# Patient Record
Sex: FEMALE | Race: WHITE | NOT HISPANIC OR LATINO | URBAN - METROPOLITAN AREA
[De-identification: names, ages, dates, MRNs, and addresses within clinical notes are randomized per-mention and may not be internally consistent; named-entity substitution may affect disease eponyms.]

---

## 2019-04-23 ENCOUNTER — EMERGENCY (EMERGENCY)
Facility: HOSPITAL | Age: 49
LOS: 1 days | Discharge: HOME | End: 2019-04-23
Attending: EMERGENCY MEDICINE
Payer: COMMERCIAL

## 2019-04-23 VITALS
RESPIRATION RATE: 20 BRPM | OXYGEN SATURATION: 100 % | DIASTOLIC BLOOD PRESSURE: 84 MMHG | WEIGHT: 169.98 LBS | HEART RATE: 122 BPM | TEMPERATURE: 99 F | HEIGHT: 61 IN | SYSTOLIC BLOOD PRESSURE: 138 MMHG

## 2019-04-23 VITALS
SYSTOLIC BLOOD PRESSURE: 137 MMHG | DIASTOLIC BLOOD PRESSURE: 76 MMHG | TEMPERATURE: 98 F | RESPIRATION RATE: 16 BRPM | OXYGEN SATURATION: 100 % | HEART RATE: 112 BPM

## 2019-04-23 DIAGNOSIS — R19.00 INTRA-ABDOMINAL AND PELVIC SWELLING, MASS AND LUMP, UNSPECIFIED SITE: ICD-10-CM

## 2019-04-23 LAB
ALBUMIN SERPL ELPH-MCNC: 3.6 G/DL — SIGNIFICANT CHANGE UP (ref 3.3–5.2)
ALP SERPL-CCNC: 61 U/L — SIGNIFICANT CHANGE UP (ref 40–120)
ALT FLD-CCNC: 10 U/L — SIGNIFICANT CHANGE UP
ANION GAP SERPL CALC-SCNC: 15 MMOL/L — SIGNIFICANT CHANGE UP (ref 5–17)
APAP SERPL-MCNC: <7.5 UG/ML — LOW (ref 10–26)
APPEARANCE UR: CLEAR — SIGNIFICANT CHANGE UP
APTT BLD: 30.5 SEC — SIGNIFICANT CHANGE UP (ref 27.5–36.3)
AST SERPL-CCNC: 16 U/L — SIGNIFICANT CHANGE UP
BACTERIA # UR AUTO: ABNORMAL
BASOPHILS # BLD AUTO: 0 K/UL — SIGNIFICANT CHANGE UP (ref 0–0.2)
BASOPHILS NFR BLD AUTO: 0.1 % — SIGNIFICANT CHANGE UP (ref 0–2)
BILIRUB DIRECT SERPL-MCNC: 0.1 MG/DL — SIGNIFICANT CHANGE UP (ref 0–0.3)
BILIRUB INDIRECT FLD-MCNC: 0.3 MG/DL — SIGNIFICANT CHANGE UP (ref 0.2–1)
BILIRUB SERPL-MCNC: 0.4 MG/DL — SIGNIFICANT CHANGE UP (ref 0.4–2)
BILIRUB SERPL-MCNC: 0.4 MG/DL — SIGNIFICANT CHANGE UP (ref 0.4–2)
BILIRUB UR-MCNC: NEGATIVE — SIGNIFICANT CHANGE UP
BUN SERPL-MCNC: 9 MG/DL — SIGNIFICANT CHANGE UP (ref 8–20)
CALCIUM SERPL-MCNC: 9 MG/DL — SIGNIFICANT CHANGE UP (ref 8.6–10.2)
CHLORIDE SERPL-SCNC: 101 MMOL/L — SIGNIFICANT CHANGE UP (ref 98–107)
CK SERPL-CCNC: 68 U/L — SIGNIFICANT CHANGE UP (ref 25–170)
CO2 SERPL-SCNC: 22 MMOL/L — SIGNIFICANT CHANGE UP (ref 22–29)
COLOR SPEC: YELLOW — SIGNIFICANT CHANGE UP
CREAT SERPL-MCNC: 0.66 MG/DL — SIGNIFICANT CHANGE UP (ref 0.5–1.3)
DIFF PNL FLD: ABNORMAL
EOSINOPHIL # BLD AUTO: 0 K/UL — SIGNIFICANT CHANGE UP (ref 0–0.5)
EOSINOPHIL NFR BLD AUTO: 0.4 % — SIGNIFICANT CHANGE UP (ref 0–6)
EPI CELLS # UR: SIGNIFICANT CHANGE UP
ETHANOL SERPL-MCNC: <10 MG/DL — SIGNIFICANT CHANGE UP
GLUCOSE SERPL-MCNC: 113 MG/DL — SIGNIFICANT CHANGE UP (ref 70–115)
GLUCOSE UR QL: NEGATIVE MG/DL — SIGNIFICANT CHANGE UP
HCT VFR BLD CALC: 30.9 % — LOW (ref 37–47)
HGB BLD-MCNC: 9.3 G/DL — LOW (ref 12–16)
INR BLD: 1.06 RATIO — SIGNIFICANT CHANGE UP (ref 0.88–1.16)
KETONES UR-MCNC: NEGATIVE — SIGNIFICANT CHANGE UP
LDH SERPL L TO P-CCNC: 273 U/L — HIGH (ref 98–192)
LEUKOCYTE ESTERASE UR-ACNC: NEGATIVE — SIGNIFICANT CHANGE UP
LIDOCAIN IGE QN: 24 U/L — SIGNIFICANT CHANGE UP (ref 22–51)
LYMPHOCYTES # BLD AUTO: 1.4 K/UL — SIGNIFICANT CHANGE UP (ref 1–4.8)
LYMPHOCYTES # BLD AUTO: 15.8 % — LOW (ref 20–55)
MAGNESIUM SERPL-MCNC: 1.9 MG/DL — SIGNIFICANT CHANGE UP (ref 1.6–2.6)
MCHC RBC-ENTMCNC: 24.3 PG — LOW (ref 27–31)
MCHC RBC-ENTMCNC: 30.1 G/DL — LOW (ref 32–36)
MCV RBC AUTO: 80.9 FL — LOW (ref 81–99)
MONOCYTES # BLD AUTO: 0.7 K/UL — SIGNIFICANT CHANGE UP (ref 0–0.8)
MONOCYTES NFR BLD AUTO: 7.8 % — SIGNIFICANT CHANGE UP (ref 3–10)
NEUTROPHILS # BLD AUTO: 6.9 K/UL — SIGNIFICANT CHANGE UP (ref 1.8–8)
NEUTROPHILS NFR BLD AUTO: 75.7 % — HIGH (ref 37–73)
NITRITE UR-MCNC: NEGATIVE — SIGNIFICANT CHANGE UP
PH UR: 7 — SIGNIFICANT CHANGE UP (ref 5–8)
PLATELET # BLD AUTO: 479 K/UL — HIGH (ref 150–400)
POTASSIUM SERPL-MCNC: 3.5 MMOL/L — SIGNIFICANT CHANGE UP (ref 3.5–5.3)
POTASSIUM SERPL-SCNC: 3.5 MMOL/L — SIGNIFICANT CHANGE UP (ref 3.5–5.3)
PROT SERPL-MCNC: 8.3 G/DL — SIGNIFICANT CHANGE UP (ref 6.6–8.7)
PROT UR-MCNC: 30 MG/DL
PROTHROM AB SERPL-ACNC: 12.2 SEC — SIGNIFICANT CHANGE UP (ref 10–12.9)
RBC # BLD: 3.82 M/UL — LOW (ref 4.4–5.2)
RBC # FLD: 16.5 % — HIGH (ref 11–15.6)
RBC CASTS # UR COMP ASSIST: SIGNIFICANT CHANGE UP /HPF (ref 0–4)
SODIUM SERPL-SCNC: 138 MMOL/L — SIGNIFICANT CHANGE UP (ref 135–145)
SP GR SPEC: 1 — LOW (ref 1.01–1.02)
TSH SERPL-MCNC: 2.51 UIU/ML — SIGNIFICANT CHANGE UP (ref 0.27–4.2)
UROBILINOGEN FLD QL: NEGATIVE MG/DL — SIGNIFICANT CHANGE UP
WBC # BLD: 9.1 K/UL — SIGNIFICANT CHANGE UP (ref 4.8–10.8)
WBC # FLD AUTO: 9.1 K/UL — SIGNIFICANT CHANGE UP (ref 4.8–10.8)
WBC UR QL: SIGNIFICANT CHANGE UP

## 2019-04-23 PROCEDURE — 87086 URINE CULTURE/COLONY COUNT: CPT

## 2019-04-23 PROCEDURE — 82248 BILIRUBIN DIRECT: CPT

## 2019-04-23 PROCEDURE — 83690 ASSAY OF LIPASE: CPT

## 2019-04-23 PROCEDURE — 71275 CT ANGIOGRAPHY CHEST: CPT | Mod: 26

## 2019-04-23 PROCEDURE — 82550 ASSAY OF CK (CPK): CPT

## 2019-04-23 PROCEDURE — 71275 CT ANGIOGRAPHY CHEST: CPT

## 2019-04-23 PROCEDURE — 80307 DRUG TEST PRSMV CHEM ANLYZR: CPT

## 2019-04-23 PROCEDURE — 80053 COMPREHEN METABOLIC PANEL: CPT

## 2019-04-23 PROCEDURE — 90471 IMMUNIZATION ADMIN: CPT

## 2019-04-23 PROCEDURE — 86301 IMMUNOASSAY TUMOR CA 19-9: CPT

## 2019-04-23 PROCEDURE — 99284 EMERGENCY DEPT VISIT MOD MDM: CPT

## 2019-04-23 PROCEDURE — 90715 TDAP VACCINE 7 YRS/> IM: CPT

## 2019-04-23 PROCEDURE — 85027 COMPLETE CBC AUTOMATED: CPT

## 2019-04-23 PROCEDURE — 82378 CARCINOEMBRYONIC ANTIGEN: CPT

## 2019-04-23 PROCEDURE — 12013 RPR F/E/E/N/L/M 2.6-5.0 CM: CPT

## 2019-04-23 PROCEDURE — 83615 LACTATE (LD) (LDH) ENZYME: CPT

## 2019-04-23 PROCEDURE — 74177 CT ABD & PELVIS W/CONTRAST: CPT

## 2019-04-23 PROCEDURE — 81001 URINALYSIS AUTO W/SCOPE: CPT

## 2019-04-23 PROCEDURE — 85610 PROTHROMBIN TIME: CPT

## 2019-04-23 PROCEDURE — 85730 THROMBOPLASTIN TIME PARTIAL: CPT

## 2019-04-23 PROCEDURE — 99284 EMERGENCY DEPT VISIT MOD MDM: CPT | Mod: 25

## 2019-04-23 PROCEDURE — 84443 ASSAY THYROID STIM HORMONE: CPT

## 2019-04-23 PROCEDURE — 86304 IMMUNOASSAY TUMOR CA 125: CPT

## 2019-04-23 PROCEDURE — 83735 ASSAY OF MAGNESIUM: CPT

## 2019-04-23 PROCEDURE — 36415 COLL VENOUS BLD VENIPUNCTURE: CPT

## 2019-04-23 PROCEDURE — 84702 CHORIONIC GONADOTROPIN TEST: CPT

## 2019-04-23 PROCEDURE — 74177 CT ABD & PELVIS W/CONTRAST: CPT | Mod: 26

## 2019-04-23 RX ORDER — CEPHALEXIN 500 MG
1 CAPSULE ORAL
Qty: 20 | Refills: 0 | OUTPATIENT
Start: 2019-04-23 | End: 2019-04-27

## 2019-04-23 RX ORDER — CEPHALEXIN 500 MG
500 CAPSULE ORAL ONCE
Qty: 0 | Refills: 0 | Status: COMPLETED | OUTPATIENT
Start: 2019-04-23 | End: 2019-04-23

## 2019-04-23 RX ORDER — TETANUS TOXOID, REDUCED DIPHTHERIA TOXOID AND ACELLULAR PERTUSSIS VACCINE, ADSORBED 5; 2.5; 8; 8; 2.5 [IU]/.5ML; [IU]/.5ML; UG/.5ML; UG/.5ML; UG/.5ML
0.5 SUSPENSION INTRAMUSCULAR ONCE
Qty: 0 | Refills: 0 | Status: COMPLETED | OUTPATIENT
Start: 2019-04-23 | End: 2019-04-23

## 2019-04-23 RX ADMIN — Medication 500 MILLIGRAM(S): at 21:49

## 2019-04-23 RX ADMIN — TETANUS TOXOID, REDUCED DIPHTHERIA TOXOID AND ACELLULAR PERTUSSIS VACCINE, ADSORBED 0.5 MILLILITER(S): 5; 2.5; 8; 8; 2.5 SUSPENSION INTRAMUSCULAR at 16:11

## 2019-04-23 NOTE — ED STATDOCS - NS ED ROS FT
Const: Denies fever, chills  HEENT: Denies blurry vision, sore throat  Neck: Denies neck pain/stiffness  Resp: Denies coughing, SOB  Cardiovascular: Denies CP, palpitations, LE edema  GI: (+) abdominal distension, weight loss (-) Denies nausea, vomiting, abdominal pain, diarrhea, constipation, blood in stool  : Denies urinary frequency/urgency/dysuria, hematuria  MSK: (+) nasal bridge pain (-) Denies back pain  Neuro: Denies HA, dizziness, numbness, weakness  Skin: (+) laceration (-) Denies rashes.

## 2019-04-23 NOTE — ED STATDOCS - ATTENDING CONTRIBUTION TO CARE
I, Rayshawn Peters, performed the initial face to face bedside interview with this patient regarding history of present illness, review of symptoms and relevant past medical, social and family history.  I completed an independent physical examination.  I was the initial provider who evaluated this patient. I have signed out the follow up of any pending tests (i.e. labs, radiological studies) to the ACP.  I have communicated the patient’s plan of care and disposition with the ACP.

## 2019-04-23 NOTE — ED ADULT NURSE NOTE - INTERVENTIONS DEFINITIONS
Instruct patient to call for assistance/Non-slip footwear when patient is off stretcher/Physically safe environment: no spills, clutter or unnecessary equipment/Review medications for side effects contributing to fall risk/Call bell, personal items and telephone within reach/Stretcher in lowest position, wheels locked, appropriate side rails in place/Monitor gait and stability/Reinforce activity limits and safety measures with patient and family/Monitor for mental status changes and reorient to person, place, and time

## 2019-04-23 NOTE — CONSULT NOTE ADULT - PROBLEM SELECTOR RECOMMENDATION 9
Patient okay for discharge from GYN perspective  Stat labs including CEA, CA-125,  and LDH to be drawn prior to discharge if discharge deemed acceptable by ED  Discussed with patient outpatient follow-up tomorrow in [place], patient provided with contact information and agrees to comply with follow-up.    Case discussed with Dr. Gonzalez

## 2019-04-23 NOTE — ED ADULT NURSE NOTE - OBJECTIVE STATEMENT
Patient AOx4, reliable historian, reports stripping over the sidewalk and falling. Patient denies all forms of pain at this time. Bruising noted to fall, mostly around nose and scant bleeding. Patient denies n/v, loss of consciousness, other heady injury. Patient has large abdomen, but patient denies

## 2019-04-23 NOTE — CONSULT NOTE ADULT - SUBJECTIVE AND OBJECTIVE BOX
GYNECOLOGIC ONCOLOGY CONSULT NOTE    47yo nulligravida LMP January 2019 with no pert. hx. presented to ED s/p mechanical fall with c/o moderate, aching pain to the bridge of her nose with laceration. Pt reports she had a fall on pavement outside of her work office. Patient underwent CT A/P with incidental finding of  complex cystic left adnexal mass measuring 17 cm. Massive cystic lesion occupying the lower abdomen measuring 33 cm. It is uncertain whether this represents a large peritoneal metastasis or a component of the above described cystic left adnexal mass, or possibly a right adnexal mass. There is suspicion for rupture of this mass with a large amount of abdominal ascites. Patient reports some occasional constipation over the past few years that comes and goes, she states that it has gotten icnreasingly worse over the past month along with some abdominal distention. She admits to weight loss although she states it was intentional with change in her diet. Patient otherwise has no complaints at this time. She reports never having any GYN Care. She also reports not being followed by a PCP. She reports FHx of Breast Cancer  in her Maternal Aunt. She denies any fevers, SOB, CP, N/V/D, abdominal pain, VB, changes in bladder function and/or calf pain.     OB/GYN HISTORY: G0  LPAP, Lcolonoscopy, Lmammo, LBone Density Scan: Never    Surgical History:    No significant past surgical history    Past Medical History:   No pertinent past medical history    No Known Drug Allergies    REVIEW OF SYSTEMS:    CONSTITUTIONAL: intentional weight loss No fever or fatigue  EYES: No eye pain, visual disturbances, or discharge  ENMT:  No difficulty hearing, tinnitus, vertigo; No sinus or throat pain  NECK: No pain or stiffness  BREASTS: No pain, masses, or nipple discharge  RESPIRATORY: No cough, wheezing, chills or hemoptysis; No shortness of breath  CARDIOVASCULAR: No chest pain, palpitations, dizziness, or leg swelling  GASTROINTESTINAL: Abdominal distention, constipation, No abdominal or epigastric pain. No nausea, vomiting, or hematemesis; No diarrhea. No melena or hematochezia.  GENITOURINARY: No dysuria, frequency, hematuria, or incontinence  NEUROLOGICAL: No headaches, memory loss, loss of strength, numbness, or tremors  SKIN: No itching, burning, rashes, or lesions   LYMPH NODES: No enlarged glands  ENDOCRINE: No heat or cold intolerance; No hair loss  MUSCULOSKELETAL: No joint pain or swelling; No muscle, back, or extremity pain  PSYCHIATRIC: No depression, anxiety, mood swings, or difficulty sleeping  HEME/LYMPH: No easy bruising, or bleeding gums  ALLERY AND IMMUNOLOGIC: No hives or eczema    MEDICATIONS  (STANDING):    OBJECTIVE FINDINGS:    Vital Signs Last 24 Hrs  T(F): 97.9 (23 Apr 2019 18:20), Max: 98.7 (23 Apr 2019 13:01)  HR: 112 (23 Apr 2019 18:20) (112 - 122)  BP: 137/76 (23 Apr 2019 18:20) (137/76 - 138/84)  RR: 16 (23 Apr 2019 18:20) (16 - 20)  SpO2: 100% (23 Apr 2019 18:20) (100% - 100%)    PHYSICAL EXAM:    GENERAL: NAD, well-developed  HEAD:  Swollen R. orbital with bruising, nasal area with laceration covered by dressing.   EYES: Conjunctiva and sclera clear  NECK: Normal thyroid  NERVOUS SYSTEM:  Alert & Oriented X3, Good concentration; Motor Strength 5/5 B/L upper and lower extremities; DTRs 2+ intact and symmetric  CHEST/LUNG: Clear to percussion bilaterally; No rales, rhonchi, wheezing, or rubs  HEART: Regular rate and rhythm; No murmurs, rubs, or gallops  ABDOMEN: Softly distended, nontender, Bowel sounds present, No rebound, No guarding  EXTREMITIES:  2+ Peripheral Pulses, No clubbing, cyanosis, or edema.  LYMPH: No lymphadenopathy noted  SKIN: No rashes or lesions      LABS:                        9.3    9.1   )-----------( 479      ( 23 Apr 2019 14:33 )             30.9

## 2019-04-23 NOTE — ED STATDOCS - PROGRESS NOTE DETAILS
None
PA note: Results of CT scan reviewed " Massive cystic lesion occupying the lower abdomen measuring 33 cm. It is uncertain whether this represents a large peritoneal metastasis or a component of the above described cystic left adnexal mass, or possibly a right adnexal mass." Case discussed with LAURIE Diana ( MD [facility] ). Pt was evaluated by Doctor Carlos who recommended pt follow up in [place] tomorrow. Pt was given follow up information. Pt instructed to return to ED in 5-7 days for suture removal. Pt given xeroform / gauze and instructed on wound care for facial laceration. PT educated to return to ED immediately if she develops abdominal pain.

## 2019-04-23 NOTE — CONSULT NOTE ADULT - ASSESSMENT
49yo s/p fall with incidental finding on CT revealing complex left adnexal mass as well as massive cystic lesion representing peritoneal mets vs component of adnexal cyst, c/o abdominal distention and constipation.

## 2019-04-23 NOTE — ED STATDOCS - PHYSICAL EXAMINATION
Const: Awake, alert and oriented. In no acute distress. Well appearing.  HEENT: NC/AT. Moist mucous membranes.  Eyes: No scleral icterus. EOMI.  Neck:. Soft and supple. Full ROM without pain.  Cardiac: Regular rate and rhythm. +S1/S2. No murmurs. Peripheral pulses 2+ and symmetric. No LE edema.  Resp: Speaking in full sentences. No evidence of respiratory distress. No wheezes, rales or rhonchi.  Abd: Abdomen soft nontender, distended with large volume ascites, with protruding umbilicus and spiders angiomata. Normal bowel sounds in all 4 quadrants. No guarding or rebound.  Back: Spine midline and non-tender. No CVAT.  Skin: 3 cm vertical laceration to bridge of nose, with avulsion over proximal nasal bridge; no bony tenderness, or crepitus.

## 2019-04-23 NOTE — ED STATDOCS - OBJECTIVE STATEMENT
49 y/o F pt with no pert. hx. presents to ED with co-workers/friends s/p mechanical trip and fall 1 hour ago c/o moderate, aching pain to bridge of nose with laceration. Pt reports she had a fall on pavement outside of her work office. In addition, both pt and friends from work, describe pt having 3 months of progressive abdominal distension with assoc. weight loss. Pt admits she has declined to see a doctor up to this point. Pt has not had any routine physicals or health maintenance in many years, including pap smear, and colonoscopy. Pt reports family hx of pancreatic and breast cancer. Pt is not currently on meds. Pt is a non-smoker. Pt denies use of Tylenol. Denies vaginal bleeding, nausea, vomiting and diarrhea. No further complaints at this time.

## 2019-04-23 NOTE — ED STATDOCS - CARE PLAN
Principal Discharge DX:	Pelvic mass Principal Discharge DX:	Pelvic mass  Secondary Diagnosis:	Ascites, malignant  Secondary Diagnosis:	Nasal laceration, initial encounter

## 2019-04-24 ENCOUNTER — APPOINTMENT (OUTPATIENT)
Dept: GYNECOLOGIC ONCOLOGY | Facility: CLINIC | Age: 49
End: 2019-04-24
Payer: COMMERCIAL

## 2019-04-24 VITALS — BODY MASS INDEX: 32.47 KG/M2 | HEIGHT: 61 IN | WEIGHT: 172 LBS

## 2019-04-24 DIAGNOSIS — Z80.3 FAMILY HISTORY OF MALIGNANT NEOPLASM OF BREAST: ICD-10-CM

## 2019-04-24 LAB
CANCER AG125 SERPL-ACNC: 93 U/ML — HIGH
CANCER AG19-9 SERPL-ACNC: 99 U/ML — HIGH
CEA SERPL-MCNC: <0.6 NG/ML — SIGNIFICANT CHANGE UP (ref 0–3.8)
CULTURE RESULTS: SIGNIFICANT CHANGE UP
SPECIMEN SOURCE: SIGNIFICANT CHANGE UP

## 2019-04-24 PROCEDURE — 99204 OFFICE O/P NEW MOD 45 MIN: CPT

## 2019-04-26 ENCOUNTER — TRANSCRIPTION ENCOUNTER (OUTPATIENT)
Age: 49
End: 2019-04-26

## 2019-04-26 NOTE — CHIEF COMPLAINT
[FreeTextEntry1] : [facility]\par \par [facility] [phone] at [address] [place], [place] [id number]

## 2019-04-26 NOTE — HISTORY OF PRESENT ILLNESS
[FreeTextEntry1] : This 49 y/o nulligravida female, LMP 1/2019 with no pertinent history presented to ED at [facility] on 4/23 s/p mechanical fall with c/o moderate, aching pain to the bridge of her nose with laceration. Pt. at that time reported she had a fall on the pavement outside of her work office. Patient underwent CT A/P with incidental finding of complex cystic left adnexal mass measuring 17cm. Massive cyst occupying the lower abdomen measuring 33cm. It is uncertain whether this represents a large peritoneal metastasis or a component of the above described cystic left adnexal mass, or possibly a right adnexal mass. There is suspicion for rupture of this mass with a large amount of abdominal ascites. Patient reports some occasional constipation that comes and goes as well as abdominal distension since December. She does admit to weight loss, amount which she is unable to quantify but does admit it can be intentional to a change in her diet. Family history significant for a maternal aunt with breast ca later in life and sister who was diagnosed in her 40s, who she reports had genetic testing which was negative.\par She reports last menstrual period was in 8/2018 which she reports was irregular x 1 year, and then she had an episode of bleeding in 1/2019. She denies any GYN care in the past.\par Denies ever having a PAP smear, mammogram, colonoscopy or bone scan. \par

## 2019-04-26 NOTE — ASSESSMENT
[FreeTextEntry1] : This 49 y/o nulligravida, virginal female being referred for large pelvic mass found incidentally during mechanical fall work up. Physical examination today revealed an intact hymen which one finger was able to pass into the vaginal canal, pelvic mass filling up the pelvis, unable to tell if it is arising from the uterus. \par \par Discussed with the patient that we are unable to see where this mass is originating from but I am recommending surgical management. I would like her to see a PCP prior to the surgery for medical clearance as she does not routinely follow up with a doctor. \par \par I discussed at length with the patient the nature, purpose, risks, benefits, and alternatives of total abdominal hysterectomy and bilateral salpingo-oophorectomy via a vertical, midline incision, possible bilateral pelvic and para-aortic lymphadenectomy and surgical staging, FS.  The patient understands the risks to include (but not be limited to) bowel injury, bleeding (with the possible need for transfusion), bladder or ureteral injury, infections, protracted wound closure, deep venous thrombosis, and jazmin-operative death.  She is also aware of  the possibility of  a unrecognized surgical complication with need for subsequent re-exploration.  She also understands the rationale for surgical procedures such as omentectomy, or pelvic and para-aortic lymphadenectomy for the proper staging of a gynecological cancer.  She agrees to proceed.  She asked numerous questions which were answered to her satisfaction.  She understands the need for a pre-operative bowel preparation and agrees to comply with our instructions.

## 2019-04-26 NOTE — PHYSICAL EXAM
[Normal] : Bimanual Exam: Normal [de-identified] : tense distension [de-identified] : intact hymen, one finger able to pass through, ovrerall normal cervix [de-identified] : unable to decifer if mass related to uterus, mass filling the pelvis  [de-identified] : Patient was interviewed and examined with chaperone present. Name of Chaperone: Sil Valenzuela PA-C

## 2019-04-29 ENCOUNTER — EMERGENCY (EMERGENCY)
Facility: HOSPITAL | Age: 49
LOS: 1 days | Discharge: HOME | End: 2019-04-29
Attending: EMERGENCY MEDICINE

## 2019-04-29 VITALS
RESPIRATION RATE: 20 BRPM | SYSTOLIC BLOOD PRESSURE: 116 MMHG | HEIGHT: 61 IN | OXYGEN SATURATION: 98 % | WEIGHT: 201.94 LBS | DIASTOLIC BLOOD PRESSURE: 79 MMHG | TEMPERATURE: 98 F | HEART RATE: 112 BPM

## 2019-04-29 NOTE — ED PROVIDER NOTE - ATTENDING CONTRIBUTION TO CARE
Nicole: I performed a face to face bedside interview with patient regarding history of present illness, review of symptoms and past medical history. I completed an independent physical exam.  I have discussed patient's plan of care with advanced care provider.   I agree with note as stated above including HISTORY OF PRESENT ILLNESS, HIV, PAST MEDICAL/SURGICAL/FAMILY/SOCIAL HISTORY, ALLERGIES AND HOME MEDICATIONS, REVIEW OF SYSTEMS, PHYSICAL EXAM, MEDICAL DECISION MAKING and any PROGRESS NOTES during the time I functioned as the attending physician for this patient  unless otherwise noted. My brief assessment is as follows: 5 sutures to bridge of nose, no infection, healing well, removed by LAURIE Nixon

## 2019-05-01 ENCOUNTER — OUTPATIENT (OUTPATIENT)
Dept: OUTPATIENT SERVICES | Facility: HOSPITAL | Age: 49
LOS: 1 days | End: 2019-05-01
Payer: COMMERCIAL

## 2019-05-01 VITALS
DIASTOLIC BLOOD PRESSURE: 79 MMHG | RESPIRATION RATE: 18 BRPM | HEIGHT: 61 IN | SYSTOLIC BLOOD PRESSURE: 118 MMHG | TEMPERATURE: 98 F | WEIGHT: 163.14 LBS | HEART RATE: 111 BPM

## 2019-05-01 DIAGNOSIS — N94.9 UNSPECIFIED CONDITION ASSOCIATED WITH FEMALE GENITAL ORGANS AND MENSTRUAL CYCLE: ICD-10-CM

## 2019-05-01 DIAGNOSIS — Z98.890 OTHER SPECIFIED POSTPROCEDURAL STATES: Chronic | ICD-10-CM

## 2019-05-01 DIAGNOSIS — Z29.9 ENCOUNTER FOR PROPHYLACTIC MEASURES, UNSPECIFIED: ICD-10-CM

## 2019-05-01 DIAGNOSIS — Z13.89 ENCOUNTER FOR SCREENING FOR OTHER DISORDER: ICD-10-CM

## 2019-05-01 DIAGNOSIS — Z01.818 ENCOUNTER FOR OTHER PREPROCEDURAL EXAMINATION: ICD-10-CM

## 2019-05-01 LAB
BLD GP AB SCN SERPL QL: SIGNIFICANT CHANGE UP
HBA1C BLD-MCNC: 5.2 % — SIGNIFICANT CHANGE UP (ref 4–5.6)
TYPE + AB SCN PNL BLD: SIGNIFICANT CHANGE UP

## 2019-05-01 PROCEDURE — 93010 ELECTROCARDIOGRAM REPORT: CPT

## 2019-05-01 RX ORDER — SODIUM CHLORIDE 9 MG/ML
3 INJECTION INTRAMUSCULAR; INTRAVENOUS; SUBCUTANEOUS EVERY 8 HOURS
Qty: 0 | Refills: 0 | Status: DISCONTINUED | OUTPATIENT
Start: 2019-05-08 | End: 2019-05-11

## 2019-05-01 NOTE — H&P PST ADULT - ENMT COMMENTS
Pt has positive ecchymosis noted to right eye, facial area (around her mouth) and bandage to nose in place

## 2019-05-01 NOTE — H&P PST ADULT - NSICDXPROBLEM_GEN_ALL_CORE_FT
PROBLEM DIAGNOSES  Problem: Unsp cond assoc w female genital organs and menstrual cycle  Assessment and Plan: Exploratory Laparotomy, Total Abdominal Hysterectomy, Bilateral Salpingo-Oophorectomy, Possible Staging, Frozen Section    Problem: Need for prophylactic measure  Assessment and Plan:     Problem: Screening for substance abuse  Assessment and Plan: PROBLEM DIAGNOSES  Problem: Unsp cond assoc w female genital organs and menstrual cycle  Assessment and Plan: Exploratory Laparotomy, Total Abdominal Hysterectomy, Bilateral Salpingo-Oophorectomy, Possible Staging, Frozen Section  Medical Clearance (Per surgeon)    Problem: Need for prophylactic measure  Assessment and Plan: High risk.  Surgical Team to evaluate need for Pharmacologic VTE Prophylaxis     Problem: Screening for substance abuse  Assessment and Plan: Opioid screening tool score =0.  Low risk for potential future abuse

## 2019-05-01 NOTE — H&P PST ADULT - ASSESSMENT

## 2019-05-01 NOTE — H&P PST ADULT - HISTORY OF PRESENT ILLNESS
This is a 48 y.o female who presents to PST today. This is a 48 y.o female who presents to PST today.  The pt reports she tripped and fell approximately six days ago and sustained an nasal injury.  She was evaluated in [facility] ED at that time and upon physical examination was noted to have abdominal distention.  She underwent a CT scan and was noted to have a mass.  She was evaluated by Dr. Gonzalez while in the ED and the next day in his office.  She is now in anticipation of a Laparotomy and hysterectomy.

## 2019-05-01 NOTE — H&P PST ADULT - RS GEN PE MLT RESP DETAILS PC
diminished breath sounds, R/diminished breath sounds, L/normal/airway patent/respirations non-labored

## 2019-05-01 NOTE — H&P PST ADULT - NSICDXFAMILYHX_GEN_ALL_CORE_FT
FAMILY HISTORY:  Sibling  Still living? Yes, Estimated age: 41-50  FH: breast cancer, Age at diagnosis: 41-50

## 2019-05-01 NOTE — H&P PST ADULT - NSANTHOSAYNRD_GEN_A_CORE
No. BROOKS screening performed.  STOP BANG Legend: 0-2 = LOW Risk; 3-4 = INTERMEDIATE Risk; 5-8 = HIGH Risk

## 2019-05-02 PROBLEM — Z78.9 OTHER SPECIFIED HEALTH STATUS: Chronic | Status: INACTIVE | Noted: 2019-04-23 | Resolved: 2019-05-01

## 2019-05-07 ENCOUNTER — RESULT REVIEW (OUTPATIENT)
Age: 49
End: 2019-05-07

## 2019-05-07 ENCOUNTER — INPATIENT (INPATIENT)
Facility: HOSPITAL | Age: 49
LOS: 3 days | Discharge: HOME | DRG: 742 | End: 2019-05-11
Attending: OBSTETRICS & GYNECOLOGY | Admitting: OBSTETRICS & GYNECOLOGY
Payer: COMMERCIAL

## 2019-05-07 VITALS
RESPIRATION RATE: 16 BRPM | DIASTOLIC BLOOD PRESSURE: 73 MMHG | OXYGEN SATURATION: 97 % | HEART RATE: 113 BPM | WEIGHT: 163.14 LBS | TEMPERATURE: 100 F | HEIGHT: 61 IN | SYSTOLIC BLOOD PRESSURE: 108 MMHG

## 2019-05-07 DIAGNOSIS — Z98.890 OTHER SPECIFIED POSTPROCEDURAL STATES: Chronic | ICD-10-CM

## 2019-05-07 DIAGNOSIS — N94.9 UNSPECIFIED CONDITION ASSOCIATED WITH FEMALE GENITAL ORGANS AND MENSTRUAL CYCLE: ICD-10-CM

## 2019-05-07 LAB
ABO RH CONFIRMATION: SIGNIFICANT CHANGE UP
GLUCOSE BLDC GLUCOMTR-MCNC: 135 MG/DL — HIGH (ref 70–99)
GLUCOSE BLDC GLUCOMTR-MCNC: 91 MG/DL — SIGNIFICANT CHANGE UP (ref 70–99)
GLUCOSE BLDC GLUCOMTR-MCNC: 95 MG/DL — SIGNIFICANT CHANGE UP (ref 70–99)

## 2019-05-07 PROCEDURE — 36415 COLL VENOUS BLD VENIPUNCTURE: CPT

## 2019-05-07 PROCEDURE — 44604 SUTURE LARGE INTESTINE: CPT | Mod: 80,59

## 2019-05-07 PROCEDURE — 88333 PATH CONSLTJ SURG CYTO XM 1: CPT | Mod: 26

## 2019-05-07 PROCEDURE — 58954 TAH RAD DEBULK/LYMPH REMOVE: CPT | Mod: 80

## 2019-05-07 PROCEDURE — 86850 RBC ANTIBODY SCREEN: CPT

## 2019-05-07 PROCEDURE — 88305 TISSUE EXAM BY PATHOLOGIST: CPT | Mod: 26

## 2019-05-07 PROCEDURE — 58954 TAH RAD DEBULK/LYMPH REMOVE: CPT | Mod: 22

## 2019-05-07 PROCEDURE — G0463: CPT

## 2019-05-07 PROCEDURE — 93005 ELECTROCARDIOGRAM TRACING: CPT

## 2019-05-07 PROCEDURE — 88307 TISSUE EXAM BY PATHOLOGIST: CPT | Mod: 26

## 2019-05-07 PROCEDURE — 86901 BLOOD TYPING SEROLOGIC RH(D): CPT

## 2019-05-07 PROCEDURE — 83036 HEMOGLOBIN GLYCOSYLATED A1C: CPT

## 2019-05-07 PROCEDURE — 86923 COMPATIBILITY TEST ELECTRIC: CPT

## 2019-05-07 PROCEDURE — 44604 SUTURE LARGE INTESTINE: CPT | Mod: 59

## 2019-05-07 PROCEDURE — 88304 TISSUE EXAM BY PATHOLOGIST: CPT | Mod: 26

## 2019-05-07 PROCEDURE — 86900 BLOOD TYPING SEROLOGIC ABO: CPT

## 2019-05-07 RX ORDER — NALOXONE HYDROCHLORIDE 4 MG/.1ML
0.1 SPRAY NASAL
Qty: 0 | Refills: 0 | Status: DISCONTINUED | OUTPATIENT
Start: 2019-05-07 | End: 2019-05-11

## 2019-05-07 RX ORDER — DIPHENHYDRAMINE HCL 50 MG
12.5 CAPSULE ORAL EVERY 4 HOURS
Qty: 0 | Refills: 0 | Status: DISCONTINUED | OUTPATIENT
Start: 2019-05-07 | End: 2019-05-11

## 2019-05-07 RX ORDER — FENTANYL CITRATE 50 UG/ML
30 INJECTION INTRAVENOUS
Qty: 0 | Refills: 0 | Status: DISCONTINUED | OUTPATIENT
Start: 2019-05-07 | End: 2019-05-09

## 2019-05-07 RX ORDER — DEXTROSE MONOHYDRATE, SODIUM CHLORIDE, AND POTASSIUM CHLORIDE 50; .745; 4.5 G/1000ML; G/1000ML; G/1000ML
1000 INJECTION, SOLUTION INTRAVENOUS
Qty: 0 | Refills: 0 | Status: DISCONTINUED | OUTPATIENT
Start: 2019-05-08 | End: 2019-05-08

## 2019-05-07 RX ORDER — FENTANYL CITRATE 50 UG/ML
25 INJECTION INTRAVENOUS
Qty: 0 | Refills: 0 | Status: DISCONTINUED | OUTPATIENT
Start: 2019-05-07 | End: 2019-05-08

## 2019-05-07 RX ORDER — CEFOTETAN DISODIUM 1 G
1 VIAL (EA) INJECTION EVERY 12 HOURS
Qty: 0 | Refills: 0 | Status: COMPLETED | OUTPATIENT
Start: 2019-05-07 | End: 2019-05-08

## 2019-05-07 RX ORDER — SODIUM CHLORIDE 9 MG/ML
1000 INJECTION, SOLUTION INTRAVENOUS
Qty: 0 | Refills: 0 | Status: DISCONTINUED | OUTPATIENT
Start: 2019-05-07 | End: 2019-05-08

## 2019-05-07 RX ORDER — ONDANSETRON 8 MG/1
4 TABLET, FILM COATED ORAL ONCE
Qty: 0 | Refills: 0 | Status: DISCONTINUED | OUTPATIENT
Start: 2019-05-07 | End: 2019-05-08

## 2019-05-07 RX ORDER — ONDANSETRON 8 MG/1
4 TABLET, FILM COATED ORAL EVERY 6 HOURS
Qty: 0 | Refills: 0 | Status: DISCONTINUED | OUTPATIENT
Start: 2019-05-08 | End: 2019-05-11

## 2019-05-07 RX ORDER — ENOXAPARIN SODIUM 100 MG/ML
40 INJECTION SUBCUTANEOUS DAILY
Qty: 0 | Refills: 0 | Status: DISCONTINUED | OUTPATIENT
Start: 2019-05-08 | End: 2019-05-11

## 2019-05-07 RX ORDER — ONDANSETRON 8 MG/1
4 TABLET, FILM COATED ORAL EVERY 6 HOURS
Qty: 0 | Refills: 0 | Status: DISCONTINUED | OUTPATIENT
Start: 2019-05-07 | End: 2019-05-11

## 2019-05-07 RX ORDER — KETOROLAC TROMETHAMINE 30 MG/ML
30 SYRINGE (ML) INJECTION EVERY 6 HOURS
Qty: 0 | Refills: 0 | Status: DISCONTINUED | OUTPATIENT
Start: 2019-05-08 | End: 2019-05-09

## 2019-05-07 RX ADMIN — Medication 100 GRAM(S): at 15:12

## 2019-05-07 RX ADMIN — FENTANYL CITRATE 30 MILLILITER(S): 50 INJECTION INTRAVENOUS at 21:02

## 2019-05-08 ENCOUNTER — RESULT REVIEW (OUTPATIENT)
Age: 49
End: 2019-05-08

## 2019-05-08 DIAGNOSIS — Z90.710 ACQUIRED ABSENCE OF BOTH CERVIX AND UTERUS: ICD-10-CM

## 2019-05-08 LAB
ALBUMIN SERPL ELPH-MCNC: 2.1 G/DL — LOW (ref 3.3–5.2)
ANION GAP SERPL CALC-SCNC: 8 MMOL/L — SIGNIFICANT CHANGE UP (ref 5–17)
ANISOCYTOSIS BLD QL: SLIGHT — SIGNIFICANT CHANGE UP
ANISOCYTOSIS BLD QL: SLIGHT — SIGNIFICANT CHANGE UP
BUN SERPL-MCNC: 14 MG/DL — SIGNIFICANT CHANGE UP (ref 8–20)
BURR CELLS BLD QL SMEAR: PRESENT — SIGNIFICANT CHANGE UP
CALCIUM SERPL-MCNC: 7.4 MG/DL — LOW (ref 8.6–10.2)
CHLORIDE SERPL-SCNC: 102 MMOL/L — SIGNIFICANT CHANGE UP (ref 98–107)
CO2 SERPL-SCNC: 22 MMOL/L — SIGNIFICANT CHANGE UP (ref 22–29)
CREAT SERPL-MCNC: 0.67 MG/DL — SIGNIFICANT CHANGE UP (ref 0.5–1.3)
ELLIPTOCYTES BLD QL SMEAR: SLIGHT — SIGNIFICANT CHANGE UP
GLUCOSE SERPL-MCNC: 234 MG/DL — HIGH (ref 70–115)
HCT VFR BLD CALC: 17.6 % — CRITICAL LOW (ref 37–47)
HCT VFR BLD CALC: 28.7 % — LOW (ref 37–47)
HGB BLD-MCNC: 5.7 G/DL — CRITICAL LOW (ref 12–16)
HGB BLD-MCNC: 9.2 G/DL — LOW (ref 12–16)
HYPOCHROMIA BLD QL: SLIGHT — SIGNIFICANT CHANGE UP
LYMPHOCYTES # BLD AUTO: 4 % — LOW (ref 20–55)
LYMPHOCYTES # BLD AUTO: 5 % — LOW (ref 20–55)
MACROCYTES BLD QL: SLIGHT — SIGNIFICANT CHANGE UP
MAGNESIUM SERPL-MCNC: 1.8 MG/DL — SIGNIFICANT CHANGE UP (ref 1.6–2.6)
MCHC RBC-ENTMCNC: 25.7 PG — LOW (ref 27–31)
MCHC RBC-ENTMCNC: 26.4 PG — LOW (ref 27–31)
MCHC RBC-ENTMCNC: 32.1 G/DL — SIGNIFICANT CHANGE UP (ref 32–36)
MCHC RBC-ENTMCNC: 32.4 G/DL — SIGNIFICANT CHANGE UP (ref 32–36)
MCV RBC AUTO: 80.2 FL — LOW (ref 81–99)
MCV RBC AUTO: 81.5 FL — SIGNIFICANT CHANGE UP (ref 81–99)
MICROCYTES BLD QL: SLIGHT — SIGNIFICANT CHANGE UP
MICROCYTES BLD QL: SLIGHT — SIGNIFICANT CHANGE UP
MONOCYTES NFR BLD AUTO: 7 % — SIGNIFICANT CHANGE UP (ref 3–10)
MONOCYTES NFR BLD AUTO: 9 % — SIGNIFICANT CHANGE UP (ref 3–10)
NEUTROPHILS NFR BLD AUTO: 87 % — HIGH (ref 37–73)
NEUTROPHILS NFR BLD AUTO: 88 % — HIGH (ref 37–73)
OVALOCYTES BLD QL SMEAR: SLIGHT — SIGNIFICANT CHANGE UP
OVALOCYTES BLD QL SMEAR: SLIGHT — SIGNIFICANT CHANGE UP
PLAT MORPH BLD: NORMAL — SIGNIFICANT CHANGE UP
PLAT MORPH BLD: NORMAL — SIGNIFICANT CHANGE UP
PLATELET # BLD AUTO: 291 K/UL — SIGNIFICANT CHANGE UP (ref 150–400)
PLATELET # BLD AUTO: 527 K/UL — HIGH (ref 150–400)
POIKILOCYTOSIS BLD QL AUTO: SLIGHT — SIGNIFICANT CHANGE UP
POIKILOCYTOSIS BLD QL AUTO: SLIGHT — SIGNIFICANT CHANGE UP
POLYCHROMASIA BLD QL SMEAR: SLIGHT — SIGNIFICANT CHANGE UP
POLYCHROMASIA BLD QL SMEAR: SLIGHT — SIGNIFICANT CHANGE UP
POTASSIUM SERPL-MCNC: 4.8 MMOL/L — SIGNIFICANT CHANGE UP (ref 3.5–5.3)
POTASSIUM SERPL-SCNC: 4.8 MMOL/L — SIGNIFICANT CHANGE UP (ref 3.5–5.3)
RBC # BLD: 2.16 M/UL — LOW (ref 4.4–5.2)
RBC # BLD: 3.58 M/UL — LOW (ref 4.4–5.2)
RBC # FLD: 16.1 % — HIGH (ref 11–15.6)
RBC # FLD: 16.3 % — HIGH (ref 11–15.6)
RBC BLD AUTO: ABNORMAL
RBC BLD AUTO: ABNORMAL
SODIUM SERPL-SCNC: 132 MMOL/L — LOW (ref 135–145)
WBC # BLD: 13.3 K/UL — HIGH (ref 4.8–10.8)
WBC # BLD: 7.7 K/UL — SIGNIFICANT CHANGE UP (ref 4.8–10.8)
WBC # FLD AUTO: 13.3 K/UL — HIGH (ref 4.8–10.8)
WBC # FLD AUTO: 7.7 K/UL — SIGNIFICANT CHANGE UP (ref 4.8–10.8)

## 2019-05-08 PROCEDURE — 71045 X-RAY EXAM CHEST 1 VIEW: CPT | Mod: 26

## 2019-05-08 PROCEDURE — 88305 TISSUE EXAM BY PATHOLOGIST: CPT | Mod: 26

## 2019-05-08 PROCEDURE — 88112 CYTOPATH CELL ENHANCE TECH: CPT | Mod: 26

## 2019-05-08 RX ORDER — DEXTROSE MONOHYDRATE, SODIUM CHLORIDE, AND POTASSIUM CHLORIDE 50; .745; 4.5 G/1000ML; G/1000ML; G/1000ML
1000 INJECTION, SOLUTION INTRAVENOUS
Qty: 0 | Refills: 0 | Status: DISCONTINUED | OUTPATIENT
Start: 2019-05-08 | End: 2019-05-09

## 2019-05-08 RX ORDER — SODIUM CHLORIDE 9 MG/ML
1000 INJECTION INTRAMUSCULAR; INTRAVENOUS; SUBCUTANEOUS
Qty: 0 | Refills: 0 | Status: DISCONTINUED | OUTPATIENT
Start: 2019-05-08 | End: 2019-05-08

## 2019-05-08 RX ORDER — CEFOTETAN DISODIUM 1 G
2 VIAL (EA) INJECTION ONCE
Qty: 0 | Refills: 0 | Status: COMPLETED | OUTPATIENT
Start: 2019-05-08 | End: 2019-05-07

## 2019-05-08 RX ADMIN — Medication 30 MILLIGRAM(S): at 12:18

## 2019-05-08 RX ADMIN — SODIUM CHLORIDE 3 MILLILITER(S): 9 INJECTION INTRAMUSCULAR; INTRAVENOUS; SUBCUTANEOUS at 06:14

## 2019-05-08 RX ADMIN — Medication 30 MILLIGRAM(S): at 12:30

## 2019-05-08 RX ADMIN — Medication 30 MILLIGRAM(S): at 00:55

## 2019-05-08 RX ADMIN — Medication 30 MILLIGRAM(S): at 06:20

## 2019-05-08 RX ADMIN — SODIUM CHLORIDE 3 MILLILITER(S): 9 INJECTION INTRAMUSCULAR; INTRAVENOUS; SUBCUTANEOUS at 20:52

## 2019-05-08 RX ADMIN — Medication 30 MILLIGRAM(S): at 07:00

## 2019-05-08 RX ADMIN — DEXTROSE MONOHYDRATE, SODIUM CHLORIDE, AND POTASSIUM CHLORIDE 125 MILLILITER(S): 50; .745; 4.5 INJECTION, SOLUTION INTRAVENOUS at 16:15

## 2019-05-08 RX ADMIN — Medication 30 MILLIGRAM(S): at 17:20

## 2019-05-08 RX ADMIN — Medication 100 GRAM(S): at 16:15

## 2019-05-08 RX ADMIN — FENTANYL CITRATE 30 MILLILITER(S): 50 INJECTION INTRAVENOUS at 06:13

## 2019-05-08 RX ADMIN — SODIUM CHLORIDE 3 MILLILITER(S): 9 INJECTION INTRAMUSCULAR; INTRAVENOUS; SUBCUTANEOUS at 12:18

## 2019-05-08 RX ADMIN — ENOXAPARIN SODIUM 40 MILLIGRAM(S): 100 INJECTION SUBCUTANEOUS at 12:17

## 2019-05-08 RX ADMIN — FENTANYL CITRATE 30 MILLILITER(S): 50 INJECTION INTRAVENOUS at 19:18

## 2019-05-08 RX ADMIN — DEXTROSE MONOHYDRATE, SODIUM CHLORIDE, AND POTASSIUM CHLORIDE 125 MILLILITER(S): 50; .745; 4.5 INJECTION, SOLUTION INTRAVENOUS at 00:52

## 2019-05-08 RX ADMIN — Medication 30 MILLIGRAM(S): at 17:07

## 2019-05-08 RX ADMIN — FENTANYL CITRATE 30 MILLILITER(S): 50 INJECTION INTRAVENOUS at 07:33

## 2019-05-08 RX ADMIN — Medication 100 GRAM(S): at 02:55

## 2019-05-08 RX ADMIN — Medication 30 MILLIGRAM(S): at 01:22

## 2019-05-08 NOTE — PROGRESS NOTE ADULT - SUBJECTIVE AND OBJECTIVE BOX
Patient seen for afternoon rounds with Dr. Gonzalez.  Patient is OOB to chair, in good spirits and without complaints at this time.  Pain is well controlled with PCA.  Grey with 700 UO in 16 hrs, will continue to monitor output.  Encouraged incentive spirometer use, patient demonstrated inhale to 700 at bedside and was educated on proper use.  Fluids remain at 125 cc/hr.   NPO with NGT in place.  CXR demonstrate NGT with proper placement, with no output. Will monitor output for possible removal of NGT tomorrow AM.   Abdominal binder ordered  Will continue to monitor AM labs.     Plan discussed with Dr. Gonzalez

## 2019-05-08 NOTE — PROGRESS NOTE ADULT - SUBJECTIVE AND OBJECTIVE BOX
patient vitals are stable, urine output 375 ml in the last 6 hours, pain is controlled, A- line still in, BP still stable , repeat HGB is 5.7 , will transfuse 2 unit of packed red blood cell , 1 unit of fresh frozen , will d/w dr leal .

## 2019-05-08 NOTE — PROGRESS NOTE ADULT - PROBLEM SELECTOR PLAN 1
Patient doing well on AM rounds. Urine output 75cc/hour. Will continue to monitor closely. CBC in PACU drawn from A-line. Repeat wnl. 2 units and 1 unit FFP held for now. NG tube with 0 cc output. Will discuss plans to advance today. Pain controlled with PCA.

## 2019-05-08 NOTE — PROGRESS NOTE ADULT - SUBJECTIVE AND OBJECTIVE BOX
GYNECOLOGIC ONCOLOGY PROGRESS NOTE    POD#1 s/p ex lap, pelvic mass removal, total abdominal hysterectomy, bilateral salpingectomy    PROBLEMS:  Unsp cond assoc w female genital organs and menstrual cycle  Need for prophylactic measure  Screening for substance abuse      Pt seen and examined at bedside.     SUBJECTIVE:    Patient is without complaints.  Pain well-controlled.  Flatus: absent   Denies Nausea, Vomiting or Diarrhea.  Denies shortness of breath, chest pain or dyspnea on exertion.  NPO, NG tube to suction     OBJECTIVE:     VITALS:  T(F): 99 (05-08-19 @ 06:19), Max: 99.5 (05-07-19 @ 11:19)  HR: 83 (05-08-19 @ 06:19) (83 - 113)  BP: 95/56 (05-08-19 @ 06:19) (88/49 - 108/73)  RR: 18 (05-08-19 @ 06:19) (14 - 18)  SpO2: 97% (05-08-19 @ 06:19) (96% - 100%)    I&O's Summary    07 May 2019 07:01  -  08 May 2019 06:57  --------------------------------------------------------  IN: 0 mL / OUT: 375 mL / NET: -375 mL        Physical Exam:  Constitutional: NAD  Pulmonary: clear to auscultation bilaterally   Cardiovascular: Regular rate and rhythm   Abdomen: soft, non-tender, non-distended, distant bowel sounds  Extremities: no lower extremity edema or calve tenderness  Incision: Clean, dry, intact.  Without signs of infection or hernia.      LABS:                        9.2    13.3  )-----------( 527      ( 08 May 2019 04:40 )             28.7     05-08    132<L>  |  102  |  14.0  ----------------------------<  234<H>  4.8   |  22.0  |  0.67    Ca    7.4<L>      08 May 2019 04:40  Mg     1.8     05-08            RADIOLOGY & ADDITIONAL TESTS:

## 2019-05-09 DIAGNOSIS — D64.9 ANEMIA, UNSPECIFIED: ICD-10-CM

## 2019-05-09 LAB
ALBUMIN SERPL ELPH-MCNC: 2 G/DL — LOW (ref 3.3–5.2)
ANION GAP SERPL CALC-SCNC: 7 MMOL/L — SIGNIFICANT CHANGE UP (ref 5–17)
BASOPHILS # BLD AUTO: 0 K/UL — SIGNIFICANT CHANGE UP (ref 0–0.2)
BASOPHILS NFR BLD AUTO: 0.1 % — SIGNIFICANT CHANGE UP (ref 0–2)
BLD GP AB SCN SERPL QL: SIGNIFICANT CHANGE UP
BUN SERPL-MCNC: 9 MG/DL — SIGNIFICANT CHANGE UP (ref 8–20)
CALCIUM SERPL-MCNC: 7.5 MG/DL — LOW (ref 8.6–10.2)
CHLORIDE SERPL-SCNC: 108 MMOL/L — HIGH (ref 98–107)
CO2 SERPL-SCNC: 23 MMOL/L — SIGNIFICANT CHANGE UP (ref 22–29)
CREAT SERPL-MCNC: 0.56 MG/DL — SIGNIFICANT CHANGE UP (ref 0.5–1.3)
EOSINOPHIL # BLD AUTO: 0 K/UL — SIGNIFICANT CHANGE UP (ref 0–0.5)
EOSINOPHIL NFR BLD AUTO: 0.4 % — SIGNIFICANT CHANGE UP (ref 0–6)
GLUCOSE SERPL-MCNC: 112 MG/DL — SIGNIFICANT CHANGE UP (ref 70–115)
HCT VFR BLD CALC: 22.4 % — LOW (ref 37–47)
HGB BLD-MCNC: 7.2 G/DL — LOW (ref 12–16)
LYMPHOCYTES # BLD AUTO: 1.2 K/UL — SIGNIFICANT CHANGE UP (ref 1–4.8)
LYMPHOCYTES # BLD AUTO: 12 % — LOW (ref 20–55)
MAGNESIUM SERPL-MCNC: 1.8 MG/DL — SIGNIFICANT CHANGE UP (ref 1.6–2.6)
MCHC RBC-ENTMCNC: 26.5 PG — LOW (ref 27–31)
MCHC RBC-ENTMCNC: 32.1 G/DL — SIGNIFICANT CHANGE UP (ref 32–36)
MCV RBC AUTO: 82.4 FL — SIGNIFICANT CHANGE UP (ref 81–99)
MONOCYTES # BLD AUTO: 1 K/UL — HIGH (ref 0–0.8)
MONOCYTES NFR BLD AUTO: 9.7 % — SIGNIFICANT CHANGE UP (ref 3–10)
NEUTROPHILS # BLD AUTO: 7.7 K/UL — SIGNIFICANT CHANGE UP (ref 1.8–8)
NEUTROPHILS NFR BLD AUTO: 77.4 % — HIGH (ref 37–73)
PHOSPHATE SERPL-MCNC: 1.9 MG/DL — LOW (ref 2.4–4.7)
PLATELET # BLD AUTO: 444 K/UL — HIGH (ref 150–400)
POTASSIUM SERPL-MCNC: 4.3 MMOL/L — SIGNIFICANT CHANGE UP (ref 3.5–5.3)
POTASSIUM SERPL-SCNC: 4.3 MMOL/L — SIGNIFICANT CHANGE UP (ref 3.5–5.3)
RBC # BLD: 2.72 M/UL — LOW (ref 4.4–5.2)
RBC # FLD: 16.8 % — HIGH (ref 11–15.6)
SODIUM SERPL-SCNC: 138 MMOL/L — SIGNIFICANT CHANGE UP (ref 135–145)
WBC # BLD: 9.9 K/UL — SIGNIFICANT CHANGE UP (ref 4.8–10.8)
WBC # FLD AUTO: 9.9 K/UL — SIGNIFICANT CHANGE UP (ref 4.8–10.8)

## 2019-05-09 RX ORDER — DEXTROSE MONOHYDRATE, SODIUM CHLORIDE, AND POTASSIUM CHLORIDE 50; .745; 4.5 G/1000ML; G/1000ML; G/1000ML
1000 INJECTION, SOLUTION INTRAVENOUS
Refills: 0 | Status: DISCONTINUED | OUTPATIENT
Start: 2019-05-09 | End: 2019-05-10

## 2019-05-09 RX ORDER — OXYCODONE AND ACETAMINOPHEN 5; 325 MG/1; MG/1
2 TABLET ORAL EVERY 4 HOURS
Refills: 0 | Status: DISCONTINUED | OUTPATIENT
Start: 2019-05-09 | End: 2019-05-11

## 2019-05-09 RX ORDER — OXYCODONE AND ACETAMINOPHEN 5; 325 MG/1; MG/1
1 TABLET ORAL EVERY 4 HOURS
Refills: 0 | Status: DISCONTINUED | OUTPATIENT
Start: 2019-05-09 | End: 2019-05-11

## 2019-05-09 RX ADMIN — ENOXAPARIN SODIUM 40 MILLIGRAM(S): 100 INJECTION SUBCUTANEOUS at 11:43

## 2019-05-09 RX ADMIN — Medication 500 MILLIGRAM(S): at 17:05

## 2019-05-09 RX ADMIN — SODIUM CHLORIDE 3 MILLILITER(S): 9 INJECTION INTRAMUSCULAR; INTRAVENOUS; SUBCUTANEOUS at 22:26

## 2019-05-09 RX ADMIN — Medication 30 MILLIGRAM(S): at 05:34

## 2019-05-09 RX ADMIN — Medication 500 MILLIGRAM(S): at 17:02

## 2019-05-09 RX ADMIN — OXYCODONE AND ACETAMINOPHEN 1 TABLET(S): 5; 325 TABLET ORAL at 14:53

## 2019-05-09 RX ADMIN — SODIUM CHLORIDE 3 MILLILITER(S): 9 INJECTION INTRAMUSCULAR; INTRAVENOUS; SUBCUTANEOUS at 05:29

## 2019-05-09 RX ADMIN — SODIUM CHLORIDE 3 MILLILITER(S): 9 INJECTION INTRAMUSCULAR; INTRAVENOUS; SUBCUTANEOUS at 13:01

## 2019-05-09 NOTE — PROGRESS NOTE ADULT - PROBLEM SELECTOR PLAN 2
1. Patient with Hgb of 7.2 on AM labs. Asymptomatic on rounds. Will discuss with Dr. Lau need for transfusion versus expectant management.

## 2019-05-09 NOTE — PROGRESS NOTE ADULT - PROBLEM SELECTOR PLAN 1
1. NG tube with minimal output in 24 hours, pulled on AM rounds. Will advance to clear liquid diet and switch PCA pump to PO medications. Will decrease IV fluids (currently NS 125cc/hour + KCL) to 60cc/hour.   2. Urine output approximately 50cc/hour overnight. Will discontinue dee catheter with trial of void.   3. Ambulation encouraged. To continue abdominal binder. Counselled on incentive spirometry 10 times per hour.

## 2019-05-09 NOTE — PROGRESS NOTE ADULT - SUBJECTIVE AND OBJECTIVE BOX
Patient seen and examined for afternoon rounds, resting comfortably in bedside chair. She is tolerating a CLD, pain well controlled, ambulating and voiding since removal of Grey. She has not yet passed gas. Plan for 2U PRBC for Hgb of 7.2 this AM. Will follow up AM CBC, patient remains asymptomatic.

## 2019-05-09 NOTE — PROGRESS NOTE ADULT - ASSESSMENT
POD#2 s/p exploratory laparotomy, pelvic mass removal, total abdominal hysterectomy, bilateral salpingectomy

## 2019-05-09 NOTE — PROGRESS NOTE ADULT - SUBJECTIVE AND OBJECTIVE BOX
GYNECOLOGIC ONCOLOGY PROGRESS NOTE    POD#2 s/p exploratory laparotomy, pelvic mass removal, total abdominal hysterectomy, bilateral salpingectomy     PROBLEMS:  S/P hysterectomy  Endometriosis     Pt seen and examined at bedside.     SUBJECTIVE:    Patient is without complaints. Feeling tired, only slept 2-3 hours overnight. No nausea/vomiting.   Pain well-controlled with PCA.   Flatus: None   Denies Nausea, Vomiting or Diarrhea.  Denies shortness of breath, chest pain or dyspnea on exertion.  NPO, NG tube to intermittent suction  OBJECTIVE:     VITALS:  T(F): 99.4 (05-09-19 @ 00:56), Max: 99.4 (05-09-19 @ 00:56)  HR: 104 (05-09-19 @ 00:56) (85 - 113)  BP: 91/54 (05-09-19 @ 00:56) (81/54 - 95/56)  RR: 18 (05-09-19 @ 00:56) (18 - 18)  SpO2: 93% (05-09-19 @ 00:56) (93% - 100%)    I&O's Summary    08 May 2019 07:01  -  09 May 2019 07:00  --------------------------------------------------------  IN: 1550 mL / OUT: 925 mL / NET: 625 mL    NG TUBE: 50cc output in 24+ hours, intermittent suction       MEDICATIONS  (STANDING):  enoxaparin Injectable 40 milliGRAM(s) SubCutaneous daily  naproxen 500 milliGRAM(s) Oral two times a day  sodium chloride 0.9% lock flush 3 milliLiter(s) IV Push every 8 hours  sodium chloride 0.9% with potassium chloride 20 mEq/L 1000 milliLiter(s) (65 mL/Hr) IV Continuous <Continuous>    MEDICATIONS  (PRN):  diphenhydrAMINE   Injectable 12.5 milliGRAM(s) IV Push every 4 hours PRN Pruritus  naloxone Injectable 0.1 milliGRAM(s) IV Push every 3 minutes PRN For ANY of the following changes in patient status:  A. RR LESS THAN 10 breaths per minute, B. Oxygen saturation LESS THAN 90%, C. Sedation score of 6  ondansetron Injectable 4 milliGRAM(s) IV Push every 6 hours PRN Nausea  ondansetron Injectable 4 milliGRAM(s) IV Push every 6 hours PRN Postoperative Nausea and/or Vomiting  oxyCODONE    5 mG/acetaminophen 325 mG 1 Tablet(s) Oral every 4 hours PRN Moderate Pain (4 - 6)  oxyCODONE    5 mG/acetaminophen 325 mG 2 Tablet(s) Oral every 4 hours PRN Severe Pain (7 - 10)      Physical Exam:  Constitutional: NAD  Pulmonary: clear to auscultation bilaterally. Incentive spirometry to 700.   Cardiovascular: Regular rate and rhythm   Abdomen: soft, non-tender, non-distended, normal bowel signs  Extremities: no lower extremity edema or calve tenderness  Incision: Provena, abdominal binder ordered.       LABS:                        7.2    9.9   )-----------( 444      ( 09 May 2019 06:47 )             22.4     05-09    138  |  108<H>  |  9.0  ----------------------------<  112  4.3   |  23.0  |  0.56    Ca    7.5<L>      09 May 2019 06:47  Phos  1.9     05-09  Mg     1.8     05-09    TPro  x   /  Alb  2.1<L>  /  TBili  x   /  DBili  x   /  AST  x   /  ALT  x   /  AlkPhos  x   05-08

## 2019-05-10 ENCOUNTER — TRANSCRIPTION ENCOUNTER (OUTPATIENT)
Age: 49
End: 2019-05-10

## 2019-05-10 DIAGNOSIS — R19.00 INTRA-ABDOMINAL AND PELVIC SWELLING, MASS AND LUMP, UNSPECIFIED SITE: ICD-10-CM

## 2019-05-10 LAB
ANION GAP SERPL CALC-SCNC: 8 MMOL/L — SIGNIFICANT CHANGE UP (ref 5–17)
BASOPHILS # BLD AUTO: 0 K/UL — SIGNIFICANT CHANGE UP (ref 0–0.2)
BASOPHILS NFR BLD AUTO: 0.2 % — SIGNIFICANT CHANGE UP (ref 0–2)
BUN SERPL-MCNC: 4 MG/DL — LOW (ref 8–20)
CALCIUM SERPL-MCNC: 7.8 MG/DL — LOW (ref 8.6–10.2)
CHLORIDE SERPL-SCNC: 104 MMOL/L — SIGNIFICANT CHANGE UP (ref 98–107)
CO2 SERPL-SCNC: 26 MMOL/L — SIGNIFICANT CHANGE UP (ref 22–29)
CREAT SERPL-MCNC: 0.48 MG/DL — LOW (ref 0.5–1.3)
EOSINOPHIL # BLD AUTO: 0.2 K/UL — SIGNIFICANT CHANGE UP (ref 0–0.5)
EOSINOPHIL NFR BLD AUTO: 1.8 % — SIGNIFICANT CHANGE UP (ref 0–6)
GLUCOSE SERPL-MCNC: 122 MG/DL — HIGH (ref 70–115)
HCT VFR BLD CALC: 28.4 % — LOW (ref 37–47)
HGB BLD-MCNC: 9 G/DL — LOW (ref 12–16)
LYMPHOCYTES # BLD AUTO: 1 K/UL — SIGNIFICANT CHANGE UP (ref 1–4.8)
LYMPHOCYTES # BLD AUTO: 11.3 % — LOW (ref 20–55)
MCHC RBC-ENTMCNC: 26.3 PG — LOW (ref 27–31)
MCHC RBC-ENTMCNC: 31.7 G/DL — LOW (ref 32–36)
MCV RBC AUTO: 83 FL — SIGNIFICANT CHANGE UP (ref 81–99)
MONOCYTES # BLD AUTO: 0.7 K/UL — SIGNIFICANT CHANGE UP (ref 0–0.8)
MONOCYTES NFR BLD AUTO: 8.1 % — SIGNIFICANT CHANGE UP (ref 3–10)
NEUTROPHILS # BLD AUTO: 7.1 K/UL — SIGNIFICANT CHANGE UP (ref 1.8–8)
NEUTROPHILS NFR BLD AUTO: 78.3 % — HIGH (ref 37–73)
NON-GYNECOLOGICAL CYTOLOGY STUDY: SIGNIFICANT CHANGE UP
PLATELET # BLD AUTO: 457 K/UL — HIGH (ref 150–400)
POTASSIUM SERPL-MCNC: 3.8 MMOL/L — SIGNIFICANT CHANGE UP (ref 3.5–5.3)
POTASSIUM SERPL-SCNC: 3.8 MMOL/L — SIGNIFICANT CHANGE UP (ref 3.5–5.3)
RBC # BLD: 3.42 M/UL — LOW (ref 4.4–5.2)
RBC # FLD: 16.8 % — HIGH (ref 11–15.6)
SODIUM SERPL-SCNC: 138 MMOL/L — SIGNIFICANT CHANGE UP (ref 135–145)
WBC # BLD: 9.1 K/UL — SIGNIFICANT CHANGE UP (ref 4.8–10.8)
WBC # FLD AUTO: 9.1 K/UL — SIGNIFICANT CHANGE UP (ref 4.8–10.8)

## 2019-05-10 RX ADMIN — SODIUM CHLORIDE 3 MILLILITER(S): 9 INJECTION INTRAMUSCULAR; INTRAVENOUS; SUBCUTANEOUS at 05:25

## 2019-05-10 RX ADMIN — Medication 500 MILLIGRAM(S): at 18:10

## 2019-05-10 RX ADMIN — Medication 500 MILLIGRAM(S): at 05:48

## 2019-05-10 RX ADMIN — ENOXAPARIN SODIUM 40 MILLIGRAM(S): 100 INJECTION SUBCUTANEOUS at 11:25

## 2019-05-10 RX ADMIN — Medication 500 MILLIGRAM(S): at 05:49

## 2019-05-10 RX ADMIN — OXYCODONE AND ACETAMINOPHEN 2 TABLET(S): 5; 325 TABLET ORAL at 12:25

## 2019-05-10 RX ADMIN — SODIUM CHLORIDE 3 MILLILITER(S): 9 INJECTION INTRAMUSCULAR; INTRAVENOUS; SUBCUTANEOUS at 11:59

## 2019-05-10 RX ADMIN — SODIUM CHLORIDE 3 MILLILITER(S): 9 INJECTION INTRAMUSCULAR; INTRAVENOUS; SUBCUTANEOUS at 21:03

## 2019-05-10 RX ADMIN — OXYCODONE AND ACETAMINOPHEN 2 TABLET(S): 5; 325 TABLET ORAL at 11:25

## 2019-05-10 NOTE — PROGRESS NOTE ADULT - SUBJECTIVE AND OBJECTIVE BOX
GYNECOLOGIC ONCOLOGY PROGRESS NOTE    POD#3    PROBLEMS:  Postoperative anemia  S/P hysterectomy  Unsp cond assoc w female genital organs and menstrual cycle  Need for prophylactic measure  Screening for substance abuse      Pt seen and examined at bedside.     SUBJECTIVE:    Patient is without complaints.  Pain well-controlled.  Flatus: negative   Denies Nausea, Vomiting or Diarrhea.  Denies shortness of breath, chest pain or dyspnea on exertion.  Tolerating clears .    OBJECTIVE:     VITALS:  T(F): 97.5 (05-10-19 @ 08:07), Max: 99.3 (05-09-19 @ 19:00)  HR: 84 (05-10-19 @ 08:07) (84 - 100)  BP: 107/72 (05-10-19 @ 08:07) (91/53 - 107/72)  RR: 16 (05-10-19 @ 08:07) (16 - 18)  SpO2: 99% (05-10-19 @ 08:07) (91% - 99%)  Wt(kg): --    I&O's Summary    09 May 2019 07:01  -  10 May 2019 07:00  --------------------------------------------------------  IN: 1000 mL / OUT: 300 mL / NET: 700 mL        MEDICATIONS  (STANDING):  enoxaparin Injectable 40 milliGRAM(s) SubCutaneous daily  naproxen 500 milliGRAM(s) Oral two times a day  sodium chloride 0.9% lock flush 3 milliLiter(s) IV Push every 8 hours    MEDICATIONS  (PRN):  diphenhydrAMINE   Injectable 12.5 milliGRAM(s) IV Push every 4 hours PRN Pruritus  naloxone Injectable 0.1 milliGRAM(s) IV Push every 3 minutes PRN For ANY of the following changes in patient status:  A. RR LESS THAN 10 breaths per minute, B. Oxygen saturation LESS THAN 90%, C. Sedation score of 6  ondansetron Injectable 4 milliGRAM(s) IV Push every 6 hours PRN Nausea  ondansetron Injectable 4 milliGRAM(s) IV Push every 6 hours PRN Postoperative Nausea and/or Vomiting  oxyCODONE    5 mG/acetaminophen 325 mG 1 Tablet(s) Oral every 4 hours PRN Moderate Pain (4 - 6)  oxyCODONE    5 mG/acetaminophen 325 mG 2 Tablet(s) Oral every 4 hours PRN Severe Pain (7 - 10)      Physical Exam:  Constitutional: NAD  Pulmonary: clear to auscultation bilaterally   Cardiovascular: Regular rate and rhythm   Abdomen: soft, non-tender, non-distended, diminished bowel sounds   Extremities: no lower extremity edema or calve tenderness, Martha's sign negative.  Incision: Covered by prevena       LABS:                        7.2    9.9   )-----------( 444      ( 09 May 2019 06:47 )             22.4     05-09    138  |  108<H>  |  9.0  ----------------------------<  112  4.3   |  23.0  |  0.56    Ca    7.5<L>      09 May 2019 06:47  Phos  1.9     05-09  Mg     1.8     05-09    TPro  x   /  Alb  2.0<L>  /  TBili  x   /  DBili  x   /  AST  x   /  ALT  x   /  AlkPhos  x   05-09          RADIOLOGY & ADDITIONAL TESTS:

## 2019-05-10 NOTE — PROGRESS NOTE ADULT - ATTENDING COMMENTS
Dr Gonzalez was updated and agreed with the plan .
patient is seen and examined, x-ray ordered to confirm location of NG tube, otherwise stable vitals and labs, pain is well controlled , Dr Gonzalez was updated .
patient was seen and examined, doing well , hgb is 7.2 patient had soft bp and slightly tachycardic will transfuse 2 units of prbcs , will d/c pca, NG  tube pulled out, start on clears , encourage IS use .
patient was seen and examined, will follow up with post transfusion CBC , will d/c fluids, advance diet to full liquid diet, encourage ambulation and IS use,  will update Dr Gonzalez

## 2019-05-10 NOTE — DISCHARGE NOTE PROVIDER - HOSPITAL COURSE
Patient post-operatively had an uncomplicated hospital course. Her pain was well controlled. She is tolerating a regular diet. She is ambulating independently. She was able to void after removal of dee. Patient with flatus. Labs and Vitals WNL upon discharge.

## 2019-05-10 NOTE — DISCHARGE NOTE PROVIDER - CARE PROVIDER_API CALL
Manpreet Gonzalez)  [facility]  [address] [place] [address]  Phone: [phone]  Fax: [phone]  Follow Up Time:

## 2019-05-10 NOTE — PROGRESS NOTE ADULT - PROBLEM SELECTOR PLAN 1
pod#3 s/p EX-lap , modified radical hyst, bso , JG, multiple bowel serosal repair , pelvic mass excision and omentectomy

## 2019-05-10 NOTE — DISCHARGE NOTE PROVIDER - NSDCFUADDAPPT_GEN_ALL_CORE_FT
Follow-up with Dr. Gonzalez in two weeks to review pathology report.   Please contact your provider for any pain uncontrolled by medication, excessive bleeding or Fever>100.4  Please take Naprosyn 1 tablet every 12 hours x 3 days, may take percocet as prescribed for breakthrough pain.

## 2019-05-10 NOTE — PROGRESS NOTE ADULT - SUBJECTIVE AND OBJECTIVE BOX
Patient seen and examined for afternoon rounds with Dr. Lau, resting comfortably in bedside chair. She reports she has not yet passed flatus but does feel it coming. She is ambulating, voiding without difficulties and tolerating a FLD. AM Hgb appropriate post 2 U PRBC. Will continue to monitor, likely advance to regular diet tonight for dinner. DCP this weekend if patient continues to clinically improve.

## 2019-05-10 NOTE — DISCHARGE NOTE PROVIDER - REASON FOR ADMISSION
surgery - Ex-lap modified radical hysterectomy, bilateral salpingo-oophorectomy, lysis of adhesions, multiple bowel serosal repair, pelvic mass excision, omentectomy

## 2019-05-10 NOTE — PROGRESS NOTE ADULT - ASSESSMENT
49 y/o female pod#3 s/p EX-lap , modified radical hyst, bso , JG, multiple bowel serosal repair , pelvic mass excision and omentectomy, , anemia .

## 2019-05-11 ENCOUNTER — TRANSCRIPTION ENCOUNTER (OUTPATIENT)
Age: 49
End: 2019-05-11

## 2019-05-11 VITALS
HEART RATE: 88 BPM | TEMPERATURE: 98 F | OXYGEN SATURATION: 97 % | RESPIRATION RATE: 18 BRPM | SYSTOLIC BLOOD PRESSURE: 102 MMHG | DIASTOLIC BLOOD PRESSURE: 66 MMHG

## 2019-05-11 LAB
ANION GAP SERPL CALC-SCNC: 9 MMOL/L — SIGNIFICANT CHANGE UP (ref 5–17)
BASOPHILS # BLD AUTO: 0 K/UL — SIGNIFICANT CHANGE UP (ref 0–0.2)
BASOPHILS NFR BLD AUTO: 0.1 % — SIGNIFICANT CHANGE UP (ref 0–2)
BUN SERPL-MCNC: 6 MG/DL — LOW (ref 8–20)
CALCIUM SERPL-MCNC: 7.8 MG/DL — LOW (ref 8.6–10.2)
CHLORIDE SERPL-SCNC: 105 MMOL/L — SIGNIFICANT CHANGE UP (ref 98–107)
CO2 SERPL-SCNC: 26 MMOL/L — SIGNIFICANT CHANGE UP (ref 22–29)
CREAT SERPL-MCNC: 0.43 MG/DL — LOW (ref 0.5–1.3)
EOSINOPHIL # BLD AUTO: 0.3 K/UL — SIGNIFICANT CHANGE UP (ref 0–0.5)
EOSINOPHIL NFR BLD AUTO: 4 % — SIGNIFICANT CHANGE UP (ref 0–6)
GLUCOSE SERPL-MCNC: 120 MG/DL — HIGH (ref 70–115)
HCT VFR BLD CALC: 28.4 % — LOW (ref 37–47)
HGB BLD-MCNC: 9.1 G/DL — LOW (ref 12–16)
LYMPHOCYTES # BLD AUTO: 1.2 K/UL — SIGNIFICANT CHANGE UP (ref 1–4.8)
LYMPHOCYTES # BLD AUTO: 14.8 % — LOW (ref 20–55)
MCHC RBC-ENTMCNC: 26.7 PG — LOW (ref 27–31)
MCHC RBC-ENTMCNC: 32 G/DL — SIGNIFICANT CHANGE UP (ref 32–36)
MCV RBC AUTO: 83.3 FL — SIGNIFICANT CHANGE UP (ref 81–99)
MONOCYTES # BLD AUTO: 0.5 K/UL — SIGNIFICANT CHANGE UP (ref 0–0.8)
MONOCYTES NFR BLD AUTO: 6.8 % — SIGNIFICANT CHANGE UP (ref 3–10)
NEUTROPHILS # BLD AUTO: 5.8 K/UL — SIGNIFICANT CHANGE UP (ref 1.8–8)
NEUTROPHILS NFR BLD AUTO: 74 % — HIGH (ref 37–73)
PLATELET # BLD AUTO: 551 K/UL — HIGH (ref 150–400)
POTASSIUM SERPL-MCNC: 3.7 MMOL/L — SIGNIFICANT CHANGE UP (ref 3.5–5.3)
POTASSIUM SERPL-SCNC: 3.7 MMOL/L — SIGNIFICANT CHANGE UP (ref 3.5–5.3)
RBC # BLD: 3.41 M/UL — LOW (ref 4.4–5.2)
RBC # FLD: 16.9 % — HIGH (ref 11–15.6)
SODIUM SERPL-SCNC: 140 MMOL/L — SIGNIFICANT CHANGE UP (ref 135–145)
WBC # BLD: 7.8 K/UL — SIGNIFICANT CHANGE UP (ref 4.8–10.8)
WBC # FLD AUTO: 7.8 K/UL — SIGNIFICANT CHANGE UP (ref 4.8–10.8)

## 2019-05-11 PROCEDURE — 36415 COLL VENOUS BLD VENIPUNCTURE: CPT

## 2019-05-11 PROCEDURE — 88112 CYTOPATH CELL ENHANCE TECH: CPT

## 2019-05-11 PROCEDURE — P9016: CPT

## 2019-05-11 PROCEDURE — 86900 BLOOD TYPING SEROLOGIC ABO: CPT

## 2019-05-11 PROCEDURE — 83735 ASSAY OF MAGNESIUM: CPT

## 2019-05-11 PROCEDURE — 36430 TRANSFUSION BLD/BLD COMPNT: CPT

## 2019-05-11 PROCEDURE — 71045 X-RAY EXAM CHEST 1 VIEW: CPT

## 2019-05-11 PROCEDURE — 86850 RBC ANTIBODY SCREEN: CPT

## 2019-05-11 PROCEDURE — 85027 COMPLETE CBC AUTOMATED: CPT

## 2019-05-11 PROCEDURE — 82040 ASSAY OF SERUM ALBUMIN: CPT

## 2019-05-11 PROCEDURE — 88305 TISSUE EXAM BY PATHOLOGIST: CPT

## 2019-05-11 PROCEDURE — 88304 TISSUE EXAM BY PATHOLOGIST: CPT

## 2019-05-11 PROCEDURE — 82962 GLUCOSE BLOOD TEST: CPT

## 2019-05-11 PROCEDURE — 80048 BASIC METABOLIC PNL TOTAL CA: CPT

## 2019-05-11 PROCEDURE — 88333 PATH CONSLTJ SURG CYTO XM 1: CPT

## 2019-05-11 PROCEDURE — 84100 ASSAY OF PHOSPHORUS: CPT

## 2019-05-11 PROCEDURE — 86901 BLOOD TYPING SEROLOGIC RH(D): CPT

## 2019-05-11 PROCEDURE — 88307 TISSUE EXAM BY PATHOLOGIST: CPT

## 2019-05-11 RX ADMIN — SODIUM CHLORIDE 3 MILLILITER(S): 9 INJECTION INTRAMUSCULAR; INTRAVENOUS; SUBCUTANEOUS at 06:13

## 2019-05-11 RX ADMIN — Medication 500 MILLIGRAM(S): at 07:10

## 2019-05-11 RX ADMIN — OXYCODONE AND ACETAMINOPHEN 1 TABLET(S): 5; 325 TABLET ORAL at 11:42

## 2019-05-11 RX ADMIN — SODIUM CHLORIDE 3 MILLILITER(S): 9 INJECTION INTRAMUSCULAR; INTRAVENOUS; SUBCUTANEOUS at 13:40

## 2019-05-11 RX ADMIN — Medication 500 MILLIGRAM(S): at 06:12

## 2019-05-11 RX ADMIN — OXYCODONE AND ACETAMINOPHEN 1 TABLET(S): 5; 325 TABLET ORAL at 12:40

## 2019-05-11 NOTE — PROGRESS NOTE ADULT - PROBLEM SELECTOR PLAN 1
POD#4 s/p procedure above  Continue routine post op care  Encourage ambulation and IS  Continue regular diet  Lovenox for DVT ppx  Continue PO pain meds  Consider d/c home today

## 2019-05-11 NOTE — PROGRESS NOTE ADULT - ASSESSMENT
POD#4 s/p exploratory laparotomy, pelvic mass removal, total abdominal hysterectomy, bilateral salpingectomy.  Recovering well.

## 2019-05-11 NOTE — DISCHARGE NOTE NURSING/CASE MANAGEMENT/SOCIAL WORK - NSDCDPATPORTLINK_GEN_ALL_CORE
You can access the [facility], offered by [facility], by registering with the following website: [email or web address]

## 2019-05-11 NOTE — PROGRESS NOTE ADULT - SUBJECTIVE AND OBJECTIVE BOX
GYNECOLOGIC ONCOLOGY PROGRESS NOTE    POD#4    PROBLEMS:  Pelvic mass in female  Postoperative anemia  S/P hysterectomy  Unsp cond assoc w female genital organs and menstrual cycle    Pt seen and examined at bedside.     SUBJECTIVE:    Patient is without complaints.  Pain well-controlled.  Flatus: yes.  Denies Nausea, Vomiting or Diarrhea.  Denies shortness of breath, chest pain or dyspnea on exertion.  Tolerating diet.    OBJECTIVE:     VITALS:  T(F): 97.5 (05-11-19 @ 08:00), Max: 98.3 (05-10-19 @ 23:45)  HR: 88 (05-11-19 @ 08:00) (73 - 88)  BP: 102/64 (05-11-19 @ 08:00) (101/65 - 115/74)  RR: 19 (05-11-19 @ 08:00) (18 - 19)  SpO2: 95% (05-11-19 @ 08:00) (95% - 100%)    I&O's Summary    10 May 2019 07:01  -  11 May 2019 07:00  --------------------------------------------------------  IN: 350 mL / OUT: 0 mL / NET: 350 mL    MEDICATIONS  (STANDING):  enoxaparin Injectable 40 milliGRAM(s) SubCutaneous daily  naproxen 500 milliGRAM(s) Oral two times a day  sodium chloride 0.9% lock flush 3 milliLiter(s) IV Push every 8 hours    MEDICATIONS  (PRN):  diphenhydrAMINE   Injectable 12.5 milliGRAM(s) IV Push every 4 hours PRN Pruritus  naloxone Injectable 0.1 milliGRAM(s) IV Push every 3 minutes PRN For ANY of the following changes in patient status:  A. RR LESS THAN 10 breaths per minute, B. Oxygen saturation LESS THAN 90%, C. Sedation score of 6  ondansetron Injectable 4 milliGRAM(s) IV Push every 6 hours PRN Nausea  ondansetron Injectable 4 milliGRAM(s) IV Push every 6 hours PRN Postoperative Nausea and/or Vomiting  oxyCODONE    5 mG/acetaminophen 325 mG 1 Tablet(s) Oral every 4 hours PRN Moderate Pain (4 - 6)  oxyCODONE    5 mG/acetaminophen 325 mG 2 Tablet(s) Oral every 4 hours PRN Severe Pain (7 - 10)    Physical Exam:  Constitutional: NAD  Pulmonary: clear to auscultation bilaterally   Cardiovascular: Regular rate and rhythm   Abdomen: soft, non-tender, non-distended, normal bowel sounds  Extremities: no lower extremity edema or calve tenderness, Martha's sign negative.  Incision: Clean, dry, intact with prevena. Without signs of infection or hernia.    LABS:                        9.1    7.8   )-----------( 551      ( 11 May 2019 07:41 )             28.4     05-11    140  |  105  |  6.0<L>  ----------------------------<  120<H>  3.7   |  26.0  |  0.43<L>    Ca    7.8<L>      11 May 2019 07:41

## 2019-05-13 LAB — SURGICAL PATHOLOGY STUDY: SIGNIFICANT CHANGE UP

## 2019-05-14 PROBLEM — D64.9 ANEMIA, UNSPECIFIED: Chronic | Status: ACTIVE | Noted: 2019-05-01

## 2019-05-15 ENCOUNTER — RESULT REVIEW (OUTPATIENT)
Age: 49
End: 2019-05-15

## 2019-05-15 ENCOUNTER — INPATIENT (INPATIENT)
Facility: HOSPITAL | Age: 49
LOS: 6 days | Discharge: HOME | DRG: 853 | End: 2019-05-22
Attending: STUDENT IN AN ORGANIZED HEALTH CARE EDUCATION/TRAINING PROGRAM | Admitting: OBSTETRICS & GYNECOLOGY
Payer: COMMERCIAL

## 2019-05-15 VITALS — WEIGHT: 148.81 LBS | HEIGHT: 61 IN

## 2019-05-15 DIAGNOSIS — Z98.890 OTHER SPECIFIED POSTPROCEDURAL STATES: Chronic | ICD-10-CM

## 2019-05-15 DIAGNOSIS — A41.9 SEPSIS, UNSPECIFIED ORGANISM: ICD-10-CM

## 2019-05-15 DIAGNOSIS — R19.00 INTRA-ABDOMINAL AND PELVIC SWELLING, MASS AND LUMP, UNSPECIFIED SITE: ICD-10-CM

## 2019-05-15 LAB
ALBUMIN SERPL ELPH-MCNC: 2 G/DL — LOW (ref 3.3–5.2)
ALP SERPL-CCNC: 73 U/L — SIGNIFICANT CHANGE UP (ref 40–120)
ALT FLD-CCNC: 16 U/L — SIGNIFICANT CHANGE UP
ANION GAP SERPL CALC-SCNC: 16 MMOL/L — SIGNIFICANT CHANGE UP (ref 5–17)
ANISOCYTOSIS BLD QL: SLIGHT — SIGNIFICANT CHANGE UP
APPEARANCE UR: CLEAR — SIGNIFICANT CHANGE UP
APTT BLD: 24.3 SEC — LOW (ref 27.5–36.3)
AST SERPL-CCNC: 14 U/L — SIGNIFICANT CHANGE UP
BACTERIA # UR AUTO: ABNORMAL
BASE EXCESS BLDV CALC-SCNC: -4.9 MMOL/L — LOW (ref -2–2)
BILIRUB SERPL-MCNC: 0.7 MG/DL — SIGNIFICANT CHANGE UP (ref 0.4–2)
BILIRUB UR-MCNC: ABNORMAL
BLD GP AB SCN SERPL QL: SIGNIFICANT CHANGE UP
BUN SERPL-MCNC: 21 MG/DL — HIGH (ref 8–20)
BURR CELLS BLD QL SMEAR: PRESENT — SIGNIFICANT CHANGE UP
CA-I SERPL-SCNC: 0.96 MMOL/L — LOW (ref 1.15–1.33)
CALCIUM SERPL-MCNC: 7.3 MG/DL — LOW (ref 8.6–10.2)
CHLORIDE BLDV-SCNC: 102 MMOL/L — SIGNIFICANT CHANGE UP (ref 98–107)
CHLORIDE SERPL-SCNC: 96 MMOL/L — LOW (ref 98–107)
CO2 SERPL-SCNC: 18 MMOL/L — LOW (ref 22–29)
COLOR SPEC: YELLOW — SIGNIFICANT CHANGE UP
CREAT SERPL-MCNC: 1.53 MG/DL — HIGH (ref 0.5–1.3)
DIFF PNL FLD: ABNORMAL
ELLIPTOCYTES BLD QL SMEAR: SLIGHT — SIGNIFICANT CHANGE UP
EPI CELLS # UR: SIGNIFICANT CHANGE UP
GAS PNL BLDV: 132 MMOL/L — LOW (ref 135–145)
GAS PNL BLDV: SIGNIFICANT CHANGE UP
GAS PNL BLDV: SIGNIFICANT CHANGE UP
GLUCOSE BLDV-MCNC: 123 MG/DL — HIGH (ref 70–99)
GLUCOSE SERPL-MCNC: 134 MG/DL — HIGH (ref 70–115)
GLUCOSE UR QL: NEGATIVE MG/DL — SIGNIFICANT CHANGE UP
HCO3 BLDV-SCNC: 20 MMOL/L — LOW (ref 21–29)
HCT VFR BLD CALC: 26.1 % — LOW (ref 37–47)
HGB BLD-MCNC: 8.2 G/DL — LOW (ref 12–16)
HYPOCHROMIA BLD QL: SLIGHT — SIGNIFICANT CHANGE UP
INR BLD: 1.49 RATIO — HIGH (ref 0.88–1.16)
KETONES UR-MCNC: ABNORMAL
LACTATE BLDV-MCNC: 4.8 MMOL/L — CRITICAL HIGH (ref 0.5–2)
LACTATE BLDV-MCNC: 6.7 MMOL/L — CRITICAL HIGH (ref 0.5–2)
LEUKOCYTE ESTERASE UR-ACNC: ABNORMAL
LYMPHOCYTES # BLD AUTO: 5 % — LOW (ref 20–55)
MACROCYTES BLD QL: SLIGHT — SIGNIFICANT CHANGE UP
MANUAL DIF COMMENT BLD-IMP: SIGNIFICANT CHANGE UP
MCHC RBC-ENTMCNC: 26.5 PG — LOW (ref 27–31)
MCHC RBC-ENTMCNC: 31.4 G/DL — LOW (ref 32–36)
MCV RBC AUTO: 84.2 FL — SIGNIFICANT CHANGE UP (ref 81–99)
METAMYELOCYTES # FLD: 3 % — HIGH (ref 0–0)
MICROCYTES BLD QL: SLIGHT — SIGNIFICANT CHANGE UP
MONOCYTES NFR BLD AUTO: 3 % — SIGNIFICANT CHANGE UP (ref 3–10)
MYELOCYTES NFR BLD: 1 % — HIGH (ref 0–0)
NEUTROPHILS NFR BLD AUTO: 74 % — HIGH (ref 37–73)
NEUTS BAND # BLD: 12 % — HIGH (ref 0–8)
NITRITE UR-MCNC: NEGATIVE — SIGNIFICANT CHANGE UP
OVALOCYTES BLD QL SMEAR: SLIGHT — SIGNIFICANT CHANGE UP
PCO2 BLDV: 34 MMHG — LOW (ref 35–50)
PH BLDV: 7.37 — SIGNIFICANT CHANGE UP (ref 7.32–7.43)
PH UR: 6 — SIGNIFICANT CHANGE UP (ref 5–8)
PLAT MORPH BLD: SIGNIFICANT CHANGE UP
PLATELET # BLD AUTO: 522 K/UL — HIGH (ref 150–400)
PO2 BLDV: 162 MMHG — HIGH (ref 25–45)
POIKILOCYTOSIS BLD QL AUTO: SLIGHT — SIGNIFICANT CHANGE UP
POLYCHROMASIA BLD QL SMEAR: SLIGHT — SIGNIFICANT CHANGE UP
POTASSIUM BLDV-SCNC: 4.1 MMOL/L — SIGNIFICANT CHANGE UP (ref 3.4–4.5)
POTASSIUM SERPL-MCNC: 4 MMOL/L — SIGNIFICANT CHANGE UP (ref 3.5–5.3)
POTASSIUM SERPL-SCNC: 4 MMOL/L — SIGNIFICANT CHANGE UP (ref 3.5–5.3)
PROT SERPL-MCNC: 5.1 G/DL — LOW (ref 6.6–8.7)
PROT UR-MCNC: 30 MG/DL
PROTHROM AB SERPL-ACNC: 17.3 SEC — HIGH (ref 10–12.9)
RBC # BLD: 3.1 M/UL — LOW (ref 4.4–5.2)
RBC # FLD: 18.3 % — HIGH (ref 11–15.6)
RBC BLD AUTO: ABNORMAL
RBC CASTS # UR COMP ASSIST: SIGNIFICANT CHANGE UP /HPF (ref 0–4)
SAO2 % BLDV: 100 % — SIGNIFICANT CHANGE UP
SODIUM SERPL-SCNC: 130 MMOL/L — LOW (ref 135–145)
SP GR SPEC: 1.01 — SIGNIFICANT CHANGE UP (ref 1.01–1.02)
UROBILINOGEN FLD QL: 1 MG/DL
VARIANT LYMPHS # BLD: 2 % — SIGNIFICANT CHANGE UP (ref 0–6)
WBC # BLD: 18.3 K/UL — HIGH (ref 4.8–10.8)
WBC # FLD AUTO: 18.3 K/UL — HIGH (ref 4.8–10.8)
WBC UR QL: SIGNIFICANT CHANGE UP

## 2019-05-15 PROCEDURE — 93010 ELECTROCARDIOGRAM REPORT: CPT

## 2019-05-15 PROCEDURE — 99222 1ST HOSP IP/OBS MODERATE 55: CPT

## 2019-05-15 PROCEDURE — 99221 1ST HOSP IP/OBS SF/LOW 40: CPT

## 2019-05-15 PROCEDURE — 71045 X-RAY EXAM CHEST 1 VIEW: CPT | Mod: 26

## 2019-05-15 PROCEDURE — 99223 1ST HOSP IP/OBS HIGH 75: CPT | Mod: 24

## 2019-05-15 PROCEDURE — 88307 TISSUE EXAM BY PATHOLOGIST: CPT | Mod: 26

## 2019-05-15 PROCEDURE — 71045 X-RAY EXAM CHEST 1 VIEW: CPT | Mod: 26,77,76

## 2019-05-15 PROCEDURE — 99291 CRITICAL CARE FIRST HOUR: CPT

## 2019-05-15 PROCEDURE — 88300 SURGICAL PATH GROSS: CPT | Mod: 26,59

## 2019-05-15 PROCEDURE — 74177 CT ABD & PELVIS W/CONTRAST: CPT | Mod: 26

## 2019-05-15 PROCEDURE — 99292 CRITICAL CARE ADDL 30 MIN: CPT

## 2019-05-15 RX ORDER — ONDANSETRON 8 MG/1
4 TABLET, FILM COATED ORAL ONCE
Refills: 0 | Status: COMPLETED | OUTPATIENT
Start: 2019-05-15 | End: 2019-05-15

## 2019-05-15 RX ORDER — PIPERACILLIN AND TAZOBACTAM 4; .5 G/20ML; G/20ML
3.38 INJECTION, POWDER, LYOPHILIZED, FOR SOLUTION INTRAVENOUS EVERY 8 HOURS
Refills: 0 | Status: DISCONTINUED | OUTPATIENT
Start: 2019-05-15 | End: 2019-05-15

## 2019-05-15 RX ORDER — ENOXAPARIN SODIUM 100 MG/ML
40 INJECTION SUBCUTANEOUS DAILY
Refills: 0 | Status: DISCONTINUED | OUTPATIENT
Start: 2019-05-15 | End: 2019-05-22

## 2019-05-15 RX ORDER — NOREPINEPHRINE BITARTRATE/D5W 8 MG/250ML
0.08 PLASTIC BAG, INJECTION (ML) INTRAVENOUS
Qty: 8 | Refills: 0 | Status: DISCONTINUED | OUTPATIENT
Start: 2019-05-15 | End: 2019-05-17

## 2019-05-15 RX ORDER — SODIUM CHLORIDE 9 MG/ML
1000 INJECTION, SOLUTION INTRAVENOUS ONCE
Refills: 0 | Status: COMPLETED | OUTPATIENT
Start: 2019-05-15 | End: 2019-05-15

## 2019-05-15 RX ORDER — PIPERACILLIN AND TAZOBACTAM 4; .5 G/20ML; G/20ML
3.38 INJECTION, POWDER, LYOPHILIZED, FOR SOLUTION INTRAVENOUS ONCE
Refills: 0 | Status: COMPLETED | OUTPATIENT
Start: 2019-05-15 | End: 2019-05-15

## 2019-05-15 RX ORDER — SODIUM CHLORIDE 9 MG/ML
1000 INJECTION INTRAMUSCULAR; INTRAVENOUS; SUBCUTANEOUS ONCE
Refills: 0 | Status: COMPLETED | OUTPATIENT
Start: 2019-05-15 | End: 2019-05-15

## 2019-05-15 RX ORDER — VANCOMYCIN HCL 1 G
1000 VIAL (EA) INTRAVENOUS ONCE
Refills: 0 | Status: COMPLETED | OUTPATIENT
Start: 2019-05-15 | End: 2019-05-15

## 2019-05-15 RX ORDER — ASPIRIN/CALCIUM CARB/MAGNESIUM 324 MG
0 TABLET ORAL
Qty: 0 | Refills: 0 | DISCHARGE

## 2019-05-15 RX ORDER — PIPERACILLIN AND TAZOBACTAM 4; .5 G/20ML; G/20ML
3.38 INJECTION, POWDER, LYOPHILIZED, FOR SOLUTION INTRAVENOUS ONCE
Refills: 0 | Status: DISCONTINUED | OUTPATIENT
Start: 2019-05-15 | End: 2019-05-15

## 2019-05-15 RX ORDER — HYDROMORPHONE HYDROCHLORIDE 2 MG/ML
0.5 INJECTION INTRAMUSCULAR; INTRAVENOUS; SUBCUTANEOUS
Refills: 0 | Status: DISCONTINUED | OUTPATIENT
Start: 2019-05-15 | End: 2019-05-21

## 2019-05-15 RX ORDER — PIPERACILLIN AND TAZOBACTAM 4; .5 G/20ML; G/20ML
2.25 INJECTION, POWDER, LYOPHILIZED, FOR SOLUTION INTRAVENOUS EVERY 6 HOURS
Refills: 0 | Status: DISCONTINUED | OUTPATIENT
Start: 2019-05-16 | End: 2019-05-16

## 2019-05-15 RX ORDER — SODIUM CHLORIDE 9 MG/ML
2100 INJECTION INTRAMUSCULAR; INTRAVENOUS; SUBCUTANEOUS ONCE
Refills: 0 | Status: COMPLETED | OUTPATIENT
Start: 2019-05-15 | End: 2019-05-15

## 2019-05-15 RX ORDER — NOREPINEPHRINE BITARTRATE/D5W 8 MG/250ML
0.05 PLASTIC BAG, INJECTION (ML) INTRAVENOUS
Qty: 8 | Refills: 0 | Status: DISCONTINUED | OUTPATIENT
Start: 2019-05-15 | End: 2019-05-15

## 2019-05-15 RX ADMIN — SODIUM CHLORIDE 1000 MILLILITER(S): 9 INJECTION INTRAMUSCULAR; INTRAVENOUS; SUBCUTANEOUS at 14:05

## 2019-05-15 RX ADMIN — ONDANSETRON 4 MILLIGRAM(S): 8 TABLET, FILM COATED ORAL at 15:00

## 2019-05-15 RX ADMIN — SODIUM CHLORIDE 2000 MILLILITER(S): 9 INJECTION, SOLUTION INTRAVENOUS at 17:48

## 2019-05-15 RX ADMIN — PIPERACILLIN AND TAZOBACTAM 200 GRAM(S): 4; .5 INJECTION, POWDER, LYOPHILIZED, FOR SOLUTION INTRAVENOUS at 13:55

## 2019-05-15 RX ADMIN — PIPERACILLIN AND TAZOBACTAM 3.38 GRAM(S): 4; .5 INJECTION, POWDER, LYOPHILIZED, FOR SOLUTION INTRAVENOUS at 14:20

## 2019-05-15 RX ADMIN — SODIUM CHLORIDE 2100 MILLILITER(S): 9 INJECTION INTRAMUSCULAR; INTRAVENOUS; SUBCUTANEOUS at 13:55

## 2019-05-15 RX ADMIN — Medication 6.33 MICROGRAM(S)/KG/MIN: at 14:27

## 2019-05-15 RX ADMIN — Medication 250 MILLIGRAM(S): at 15:37

## 2019-05-15 RX ADMIN — SODIUM CHLORIDE 2100 MILLILITER(S): 9 INJECTION INTRAMUSCULAR; INTRAVENOUS; SUBCUTANEOUS at 14:00

## 2019-05-15 NOTE — ED ADULT TRIAGE NOTE - CHIEF COMPLAINT QUOTE
Pt BIBA A&Ox3 c/o dizziness and hypotension, pt had hysterectomy last week by Dr. Gonzalez. Abdomen distended, skin color very pale. Reports associated SOB. Critical care RN and MD Kraft at bedside upon arrival.

## 2019-05-15 NOTE — H&P ADULT - HISTORY OF PRESENT ILLNESS
GYNECOLOGIC ONCOLOGY CONSULT NOTE    48y G  P  Last Menstrual Period    presents with HPI: s/p exlap modified radical abdominal hysterectomy, bilateral salpingo-oopherectoy omentectomy, pelvic lymph node sampling, peritonectomy of abdominal cavity, frozen section, enterolysis, serosal repair of sigmoid colon on 5/7/19 for abdominal mass, presenting to the ED with lightheadness, and hypotension, patient has fallen multiple times at home since yesterday. At presentation patient was a code sepsis with hypotension, tachycardia and WBC of 18. She admits to having a fever of 104 yesterday, with 1 episode of vomiting, as well as loose stools this morning. Patient denies chest pain, shortness of breath, or any pain at this time. GYNECOLOGIC ONCOLOGY CONSULT NOTE    48y     presents with HPI: s/p exlap modified radical abdominal hysterectomy, bilateral salpingo-oopherectoy omentectomy, pelvic lymph node sampling, peritonectomy of abdominal cavity, frozen section, enterolysis, serosal repair of sigmoid colon on 5/7/19 for abdominal mass, presenting to the ED with lightheadness, and hypotension, patient has fallen multiple times at home since yesterday. At presentation patient was a code sepsis with hypotension, tachycardia and WBC of 18. She admits to having a fever of 104 yesterday, with 1 episode of vomiting, as well as loose stools this morning. Patient denies chest pain, shortness of breath, vainal bleeding, abdominal pain, or calf pain.

## 2019-05-15 NOTE — H&P ADULT - ASSESSMENT
47 yo s/p exlap modified radical abdominal hysterectomy, bilateral salpingo-oopherectoy omentectomy, pelvic lymph node sampling, peritonectomy of abdominal cavity, frozen section, enterolysis, serosal repair of sigmoid colon presenting as code sepsis.

## 2019-05-15 NOTE — ED ADULT NURSE NOTE - NSIMPLEMENTINTERV_GEN_ALL_ED
Implemented All Fall with Harm Risk Interventions:  [place] to call system. Call bell, personal items and telephone within reach. Instruct patient to call for assistance. Room bathroom lighting operational. Non-slip footwear when patient is off stretcher. Physically safe environment: no spills, clutter or unnecessary equipment. Stretcher in lowest position, wheels locked, appropriate side rails in place. Provide visual cue, wrist band, yellow gown, etc. Monitor gait and stability. Monitor for mental status changes and reorient to person, place, and time. Review medications for side effects contributing to fall risk. Reinforce activity limits and safety measures with patient and family. Provide visual clues: red socks.

## 2019-05-15 NOTE — ED PROVIDER NOTE - CLINICAL SUMMARY MEDICAL DECISION MAKING FREE TEXT BOX
49 yo F s/p hysterectomy for ovarian mass now with hypotension, tachycardia. Patient was peresitently hypotensive 70s/30s after NS3L. She was started on peripheral levophed. WBC 18. Lacate 4.8 worsened to 6.7 after IVF. She was given vanc and zosyn.

## 2019-05-15 NOTE — ED PROVIDER NOTE - OBJECTIVE STATEMENT
47 yo F hx of ovarian mass s/p surgery with total hysterectomy last tuesday with Dr. Flowers. She report feeling weak since yesterday, she fell a few times, no loc. no head injury. Patient report fever of 104F yesterday. She took oxycodone and naproxen yesterday. She continue to feel weak. She was found hypotensive 60/30s  and tachycardic. No chest pain, no sob, no abdominal pain, no dysuria. She had 1 episode of vomting. had BM today.

## 2019-05-15 NOTE — ED PROVIDER NOTE - PROGRESS NOTE DETAILS
GYN-ONC PA with  at bedside. Patient persistently hypotensive after NS 3 L, started on levophed peripherally. Lacate worsenened from 4.8 to 6.7. vancomycine ordered. CT abdomen showed large fluid collection. GYN-on notified. SICU at bedside for evaluation.

## 2019-05-15 NOTE — CONSULT NOTE ADULT - ASSESSMENT
48 year old female POD#8 s/p ex lap, JG, modified radical hysterectomy, BSO, pelvic mass excision and pelvic lymph node sampling in addition to multiple bowel serosal repairs intraoperatively (?sigmoid colon?) now presenting in hypovolemic shock requiring pressors (0.4 levo) found to have a large 25cm extraperitoneal fluid collection to the omentum requiring operative intervention with Gyn Onc team in conjunction with CRS service      Neuro:     CV: Currently on 0.4mcg/kg/min of levophed with consideration of addition of vaso if unable to maintain MAP>65    Pulm: 98% O2 on RA - no active issues     GI/Nutrition: Keep NPO     /Renal: Initiate IVFs     ID: Received one dose of vanc and zosyn     Endo:     Skin: Repositioning for DTI prevention while in bed    DVT Prophylaxis: SCDs    Lines/Tubes:     Dispo:     CODE STATUS: 48 year old female POD#8 s/p ex lap, JG, modified radical hysterectomy, BSO, pelvic mass excision and pelvic lymph node sampling in addition to multiple bowel serosal repairs intraoperatively (?sigmoid colon?) now presenting in hypovolemic shock requiring pressors (0.4 levo) found to have a large 25cm extraperitoneal fluid collection to the omentum requiring operative intervention with Gyn Onc team in conjunction with CRS service      Neuro: AOX3 GCS 15 with no pre-op active issues     CV: Currently on 0.4mcg/kg/min of levophed with consideration of addition of vaso if unable to maintain MAP>65    Pulm: 98% O2 on RA - no active issues     GI/Nutrition: Keep NPO for now in anticipation of the OR     /Renal: Initiate IVFs as patient is NPO; JAMIE noted Cr 1.53     ID: WBC 18.3 on presentation; received one dose of vanc and zosyn in the ED     Endo: No Hx of DM or hypothyroidism in PMHx     Skin: Repositioning for DTI prevention while in bed    DVT Prophylaxis: SCDs    Lines/Tubes: Intent to place central line and A line     Dispo: Admission to ICU prior to OR      Misc: 2 units on hold to OR     CODE STATUS: Full 48 year old female POD#8 s/p ex lap, JG, modified radical hysterectomy, BSO, pelvic mass excision and pelvic lymph node sampling in addition to multiple bowel serosal repairs intraoperatively (?sigmoid colon?) now presenting in shock requiring pressors (0.4 levo) found to have free air ilsa large 25cm extraperitoneal fluid collection to the omentum on initial imaging requiring immediate operative intervention with Gyn Onc team in conjunction with CRS service      Neuro: AOX3 GCS 15 with no pre-op active issues     CV: Currently on 0.4mcg/kg/min of levophed with consideration of addition of vaso if unable to maintain MAP>65    Pulm: 98% O2 on RA - no active issues     GI/Nutrition: Keep NPO for now in anticipation of the OR     /Renal: Initiate IVFs as patient is NPO; JAMIE noted Cr 1.53     ID: WBC 18.3 on presentation; received one dose of vanc and zosyn in the ED     Endo: No Hx of DM or hypothyroidism in PMHx     Skin: Repositioning for DTI prevention while in bed    DVT Prophylaxis: SCDs    Lines/Tubes: Intent to place central line and A line     Dispo: Admission to SICU prior to OR      Misc: 2 units on hold to OR     CODE STATUS: Full

## 2019-05-15 NOTE — BRIEF OPERATIVE NOTE - OPERATION/FINDINGS
3L Feculent liquid suctioned upon opening.  Dense adhesions of small and large bowel.  Identified perforation of sigmoid colon with feculent drainage.  Resection performed of this portion of colon.  >10L irrigation.  End colostomy creation.  Fascia closed, wound vac placed.

## 2019-05-15 NOTE — H&P ADULT - PROBLEM SELECTOR PLAN 2
Resuscitated with zosyn, IVF and lefophed.  STAT Labs: WBC 18, lactate 4.8   Urine and blood cultures sent-will follow up  CT AP ordered-will follow up results  CXR ordered-will follow up results   NPO Resuscitated with zosyn, IVF and lefophed.  STAT Labs: WBC 18, lactate 4.8   Urine and blood cultures sent-will follow up  CT AP ordered-will follow up results  CXR ordered-will follow up results   NPO  Will consult ICU  Will consult surgery Resuscitated with IVF, currently requiring levophed to maintain MAP > 60.  Received zosyn x 1.  STAT Labs: WBC 18, lactate uptrending, will continue to monitor.   Urine and blood cultures sent-will follow up  CT AP ordered-will follow up results  CXR ordered-will follow up results   Cardiac monitor  NPO  STAT ICU consult  STAT surgery consult  SCD's for DVT ppx    Discussed case with Dr. Gonzalez.

## 2019-05-15 NOTE — CONSULT NOTE ADULT - SUBJECTIVE AND OBJECTIVE BOX
HISTORY  48y Female    24 HOUR EVENTS:    SUBJECTIVE/ROS:  [ ] A ten-point review of systems was otherwise negative except as noted.  [ ] Due to altered mental status/intubation, subjective information were not able to be obtained from the patient. History was obtained, to the extent possible, from review of the chart and collateral sources of information.      NEURO  RASS:     GCS:     CAM ICU:  Exam:   Meds:   [x] Adequacy of sedation and pain control has been assessed and adjusted      RESPIRATORY  RR: 18 (05-15-19 @ 16:18) (16 - 22)  SpO2: 100% (05-15-19 @ 16:18) (97% - 100%)  Wt(kg): --  Exam: unlabored, clear to auscultation bilaterally  Mechanical Ventilation:     [ ] Extubation Readiness Assessed  Meds:       CARDIOVASCULAR  HR: 98 (05-15-19 @ 16:18) (75 - 118)  BP: 127/71 (05-15-19 @ 16:18) (68/34 - 127/71)  BP(mean): --  ABP: --  ABP(mean): --  Wt(kg): --  CVP(cm H2O): --  VBG - ( 15 May 2019 15:22 )  pH: x     /  pCO2: x     /  pO2: x     / HCO3: x     / Base Excess: x     /  SaO2: x      Lactate: 6.7                Exam:  Cardiac Rhythm:  Perfusion     [ ]Adequate   [ ]Inadequate  Mentation   [ ]Normal       [ ]Reduced  Extremities  [ ]Warm         [ ]Cool  Volume Status [ ]Hypervolemic [ ]Euvolemic [ ]Hypovolemic  Meds: norepinephrine Infusion 0.05 MICROgram(s)/kG/Min IV Continuous <Continuous>        GI/NUTRITION  Exam:  Diet:  Meds:     GENITOURINARY  I&O's Detail    Weight (kg): 67.5 (05-15 @ 13:11)  05-15    130<L>  |  96<L>  |  21.0<H>  ----------------------------<  134<H>  4.0   |  18.0<L>  |  1.53<H>    Ca    7.3<L>      15 May 2019 13:46    TPro  5.1<L>  /  Alb  2.0<L>  /  TBili  0.7  /  DBili  x   /  AST  14  /  ALT  16  /  AlkPhos  73  05-15    [ ] Grey catheter, indication: N/A  Meds:       HEMATOLOGIC  Meds:   [x] VTE Prophylaxis                        8.2    18.3  )-----------( 522      ( 15 May 2019 13:46 )             26.1     PT/INR - ( 15 May 2019 13:46 )   PT: 17.3 sec;   INR: 1.49 ratio         PTT - ( 15 May 2019 13:46 )  PTT:24.3 sec  Transfusion     [ ] PRBC   [ ] Platelets   [ ] FFP   [ ] Cryoprecipitate      INFECTIOUS DISEASES  T(C): 37.8 (05-15-19 @ 13:50), Max: 37.8 (05-15-19 @ 13:50)  Wt(kg): --  WBC Count: 18.3 K/uL (05-15 @ 13:46)    Recent Cultures:    Meds:       ENDOCRINE  Capillary Blood Glucose    Meds:       ACCESS DEVICES:  [ ] Peripheral IV  [ ] Central Venous Line	[ ] R	[ ] L	[ ] IJ	[ ] Fem	[ ] SC	Placed:   [ ] Arterial Line		[ ] R	[ ] L	[ ] Fem	[ ] Rad	[ ] Ax	Placed:   [ ] PICC:					[ ] Mediport  [ ] Urinary Catheter, Date Placed:   [ ] Necessity of urinary, arterial, and venous catheters discussed    OTHER MEDICATIONS: 24 HOUR EVENTS:    SUBJECTIVE/ROS:  [ ] A ten-point review of systems was otherwise negative except as noted.  [ ] Due to altered mental status/intubation, subjective information were not able to be obtained from the patient. History was obtained, to the extent possible, from review of the chart and collateral sources of information.      NEURO  RASS:     GCS:     CAM ICU:  Exam:   Meds:   [x] Adequacy of sedation and pain control has been assessed and adjusted      RESPIRATORY  RR: 18 (05-15-19 @ 16:18) (16 - 22)  SpO2: 100% (05-15-19 @ 16:18) (97% - 100%)  Wt(kg): --  Exam: unlabored, clear to auscultation bilaterally  Mechanical Ventilation:     [ ] Extubation Readiness Assessed  Meds:       CARDIOVASCULAR  HR: 98 (05-15-19 @ 16:18) (75 - 118)  BP: 127/71 (05-15-19 @ 16:18) (68/34 - 127/71)  BP(mean): --  ABP: --  ABP(mean): --  Wt(kg): --  CVP(cm H2O): --  VBG - ( 15 May 2019 15:22 )  pH: x     /  pCO2: x     /  pO2: x     / HCO3: x     / Base Excess: x     /  SaO2: x      Lactate: 6.7                Exam:  Cardiac Rhythm:  Perfusion     [ ]Adequate   [ ]Inadequate  Mentation   [ ]Normal       [ ]Reduced  Extremities  [ ]Warm         [ ]Cool  Volume Status [ ]Hypervolemic [ ]Euvolemic [ ]Hypovolemic  Meds: norepinephrine Infusion 0.05 MICROgram(s)/kG/Min IV Continuous <Continuous>        GI/NUTRITION  Exam:  Diet:  Meds:     GENITOURINARY  I&O's Detail    Weight (kg): 67.5 (05-15 @ 13:11)  05-15    130<L>  |  96<L>  |  21.0<H>  ----------------------------<  134<H>  4.0   |  18.0<L>  |  1.53<H>    Ca    7.3<L>      15 May 2019 13:46    TPro  5.1<L>  /  Alb  2.0<L>  /  TBili  0.7  /  DBili  x   /  AST  14  /  ALT  16  /  AlkPhos  73  05-15    [ ] Grey catheter, indication: N/A  Meds:       HEMATOLOGIC  Meds:   [x] VTE Prophylaxis                        8.2    18.3  )-----------( 522      ( 15 May 2019 13:46 )             26.1     PT/INR - ( 15 May 2019 13:46 )   PT: 17.3 sec;   INR: 1.49 ratio         PTT - ( 15 May 2019 13:46 )  PTT:24.3 sec  Transfusion     [ ] PRBC   [ ] Platelets   [ ] FFP   [ ] Cryoprecipitate      INFECTIOUS DISEASES  T(C): 37.8 (05-15-19 @ 13:50), Max: 37.8 (05-15-19 @ 13:50)  Wt(kg): --  WBC Count: 18.3 K/uL (05-15 @ 13:46)    Recent Cultures:    Meds:       ENDOCRINE  Capillary Blood Glucose    Meds:       ACCESS DEVICES:  [ ] Peripheral IV  [ ] Central Venous Line	[ ] R	[ ] L	[ ] IJ	[ ] Fem	[ ] SC	Placed:   [ ] Arterial Line		[ ] R	[ ] L	[ ] Fem	[ ] Rad	[ ] Ax	Placed:   [ ] PICC:					[ ] Mediport  [ ] Urinary Catheter, Date Placed:   [ ] Necessity of urinary, arterial, and venous catheters discussed    OTHER MEDICATIONS: 48 year old female presenting with chief complaint of dizziness, weakness and SOB. Found to be hypotensive, tachycardic and tachypneic (triage vitals BP 68/34  RR 22) requiring initiation of levophed     SUBJECTIVE/ROS:  [x] A ten-point review of systems was otherwise negative except as noted.  [ ] Due to altered mental status/intubation, subjective information were not able to be obtained from the patient. History was obtained, to the extent possible, from review of the chart and collateral sources of information.              NEURO  RASS:     GCS:     CAM ICU:  Exam:   Meds:   [x] Adequacy of sedation and pain control has been assessed and adjusted      RESPIRATORY  RR: 18 (05-15-19 @ 16:18) (16 - 22)  SpO2: 100% (05-15-19 @ 16:18) (97% - 100%)  Wt(kg): --  Exam: unlabored, clear to auscultation bilaterally  Mechanical Ventilation:     [ ] Extubation Readiness Assessed  Meds:       CARDIOVASCULAR  HR: 98 (05-15-19 @ 16:18) (75 - 118)  BP: 127/71 (05-15-19 @ 16:18) (68/34 - 127/71)  BP(mean): --  ABP: --  ABP(mean): --  Wt(kg): --  CVP(cm H2O): --  VBG - ( 15 May 2019 15:22 )  pH: x     /  pCO2: x     /  pO2: x     / HCO3: x     / Base Excess: x     /  SaO2: x      Lactate: 6.7                Exam:  Cardiac Rhythm:  Perfusion     [ ]Adequate   [ ]Inadequate  Mentation   [ ]Normal       [ ]Reduced  Extremities  [ ]Warm         [ ]Cool  Volume Status [ ]Hypervolemic [ ]Euvolemic [ ]Hypovolemic  Meds: norepinephrine Infusion 0.05 MICROgram(s)/kG/Min IV Continuous <Continuous>        GI/NUTRITION  Exam:  Diet:  Meds:     GENITOURINARY  I&O's Detail    Weight (kg): 67.5 (05-15 @ 13:11)  05-15    130<L>  |  96<L>  |  21.0<H>  ----------------------------<  134<H>  4.0   |  18.0<L>  |  1.53<H>    Ca    7.3<L>      15 May 2019 13:46    TPro  5.1<L>  /  Alb  2.0<L>  /  TBili  0.7  /  DBili  x   /  AST  14  /  ALT  16  /  AlkPhos  73  05-15    [ ] Grey catheter, indication: N/A  Meds:       HEMATOLOGIC  Meds:   [x] VTE Prophylaxis                        8.2    18.3  )-----------( 522      ( 15 May 2019 13:46 )             26.1     PT/INR - ( 15 May 2019 13:46 )   PT: 17.3 sec;   INR: 1.49 ratio         PTT - ( 15 May 2019 13:46 )  PTT:24.3 sec  Transfusion     [ ] PRBC   [ ] Platelets   [ ] FFP   [ ] Cryoprecipitate      INFECTIOUS DISEASES  T(C): 37.8 (05-15-19 @ 13:50), Max: 37.8 (05-15-19 @ 13:50)  Wt(kg): --  WBC Count: 18.3 K/uL (05-15 @ 13:46)    Recent Cultures:    Meds:       ENDOCRINE  Capillary Blood Glucose    Meds:       ACCESS DEVICES:  [ ] Peripheral IV  [ ] Central Venous Line	[ ] R	[ ] L	[ ] IJ	[ ] Fem	[ ] SC	Placed:   [ ] Arterial Line		[ ] R	[ ] L	[ ] Fem	[ ] Rad	[ ] Ax	Placed:   [ ] PICC:					[ ] Mediport  [ ] Urinary Catheter, Date Placed:   [ ] Necessity of urinary, arterial, and venous catheters discussed    OTHER MEDICATIONS: 48 year old female presenting with chief complaint of dizziness, weakness and SOB. Found to be hypotensive, tachycardic and tachypneic (triage vitals BP 68/34  RR 22) requiring initiation of levophed. Currently POD#8 s/p ex lap, JG, modified radical hysterectomy, BSO, pelvic mass excision and pelvic lymph node sampling in addition to multiple bowel serosal repairs intraoperatively (?sigmoid colon?) for pelvic mass by Gyn service (initially presented to [facility] s/p fall in 2019 and during evaluation noted to have marked abdominal distention on physical exam where imaging revealing an incidental fidning of a complex left adnexal mass) Evaluated in ED by SICU team and at that time, had received 3L fluid boluses, a dose of vanc and zosyn in addition to initiation of levophed. Vitals during evaluation noted as 124/73 HR 97 RR 24 and 99% on RA. Patient immediately transferred to ICU for resuscitation prior to the OR.     PMHx: Pelvic mass  PSHx: Ex lap, JG, modified radical hysterectomy, BSO, pelvic mass excision and pelvic lymph node sampling, multiple bowel serosal repairs intraoperatively (?sigmoid colon?)   Allergies: None   Meds: Post op pain medications (oxy, NSAIDs) no meds pre-op   FamHx: Maternal aunt with breast CA and different maternal aunt with pancreatic CA (both )    SocHx: Denies toxic habits x3       SUBJECTIVE/ROS:  [x] A ten-point review of systems was otherwise negative except as noted.  [ ] Due to altered mental status/intubation, subjective information were not able to be obtained from the patient. History was obtained, to the extent possible, from review of the chart and collateral sources of information.    Constitutional: Patient resting comfortably in bed in no acute distress   HEENT: EOMI/PERRL b/l  Neck: No JVD, full ROM w/o pain?   Respiratory: CTAB with unlabored respirations, no accessory muscle use or conversational dyspnea   Cardiovascular: Regular rate and rhythm with no arrythmias or murmurs  Gastrointestinal: Midline surgical incision with staples c/d/i, abdomen soft, non-tender, moderately distended and tympanic to percussion with no rebound tenderness or guarding   Rectal: Not indicated  Neurological: GCS 15 ([id number]) with no gross sensory, motor or coordination deficits   Psychiatric: Normal mood and affect   Musculoskeletal: No joint pain, swelling or deformity with no limitation of movement     NEURO  RASS:     GCS:  15   CAM ICU:  Exam:   Meds:   [x] Adequacy of sedation and pain control has been assessed and adjusted    RESPIRATORY  RR: 18 (05-15-19 @ 16:18) (16 - 22)  SpO2: 100% (05-15-19 @ 16:18) (97% - 100%)  Wt(kg): --  Exam: unlabored, clear to auscultation bilaterally  Mechanical Ventilation:     CARDIOVASCULAR  HR: 98 (05-15-19 @ 16:18) (75 - 118)  BP: 127/71 (05-15-19 @ 16:18) (68/34 - 127/71)  BP(mean): --  ABP: --  ABP(mean): --  Wt(kg): --  CVP(cm H2O): --  VBG - ( 15 May 2019 15:22 )  pH: x     /  pCO2: x     /  pO2: x     / HCO3: x     / Base Excess: x     /  SaO2: x      Lactate: 6.7      Exam:  Cardiac Rhythm: Normal S1 and S2   Perfusion    [x]Adequate   [ ]Inadequate  Mentation   [x]Normal       [ ]Reduced  Extremities  [x]Warm         [ ]Cool  Volume Status [ ]Hypervolemic [ ]Euvolemic [ ]Hypovolemic  Meds: norepinephrine Infusion 0.05 MICROgram(s)/kG/Min IV Continuous <Continuous>    GI/NUTRITION  Exam:  Diet: NPO   Meds:     GENITOURINARY  I&O's Detail    Weight (kg): 67.5 (05-15 @ 13:11)  05-15    130<L>  |  96<L>  |  21.0<H>  ----------------------------<  134<H>  4.0   |  18.0<L>  |  1.53<H>    Ca    7.3<L>      15 May 2019 13:46    TPro  5.1<L>  /  Alb  2.0<L>  /  TBili  0.7  /  DBili  x   /  AST  14  /  ALT  16  /  AlkPhos  73  05-15    [ ] Grey catheter, indication: N/A  Meds:     HEMATOLOGIC  Meds:   [x] VTE Prophylaxis                        8.2    18.3  )-----------( 522      ( 15 May 2019 13:46 )             26.1     PT/INR - ( 15 May 2019 13:46 )   PT: 17.3 sec;   INR: 1.49 ratio         PTT - ( 15 May 2019 13:46 )  PTT:24.3 sec  Transfusion     [ ] PRBC   [ ] Platelets   [ ] FFP   [ ] Cryoprecipitate      INFECTIOUS DISEASES  T(C): 37.8 (05-15-19 @ 13:50), Max: 37.8 (05-15-19 @ 13:50)  Wt(kg): --  WBC Count: 18.3 K/uL (05-15 @ 13:46)    Recent Cultures:    Meds:     ENDOCRINE  Capillary Blood Glucose    Meds:       ACCESS DEVICES:  [x] Peripheral IV  [ ] Central Venous Line	[ ] R	[ ] L	[ ] IJ	[ ] Fem	[ ] SC	Placed:   [ ] Arterial Line		[ ] R	[ ] L	[ ] Fem	[ ] Rad	[ ] Ax	Placed:   [ ] PICC:					[ ] Mediport  [ ] Urinary Catheter, Date Placed:   [ ] Necessity of urinary, arterial, and venous catheters discussed    OTHER MEDICATIONS:

## 2019-05-15 NOTE — CONSULT NOTE ADULT - SUBJECTIVE AND OBJECTIVE BOX
49yo female POD 8 following ex lap, pelvic mass removal, total abdominal hysterectomy, bilateral salpingectomy for 'abnormal menstruation and large complex cystic adnexal mass.    Per patient "surgery was complex and bowel was matted to uterus." Reports uncomplicated postoperative course, discharge to home.  Eating and stooling normally until yesterday.  Recalls abdomen becoming very distended, two episodes of bilious emesis and decreased stool output.  Reports passing flatus.  Multiple episodes of lightheadedness, dizziness and near syncopal event today which led her to come to ED.    Denies abdominal pain at this time.  Denies abdominal pain throughout last three days    Denies chest pain, dyspnea.  Denies changes in urinary patterns, dysuria.    PMH: Denies  PSH: Denies  Meds: Denies  Allergies: Denies   FHx: Mother - borderline HTN, MGM - HTN, M Aunts - Breast CA  SHx: Lives with family, works for [employer]     on 0.4mcg Levophed.  Febrile to 104  NAD, A&Ox3  EOMI, PERRL  Breathing comfortably on 2LNC, CTAB  Tachycardia, regular rhythm  Abdomen markedly distended, nontender to palpation/percussion, resonant to percussion.  Midline incision with staples, no evidence of erythema or purulent/feculant drainage.  Extremities WDWP, nonedematous    CT imaging reviewed with large burden of intraabdominal fluid consistent with feces vs pus, including air and small pneumoperitoneum

## 2019-05-15 NOTE — ED PROVIDER NOTE - NS ED ROS FT
Review of Systems  •	CONSTITUTIONAL - (+)   fever, no diaphoresis, no weight change  •	SKIN - no rash  (+)  pale skin   •	HEMATOLOGIC - no bleeding, no bruising  •	EYES - no eye pain, no blurred vision  •	ENT - no change in hearing, no pain  •	RESPIRATORY - no shortness of breath, no cough  •	CARDIAC - no chest pain, no palpitations  •	GI - no abd pain, no nausea, no vomiting, no diarrhea, no constipation, no bleeding  •	GENITO-URINARY - no discharge, no dysuria; no hematuria,   •	ENDO - no polydypsia, no polyurea, no heat/no cold intolerance  •	MUSCULOSKELETAL - no joint pain, no swelling, no redness  •	NEUROLOGIC -  (+)   generalized  weakness, no headache, no anesthesia, no paresthesias  •	PSYCH - no anxiety, non suicidal, non homicidal, no hallucination, no depression

## 2019-05-15 NOTE — ED ADULT NURSE NOTE - OBJECTIVE STATEMENT
patient with recent hysterectomy last Tuesday, c/o nausea and fall last night getting out of the bathroom, today feeling weak and nauseous. patient has abdominal binder with staples to the abdomen.

## 2019-05-15 NOTE — ED ADULT NURSE REASSESSMENT NOTE - NS ED NURSE REASSESS COMMENT FT1
patients pressure continued to drop and after bolus MD Kraft made aware and ordered to start Levophed.

## 2019-05-15 NOTE — ED PROVIDER NOTE - PHYSICAL EXAMINATION
VITAL SIGNS: I have reviewed nursing notes and confirm.  CONSTITUTIONAL: Well-developed; well-nourished;  (+)  weak appearing   SKIN: Skin exam is warm and dry, no acute rash.  (+)  pale skin  HEAD: Normocephalic; atraumatic.  EYES: PERRL, EOM intact; conjunctiva and sclera clear.  ENT: No nasal discharge; airway clear. Throat clear.  NECK: Supple; non tender.    CARD: S1, S2 normal; no murmurs, gallops, or rubs. Regular rate and rhythm.  RESP: No wheezes,  no rales or rhonchi.   ABD:  soft;  (+)  mild distension  (+)  vertical abdominal incision with stable. no purlent driange.   EXT: Normal ROM. No clubbing, cyanosis or edema.  NEURO: Alert, oriented. Grossly unremarkable. No focal deficits. no facial droop, moves all extremities,   PSYCH: Cooperative, appropriate.

## 2019-05-15 NOTE — H&P ADULT - NSHPPHYSICALEXAM_GEN_ALL_CORE
OBJECTIVE FINDINGS:    Vital Signs Last 24 Hrs  T(C): 37.8 (15 May 2019 13:50), Max: 37.8 (15 May 2019 13:50)  T(F): 100.1 (15 May 2019 13:50), Max: 100.1 (15 May 2019 13:50)  HR: 86 (15 May 2019 14:45) (75 - 118)  BP: 114/62 (15 May 2019 14:45) (68/34 - 115/68)  RR: 18 (15 May 2019 14:45) (16 - 22)  SpO2: 100% (15 May 2019 14:45) (97% - 100%)    PHYSICAL EXAM:    GENERAL: NAD, well-developed  HEAD:  Atraumatic, Normocephalic  EYES: EOMI, PERRLA, conjunctiva and sclera clear  NECK: Supple, No JVD  NERVOUS SYSTEM:  Alert & Oriented X3, Good concentration  CHEST/LUNG: Clear to percussion bilaterally; No rales, rhonchi, wheezing, or rubs  HEART: Regular rate and rhythm; No murmurs, rubs, or gallops  ABDOMEN: Distended, firm, hypoactive bowel sounds. Nontender, No rebound, No guarding  EXTREMITIES:  2+ Peripheral Pulses, No clubbing, cyanosis, or edema, Martha's sign negative  LYMPH: No lymphadenopathy noted  SKIN: No rashes or lesions OBJECTIVE FINDINGS:    Vital Signs Last 24 Hrs  T(C): 37.8 (15 May 2019 13:50), Max: 37.8 (15 May 2019 13:50)  T(F): 100.1 (15 May 2019 13:50), Max: 100.1 (15 May 2019 13:50)  HR: 86 (15 May 2019 14:45) (75 - 118)  BP: 114/62 (15 May 2019 14:45) (68/34 - 115/68)  RR: 18 (15 May 2019 14:45) (16 - 22)  SpO2: 100% (15 May 2019 14:45) (97% - 100%)    PHYSICAL EXAM:    GENERAL: pale, NAD, well-developed  HEAD:  Atraumatic, Normocephalic  EYES: EOMI, PERRLA, conjunctiva and sclera clear  NECK: Supple, No JVD  NERVOUS SYSTEM:  Alert & Oriented X3, Good concentration  CHEST/LUNG: Clear to percussion bilaterally; No rales, rhonchi, wheezing, or rubs  HEART: Regular rate and rhythm; No murmurs, rubs, or gallops  ABDOMEN: Distended, firm, hypoactive bowel sounds. Nontender, No rebound, No guarding  EXTREMITIES:  2+ Peripheral Pulses, No clubbing, cyanosis, or edema, Martha's sign negative  LYMPH: No lymphadenopathy noted  SKIN: No rashes or lesions

## 2019-05-15 NOTE — BRIEF OPERATIVE NOTE - NSICDXBRIEFPROCEDURE_GEN_ALL_CORE_FT
PROCEDURES:  Abdominal washout 15-May-2019 22:53:34  Stacey Carrillo  Creation, end colostomy 15-May-2019 22:53:27  Stacey Carrillo  Open sigmoidectomy 15-May-2019 22:53:18  Stacey Carrillo  Exploratory laparotomy 15-May-2019 22:53:09  Stacey Carrillo

## 2019-05-15 NOTE — PHARMACOTHERAPY INTERVENTION NOTE - COMMENTS
Spoke to patient at bedside. Is currently on no maintenance medications. Is only on post-op medications s/p hysterectomy earlier this month
Hypotension, sepsis

## 2019-05-15 NOTE — ED PROVIDER NOTE - CRITICAL CARE PROVIDED
documentation/direct patient care (not related to procedure)/consultation with other physicians/interpretation of diagnostic studies/additional history taking

## 2019-05-15 NOTE — H&P ADULT - PROBLEM SELECTOR PLAN 1
s/p exlap modified radical abdominal hysterectomy, bilateral salpingo-oopherectoy omentectomy, pelvic lymph node sampling, peritonectomy of abdominal cavity, frozen section, enterolysis, serosal repair of sigmoid colon.  pathology pending from surgery, will follow up s/p exlap modified radical abdominal hysterectomy, bilateral salpingo-oopherectomy omentectomy, pelvic lymph node sampling, peritonectomy of abdominal cavity, frozen section, enterolysis, serosal repair of sigmoid colon.  pathology pending from surgery, will follow up

## 2019-05-15 NOTE — CONSULT NOTE ADULT - ASSESSMENT
47yo female POD 8 from ex lap, pelvic mass removal, total abdominal hysterectomy, bilateral salpingectomy  now with intraabdominal fluid collection and Septic Shock consistent with bowel perforation.    Agree with SICU admission  to OR for Exploratory laparotomy, Washout, Possible bowel resection, possible ostomy    Further plans to follow operative intervention    Discussed and images reviewed with attending physician whom agrees

## 2019-05-16 ENCOUNTER — TRANSCRIPTION ENCOUNTER (OUTPATIENT)
Age: 49
End: 2019-05-16

## 2019-05-16 DIAGNOSIS — N80.9 ENDOMETRIOSIS, UNSPECIFIED: ICD-10-CM

## 2019-05-16 DIAGNOSIS — K66.8 OTHER SPECIFIED DISORDERS OF PERITONEUM: ICD-10-CM

## 2019-05-16 LAB
ANION GAP SERPL CALC-SCNC: 10 MMOL/L — SIGNIFICANT CHANGE UP (ref 5–17)
BUN SERPL-MCNC: 20 MG/DL — SIGNIFICANT CHANGE UP (ref 8–20)
CALCIUM SERPL-MCNC: 6.7 MG/DL — LOW (ref 8.6–10.2)
CHLORIDE SERPL-SCNC: 108 MMOL/L — HIGH (ref 98–107)
CO2 SERPL-SCNC: 18 MMOL/L — LOW (ref 22–29)
CREAT SERPL-MCNC: 0.86 MG/DL — SIGNIFICANT CHANGE UP (ref 0.5–1.3)
CULTURE RESULTS: NO GROWTH — SIGNIFICANT CHANGE UP
GLUCOSE SERPL-MCNC: 112 MG/DL — SIGNIFICANT CHANGE UP (ref 70–115)
HCT VFR BLD CALC: 33.8 % — LOW (ref 37–47)
HGB BLD-MCNC: 11.5 G/DL — LOW (ref 12–16)
INR BLD: 1.6 RATIO — HIGH (ref 0.88–1.16)
LACTATE BLDV-MCNC: 2.3 MMOL/L — HIGH (ref 0.5–2)
MAGNESIUM SERPL-MCNC: 1.6 MG/DL — SIGNIFICANT CHANGE UP (ref 1.6–2.6)
MCHC RBC-ENTMCNC: 28.2 PG — SIGNIFICANT CHANGE UP (ref 27–31)
MCHC RBC-ENTMCNC: 34 G/DL — SIGNIFICANT CHANGE UP (ref 32–36)
MCV RBC AUTO: 82.8 FL — SIGNIFICANT CHANGE UP (ref 81–99)
PHOSPHATE SERPL-MCNC: 3.6 MG/DL — SIGNIFICANT CHANGE UP (ref 2.4–4.7)
PLATELET # BLD AUTO: 584 K/UL — HIGH (ref 150–400)
POTASSIUM SERPL-MCNC: 4.1 MMOL/L — SIGNIFICANT CHANGE UP (ref 3.5–5.3)
POTASSIUM SERPL-SCNC: 4.1 MMOL/L — SIGNIFICANT CHANGE UP (ref 3.5–5.3)
PROTHROM AB SERPL-ACNC: 18.7 SEC — HIGH (ref 10–12.9)
RBC # BLD: 4.08 M/UL — LOW (ref 4.4–5.2)
RBC # FLD: 17.4 % — HIGH (ref 11–15.6)
SODIUM SERPL-SCNC: 136 MMOL/L — SIGNIFICANT CHANGE UP (ref 135–145)
SPECIMEN SOURCE: SIGNIFICANT CHANGE UP
WBC # BLD: 22.8 K/UL — HIGH (ref 4.8–10.8)
WBC # FLD AUTO: 22.8 K/UL — HIGH (ref 4.8–10.8)

## 2019-05-16 PROCEDURE — 36556 INSERT NON-TUNNEL CV CATH: CPT

## 2019-05-16 PROCEDURE — 99233 SBSQ HOSP IP/OBS HIGH 50: CPT

## 2019-05-16 PROCEDURE — 99291 CRITICAL CARE FIRST HOUR: CPT

## 2019-05-16 RX ORDER — ONDANSETRON 8 MG/1
4 TABLET, FILM COATED ORAL ONCE
Refills: 0 | Status: COMPLETED | OUTPATIENT
Start: 2019-05-16 | End: 2019-05-16

## 2019-05-16 RX ORDER — SODIUM CHLORIDE 9 MG/ML
1000 INJECTION, SOLUTION INTRAVENOUS
Refills: 0 | Status: DISCONTINUED | OUTPATIENT
Start: 2019-05-16 | End: 2019-05-16

## 2019-05-16 RX ORDER — CHLORHEXIDINE GLUCONATE 213 G/1000ML
1 SOLUTION TOPICAL DAILY
Refills: 0 | Status: DISCONTINUED | OUTPATIENT
Start: 2019-05-16 | End: 2019-05-22

## 2019-05-16 RX ORDER — PIPERACILLIN AND TAZOBACTAM 4; .5 G/20ML; G/20ML
3.38 INJECTION, POWDER, LYOPHILIZED, FOR SOLUTION INTRAVENOUS EVERY 8 HOURS
Refills: 0 | Status: DISCONTINUED | OUTPATIENT
Start: 2019-05-16 | End: 2019-05-22

## 2019-05-16 RX ORDER — MAGNESIUM SULFATE 500 MG/ML
2 VIAL (ML) INJECTION ONCE
Refills: 0 | Status: COMPLETED | OUTPATIENT
Start: 2019-05-16 | End: 2019-05-16

## 2019-05-16 RX ORDER — SODIUM CHLORIDE 9 MG/ML
500 INJECTION, SOLUTION INTRAVENOUS ONCE
Refills: 0 | Status: COMPLETED | OUTPATIENT
Start: 2019-05-16 | End: 2019-05-16

## 2019-05-16 RX ORDER — WATER FOR INHALATION
1000 VIAL, NEBULIZER (ML) INHALATION
Refills: 0 | Status: DISCONTINUED | OUTPATIENT
Start: 2019-05-16 | End: 2019-05-17

## 2019-05-16 RX ADMIN — HYDROMORPHONE HYDROCHLORIDE 0.5 MILLIGRAM(S): 2 INJECTION INTRAMUSCULAR; INTRAVENOUS; SUBCUTANEOUS at 01:35

## 2019-05-16 RX ADMIN — ENOXAPARIN SODIUM 40 MILLIGRAM(S): 100 INJECTION SUBCUTANEOUS at 13:45

## 2019-05-16 RX ADMIN — HYDROMORPHONE HYDROCHLORIDE 0.5 MILLIGRAM(S): 2 INJECTION INTRAMUSCULAR; INTRAVENOUS; SUBCUTANEOUS at 09:47

## 2019-05-16 RX ADMIN — HYDROMORPHONE HYDROCHLORIDE 0.5 MILLIGRAM(S): 2 INJECTION INTRAMUSCULAR; INTRAVENOUS; SUBCUTANEOUS at 00:19

## 2019-05-16 RX ADMIN — PIPERACILLIN AND TAZOBACTAM 25 GRAM(S): 4; .5 INJECTION, POWDER, LYOPHILIZED, FOR SOLUTION INTRAVENOUS at 05:45

## 2019-05-16 RX ADMIN — PIPERACILLIN AND TAZOBACTAM 25 GRAM(S): 4; .5 INJECTION, POWDER, LYOPHILIZED, FOR SOLUTION INTRAVENOUS at 13:45

## 2019-05-16 RX ADMIN — HYDROMORPHONE HYDROCHLORIDE 0.5 MILLIGRAM(S): 2 INJECTION INTRAMUSCULAR; INTRAVENOUS; SUBCUTANEOUS at 10:00

## 2019-05-16 RX ADMIN — Medication 100 MILLILITER(S): at 13:45

## 2019-05-16 RX ADMIN — Medication 50 GRAM(S): at 09:47

## 2019-05-16 RX ADMIN — ONDANSETRON 4 MILLIGRAM(S): 8 TABLET, FILM COATED ORAL at 01:51

## 2019-05-16 RX ADMIN — CHLORHEXIDINE GLUCONATE 1 APPLICATION(S): 213 SOLUTION TOPICAL at 13:00

## 2019-05-16 RX ADMIN — HYDROMORPHONE HYDROCHLORIDE 0.5 MILLIGRAM(S): 2 INJECTION INTRAMUSCULAR; INTRAVENOUS; SUBCUTANEOUS at 18:46

## 2019-05-16 RX ADMIN — Medication 50 GRAM(S): at 04:01

## 2019-05-16 RX ADMIN — SODIUM CHLORIDE 1000 MILLILITER(S): 9 INJECTION, SOLUTION INTRAVENOUS at 15:30

## 2019-05-16 RX ADMIN — PIPERACILLIN AND TAZOBACTAM 25 GRAM(S): 4; .5 INJECTION, POWDER, LYOPHILIZED, FOR SOLUTION INTRAVENOUS at 21:06

## 2019-05-16 RX ADMIN — HYDROMORPHONE HYDROCHLORIDE 0.5 MILLIGRAM(S): 2 INJECTION INTRAMUSCULAR; INTRAVENOUS; SUBCUTANEOUS at 14:35

## 2019-05-16 RX ADMIN — HYDROMORPHONE HYDROCHLORIDE 0.5 MILLIGRAM(S): 2 INJECTION INTRAMUSCULAR; INTRAVENOUS; SUBCUTANEOUS at 19:00

## 2019-05-16 RX ADMIN — HYDROMORPHONE HYDROCHLORIDE 0.5 MILLIGRAM(S): 2 INJECTION INTRAMUSCULAR; INTRAVENOUS; SUBCUTANEOUS at 14:05

## 2019-05-16 NOTE — PROGRESS NOTE ADULT - SUBJECTIVE AND OBJECTIVE BOX
Subjective: Patient was seen and examined bedside. She was resting comfortably in bed w/ no complaints. NGT in place with bilious drainage. NGT flushed bedside and flowing well. Patient remains on .03 mcg/kg/min of Levophed. Grey in place with clear yellow urine. JULIAN drains with sero-sanguinous drainage. Wound vac in place on midline incision.       MEDICATIONS  (STANDING):  chlorhexidine 2% Cloths 1 Application(s) Topical daily  enoxaparin Injectable 40 milliGRAM(s) SubCutaneous daily  multiple electrolytes Injection Type 1 1000 milliLiter(s) (125 mL/Hr) IV Continuous <Continuous>  norepinephrine Infusion 0.08 MICROgram(s)/kG/Min (10.005 mL/Hr) IV Continuous <Continuous>  piperacillin/tazobactam IVPB. 3.375 Gram(s) IV Intermittent every 8 hours    MEDICATIONS  (PRN):  HYDROmorphone  Injectable 0.5 milliGRAM(s) IV Push every 3 hours PRN Moderate Pain (4 - 6)      Vital Signs Last 24 Hrs  T(C): 36.4 (16 May 2019 08:00), Max: 37.8 (15 May 2019 13:50)  T(F): 97.5 (16 May 2019 08:00), Max: 100.1 (15 May 2019 13:50)  HR: 92 (16 May 2019 10:00) (75 - 118)  BP: 86/55 (16 May 2019 10:00) (68/34 - 127/71)  BP(mean): 65 (16 May 2019 10:00) (60 - 84)  RR: 20 (16 May 2019 10:00) (16 - 33)  SpO2: 95% (16 May 2019 10:00) (95% - 100%)      05-15  -  05-16  --------------------------------------------------------  IN:    multiple electrolytes Injection Type 1: 750 mL    norepinephrine Infusion: 19 mL    norepinephrine Infusion: 94.2 mL    Plasma: 588 mL    Sodium Chloride 0.9% IV Bolus: 1000 mL    Solution: 50 mL  Total IN: 2501.2 mL    OUT:    Bulb: 165 mL    Bulb: 250 mL    Indwelling Catheter - Urethral: 760 mL  Total OUT: 1175 mL    Total NET: 1326.2 mL        --------------------------------------------------------  IN:    multiple electrolytes Injection Type 1: 375 mL    norepinephrine Infusion: 9.9 mL    Solution: 100 mL    Solution: 50 mL  Total IN: 534.9 mL    OUT:    Bulb: 15 mL    Bulb: 30 mL    Indwelling Catheter - Urethral: 125 mL  Total OUT: 170 mL    Total NET: 364.9 mL      Physical Exam:    Constitutional: NAD  HEENT: PERRL, EOMI  Neck: No JVD, FROM without pain  Respiratory: Respirations non-labored, no accessory muscle use  Cardiovascular: Regular rate & rhythm, S1, S2  Gastrointestinal: NGT in place with bilious drainage, Ostomy pink and viable, JULIAN x2 in place with serosanguinous drainage, wound vac on midline incision, Soft, non-tender, non-distended  Extremities: No peripheral edema, No cyanosis  Neurological: A&O x 3; without gross deficit  Musculoskeletal: No joint pain, swelling, deformity, or point tenderness; no limitation of movement      LABS:                        11.5   22.8  )-----------( 584      ( 16 May 2019 00:49 )             33.8     05-    136  |  108<H>  |  20.0  ----------------------------<  112  4.1   |  18.0<L>  |  0.86    Ca    6.7<L>      16 May 2019 00:49  Phos  3.6     -  Mg     1.6     -16    TPro  5.1<L>  /  Alb  2.0<L>  /  TBili  0.7  /  DBili  x   /  AST  14  /  ALT  16  /  AlkPhos  73  05-15    PT/INR - ( 16 May 2019 00:49 )   PT: 18.7 sec;   INR: 1.60 ratio         PTT - ( 15 May 2019 13:46 )  PTT:24.3 sec  Urinalysis Basic - ( 15 May 2019 18:09 )    Color: Yellow / Appearance: Clear / S.015 / pH: x  Gluc: x / Ketone: Trace  / Bili: Small / Urobili: 1 mg/dL   Blood: x / Protein: 30 mg/dL / Nitrite: Negative   Leuk Esterase: Trace / RBC: 0-2 /HPF / WBC 0-2   Sq Epi: x / Non Sq Epi: Occasional / Bacteria: Occasional

## 2019-05-16 NOTE — DIETITIAN INITIAL EVALUATION ADULT. - PERTINENT LABORATORY DATA
05-16 Na136 mmol/L Glu 112 mg/dL K+ 4.1 mmol/L Cr  0.86 mg/dL BUN 20.0 mg/dL Phos 3.6 mg/dL Alb n/a   PAB n/a

## 2019-05-16 NOTE — PROGRESS NOTE ADULT - ASSESSMENT
Assessment: Patient is a 47yo now POD#1 s/p exploratory laparotomy, abdominal washout of 3L feculent matter, open sigmoidectomy and end colostomy for bowel perforation 9 days post-op from a modified radical hysterectomy BSO, omentectomy, debulking, peritectomy of abdominal cavity for severe endometriosis.    Plan:  Continue IV ABX  Monitor wound vac  monitor JULIAN output  Grey catheter, monitor I&O's  Pain control  OOB as tolerated  encourage IS  DVT ppx  Continued care per surgical ICU

## 2019-05-16 NOTE — PROGRESS NOTE ADULT - SUBJECTIVE AND OBJECTIVE BOX
Post-op Check    Subjective:  Pt offers no acute complaints at this time, is mentating well. Pain well controlled on current regiment. Patient remains intubated on levophed , with 2 units FFP running 2/2 INR increasing to 1.6. Hemoglobin stable, lactate decreasing. Midline incision with wound well seated, JULIAN drains x2 draining sero-sangenuous fluid. Grey in place with meth[place] blue urine.    STATUS POST:  Abdominal washout ,Creation, end colostomy ,Open sigmoidectomy ,Exploratory laparotomy     POST OPERATIVE DAY #: 0    MEDICATIONS  (STANDING):  enoxaparin Injectable 40 milliGRAM(s) SubCutaneous daily  magnesium sulfate  IVPB 2 Gram(s) IV Intermittent once  multiple electrolytes Injection Type 1 1000 milliLiter(s) (125 mL/Hr) IV Continuous <Continuous>  norepinephrine Infusion 0.08 MICROgram(s)/kG/Min (10.005 mL/Hr) IV Continuous <Continuous>  piperacillin/tazobactam IVPB. 3.375 Gram(s) IV Intermittent every 8 hours    MEDICATIONS  (PRN):  HYDROmorphone  Injectable 0.5 milliGRAM(s) IV Push every 3 hours PRN Moderate Pain (4 - 6)      Vital Signs Last 24 Hrs  T(C): 36.9 (15 May 2019 17:10), Max: 37.8 (15 May 2019 13:50)  T(F): 98.4 (15 May 2019 17:10), Max: 100.1 (15 May 2019 13:50)  HR: 95 (16 May 2019 02:15) (75 - 118)  BP: 80/50 (16 May 2019 02:00) (68/34 - 127/71)  BP(mean): 60 (16 May 2019 02:00) (60 - 84)  RR: 16 (16 May 2019 02:15) (16 - 33)  SpO2: 96% (16 May 2019 02:15) (95% - 100%)    Physical Exam:    Constitutional: NAD  HEENT: PERRL, EOMI  Neck: No JVD, FROM without pain  Respiratory: no accessory muscle use, respirations non-labored, no issues on ventilator   GI: soft, appropriate ttp , midline wound vac well seated, JULIAN drains x2   Neurological: A&O x 3; without gross deficit    A:     P:  Continue current care  Pain control  OOB as tolerated  Encourage IS  DVT ppx

## 2019-05-16 NOTE — PROGRESS NOTE ADULT - SUBJECTIVE AND OBJECTIVE BOX
Patient seen for PM rounds with Dr. Gonzalez. She is doing well, sitting in chair. Pain controlled. Afebrile. Pressors discontinued however MAP going to <70 after she moved to chair. Given 500cc bolus, management per SICU. JULIAN drains output 115cc (left), 95cc (right) during shift. NPO, NG output 200cc for shift. Will continue to follow.

## 2019-05-16 NOTE — DIETITIAN INITIAL EVALUATION ADULT. - ETIOLOGY
related to increased physiological needs in the setting of sepsis related to inadequate protein-energy intake with altered GI function in setting of large adnexal mass (s/p BASILIA/BSO excision, now readmitted with pneumoperitoneum)

## 2019-05-16 NOTE — DIETITIAN INITIAL EVALUATION ADULT. - PROBLEM SELECTOR PLAN 2
Resuscitated with IVF, currently requiring levophed to maintain MAP > 60.  Received zosyn x 1.  STAT Labs: WBC 18, lactate uptrending, will continue to monitor.   Urine and blood cultures sent-will follow up  CT AP ordered-will follow up results  CXR ordered-will follow up results   Cardiac monitor  NPO  STAT ICU consult  STAT surgery consult  SCD's for DVT ppx    Discussed case with Dr. Gonzalez.

## 2019-05-16 NOTE — PROGRESS NOTE ADULT - ASSESSMENT
Patient is a 49yo now POD#1 s/p exploratory laparotomy, abdominal washout of 3L feculent matter, open sigmoidectomy and end colostomy for bowel perforation 9 days post-op from a modified radical hysterectomy BSO, omentectomy, debulking, peritectomy of abdominal cavity for severe endometriosis     Cardiac: On pressors overnight, weaned to 0.03 Levophed with MAP >80.     Pulmonary: no active disease, incentive spirometer at bedside. O2 saturation 97% on 2 L, recently d/c.     Neuro: pain well controlled with current regimen.     Endo: no active disease      GI: NPO with NG tube to continuous suction, management per surgical team. Minimal output overnight <100cc.     : JAMIE on admission, creatinine now improving. UOP approximately 100cc/hour over the last 6 hours. Will continue to monitor with strict I's and O's.     ID: Lactate downtrending, most recently 2.3 down from 6.7. Continue zosyn, renally dosed.     Heme: SCDs when not ambulating, Lovenox for VTE prophylaxis. CBC wnl will continue with AM labs.     Skin: wound vac in place     FEN: IVF at 100cc/hr, will discontinue once PO intake is adequate. Electrolytes wnl. Will continue AM labs.     Lines/tubes: Left subclavian inserted with port reserved for TPN in case necessary. JULIAN drains bilaterally. Output total <300cc overnight.     Dispo: SICU, will continue to follow.

## 2019-05-16 NOTE — PROGRESS NOTE ADULT - SUBJECTIVE AND OBJECTIVE BOX
GYNECOLOGIC ONCOLOGY PROGRESS NOTE    POD#1 s/p exploratory laparotomy, abdominal washout of 3L feculent matter, open sigmoidectomy and end colostomy for bowel perforation 9 days post-op from a modified radical hysterectomy BSO, omentectomy, debulking, peritectomy of abdominal cavity for severe endometriosis     PROBLEMS:  Pneumoperitoneum  Sepsis  Colostomy   Pelvic mass  JAMIE     Pt seen and examined at bedside.     SUBJECTIVE:  Feeling thirsty, no other complaints at this time.  Pain well-controlled.  Flatus: no   Denies Nausea, Vomiting or Diarrhea.  Denies shortness of breath, chest pain or dyspnea on exertion.  NPO with NG tube to continuous suction     OBJECTIVE:     VITALS:  T(F): 97.5 (19 @ 08:00), Max: 100.1 (05-15-19 @ 13:50)  HR: 93 (19 @ 08:17) (75 - 118)  BP: 94/60 (19 @ 08:00) (68/34 - 127/71)  RR: 22 (19 @ 08:17) (16 - 33)  SpO2: 97% (19 @ 08:17) (95% - 100%)    I&O's Summary    15 May 2019 07:  -  16 May 2019 07:00  --------------------------------------------------------  IN: 2501.2 mL / OUT: 1175 mL / NET: 1326.2 mL    16 May 2019 07:  -  16 May 2019 08:49  --------------------------------------------------------  IN: 153.7 mL / OUT: 50 mL / NET: 103.7 mL        MEDICATIONS  (STANDING):  chlorhexidine 2% Cloths 1 Application(s) Topical daily  enoxaparin Injectable 40 milliGRAM(s) SubCutaneous daily  magnesium sulfate  IVPB 2 Gram(s) IV Intermittent once  multiple electrolytes Injection Type 1 1000 milliLiter(s) (125 mL/Hr) IV Continuous <Continuous>  norepinephrine Infusion 0.08 MICROgram(s)/kG/Min (10.005 mL/Hr) IV Continuous <Continuous>  piperacillin/tazobactam IVPB. 3.375 Gram(s) IV Intermittent every 8 hours    MEDICATIONS  (PRN):  HYDROmorphone  Injectable 0.5 milliGRAM(s) IV Push every 3 hours PRN Moderate Pain (4 - 6)      Physical Exam:  Constitutional: NAD  Pulmonary: clear to auscultation bilaterally   Cardiovascular: Regular rate and rhythm   Abdomen: soft, non-tender, non-distended, normal bowel signs. Colostomy with stool and gas.   Extremities: no lower extremity edema or calve tenderness  Incision: Wound vac present-Clean, dry, intact.       LABS:                        11.5   22.8  )-----------( 584      ( 16 May 2019 00:49 )             33.8     05-16    136  |  108<H>  |  20.0  ----------------------------<  112  4.1   |  18.0<L>  |  0.86    Ca    6.7<L>      16 May 2019 00:49  Phos  3.6     05-16  Mg     1.6     05-16    TPro  5.1<L>  /  Alb  2.0<L>  /  TBili  0.7  /  DBili  x   /  AST  14  /  ALT  16  /  AlkPhos  73  05-15    PT/INR - ( 16 May 2019 00:49 )   PT: 18.7 sec;   INR: 1.60 ratio         PTT - ( 15 May 2019 13:46 )  PTT:24.3 sec  Urinalysis Basic - ( 15 May 2019 18:09 )    Color: Yellow / Appearance: Clear / S.015 / pH: x  Gluc: x / Ketone: Trace  / Bili: Small / Urobili: 1 mg/dL   Blood: x / Protein: 30 mg/dL / Nitrite: Negative   Leuk Esterase: Trace / RBC: 0-2 /HPF / WBC 0-2   Sq Epi: x / Non Sq Epi: Occasional / Bacteria: Occasional

## 2019-05-16 NOTE — PROGRESS NOTE ADULT - SUBJECTIVE AND OBJECTIVE BOX
24h Events:  Taken to OR for ex-lap, abdominal washout of 3L of stool, sigmoidectomy, creation of colostomy. Pt required pressors throughout the case, successfully extubated upon completion.     Subjective:  Pt c/o abdominal pain worst at the surgical site. Denies chest pain, shortness of breath, nausea, vomiting, diarrhea, headache.     ICU Vital Signs Last 24 Hrs  T(C): 36.9 (15 May 2019 17:10), Max: 37.8 (15 May 2019 13:50)  T(F): 98.4 (15 May 2019 17:10), Max: 100.1 (15 May 2019 13:50)  HR: 113 (15 May 2019 18:00) (75 - 118)  BP: 103/68 (15 May 2019 18:00) (68/34 - 127/71)  BP(mean): 81 (15 May 2019 18:00) (74 - 84)  ABP: --  ABP(mean): --  RR: 33 (15 May 2019 18:00) (16 - 33)  SpO2: 97% (15 May 2019 18:00) (97% - 100%)      I&O's Detail    15 May 2019 07:01  -  16 May 2019 00:18  --------------------------------------------------------  IN:    norepinephrine Infusion: 19 mL    Sodium Chloride 0.9% IV Bolus: 1000 mL  Total IN: 1019 mL    OUT:    Indwelling Catheter - Urethral: 105 mL  Total OUT: 105 mL    Total NET: 914 mL              MEDICATIONS  (STANDING):  enoxaparin Injectable 40 milliGRAM(s) SubCutaneous daily  norepinephrine Infusion 0.08 MICROgram(s)/kG/Min (10.005 mL/Hr) IV Continuous <Continuous>  piperacillin/tazobactam IVPB. 2.25 Gram(s) IV Intermittent every 6 hours    MEDICATIONS  (PRN):  HYDROmorphone  Injectable 0.5 milliGRAM(s) IV Push every 3 hours PRN Moderate Pain (4 - 6)          Physical Exam:    Gen: Resting comfortably in bed, flat affect    HEENT: PERRL, EOMI    Neurological: Alert and oriented without focal deficit    Neck: Trachea midline, no evidence of JVD, FROM without pain, neck symmetric    Pulmonary: CTAB with decreased breath sounds at the bases    Cardiovascular: S1S2    Gastrointestinal: Soft, +jazmin-incisional tenderness, b/l JULIAN in place collecting serosanguinous fluid    : Grey in place draining green urine    Extremities: Without c/c/e    Skin: midline wound VAC in place    Musculoskeletal: FROM without pain, no deformity or areas of tenderness    LABS:  Pending          ASSESSMENT/PLAN:  48y Female s/p abdominal washout, sigmoidectomy, creation of colostomy. Now pressor dependant. Labs pending. Pre-op labs reveal JAMIE    Neuro: Pain control with intermittent dilaudid, delirium precautions, sleep hygiene     CV: Titrate pressors to maintain MAP 65-70, IVC shows moderate variability, phil trac and CVP pending. Will determine fluid status once all data has been collected    Pulm: Encourage incentive spirometry, OOB as tolerated     GI/Nutrition: NGT to LIWS. NGT and JULIAN management as per surgical team. NPO/IVF for now    /Renal: Grey for strict I&O. Will monitor creatinine. Surgical team was unable to visualize L ureter intra-op but no methylene blue was seen extravasating. If Cr is trending up will consider JAMIE vs ureteral injury    ID: Zosyn, renally dosed    Endo: No issue    Skin: Repositioning for DTI prevention while in bed, continue wound VAC    Heme/DVT Prophylaxis: SCDs, Lovenox    Lines/Tubes: Prior RIJ TLC was malpositioned. It has since been removed and a L SC TLC has been inserted.     Dispo: Continue in SICU

## 2019-05-16 NOTE — DIETITIAN INITIAL EVALUATION ADULT. - OTHER INFO
48y presents with HPI: s/p exlap modified radical abdominal hysterectomy, bilateral salpingo-oopherectoy omentectomy, pelvic lymph node sampling, peritonectomy of abdominal cavity, frozen section, enterolysis, serosal repair of sigmoid colon on 5/7/19 for abdominal mass, presenting to the ED with lightheadedness, and hypotension. At presentation patient was a code sepsis. 5/16 Taken to OR for ex-lap, abdominal washout of 3L of stool, sigmoidectomy, creation of colostomy. Pt continues on NPO with IVF. Pt reports good po intake (100%) prior to admission but on 5/14 experienced lack of appetite that has persisted. Otherwise, no c/o N/V/D/C. During time of interview, pt was able to answer questions but she had long pauses as if searching for her words.

## 2019-05-16 NOTE — DIETITIAN INITIAL EVALUATION ADULT. - PROBLEM SELECTOR PLAN 1
s/p exlap modified radical abdominal hysterectomy, bilateral salpingo-oopherectomy omentectomy, pelvic lymph node sampling, peritonectomy of abdominal cavity, frozen section, enterolysis, serosal repair of sigmoid colon.  pathology pending from surgery, will follow up

## 2019-05-17 LAB
-  CANDIDA ALBICANS: SIGNIFICANT CHANGE UP
-  CANDIDA GLABRATA: SIGNIFICANT CHANGE UP
-  CANDIDA KRUSEI: SIGNIFICANT CHANGE UP
-  CANDIDA PARAPSILOSIS: SIGNIFICANT CHANGE UP
-  CANDIDA TROPICALIS: SIGNIFICANT CHANGE UP
-  COAGULASE NEGATIVE STAPHYLOCOCCUS: SIGNIFICANT CHANGE UP
-  K. PNEUMONIAE GROUP: SIGNIFICANT CHANGE UP
-  KPC RESISTANCE GENE: SIGNIFICANT CHANGE UP
-  STREPTOCOCCUS SP. (NOT GRP A, B OR S PNEUMONIAE): SIGNIFICANT CHANGE UP
A BAUMANNII DNA SPEC QL NAA+PROBE: SIGNIFICANT CHANGE UP
ALBUMIN SERPL ELPH-MCNC: 1.8 G/DL — LOW (ref 3.3–5.2)
ALP SERPL-CCNC: 68 U/L — SIGNIFICANT CHANGE UP (ref 40–120)
ALT FLD-CCNC: 11 U/L — SIGNIFICANT CHANGE UP
ANION GAP SERPL CALC-SCNC: 8 MMOL/L — SIGNIFICANT CHANGE UP (ref 5–17)
ANISOCYTOSIS BLD QL: SLIGHT — SIGNIFICANT CHANGE UP
APTT BLD: 30.2 SEC — SIGNIFICANT CHANGE UP (ref 27.5–36.3)
AST SERPL-CCNC: 13 U/L — SIGNIFICANT CHANGE UP
BASOPHILS # BLD AUTO: 0 K/UL — SIGNIFICANT CHANGE UP (ref 0–0.2)
BASOPHILS NFR BLD AUTO: 0 % — SIGNIFICANT CHANGE UP (ref 0–2)
BILIRUB SERPL-MCNC: 0.4 MG/DL — SIGNIFICANT CHANGE UP (ref 0.4–2)
BUN SERPL-MCNC: 20 MG/DL — SIGNIFICANT CHANGE UP (ref 8–20)
BURR CELLS BLD QL SMEAR: PRESENT — SIGNIFICANT CHANGE UP
CALCIUM SERPL-MCNC: 7.5 MG/DL — LOW (ref 8.6–10.2)
CHLORIDE SERPL-SCNC: 104 MMOL/L — SIGNIFICANT CHANGE UP (ref 98–107)
CO2 SERPL-SCNC: 27 MMOL/L — SIGNIFICANT CHANGE UP (ref 22–29)
CREAT SERPL-MCNC: 0.66 MG/DL — SIGNIFICANT CHANGE UP (ref 0.5–1.3)
E CLOAC COMP DNA BLD POS QL NAA+PROBE: SIGNIFICANT CHANGE UP
E COLI DNA BLD POS QL NAA+NON-PROBE: SIGNIFICANT CHANGE UP
ELLIPTOCYTES BLD QL SMEAR: SLIGHT — SIGNIFICANT CHANGE UP
ENTEROCOC DNA BLD POS QL NAA+NON-PROBE: SIGNIFICANT CHANGE UP
ENTEROCOC DNA BLD POS QL NAA+NON-PROBE: SIGNIFICANT CHANGE UP
EOSINOPHIL # BLD AUTO: 0 K/UL — SIGNIFICANT CHANGE UP (ref 0–0.5)
EOSINOPHIL NFR BLD AUTO: 1 % — SIGNIFICANT CHANGE UP (ref 0–5)
GLUCOSE SERPL-MCNC: 114 MG/DL — SIGNIFICANT CHANGE UP (ref 70–115)
GP B STREP DNA BLD POS QL NAA+NON-PROBE: SIGNIFICANT CHANGE UP
HAEM INFLU DNA BLD POS QL NAA+NON-PROBE: SIGNIFICANT CHANGE UP
HCT VFR BLD CALC: 26.5 % — LOW (ref 37–47)
HGB BLD-MCNC: 8.6 G/DL — LOW (ref 12–16)
HYPOCHROMIA BLD QL: SLIGHT — SIGNIFICANT CHANGE UP
INR BLD: 1.2 RATIO — HIGH (ref 0.88–1.16)
K OXYTOCA DNA BLD POS QL NAA+NON-PROBE: SIGNIFICANT CHANGE UP
L MONOCYTOG DNA BLD POS QL NAA+NON-PROBE: SIGNIFICANT CHANGE UP
LYMPHOCYTES # BLD AUTO: 0.8 K/UL — LOW (ref 1–4.8)
LYMPHOCYTES # BLD AUTO: 2 % — LOW (ref 20–55)
MACROCYTES BLD QL: SLIGHT — SIGNIFICANT CHANGE UP
MAGNESIUM SERPL-MCNC: 3.1 MG/DL — HIGH (ref 1.6–2.6)
MANUAL DIF COMMENT BLD-IMP: SIGNIFICANT CHANGE UP
MCHC RBC-ENTMCNC: 26.9 PG — LOW (ref 27–31)
MCHC RBC-ENTMCNC: 32.5 G/DL — SIGNIFICANT CHANGE UP (ref 32–36)
MCV RBC AUTO: 82.8 FL — SIGNIFICANT CHANGE UP (ref 81–99)
METHOD TYPE: SIGNIFICANT CHANGE UP
MICROCYTES BLD QL: SLIGHT — SIGNIFICANT CHANGE UP
MONOCYTES # BLD AUTO: 0.7 K/UL — SIGNIFICANT CHANGE UP (ref 0–0.8)
MONOCYTES NFR BLD AUTO: 3 % — SIGNIFICANT CHANGE UP (ref 3–10)
MRSA SPEC QL CULT: SIGNIFICANT CHANGE UP
MSSA DNA SPEC QL NAA+PROBE: SIGNIFICANT CHANGE UP
N MEN ISLT CULT: SIGNIFICANT CHANGE UP
NEUTROPHILS # BLD AUTO: 23.1 K/UL — HIGH (ref 1.8–8)
NEUTROPHILS NFR BLD AUTO: 89 % — HIGH (ref 37–73)
NEUTS BAND # BLD: 5 % — SIGNIFICANT CHANGE UP (ref 0–8)
ORGANISM # SPEC MICROSCOPIC CNT: SIGNIFICANT CHANGE UP
OVALOCYTES BLD QL SMEAR: SLIGHT — SIGNIFICANT CHANGE UP
P AERUGINOSA DNA BLD POS NAA+NON-PROBE: SIGNIFICANT CHANGE UP
PHOSPHATE SERPL-MCNC: 2.3 MG/DL — LOW (ref 2.4–4.7)
PLAT MORPH BLD: SIGNIFICANT CHANGE UP
PLATELET # BLD AUTO: 380 K/UL — SIGNIFICANT CHANGE UP (ref 150–400)
POIKILOCYTOSIS BLD QL AUTO: SLIGHT — SIGNIFICANT CHANGE UP
POLYCHROMASIA BLD QL SMEAR: SLIGHT — SIGNIFICANT CHANGE UP
POTASSIUM SERPL-MCNC: 4.3 MMOL/L — SIGNIFICANT CHANGE UP (ref 3.5–5.3)
POTASSIUM SERPL-SCNC: 4.3 MMOL/L — SIGNIFICANT CHANGE UP (ref 3.5–5.3)
PROT SERPL-MCNC: 5.1 G/DL — LOW (ref 6.6–8.7)
PROTHROM AB SERPL-ACNC: 13.9 SEC — HIGH (ref 10–12.9)
RBC # BLD: 3.2 M/UL — LOW (ref 4.4–5.2)
RBC # FLD: 17.6 % — HIGH (ref 11–15.6)
RBC BLD AUTO: ABNORMAL
S MARCESCENS DNA BLD POS NAA+NON-PROBE: SIGNIFICANT CHANGE UP
S PNEUM DNA BLD POS QL NAA+NON-PROBE: SIGNIFICANT CHANGE UP
S PYO DNA BLD POS QL NAA+NON-PROBE: SIGNIFICANT CHANGE UP
SODIUM SERPL-SCNC: 139 MMOL/L — SIGNIFICANT CHANGE UP (ref 135–145)
SPECIMEN SOURCE: SIGNIFICANT CHANGE UP
WBC # BLD: 22.7 K/UL — HIGH (ref 4.8–10.8)
WBC # FLD AUTO: 22.7 K/UL — HIGH (ref 4.8–10.8)

## 2019-05-17 PROCEDURE — 99233 SBSQ HOSP IP/OBS HIGH 50: CPT

## 2019-05-17 PROCEDURE — 99233 SBSQ HOSP IP/OBS HIGH 50: CPT | Mod: 24

## 2019-05-17 RX ORDER — SODIUM CHLORIDE 9 MG/ML
1000 INJECTION, SOLUTION INTRAVENOUS
Refills: 0 | Status: DISCONTINUED | OUTPATIENT
Start: 2019-05-17 | End: 2019-05-20

## 2019-05-17 RX ADMIN — HYDROMORPHONE HYDROCHLORIDE 0.5 MILLIGRAM(S): 2 INJECTION INTRAMUSCULAR; INTRAVENOUS; SUBCUTANEOUS at 13:53

## 2019-05-17 RX ADMIN — CHLORHEXIDINE GLUCONATE 1 APPLICATION(S): 213 SOLUTION TOPICAL at 11:08

## 2019-05-17 RX ADMIN — PIPERACILLIN AND TAZOBACTAM 25 GRAM(S): 4; .5 INJECTION, POWDER, LYOPHILIZED, FOR SOLUTION INTRAVENOUS at 04:52

## 2019-05-17 RX ADMIN — PIPERACILLIN AND TAZOBACTAM 25 GRAM(S): 4; .5 INJECTION, POWDER, LYOPHILIZED, FOR SOLUTION INTRAVENOUS at 21:15

## 2019-05-17 RX ADMIN — PIPERACILLIN AND TAZOBACTAM 25 GRAM(S): 4; .5 INJECTION, POWDER, LYOPHILIZED, FOR SOLUTION INTRAVENOUS at 13:53

## 2019-05-17 RX ADMIN — HYDROMORPHONE HYDROCHLORIDE 0.5 MILLIGRAM(S): 2 INJECTION INTRAMUSCULAR; INTRAVENOUS; SUBCUTANEOUS at 04:15

## 2019-05-17 RX ADMIN — HYDROMORPHONE HYDROCHLORIDE 0.5 MILLIGRAM(S): 2 INJECTION INTRAMUSCULAR; INTRAVENOUS; SUBCUTANEOUS at 00:01

## 2019-05-17 RX ADMIN — SODIUM CHLORIDE 100 MILLILITER(S): 9 INJECTION, SOLUTION INTRAVENOUS at 08:15

## 2019-05-17 RX ADMIN — HYDROMORPHONE HYDROCHLORIDE 0.5 MILLIGRAM(S): 2 INJECTION INTRAMUSCULAR; INTRAVENOUS; SUBCUTANEOUS at 00:30

## 2019-05-17 RX ADMIN — HYDROMORPHONE HYDROCHLORIDE 0.5 MILLIGRAM(S): 2 INJECTION INTRAMUSCULAR; INTRAVENOUS; SUBCUTANEOUS at 03:52

## 2019-05-17 RX ADMIN — HYDROMORPHONE HYDROCHLORIDE 0.5 MILLIGRAM(S): 2 INJECTION INTRAMUSCULAR; INTRAVENOUS; SUBCUTANEOUS at 14:08

## 2019-05-17 RX ADMIN — Medication 62.5 MILLIMOLE(S): at 09:26

## 2019-05-17 RX ADMIN — ENOXAPARIN SODIUM 40 MILLIGRAM(S): 100 INJECTION SUBCUTANEOUS at 11:08

## 2019-05-17 RX ADMIN — SODIUM CHLORIDE 100 MILLILITER(S): 9 INJECTION, SOLUTION INTRAVENOUS at 21:39

## 2019-05-17 NOTE — CHART NOTE - NSCHARTNOTEFT_GEN_A_CORE
Was called by RN. Patient had Grey catheter removed and was due for TOV at 5 PM. Patient had no spontaneously voided yet and bladder scan was performed revealing 200 CC. At 8PM patient still had not voided and had 360 CC on bladder scan. Patient remained asymptomatic with no distention or suprapubic/lower abdominal tenderness. Another bladder scan was performed at 9:30 PM which had 400 CC. Straight Cath was ordered to decompress bladder and bladder scans were ordered q6. Patient will continue to be monitored for spontaneous voiding and additional straight cath if needed prior to replacing indwelling Grey Catheter.

## 2019-05-17 NOTE — PROVIDER CONTACT NOTE (OTHER) - SITUATION
pt has not voided since dee removed, bladder scanned pt and has 366cc in bladder, pt not in any pain, no distention noted

## 2019-05-17 NOTE — PROGRESS NOTE ADULT - SUBJECTIVE AND OBJECTIVE BOX
Patient seen at bedside. No acute events overnight. No longer on levophed. Grey in place with clear yellow output. Patient reports improved abdominal pain. Patient OOB to chair. NPO/NGT to suction - 150 cc in last 24hrs. Flushed at bedside. JULIAN drains x 2 with SS output L JULIAN 20cc R JULIAN 30cc - striped at bedside. Ostomy pink/patent with mucous and residual output. Continues to work on IS. Midline wound vac in place with seal. No concerns at this time.      MEDICATIONS  (STANDING):  chlorhexidine 2% Cloths 1 Application(s) Topical daily  enoxaparin Injectable 40 milliGRAM(s) SubCutaneous daily  multiple electrolytes Injection Type 1 1000 milliLiter(s) (100 mL/Hr) IV Continuous <Continuous>  norepinephrine Infusion 0.08 MICROgram(s)/kG/Min (10.005 mL/Hr) IV Continuous <Continuous>  piperacillin/tazobactam IVPB. 3.375 Gram(s) IV Intermittent every 8 hours  sodium phosphate IVPB 15 milliMole(s) IV Intermittent once  sterile water 1000 milliLiter(s) (100 mL/Hr) IV Continuous <Continuous>    MEDICATIONS  (PRN):  HYDROmorphone  Injectable 0.5 milliGRAM(s) IV Push every 3 hours PRN Moderate Pain (4 - 6)      Vital Signs Last 24 Hrs  T(C): 36.3 (17 May 2019 04:00), Max: 36.5 (16 May 2019 12:00)  T(F): 97.4 (17 May 2019 04:00), Max: 97.7 (16 May 2019 12:00)  HR: 69 (17 May 2019 06:00) (69 - 94)  BP: 98/63 (17 May 2019 06:00) (80/53 - 101/67)  BP(mean): 76 (17 May 2019 06:00) (61 - 80)  RR: 14 (17 May 2019 06:00) (11 - 26)  SpO2: 97% (17 May 2019 06:00) (95% - 99%)      Physical Exam:  Constitutional: NAD  HEENT: PERRL, EOMI  Neck: No JVD, FROM without pain  Respiratory: Respirations non-labored, no accessory muscle use  Cardiovascular: Regular rate & rhythm, S1, S2  Gastrointestinal: NGT in place with bilious drainage, Ostomy pink and viable, JULIAN x2 in place with serosanguinous drainage, wound vac on midline incision, Soft, non-tender, non-distended  Extremities: No peripheral edema, No cyanosis  Neurological: A&O x 3; without gross deficit  Musculoskeletal: No joint pain, swelling, deformity, or point tenderness; no limitation of movement    I&O's Detail    16 May 2019 07:01  -  17 May 2019 07:00  --------------------------------------------------------  IN:    multiple electrolytes Injection Type 1multiple electrolytes Injection Type 1: 500 mL    norepinephrine Infusion: 12.4 mL    Solution: 100 mL    Solution: 325 mL    sterile water: 1900 mL  Total IN: 2837.4 mL    OUT:    Bulb: 185 mL    Bulb: 145 mL    Indwelling Catheter - Urethral: 1155 mL    Nasoenteral Tube: 250 mL  Total OUT: 1735 mL    Total NET: 1102.4 mL      17 May 2019 07:01  -  17 May 2019 08:29  --------------------------------------------------------  IN:    multiple electrolytes Injection Type 1: 200 mL    Solution: 25 mL  Total IN: 225 mL    OUT:    Bulb: 30 mL    Bulb: 20 mL    Nasoenteral Tube: 150 mL  Total OUT: 200 mL    Total NET: 25 mL          LABS:                        8.6    22.7  )-----------( 380      ( 17 May 2019 04:54 )             26.5         139  |  104  |  20.0  ----------------------------<  114  4.3   |  27.0  |  0.66    Ca    7.5<L>      17 May 2019 04:54  Phos  2.3       Mg     3.1         TPro  5.1<L>  /  Alb  1.8<L>  /  TBili  0.4  /  DBili  x   /  AST  13  /  ALT  11  /  AlkPhos  68      PT/INR - ( 17 May 2019 04:54 )   PT: 13.9 sec;   INR: 1.20 ratio         PTT - ( 17 May 2019 04:54 )  PTT:30.2 sec  Urinalysis Basic - ( 15 May 2019 18:09 )    Color: Yellow / Appearance: Clear / S.015 / pH: x  Gluc: x / Ketone: Trace  / Bili: Small / Urobili: 1 mg/dL   Blood: x / Protein: 30 mg/dL / Nitrite: Negative   Leuk Esterase: Trace / RBC: 0-2 /HPF / WBC 0-2   Sq Epi: x / Non Sq Epi: Occasional / Bacteria: Occasional        RADIOLOGY & ADDITIONAL STUDIES: Patient seen at bedside. No acute events overnight. No longer on levophed. Grey in place with clear yellow output. Patient reports improved abdominal pain. Patient OOB to chair. NPO/NGT to suction - 250 cc bilious in last 24hrs. Flushed at bedside. JULIAN drains x 2 with SS output L JULIAN 20cc R JULIAN 30cc - striped at bedside. Ostomy pink/patent with mucous and residual output, no gas. Continues to work on IS. Midline wound vac in place with seal. No concerns at this time.      MEDICATIONS  (STANDING):  chlorhexidine 2% Cloths 1 Application(s) Topical daily  enoxaparin Injectable 40 milliGRAM(s) SubCutaneous daily  multiple electrolytes Injection Type 1 1000 milliLiter(s) (100 mL/Hr) IV Continuous <Continuous>  norepinephrine Infusion 0.08 MICROgram(s)/kG/Min (10.005 mL/Hr) IV Continuous <Continuous>  piperacillin/tazobactam IVPB. 3.375 Gram(s) IV Intermittent every 8 hours  sodium phosphate IVPB 15 milliMole(s) IV Intermittent once  sterile water 1000 milliLiter(s) (100 mL/Hr) IV Continuous <Continuous>    MEDICATIONS  (PRN):  HYDROmorphone  Injectable 0.5 milliGRAM(s) IV Push every 3 hours PRN Moderate Pain (4 - 6)      Vital Signs Last 24 Hrs  T(C): 36.3 (17 May 2019 04:00), Max: 36.5 (16 May 2019 12:00)  T(F): 97.4 (17 May 2019 04:00), Max: 97.7 (16 May 2019 12:00)  HR: 69 (17 May 2019 06:00) (69 - 94)  BP: 98/63 (17 May 2019 06:00) (80/53 - 101/67)  BP(mean): 76 (17 May 2019 06:00) (61 - 80)  RR: 14 (17 May 2019 06:00) (11 - 26)  SpO2: 97% (17 May 2019 06:00) (95% - 99%)      Physical Exam:  Constitutional: NAD  HEENT: PERRL, EOMI  Neck: No JVD, FROM without pain  Respiratory: Respirations non-labored, no accessory muscle use  Cardiovascular: Regular rate & rhythm, S1, S2  Gastrointestinal: NGT in place with bilious drainage, Ostomy pink and viable, JULIAN x2 in place with serosanguinous drainage, wound vac on midline incision, Soft, non-tender, non-distended  Extremities: No peripheral edema, No cyanosis  Neurological: A&O x 3; without gross deficit  Musculoskeletal: No joint pain, swelling, deformity, or point tenderness; no limitation of movement    I&O's Detail    16 May 2019 07:01  -  17 May 2019 07:00  --------------------------------------------------------  IN:    multiple electrolytes Injection Type 1multiple electrolytes Injection Type 1: 500 mL    norepinephrine Infusion: 12.4 mL    Solution: 100 mL    Solution: 325 mL    sterile water: 1900 mL  Total IN: 2837.4 mL    OUT:    Bulb: 185 mL    Bulb: 145 mL    Indwelling Catheter - Urethral: 1155 mL    Nasoenteral Tube: 250 mL  Total OUT: 1735 mL    Total NET: 1102.4 mL      17 May 2019 07:01  -  17 May 2019 08:29  --------------------------------------------------------  IN:    multiple electrolytes Injection Type 1: 200 mL    Solution: 25 mL  Total IN: 225 mL    OUT:    Bulb: 30 mL    Bulb: 20 mL    Nasoenteral Tube: 150 mL  Total OUT: 200 mL    Total NET: 25 mL          LABS:                        8.6    22.7  )-----------( 380      ( 17 May 2019 04:54 )             26.5         139  |  104  |  20.0  ----------------------------<  114  4.3   |  27.0  |  0.66    Ca    7.5<L>      17 May 2019 04:54  Phos  2.3       Mg     3.1         TPro  5.1<L>  /  Alb  1.8<L>  /  TBili  0.4  /  DBili  x   /  AST  13  /  ALT  11  /  AlkPhos  68  05-17    PT/INR - ( 17 May 2019 04:54 )   PT: 13.9 sec;   INR: 1.20 ratio         PTT - ( 17 May 2019 04:54 )  PTT:30.2 sec  Urinalysis Basic - ( 15 May 2019 18:09 )    Color: Yellow / Appearance: Clear / S.015 / pH: x  Gluc: x / Ketone: Trace  / Bili: Small / Urobili: 1 mg/dL   Blood: x / Protein: 30 mg/dL / Nitrite: Negative   Leuk Esterase: Trace / RBC: 0-2 /HPF / WBC 0-2   Sq Epi: x / Non Sq Epi: Occasional / Bacteria: Occasional        RADIOLOGY & ADDITIONAL STUDIES:

## 2019-05-17 NOTE — PROGRESS NOTE ADULT - ASSESSMENT
48 year old female POD#10 s/p ex lap, JG, modified radical hysterectomy, BSO, pelvic mass excision and pelvic lymph node sampling in addition to multiple bowel serosal repairs intraoperatively (?sigmoid colon?) and POD#2 s/p ex-lap, abdominal washout of 3L of stool, sigmoidectomy, creation of colostomy in improving condition.     Neuro: AOX3 GCS 15 with adequate analgesia on current regimen      CV: Weaned off levophed     Pulm: 98% O2 on RA - no active issues     GI/Nutrition: Keep NPO for now in anticipation of the OR     /Renal: Initiate IVFs as patient is NPO; JAMIE noted Cr 1.53     ID: WBC 18.3 on presentation; received one dose of vanc and zosyn in the ED     Endo: No Hx of DM or hypothyroidism in PMHx     Skin: Repositioning for DTI prevention while in bed    DVT Prophylaxis: SCDs    Lines/Tubes: Intent to place central line and A line     Dispo: Admission to SICU prior to OR      Misc: 2 units on hold to OR     CODE STATUS: Full

## 2019-05-17 NOTE — PROGRESS NOTE ADULT - SUBJECTIVE AND OBJECTIVE BOX
Off levo since 11am yesterday. Was OOB to chair most of the day. No acute events overnight.     SUBJECTIVE/ROS:  [x] A ten-point review of systems was otherwise negative except as noted.  [ ] Due to altered mental status/intubation, subjective information were not able to be obtained from the patient. History was obtained, to the extent possible, from review of the chart and collateral sources of information.    Constitutional: Patient resting comfortably in bed in no acute distress   HEENT: NG tube in place   Neck: No JVD,  Respiratory: CTAB with unlabored respirations, no accessory muscle use or conversational dyspnea   Cardiovascular: Regular rate and rhythm with no arrythmias or murmurs  Gastrointestinal: Wound vac present to midline incision with good seal and no leak, abdomen soft, appropriately tender around surgical incision, minimally distended with no rebound tenderness or guarding   Rectal: Not indicated   Neurological: GCS 15 ([id number]) with no gross sensory, motor or coordination deficits   Psychiatric: Normal mood and affect   Musculoskeletal: No joint pain, swelling or deformity with no limitation of movement     NEURO  RASS:     GCS: 15    CAM ICU:  Exam:   Meds: HYDROmorphone  Injectable 0.5 milliGRAM(s) IV Push every 3 hours PRN Moderate Pain (4 - 6)    [x] Adequacy of sedation and pain control has been assessed and adjusted      RESPIRATORY  RR: 14 (05-17-19 @ 06:00) (11 - 26)  SpO2: 97% (05-17-19 @ 06:00) (95% - 99%)  Wt(kg): --  Exam: unlabored, clear to auscultation bilaterally  Mechanical Ventilation:     [ ] Extubation Readiness Assessed  Meds:       CARDIOVASCULAR  HR: 69 (05-17-19 @ 06:00) (69 - 94)  BP: 98/63 (05-17-19 @ 06:00) (80/53 - 101/67)  BP(mean): 76 (05-17-19 @ 06:00) (61 - 80)  ABP: 116/66 (05-17-19 @ 06:00) (0/0 - 116/66)  ABP(mean): 85 (05-17-19 @ 06:00) (0 - 85)  Wt(kg): --  CVP(cm H2O): --  VBG - ( 16 May 2019 00:50 )  pH: x     /  pCO2: x     /  pO2: x     / HCO3: x     / Base Excess: x     /  SaO2: x      Lactate: 2.3                Exam:  Cardiac Rhythm:  Perfusion     [ ]Adequate   [ ]Inadequate  Mentation   [ ]Normal       [ ]Reduced  Extremities  [ ]Warm         [ ]Cool  Volume Status [ ]Hypervolemic [ ]Euvolemic [ ]Hypovolemic  Meds: norepinephrine Infusion 0.08 MICROgram(s)/kG/Min IV Continuous <Continuous>      GI/NUTRITION  Exam:  Diet:  Meds:     GENITOURINARY  I&O's Detail    05-15 @ 07:01 - 05-16 @ 07:00  --------------------------------------------------------  IN:    multiple electrolytes Injection Type 1multiple electrolytes Injection Type 1: 750 mL    norepinephrine Infusion: 19 mL    norepinephrine Infusion: 94.2 mL    Plasma: 588 mL    Sodium Chloride 0.9% IV Bolus: 1000 mL    Solution: 50 mL  Total IN: 2501.2 mL    OUT:    Bulb: 165 mL    Bulb: 250 mL    Indwelling Catheter - Urethral: 760 mL  Total OUT: 1175 mL    Total NET: 1326.2 mL      05-16 @ 07:01 - 05-17 @ 06:18  --------------------------------------------------------  IN:    multiple electrolytes Injection Type 1multiple electrolytes Injection Type 1: 500 mL    norepinephrine Infusion: 12.4 mL    Solution: 100 mL    Solution: 325 mL    sterile water: 1900 mL  Total IN: 2837.4 mL    OUT:    Bulb: 145 mL    Bulb: 185 mL    Indwelling Catheter - Urethral: 1155 mL    Nasoenteral Tube: 250 mL  Total OUT: 1735 mL    Total NET: 1102.4 mL          05-17    139  |  104  |  20.0  ----------------------------<  114  4.3   |  27.0  |  0.66    Ca    7.5<L>      17 May 2019 04:54  Phos  2.3     05-17  Mg     3.1     05-17    TPro  5.1<L>  /  Alb  1.8<L>  /  TBili  0.4  /  DBili  x   /  AST  13  /  ALT  11  /  AlkPhos  68  05-17    [ ] Grey catheter, indication: N/A  Meds:       HEMATOLOGIC  Meds: enoxaparin Injectable 40 milliGRAM(s) SubCutaneous daily    [x] VTE Prophylaxis                        8.6    22.7  )-----------( 380      ( 17 May 2019 04:54 )             26.5     PT/INR - ( 17 May 2019 04:54 )   PT: 13.9 sec;   INR: 1.20 ratio         PTT - ( 17 May 2019 04:54 )  PTT:30.2 sec  Transfusion     [ ] PRBC   [ ] Platelets   [ ] FFP   [ ] Cryoprecipitate      INFECTIOUS DISEASES  T(C): 36.3 (05-17-19 @ 04:00), Max: 36.5 (05-16-19 @ 12:00)  Wt(kg): --  WBC Count: 22.7 K/uL (05-17 @ 04:54)    Recent Cultures:  Specimen Source: .Urine, 05-15 @ 18:09; Results   No growth; Gram Stain: --; Organism: --    Meds: piperacillin/tazobactam IVPB. 3.375 Gram(s) IV Intermittent every 8 hours        ENDOCRINE  Capillary Blood Glucose    Meds:       ACCESS DEVICES:  [ ] Peripheral IV  [ ] Central Venous Line	[ ] R	[ ] L	[ ] IJ	[ ] Fem	[ ] SC	Placed:   [ ] Arterial Line		[ ] R	[ ] L	[ ] Fem	[ ] Rad	[ ] Ax	Placed:   [ ] PICC:					[ ] Mediport  [ ] Urinary Catheter, Date Placed:   [ ] Necessity of urinary, arterial, and venous catheters discussed    OTHER MEDICATIONS:  chlorhexidine 2% Cloths 1 Application(s) Topical daily  sterile water 1000 milliLiter(s) IV Continuous <Continuous>

## 2019-05-17 NOTE — PROGRESS NOTE ADULT - SUBJECTIVE AND OBJECTIVE BOX
47yo F examined at bedside and found in no distress. No overnight events. No active complaints except for adequate pain in abdomen. recently downgraded for the SICU with no NGT nor dee. Patient has not yet passed her TOV. Denies fever, chills, nausea, vomiting, diarrhea, constipation, chest pain, and sob.     Vital Signs Last 24 Hrs  T(C): 36.3 (17 May 2019 12:00), Max: 36.4 (16 May 2019 20:00)  T(F): 97.3 (17 May 2019 12:00), Max: 97.5 (16 May 2019 20:00)  HR: 65 (17 May 2019 12:00) (65 - 84)  BP: 106/68 (17 May 2019 12:00) (87/58 - 111/74)  BP(mean): 83 (17 May 2019 12:00) (67 - 88)  RR: 18 (17 May 2019 12:00) (11 - 24)  SpO2: 99% (17 May 2019 12:00) (97% - 99%)    I&O's Detail    16 May 2019 07:01  -  17 May 2019 07:00  --------------------------------------------------------  IN:    multiple electrolytes Injection Type 1multiple electrolytes Injection Type 1: 500 mL    norepinephrine Infusion: 12.4 mL    Solution: 100 mL    Solution: 325 mL    sterile water: 1900 mL  Total IN: 2837.4 mL    OUT:    Bulb: 185 mL    Bulb: 145 mL    Indwelling Catheter - Urethral: 1155 mL    Nasoenteral Tube: 250 mL  Total OUT: 1735 mL    Total NET: 1102.4 mL      17 May 2019 07:01  -  17 May 2019 16:26  --------------------------------------------------------  IN:    IV PiggyBack: 250 mL    multiple electrolytes Injection Type 1: 800 mL    Solution: 125 mL  Total IN: 1175 mL    OUT:    Bulb: 60 mL    Bulb: 50 mL    Indwelling Catheter - Urethral: 150 mL    Nasoenteral Tube: 150 mL  Total OUT: 410 mL    Total NET: 765 mL      Constitutional: AAOx3  HEENT: EOMI / PERRLA, MMM  Respiratory: non labored breathing.   Cardiovascular: RRR  Gastrointestinal: Abdomen soft, non-tender, non-distended, no rebound tenderness / guarding. Wound vac at midline with adequate seal and so surrounding skin erythema. LLQ colostomy bag with minimal amounts of stool that is well sealed with no surrounding skin erythema nor maceration. Right and left Devan draine with minimal serosanguinous fluid. Binder in place.         LABS:                        8.6    22.7  )-----------( 380      ( 17 May 2019 04:54 )             26.5         139  |  104  |  20.0  ----------------------------<  114  4.3   |  27.0  |  0.66    Ca    7.5<L>      17 May 2019 04:54  Phos  2.3       Mg     3.1         TPro  5.1<L>  /  Alb  1.8<L>  /  TBili  0.4  /  DBili  x   /  AST  13  /  ALT  11  /  AlkPhos  68  17    PT/INR - ( 17 May 2019 04:54 )   PT: 13.9 sec;   INR: 1.20 ratio         PTT - ( 17 May 2019 04:54 )  PTT:30.2 sec  Urinalysis Basic - ( 15 May 2019 18:09 )    Color: Yellow / Appearance: Clear / S.015 / pH: x  Gluc: x / Ketone: Trace  / Bili: Small / Urobili: 1 mg/dL   Blood: x / Protein: 30 mg/dL / Nitrite: Negative   Leuk Esterase: Trace / RBC: 0-2 /HPF / WBC 0-2   Sq Epi: x / Non Sq Epi: Occasional / Bacteria: Occasional        MEDICATIONS  (STANDING):  chlorhexidine 2% Cloths 1 Application(s) Topical daily  enoxaparin Injectable 40 milliGRAM(s) SubCutaneous daily  multiple electrolytes Injection Type 1 1000 milliLiter(s) (100 mL/Hr) IV Continuous <Continuous>  piperacillin/tazobactam IVPB. 3.375 Gram(s) IV Intermittent every 8 hours    MEDICATIONS  (PRN):  HYDROmorphone  Injectable 0.5 milliGRAM(s) IV Push every 3 hours PRN Moderate Pain (4 - 6)

## 2019-05-17 NOTE — PROVIDER CONTACT NOTE (OTHER) - ACTION/TREATMENT ORDERED:
Surgery PA aware, ordered to bladder scan again at 2130, if any pain or discomfort possible straight cath, will continue to monitor for urination

## 2019-05-17 NOTE — PROVIDER CONTACT NOTE (MEDICATION) - ACTION/TREATMENT ORDERED:
patient to remain NPO  No need for repeat lactate at this time  Bladder scan 203 acceptable at this time, will reevaluate at 8pm

## 2019-05-17 NOTE — PROGRESS NOTE ADULT - SUBJECTIVE AND OBJECTIVE BOX
GYNECOLOGIC ONCOLOGY PROGRESS NOTE    POD#2exploratory laparotomy, abdominal washout of 3L feculent matter, open sigmoidectomy and end colostomy for bowel perforation 9 days post-op from a modified radical hysterectomy BSO, omentectomy, debulking, peritectomy of abdominal cavity for severe endometriosis     PROBLEMS:  Endometriosis, severe  Pneumoperitoneum  Sepsis  Pelvic mass      Pt seen and examined at bedside with Dr. Lau.     SUBJECTIVE:    Patient is without complaints. Reports she is hungry   Pain well-controlled.  Denies Nausea, Vomiting or Diarrhea.  Denies shortness of breath, chest pain or dyspnea on exertion.  NPO with NGT.     OBJECTIVE:     VITALS:  T(F): 96.8 (19 @ 08:00), Max: 97.7 (19 @ 12:00)  HR: 69 (19 @ 08:00) (69 - 94)  BP: 98/63 (19 @ 08:00) (80/53 - 101/67)  RR: 13 (19 @ 08:00) (11 - 26)  SpO2: 97% (19 @ 08:00) (95% - 99%)      I&O's Summary    16 May 2019 07:  -  17 May 2019 07:00  --------------------------------------------------------  IN: 2837.4 mL / OUT: 1735 mL / NET: 1102.4 mL    17 May 2019 07:  -  17 May 2019 08:38  --------------------------------------------------------  IN: 225 mL / OUT: 260 mL / NET: -35 mL    Left JULIAN 90cc serosang   Right JULIAN 80cc serosang  Grey with 450cc output   NGT 200cc output    MEDICATIONS  (STANDING):  chlorhexidine 2% Cloths 1 Application(s) Topical daily  enoxaparin Injectable 40 milliGRAM(s) SubCutaneous daily  multiple electrolytes Injection Type 1 1000 milliLiter(s) (100 mL/Hr) IV Continuous <Continuous>  norepinephrine Infusion 0.08 MICROgram(s)/kG/Min (10.005 mL/Hr) IV Continuous <Continuous>  piperacillin/tazobactam IVPB. 3.375 Gram(s) IV Intermittent every 8 hours  sodium phosphate IVPB 15 milliMole(s) IV Intermittent once  sterile water 1000 milliLiter(s) (100 mL/Hr) IV Continuous <Continuous>    MEDICATIONS  (PRN):  HYDROmorphone  Injectable 0.5 milliGRAM(s) IV Push every 3 hours PRN Moderate Pain (4 - 6)      Physical Exam:  Constitutional: NAD  Pulmonary: clear to auscultation bilaterally   Cardiovascular: Regular rate and rhythm   Abdomen: soft, non-tender, non-distended, normal bowel sounds. Colostomy with stool and gas noted.   Extremities: no lower extremity edema or calve tenderness, Martha's sign negative.  Incision: Wound vac inplace, Clean, dry, intact.  Without signs of infection or hernia.      LABS:                        8.6    22.7  )-----------( 380      ( 17 May 2019 04:54 )             26.5     05-    139  |  104  |  20.0  ----------------------------<  114  4.3   |  27.0  |  0.66    Ca    7.5<L>      17 May 2019 04:54  Phos  2.3       Mg     3.1         TPro  5.1<L>  /  Alb  1.8<L>  /  TBili  0.4  /  DBili  x   /  AST  13  /  ALT  11  /  AlkPhos  68  05-17    PT/INR - ( 17 May 2019 04:54 )   PT: 13.9 sec;   INR: 1.20 ratio         PTT - ( 17 May 2019 04:54 )  PTT:30.2 sec  Urinalysis Basic - ( 15 May 2019 18:09 )    Color: Yellow / Appearance: Clear / S.015 / pH: x  Gluc: x / Ketone: Trace  / Bili: Small / Urobili: 1 mg/dL   Blood: x / Protein: 30 mg/dL / Nitrite: Negative   Leuk Esterase: Trace / RBC: 0-2 /HPF / WBC 0-2   Sq Epi: x / Non Sq Epi: Occasional / Bacteria: Occasional

## 2019-05-17 NOTE — PROGRESS NOTE ADULT - ASSESSMENT
Patient is a 47yo now POD#2 s/p exploratory laparotomy, abdominal washout of 3L feculent matter, open sigmoidectomy and end colostomy for bowel perforation 9 days post-op from a modified radical hysterectomy BSO, omentectomy, debulking, peritectomy of abdominal cavity for severe endometriosis.    Plan:  Continue IV ABX  Monitor wound vac, change Mon 5/20  monitor JULIAN output  Grey catheter, monitor I&O's  Pain control  OOB as tolerated  encourage IS  DVT ppx  Continued care per surgical ICU Patient is a 49yo now POD#2 s/p exploratory laparotomy, abdominal washout of 3L feculent matter, open sigmoidectomy and end colostomy for bowel perforation 9 days post-op from a modified radical hysterectomy BSO, omentectomy, debulking, peritectomy of abdominal cavity for severe endometriosis.    Plan:  dc NGT, start CLD  Continue IV Zosyn  Monitor wound vac, change Mon 5/20  monitor JULIAN output  Grey catheter, monitor I&O's  Pain control  OOB as tolerated  encourage IS  DVT ppx  Continued care per surgical ICU  Discussed with attending physician who agrees with the plan

## 2019-05-17 NOTE — PROGRESS NOTE ADULT - ASSESSMENT
49yo F POD 2 from exlap with sigmoidectomy/end colostomy with recent removal on NGT and dee catheter but has not passed her TOV.   -TOV  - monitor b/l kevon drains  -maintain abdominal binder at all moments.   -monitor bowel function  -monitor urinary output  -monitor nausea/vomiting  -shirley  -monitor vital signs.

## 2019-05-17 NOTE — PROGRESS NOTE ADULT - SUBJECTIVE AND OBJECTIVE BOX
INTERVAL HPI/OVERNIGHT EVENTS/SUBJECTIVE:  Levophed weaned off yesterday. No acute events overnight. Pt has no complaints this AM.    ICU Vital Signs Last 24 Hrs  T(C): 36.3 (17 May 2019 04:00), Max: 36.5 (16 May 2019 12:00)  T(F): 97.4 (17 May 2019 04:00), Max: 97.7 (16 May 2019 12:00)  HR: 69 (17 May 2019 06:00) (69 - 94)  BP: 98/63 (17 May 2019 06:00) (80/53 - 101/67)  BP(mean): 76 (17 May 2019 06:00) (61 - 80)  ABP: 116/66 (17 May 2019 06:00) (0/0 - 116/66)  ABP(mean): 85 (17 May 2019 06:00) (0 - 85)  RR: 14 (17 May 2019 06:00) (11 - 26)  SpO2: 97% (17 May 2019 06:00) (95% - 99%)      I&O's Detail    16 May 2019 07:01  -  17 May 2019 07:00  --------------------------------------------------------  IN:    multiple electrolytes Injection Type 1multiple electrolytes Injection Type 1: 500 mL    norepinephrine Infusion: 12.4 mL    Solution: 100 mL    Solution: 325 mL    sterile water: 1900 mL  Total IN: 2837.4 mL    OUT:    Bulb: 145 mL    Bulb: 185 mL    Indwelling Catheter - Urethral: 1155 mL    Nasoenteral Tube: 250 mL  Total OUT: 1735 mL    Total NET: 1102.4 mL      MEDICATIONS  (STANDING):  chlorhexidine 2% Cloths 1 Application(s) Topical daily  enoxaparin Injectable 40 milliGRAM(s) SubCutaneous daily  norepinephrine Infusion 0.08 MICROgram(s)/kG/Min (10.005 mL/Hr) IV Continuous <Continuous>  piperacillin/tazobactam IVPB. 3.375 Gram(s) IV Intermittent every 8 hours  sodium phosphate IVPB 15 milliMole(s) IV Intermittent once  sterile water 1000 milliLiter(s) (100 mL/Hr) IV Continuous <Continuous>    MEDICATIONS  (PRN):  HYDROmorphone  Injectable 0.5 milliGRAM(s) IV Push every 3 hours PRN Moderate Pain (4 - 6)      NUTRITION/IVF: NPO/ sterile water with sodium acetate (finish this AM)    CENTRAL LINE:  L SC TLC    MENON:   Menon    A-LINE:  L Rad      PHYSICAL EXAM:   Gen: NAD, Well appearing, No cyanosis, Pallor.  Eyes: PERRL ~ 3mm, EOMI,   Neurological: A&Ox3, GCS 15, No focal deficit   Neck: Supple. trachea midline. NG tube in place  Pulmonary: NAD, CTA, = BL .    Cardiovascular: RRR, S1, S2, No Murmurs, rubs or gallops noted.  Gastrointestinal: ND, Soft, NT.   Genitourinary: Menon in place  Extremities: NT, AT, no edema, erythema or palpable cord noted.  FROM, = 2+ pulses throughout.  Skin: midline woud VAC      LABS:  CBC Full  -  ( 17 May 2019 04:54 )  WBC Count : 22.7 K/uL  RBC Count : 3.20 M/uL  Hemoglobin : 8.6 g/dL  Hematocrit : 26.5 %  Platelet Count - Automated : 380 K/uL  Mean Cell Volume : 82.8 fl  Mean Cell Hemoglobin : 26.9 pg  Mean Cell Hemoglobin Concentration : 32.5 g/dL  Auto Neutrophil # : 23.1 K/uL  Auto Lymphocyte # : 0.8 K/uL  Auto Monocyte # : 0.7 K/uL  Auto Eosinophil # : 0.0 K/uL  Auto Basophil # : 0.0 K/uL  Auto Neutrophil % : 89.0 %  Auto Lymphocyte % : 2.0 %  Auto Monocyte % : 3.0 %  Auto Eosinophil % : 1.0 %  Auto Basophil % : 0.0 %        139  |  104  |  20.0  ----------------------------<  114  4.3   |  27.0  |  0.66    Ca    7.5<L>      17 May 2019 04:54  Phos  2.3     -  Mg     3.1         TPro  5.1<L>  /  Alb  1.8<L>  /  TBili  0.4  /  DBili  x   /  AST  13  /  ALT  11  /  AlkPhos  68  -    PT/INR - ( 17 May 2019 04:54 )   PT: 13.9 sec;   INR: 1.20 ratio         PTT - ( 17 May 2019 04:54 )  PTT:30.2 sec  Urinalysis Basic - ( 15 May 2019 18:09 )    Color: Yellow / Appearance: Clear / S.015 / pH: x  Gluc: x / Ketone: Trace  / Bili: Small / Urobili: 1 mg/dL   Blood: x / Protein: 30 mg/dL / Nitrite: Negative   Leuk Esterase: Trace / RBC: 0-2 /HPF / WBC 0-2   Sq Epi: x / Non Sq Epi: Occasional / Bacteria: Occasional      RECENT CULTURES:  05-15 .Urine XXXX XXXX   No growth        LIVER FUNCTIONS - ( 17 May 2019 04:54 )  Alb: 1.8 g/dL / Pro: 5.1 g/dL / ALK PHOS: 68 U/L / ALT: 11 U/L / AST: 13 U/L / GGT: x               CAPILLARY BLOOD GLUCOSE      RADIOLOGY & ADDITIONAL STUDIES:    ASSESSMENT/PLAN:  48yFemale s/p BASILIA/BSO excision of large adnexal mass who returned a week later in hypotensive shock s/p ex-lap, abdominal washout, sigmoidectomy, creation of colostomy.     Neurological: Pain controlled on current regiment.   Pulmonary: saturating appropriately on room air. OOB. encourage IS.  Cardiovascular: no longer requiring pressors. continue to monitor vitals. VSS  Gastrointestinal: Currently NPO. Defer to operating team for diet. Sterile water with sodium acetate to finish this AM. Consider switch to plasmalyte maintenance fluids. No longer metabolic acidosis.  Genitourinary: Menon in place  Heme: SCD + Lovenox  ID: [email or web address], renally dosed  Skin: midline wound vac  Lines/ Tubes: L SC TLC, L Rad Jody, Menon  Dispo: consider downgrade            CRITICAL CARE TIME SPENT: INTERVAL HPI/OVERNIGHT EVENTS/SUBJECTIVE:  Levophed weaned off yesterday. No acute events overnight. Pt has no complaints this AM.    ICU Vital Signs Last 24 Hrs  T(C): 36.3 (17 May 2019 04:00), Max: 36.5 (16 May 2019 12:00)  T(F): 97.4 (17 May 2019 04:00), Max: 97.7 (16 May 2019 12:00)  HR: 69 (17 May 2019 06:00) (69 - 94)  BP: 98/63 (17 May 2019 06:00) (80/53 - 101/67)  BP(mean): 76 (17 May 2019 06:00) (61 - 80)  ABP: 116/66 (17 May 2019 06:00) (0/0 - 116/66)  ABP(mean): 85 (17 May 2019 06:00) (0 - 85)  RR: 14 (17 May 2019 06:00) (11 - 26)  SpO2: 97% (17 May 2019 06:00) (95% - 99%)      I&O's Detail    16 May 2019 07:01  -  17 May 2019 07:00  --------------------------------------------------------  IN:    multiple electrolytes Injection Type 1multiple electrolytes Injection Type 1: 500 mL    norepinephrine Infusion: 12.4 mL    Solution: 100 mL    Solution: 325 mL    sterile water: 1900 mL  Total IN: 2837.4 mL    OUT:    Bulb: 145 mL    Bulb: 185 mL    Indwelling Catheter - Urethral: 1155 mL    Nasoenteral Tube: 250 mL  Total OUT: 1735 mL    Total NET: 1102.4 mL      MEDICATIONS  (STANDING):  chlorhexidine 2% Cloths 1 Application(s) Topical daily  enoxaparin Injectable 40 milliGRAM(s) SubCutaneous daily  norepinephrine Infusion 0.08 MICROgram(s)/kG/Min (10.005 mL/Hr) IV Continuous <Continuous>  piperacillin/tazobactam IVPB. 3.375 Gram(s) IV Intermittent every 8 hours  sodium phosphate IVPB 15 milliMole(s) IV Intermittent once  sterile water 1000 milliLiter(s) (100 mL/Hr) IV Continuous <Continuous>    MEDICATIONS  (PRN):  HYDROmorphone  Injectable 0.5 milliGRAM(s) IV Push every 3 hours PRN Moderate Pain (4 - 6)      NUTRITION/IVF: NPO/ sterile water with sodium acetate (finish this AM)    CENTRAL LINE:  L SC TLC    MENON:   Menon    A-LINE:  L Rad      PHYSICAL EXAM:   Gen: NAD, Well appearing, No cyanosis, Pallor.  Eyes: PERRL ~ 3mm, EOMI,   Neurological: A&Ox3, GCS 15, No focal deficit   Neck: Supple. trachea midline. NG tube in place  Pulmonary: NAD, CTA, = BL .    Cardiovascular: RRR, S1, S2, No Murmurs, rubs or gallops noted.  Gastrointestinal: ND, Soft, NT.   Genitourinary: Menon in place  Extremities: NT, AT, no edema, erythema or palpable cord noted.  FROM, = 2+ pulses throughout.  Skin: midline woud VAC      LABS:  CBC Full  -  ( 17 May 2019 04:54 )  WBC Count : 22.7 K/uL  RBC Count : 3.20 M/uL  Hemoglobin : 8.6 g/dL  Hematocrit : 26.5 %  Platelet Count - Automated : 380 K/uL  Mean Cell Volume : 82.8 fl  Mean Cell Hemoglobin : 26.9 pg  Mean Cell Hemoglobin Concentration : 32.5 g/dL  Auto Neutrophil # : 23.1 K/uL  Auto Lymphocyte # : 0.8 K/uL  Auto Monocyte # : 0.7 K/uL  Auto Eosinophil # : 0.0 K/uL  Auto Basophil # : 0.0 K/uL  Auto Neutrophil % : 89.0 %  Auto Lymphocyte % : 2.0 %  Auto Monocyte % : 3.0 %  Auto Eosinophil % : 1.0 %  Auto Basophil % : 0.0 %        139  |  104  |  20.0  ----------------------------<  114  4.3   |  27.0  |  0.66    Ca    7.5<L>      17 May 2019 04:54  Phos  2.3     -  Mg     3.1         TPro  5.1<L>  /  Alb  1.8<L>  /  TBili  0.4  /  DBili  x   /  AST  13  /  ALT  11  /  AlkPhos  68  -    PT/INR - ( 17 May 2019 04:54 )   PT: 13.9 sec;   INR: 1.20 ratio         PTT - ( 17 May 2019 04:54 )  PTT:30.2 sec  Urinalysis Basic - ( 15 May 2019 18:09 )    Color: Yellow / Appearance: Clear / S.015 / pH: x  Gluc: x / Ketone: Trace  / Bili: Small / Urobili: 1 mg/dL   Blood: x / Protein: 30 mg/dL / Nitrite: Negative   Leuk Esterase: Trace / RBC: 0-2 /HPF / WBC 0-2   Sq Epi: x / Non Sq Epi: Occasional / Bacteria: Occasional      RECENT CULTURES:  05-15 .Urine XXXX XXXX   No growth        LIVER FUNCTIONS - ( 17 May 2019 04:54 )  Alb: 1.8 g/dL / Pro: 5.1 g/dL / ALK PHOS: 68 U/L / ALT: 11 U/L / AST: 13 U/L / GGT: x               CAPILLARY BLOOD GLUCOSE      RADIOLOGY & ADDITIONAL STUDIES:    ASSESSMENT/PLAN:  48yFemale s/p BASILIA/BSO excision of large adnexal mass who returned a week later in hypotensive shock s/p ex-lap, abdominal washout, sigmoidectomy, creation of colostomy.     Neurological: Pain controlled on current regimen.   Pulmonary: saturating appropriately on room air. OOB. encourage IS.  Cardiovascular: no longer requiring pressors. continue to monitor vitals. VSS  Gastrointestinal: Currently NPO. Defer to operating team for diet. Sterile water with sodium acetate to finish this AM. Consider switch to plasmalyte maintenance fluids. No longer metabolic acidosis.  Genitourinary: Menon in place  Heme: SCD + Lovenox  ID: [email or web address], renally dosed  Skin: midline wound vac  Lines/ Tubes: L SC TLC, L Rad [place]  Dispo: consider downgrade            CRITICAL CARE TIME SPENT:

## 2019-05-17 NOTE — PROGRESS NOTE ADULT - ASSESSMENT
Patient is a 49yo now POD#2 s/p exploratory laparotomy, abdominal washout of 3L feculent matter, open sigmoidectomy and end colostomy for bowel perforation 9 days post-op from a modified radical hysterectomy BSO, omentectomy, debulking, peritectomy of abdominal cavity for severe endometriosis     Cardiac: Currently off pressors, BP in normal range.     Pulmonary: no active disease, incentive spirometer at bedside. O2 saturation 97% on 2 L, recently d/c.     Neuro: pain well controlled with current regimen.     Endo: no active disease      GI: NPO with NG tube to continuous suction, management per surgical team. 200cc output overnight.     : JAMIE on admission, creatinine now improving.  cc over the past 12 hours. Will continue to monitor with strict I's and O's.     ID: Lactate downtrending, Continue zosyn, renally dosed.     Heme: SCDs when not ambulating, Lovenox for VTE prophylaxis. Hgb noted to be 8.6 from 11.5 this AM.     Skin: wound vac in place     FEN: IVF at 100cc/hr, will discontinue once PO intake is adequate. Electrolytes wnl. Will continue AM labs.     Lines/tubes: Left subclavian inserted with port reserved for TPN in case necessary. JULIAN drains bilaterally. Output total <200cc overnight.     Dispo: SICU, will continue to follow.     Above plan discussed with Dr. Gonzalez

## 2019-05-18 LAB
ANION GAP SERPL CALC-SCNC: 13 MMOL/L — SIGNIFICANT CHANGE UP (ref 5–17)
BUN SERPL-MCNC: 21 MG/DL — HIGH (ref 8–20)
CALCIUM SERPL-MCNC: 7.7 MG/DL — LOW (ref 8.6–10.2)
CHLORIDE SERPL-SCNC: 101 MMOL/L — SIGNIFICANT CHANGE UP (ref 98–107)
CO2 SERPL-SCNC: 25 MMOL/L — SIGNIFICANT CHANGE UP (ref 22–29)
CREAT SERPL-MCNC: 0.66 MG/DL — SIGNIFICANT CHANGE UP (ref 0.5–1.3)
EOSINOPHIL # BLD AUTO: 0 K/UL — SIGNIFICANT CHANGE UP (ref 0–0.5)
EOSINOPHIL NFR BLD AUTO: 0.1 % — SIGNIFICANT CHANGE UP (ref 0–6)
GLUCOSE SERPL-MCNC: 85 MG/DL — SIGNIFICANT CHANGE UP (ref 70–115)
HCT VFR BLD CALC: 27.7 % — LOW (ref 37–47)
HGB BLD-MCNC: 8.6 G/DL — LOW (ref 12–16)
LYMPHOCYTES # BLD AUTO: 12.6 % — LOW (ref 20–55)
LYMPHOCYTES # BLD AUTO: 2 K/UL — SIGNIFICANT CHANGE UP (ref 1–4.8)
MAGNESIUM SERPL-MCNC: 2.7 MG/DL — HIGH (ref 1.8–2.6)
MCHC RBC-ENTMCNC: 26.1 PG — LOW (ref 27–31)
MCHC RBC-ENTMCNC: 31 G/DL — LOW (ref 32–36)
MCV RBC AUTO: 84.2 FL — SIGNIFICANT CHANGE UP (ref 81–99)
METHOD TYPE: SIGNIFICANT CHANGE UP
MONOCYTES # BLD AUTO: 0.7 K/UL — SIGNIFICANT CHANGE UP (ref 0–0.8)
MONOCYTES NFR BLD AUTO: 4.2 % — SIGNIFICANT CHANGE UP (ref 3–10)
NEUTROPHILS # BLD AUTO: 12.8 K/UL — HIGH (ref 1.8–8)
NEUTROPHILS NFR BLD AUTO: 82.6 % — HIGH (ref 37–73)
PHOSPHATE SERPL-MCNC: 2.2 MG/DL — LOW (ref 2.4–4.7)
PLATELET # BLD AUTO: 439 K/UL — HIGH (ref 150–400)
POTASSIUM SERPL-MCNC: 3.8 MMOL/L — SIGNIFICANT CHANGE UP (ref 3.5–5.3)
POTASSIUM SERPL-SCNC: 3.8 MMOL/L — SIGNIFICANT CHANGE UP (ref 3.5–5.3)
RBC # BLD: 3.29 M/UL — LOW (ref 4.4–5.2)
RBC # FLD: 17.8 % — HIGH (ref 11–15.6)
SODIUM SERPL-SCNC: 139 MMOL/L — SIGNIFICANT CHANGE UP (ref 135–145)
WBC # BLD: 15.5 K/UL — HIGH (ref 4.8–10.8)
WBC # FLD AUTO: 15.5 K/UL — HIGH (ref 4.8–10.8)

## 2019-05-18 PROCEDURE — 99233 SBSQ HOSP IP/OBS HIGH 50: CPT

## 2019-05-18 RX ORDER — SODIUM,POTASSIUM PHOSPHATES 278-250MG
1 POWDER IN PACKET (EA) ORAL
Refills: 0 | Status: DISCONTINUED | OUTPATIENT
Start: 2019-05-18 | End: 2019-05-22

## 2019-05-18 RX ADMIN — HYDROMORPHONE HYDROCHLORIDE 0.5 MILLIGRAM(S): 2 INJECTION INTRAMUSCULAR; INTRAVENOUS; SUBCUTANEOUS at 09:06

## 2019-05-18 RX ADMIN — ENOXAPARIN SODIUM 40 MILLIGRAM(S): 100 INJECTION SUBCUTANEOUS at 11:21

## 2019-05-18 RX ADMIN — HYDROMORPHONE HYDROCHLORIDE 0.5 MILLIGRAM(S): 2 INJECTION INTRAMUSCULAR; INTRAVENOUS; SUBCUTANEOUS at 16:21

## 2019-05-18 RX ADMIN — PIPERACILLIN AND TAZOBACTAM 25 GRAM(S): 4; .5 INJECTION, POWDER, LYOPHILIZED, FOR SOLUTION INTRAVENOUS at 21:08

## 2019-05-18 RX ADMIN — PIPERACILLIN AND TAZOBACTAM 25 GRAM(S): 4; .5 INJECTION, POWDER, LYOPHILIZED, FOR SOLUTION INTRAVENOUS at 05:10

## 2019-05-18 RX ADMIN — PIPERACILLIN AND TAZOBACTAM 25 GRAM(S): 4; .5 INJECTION, POWDER, LYOPHILIZED, FOR SOLUTION INTRAVENOUS at 14:20

## 2019-05-18 RX ADMIN — HYDROMORPHONE HYDROCHLORIDE 0.5 MILLIGRAM(S): 2 INJECTION INTRAMUSCULAR; INTRAVENOUS; SUBCUTANEOUS at 17:50

## 2019-05-18 RX ADMIN — HYDROMORPHONE HYDROCHLORIDE 0.5 MILLIGRAM(S): 2 INJECTION INTRAMUSCULAR; INTRAVENOUS; SUBCUTANEOUS at 09:38

## 2019-05-18 RX ADMIN — SODIUM CHLORIDE 100 MILLILITER(S): 9 INJECTION, SOLUTION INTRAVENOUS at 11:17

## 2019-05-18 RX ADMIN — CHLORHEXIDINE GLUCONATE 1 APPLICATION(S): 213 SOLUTION TOPICAL at 11:21

## 2019-05-18 RX ADMIN — Medication 1 PACKET(S): at 17:50

## 2019-05-18 RX ADMIN — SODIUM CHLORIDE 100 MILLILITER(S): 9 INJECTION, SOLUTION INTRAVENOUS at 20:47

## 2019-05-18 RX ADMIN — Medication 1 PACKET(S): at 11:21

## 2019-05-18 NOTE — PROGRESS NOTE ADULT - ASSESSMENT
Patient is a 49yo now POD#3 s/p exploratory laparotomy, abdominal washout of 3L feculent matter, open sigmoidectomy and end colostomy for bowel perforation 9 days post-op from a modified radical hysterectomy BSO, omentectomy, debulking, peritectomy of abdominal cavity for severe endometriosis     Cardiac: BP in normal range.     Pulmonary: no active disease, incentive spirometer at bedside. Encouraged to continue using. Up to 750cc.     Neuro: pain well controlled with current regimen.     Endo: no active disease      GI: Clear liquid diet.     : dee removed, pending TOV    Heme: SCDs when not ambulating, Lovenox for VTE prophylaxis. Hgb noted to be 8.6     Skin: wound vac in place     FEN: Plasmalyte at 100cc/hr. Electrolytes wnl. Will continue AM labs.     Lines/tubes: Left subclavian inserted with port reserved for TPN in case necessary. JULIAN drains bilaterally.     Dispo: Downgraded to 5T     Management as per colorectal team, will continue to follow.

## 2019-05-18 NOTE — PROGRESS NOTE ADULT - SUBJECTIVE AND OBJECTIVE BOX
GYNECOLOGIC ONCOLOGY PROGRESS NOTE    POD#3 exploratory laparotomy, abdominal washout of 3L feculent matter, open sigmoidectomy and end colostomy for bowel perforation 9 days post-op from a modified radical hysterectomy BSO, omentectomy, debulking, peritectomy of abdominal cavity for severe endometriosis     PROBLEMS:  Endometriosis, severe  Pneumoperitoneum  Sepsis  Pelvic mass      Pt seen and examined at bedside     SUBJECTIVE:    Patient is without complaints. Reports she is hungry   Pain well-controlled.  Denies Nausea, Vomiting or Diarrhea.  Denies shortness of breath, chest pain or dyspnea on exertion.  NGT removed. On clear liquid diet.     OBJECTIVE:     VITALS:  Vital Signs Last 24 Hrs  T(C): 36.5 (18 May 2019 08:54), Max: 36.7 (18 May 2019 00:14)  T(F): 97.7 (18 May 2019 08:54), Max: 98 (18 May 2019 00:14)  HR: 65 (18 May 2019 08:54) (64 - 77)  BP: 134/81 (18 May 2019 08:54) (111/71 - 134/81)  RR: 18 (18 May 2019 08:54) (18 - 18)  SpO2: 94% (18 May 2019 08:54) (94% - 96%)      17 May 2019 07:01  -  18 May 2019 07:00  --------------------------------------------------------  IN:    IV PiggyBack: 250 mL    multiple electrolytes Injection Type 1: 1900 mL    Solution: 325 mL  Total IN: 2475 mL    OUT:    Bulb: 120 mL    Bulb: 155 mL    Indwelling Catheter - Urethral: 150 mL    Intermittent Catheterization - Urethral: 350 mL    Nasoenteral Tube: 150 mL  Total OUT: 925 mL    Total NET: 1550 mL      18 May 2019 07:01  -  18 May 2019 13:02  --------------------------------------------------------  IN:  Total IN: 0 mL    OUT:    Intermittent Catheterization - Urethral: 350 mL    Voided: 675 mL  Total OUT: 1025 mL    Total NET: -1025 mL    Physical Exam:  Constitutional: NAD  Pulmonary: clear to auscultation bilaterally. Incentive spirometer to 750, encouraged to reach 1000.   Cardiovascular: Regular rate and rhythm   Abdomen: soft, non-tender, non-distended, normal bowel sounds. Colostomy with stool and gas noted.   Extremities: no lower extremity edema or calve tenderness  Incision: Wound vac in place, Clean, dry, intact.  Without signs of infection or hernia.      LABS:                        8.6    15.5  )-----------( 439      ( 18 May 2019 08:33 )             27.7   05-18    139  |  101  |  21.0<H>  ----------------------------<  85  3.8   |  25.0  |  0.66    Ca    7.7<L>      18 May 2019 08:33  Phos  2.2     05-18  Mg     2.7     05-18    TPro  5.1<L>  /  Alb  1.8<L>  /  TBili  0.4  /  DBili  x   /  AST  13  /  ALT  11  /  AlkPhos  68  05-17

## 2019-05-18 NOTE — PROGRESS NOTE ADULT - SUBJECTIVE AND OBJECTIVE BOX
INTERVAL HPI/OVERNIGHT EVENTS:    SUBJECTIVE:  No acute events. NGT was removed yesterday; kept NPO.  Grey was removed yesterday, failed trial void overnight and was straight cathed, will bladderscan and retry in the AM    MEDICATIONS  (STANDING):  chlorhexidine 2% Cloths 1 Application(s) Topical daily  enoxaparin Injectable 40 milliGRAM(s) SubCutaneous daily  multiple electrolytes Injection Type 1 1000 milliLiter(s) (100 mL/Hr) IV Continuous <Continuous>  piperacillin/tazobactam IVPB. 3.375 Gram(s) IV Intermittent every 8 hours    MEDICATIONS  (PRN):  HYDROmorphone  Injectable 0.5 milliGRAM(s) IV Push every 3 hours PRN Moderate Pain (4 - 6)      Vital Signs Last 24 Hrs  T(C): 36.7 (18 May 2019 00:14), Max: 36.7 (18 May 2019 00:14)  T(F): 98 (18 May 2019 00:14), Max: 98 (18 May 2019 00:14)  HR: 64 (18 May 2019 00:14) (64 - 77)  BP: 118/77 (18 May 2019 00:14) (98/60 - 118/77)  BP(mean): 83 (17 May 2019 12:00) (75 - 88)  RR: 18 (18 May 2019 00:14) (11 - 24)  SpO2: 95% (18 May 2019 00:14) (95% - 99%)      Constitutional: AAOx3  HEENT: EOMI / PERRLA, MMM  Respiratory: non labored breathing.   Cardiovascular: RRR  Gastrointestinal: Abdomen soft, non-tender, non-distended, no rebound tenderness / guarding. Wound vac at midline with adequate seal and so surrounding skin erythema. LLQ colostomy bag with minimal amounts of stool that is well sealed with no surrounding skin erythema nor maceration. Right and left Devan draine with minimal serosanguinous fluid. Binder in place.             I&O's Detail    16 May 2019 07:01  -  17 May 2019 07:00  --------------------------------------------------------  IN:    multiple electrolytes Injection Type 1multiple electrolytes Injection Type 1: 500 mL    norepinephrine Infusion: 12.4 mL    Solution: 100 mL    Solution: 325 mL    sterile water: 1900 mL  Total IN: 2837.4 mL    OUT:    Bulb: 185 mL    Bulb: 145 mL    Indwelling Catheter - Urethral: 1155 mL    Nasoenteral Tube: 250 mL  Total OUT: 1735 mL    Total NET: 1102.4 mL      17 May 2019 07:01  -  18 May 2019 04:06  --------------------------------------------------------  IN:    IV PiggyBack: 250 mL    multiple electrolytes Injection Type 1: 1600 mL    Solution: 225 mL  Total IN: 2075 mL    OUT:    Bulb: 70 mL    Bulb: 95 mL    Indwelling Catheter - Urethral: 150 mL    Intermittent Catheterization - Urethral: 350 mL    Nasoenteral Tube: 150 mL  Total OUT: 815 mL    Total NET: 1260 mL          LABS:                        8.6    22.7  )-----------( 380      ( 17 May 2019 04:54 )             26.5     05-17    139  |  104  |  20.0  ----------------------------<  114  4.3   |  27.0  |  0.66    Ca    7.5<L>      17 May 2019 04:54  Phos  2.3     05-17  Mg     3.1     05-17    TPro  5.1<L>  /  Alb  1.8<L>  /  TBili  0.4  /  DBili  x   /  AST  13  /  ALT  11  /  AlkPhos  68  05-17    PT/INR - ( 17 May 2019 04:54 )   PT: 13.9 sec;   INR: 1.20 ratio         PTT - ( 17 May 2019 04:54 )  PTT:30.2 sec

## 2019-05-18 NOTE — PROGRESS NOTE ADULT - ASSESSMENT
47yo F POD 3 from exlap with sigmoidectomy/end colostomy with recent removal on NGT and dee catheter but has not passed her TOV.   -TOV  -Q8H straight caths attempt  - monitor b/l kevon drains: Serous at this time  -maintain abdominal binder at all moments.   -monitor bowel function  -monitor urinary output  -monitor nausea/vomiting  -shirley  -monitor vital signs.

## 2019-05-19 LAB
ANION GAP SERPL CALC-SCNC: 10 MMOL/L — SIGNIFICANT CHANGE UP (ref 5–17)
ANISOCYTOSIS BLD QL: SLIGHT — SIGNIFICANT CHANGE UP
BASOPHILS # BLD AUTO: 0 K/UL — SIGNIFICANT CHANGE UP (ref 0–0.2)
BASOPHILS NFR BLD AUTO: 0 % — SIGNIFICANT CHANGE UP (ref 0–2)
BUN SERPL-MCNC: 12 MG/DL — SIGNIFICANT CHANGE UP (ref 8–20)
BURR CELLS BLD QL SMEAR: PRESENT — SIGNIFICANT CHANGE UP
CALCIUM SERPL-MCNC: 8.1 MG/DL — LOW (ref 8.6–10.2)
CHLORIDE SERPL-SCNC: 105 MMOL/L — SIGNIFICANT CHANGE UP (ref 98–107)
CO2 SERPL-SCNC: 25 MMOL/L — SIGNIFICANT CHANGE UP (ref 22–29)
CREAT SERPL-MCNC: 0.6 MG/DL — SIGNIFICANT CHANGE UP (ref 0.5–1.3)
ELLIPTOCYTES BLD QL SMEAR: SLIGHT — SIGNIFICANT CHANGE UP
EOSINOPHIL # BLD AUTO: 0 K/UL — SIGNIFICANT CHANGE UP (ref 0–0.5)
EOSINOPHIL NFR BLD AUTO: 2 % — SIGNIFICANT CHANGE UP (ref 0–5)
GLUCOSE SERPL-MCNC: 120 MG/DL — HIGH (ref 70–115)
HCT VFR BLD CALC: 30.3 % — LOW (ref 37–47)
HGB BLD-MCNC: 9.5 G/DL — LOW (ref 12–16)
HYPOCHROMIA BLD QL: SLIGHT — SIGNIFICANT CHANGE UP
LYMPHOCYTES # BLD AUTO: 1.4 K/UL — SIGNIFICANT CHANGE UP (ref 1–4.8)
LYMPHOCYTES # BLD AUTO: 12 % — LOW (ref 20–55)
MAGNESIUM SERPL-MCNC: 2.1 MG/DL — SIGNIFICANT CHANGE UP (ref 1.6–2.6)
MCHC RBC-ENTMCNC: 26.9 PG — LOW (ref 27–31)
MCHC RBC-ENTMCNC: 31.4 G/DL — LOW (ref 32–36)
MCV RBC AUTO: 85.8 FL — SIGNIFICANT CHANGE UP (ref 81–99)
MICROCYTES BLD QL: SLIGHT — SIGNIFICANT CHANGE UP
MONOCYTES # BLD AUTO: 0.9 K/UL — HIGH (ref 0–0.8)
MONOCYTES NFR BLD AUTO: 8 % — SIGNIFICANT CHANGE UP (ref 3–10)
NEUTROPHILS # BLD AUTO: 7.9 K/UL — SIGNIFICANT CHANGE UP (ref 1.8–8)
NEUTROPHILS NFR BLD AUTO: 74 % — HIGH (ref 37–73)
NEUTS BAND # BLD: 2 % — SIGNIFICANT CHANGE UP (ref 0–8)
OVALOCYTES BLD QL SMEAR: SLIGHT — SIGNIFICANT CHANGE UP
PHOSPHATE SERPL-MCNC: 3.2 MG/DL — SIGNIFICANT CHANGE UP (ref 2.4–4.7)
PLAT MORPH BLD: NORMAL — SIGNIFICANT CHANGE UP
PLATELET # BLD AUTO: 460 K/UL — HIGH (ref 150–400)
POIKILOCYTOSIS BLD QL AUTO: SLIGHT — SIGNIFICANT CHANGE UP
POTASSIUM SERPL-MCNC: 3.7 MMOL/L — SIGNIFICANT CHANGE UP (ref 3.5–5.3)
POTASSIUM SERPL-SCNC: 3.7 MMOL/L — SIGNIFICANT CHANGE UP (ref 3.5–5.3)
RBC # BLD: 3.53 M/UL — LOW (ref 4.4–5.2)
RBC # FLD: 17.7 % — HIGH (ref 11–15.6)
RBC BLD AUTO: ABNORMAL
SODIUM SERPL-SCNC: 140 MMOL/L — SIGNIFICANT CHANGE UP (ref 135–145)
VARIANT LYMPHS # BLD: 2 % — SIGNIFICANT CHANGE UP (ref 0–6)
WBC # BLD: 10.3 K/UL — SIGNIFICANT CHANGE UP (ref 4.8–10.8)
WBC # FLD AUTO: 10.3 K/UL — SIGNIFICANT CHANGE UP (ref 4.8–10.8)

## 2019-05-19 PROCEDURE — 99233 SBSQ HOSP IP/OBS HIGH 50: CPT

## 2019-05-19 RX ORDER — TAMSULOSIN HYDROCHLORIDE 0.4 MG/1
0.4 CAPSULE ORAL AT BEDTIME
Refills: 0 | Status: DISCONTINUED | OUTPATIENT
Start: 2019-05-19 | End: 2019-05-22

## 2019-05-19 RX ADMIN — CHLORHEXIDINE GLUCONATE 1 APPLICATION(S): 213 SOLUTION TOPICAL at 11:51

## 2019-05-19 RX ADMIN — SODIUM CHLORIDE 100 MILLILITER(S): 9 INJECTION, SOLUTION INTRAVENOUS at 21:15

## 2019-05-19 RX ADMIN — PIPERACILLIN AND TAZOBACTAM 25 GRAM(S): 4; .5 INJECTION, POWDER, LYOPHILIZED, FOR SOLUTION INTRAVENOUS at 21:33

## 2019-05-19 RX ADMIN — TAMSULOSIN HYDROCHLORIDE 0.4 MILLIGRAM(S): 0.4 CAPSULE ORAL at 21:14

## 2019-05-19 RX ADMIN — Medication 1 PACKET(S): at 17:34

## 2019-05-19 RX ADMIN — PIPERACILLIN AND TAZOBACTAM 25 GRAM(S): 4; .5 INJECTION, POWDER, LYOPHILIZED, FOR SOLUTION INTRAVENOUS at 05:10

## 2019-05-19 RX ADMIN — HYDROMORPHONE HYDROCHLORIDE 0.5 MILLIGRAM(S): 2 INJECTION INTRAMUSCULAR; INTRAVENOUS; SUBCUTANEOUS at 00:25

## 2019-05-19 RX ADMIN — Medication 1 PACKET(S): at 05:10

## 2019-05-19 RX ADMIN — ENOXAPARIN SODIUM 40 MILLIGRAM(S): 100 INJECTION SUBCUTANEOUS at 11:51

## 2019-05-19 RX ADMIN — HYDROMORPHONE HYDROCHLORIDE 0.5 MILLIGRAM(S): 2 INJECTION INTRAMUSCULAR; INTRAVENOUS; SUBCUTANEOUS at 01:02

## 2019-05-19 RX ADMIN — PIPERACILLIN AND TAZOBACTAM 25 GRAM(S): 4; .5 INJECTION, POWDER, LYOPHILIZED, FOR SOLUTION INTRAVENOUS at 13:47

## 2019-05-19 NOTE — PROGRESS NOTE ADULT - SUBJECTIVE AND OBJECTIVE BOX
49yo F examined at bedside and found in  no distress. Overnight patient did not pass her TOV and had a Dee inserted. No active complaints. Patient is tolerating CLD and ambulating the halls. Denies fever, chills, nausea, vomiting, diarrhea, constipation, chest pain, and sob.     Vital Signs Last 24 Hrs  T(C): 36.5 (18 May 2019 08:54), Max: 36.5 (18 May 2019 08:54)  T(F): 97.7 (18 May 2019 08:54), Max: 97.7 (18 May 2019 08:54)  HR: 65 (18 May 2019 08:54) (65 - 65)  BP: 134/81 (18 May 2019 08:54) (134/81 - 134/81)  BP(mean): --  RR: 18 (18 May 2019 08:54) (18 - 18)  SpO2: 94% (18 May 2019 08:54) (94% - 94%)    I&O's Detail    17 May 2019 07:01  -  18 May 2019 07:00  --------------------------------------------------------  IN:    IV PiggyBack: 250 mL    multiple electrolytes Injection Type 1: 1900 mL    Solution: 325 mL  Total IN: 2475 mL    OUT:    Bulb: 155 mL    Bulb: 120 mL    Indwelling Catheter - Urethral: 150 mL    Intermittent Catheterization - Urethral: 350 mL    Nasoenteral Tube: 150 mL  Total OUT: 925 mL    Total NET: 1550 mL      18 May 2019 07:01  -  19 May 2019 03:16  --------------------------------------------------------  IN:    multiple electrolytes Injection Type 1: 2000 mL    Solution: 100 mL  Total IN: 2100 mL    OUT:    Bulb: 75 mL    Bulb: 60 mL    Intermittent Catheterization - Urethral: 350 mL    Voided: 675 mL  Total OUT: 1160 mL    Total NET: 940 mL      Constitutional: AAOx3  HEENT: EOMI / PERRLA, MMM  Respiratory: non labored breathing.   Cardiovascular: RRR  Gastrointestinal: Abdomen soft, non-tender, non-distended, no rebound tenderness / guarding. Midline wound vac with adequate seal, c/d/i. LLQ colostomy bag with stool and no leakage, c/d/i. R and L kevon drains with minimal serosanguinous drain. Abdominal binder in place.   : dee re inserted since she failed 3 TOV.     LABS:                        8.6    15.5  )-----------( 439      ( 18 May 2019 08:33 )             27.7     05-18    139  |  101  |  21.0<H>  ----------------------------<  85  3.8   |  25.0  |  0.66    Ca    7.7<L>      18 May 2019 08:33  Phos  2.2     05-18  Mg     2.7     05-18    TPro  5.1<L>  /  Alb  1.8<L>  /  TBili  0.4  /  DBili  x   /  AST  13  /  ALT  11  /  AlkPhos  68  05-17    PT/INR - ( 17 May 2019 04:54 )   PT: 13.9 sec;   INR: 1.20 ratio         PTT - ( 17 May 2019 04:54 )  PTT:30.2 sec      MEDICATIONS  (STANDING):  chlorhexidine 2% Cloths 1 Application(s) Topical daily  enoxaparin Injectable 40 milliGRAM(s) SubCutaneous daily  multiple electrolytes Injection Type 1 1000 milliLiter(s) (100 mL/Hr) IV Continuous <Continuous>  piperacillin/tazobactam IVPB. 3.375 Gram(s) IV Intermittent every 8 hours  potassium phosphate / sodium phosphate powder 1 Packet(s) Oral two times a day    MEDICATIONS  (PRN):  HYDROmorphone  Injectable 0.5 milliGRAM(s) IV Push every 3 hours PRN Moderate Pain (4 - 6)

## 2019-05-20 LAB
ANION GAP SERPL CALC-SCNC: 12 MMOL/L — SIGNIFICANT CHANGE UP (ref 5–17)
ANISOCYTOSIS BLD QL: SLIGHT — SIGNIFICANT CHANGE UP
BASOPHILS # BLD AUTO: 0 K/UL — SIGNIFICANT CHANGE UP (ref 0–0.2)
BUN SERPL-MCNC: 6 MG/DL — LOW (ref 8–20)
CALCIUM SERPL-MCNC: 8.1 MG/DL — LOW (ref 8.6–10.2)
CHLORIDE SERPL-SCNC: 104 MMOL/L — SIGNIFICANT CHANGE UP (ref 98–107)
CO2 SERPL-SCNC: 24 MMOL/L — SIGNIFICANT CHANGE UP (ref 22–29)
CREAT SERPL-MCNC: 0.45 MG/DL — LOW (ref 0.5–1.3)
CULTURE RESULTS: SIGNIFICANT CHANGE UP
ELLIPTOCYTES BLD QL SMEAR: SLIGHT — SIGNIFICANT CHANGE UP
EOSINOPHIL # BLD AUTO: 0.2 K/UL — SIGNIFICANT CHANGE UP (ref 0–0.5)
EOSINOPHIL NFR BLD AUTO: 2 % — SIGNIFICANT CHANGE UP (ref 0–5)
GLUCOSE SERPL-MCNC: 101 MG/DL — SIGNIFICANT CHANGE UP (ref 70–115)
HCT VFR BLD CALC: 31.4 % — LOW (ref 37–47)
HGB BLD-MCNC: 9.8 G/DL — LOW (ref 12–16)
HYPOCHROMIA BLD QL: SLIGHT — SIGNIFICANT CHANGE UP
LYMPHOCYTES # BLD AUTO: 1.8 K/UL — SIGNIFICANT CHANGE UP (ref 1–4.8)
LYMPHOCYTES # BLD AUTO: 20 % — SIGNIFICANT CHANGE UP (ref 20–55)
MACROCYTES BLD QL: SLIGHT — SIGNIFICANT CHANGE UP
MAGNESIUM SERPL-MCNC: 1.9 MG/DL — SIGNIFICANT CHANGE UP (ref 1.8–2.6)
MCHC RBC-ENTMCNC: 26.5 PG — LOW (ref 27–31)
MCHC RBC-ENTMCNC: 31.2 G/DL — LOW (ref 32–36)
MCV RBC AUTO: 84.9 FL — SIGNIFICANT CHANGE UP (ref 81–99)
MICROCYTES BLD QL: SLIGHT — SIGNIFICANT CHANGE UP
MONOCYTES # BLD AUTO: 1.3 K/UL — HIGH (ref 0–0.8)
MONOCYTES NFR BLD AUTO: 14 % — HIGH (ref 3–10)
NEUTROPHILS # BLD AUTO: 5.6 K/UL — SIGNIFICANT CHANGE UP (ref 1.8–8)
NEUTROPHILS NFR BLD AUTO: 61 % — SIGNIFICANT CHANGE UP (ref 37–73)
NEUTS BAND # BLD: 1 % — SIGNIFICANT CHANGE UP (ref 0–8)
OVALOCYTES BLD QL SMEAR: SLIGHT — SIGNIFICANT CHANGE UP
PHOSPHATE SERPL-MCNC: 3.8 MG/DL — SIGNIFICANT CHANGE UP (ref 2.4–4.7)
PLAT MORPH BLD: NORMAL — SIGNIFICANT CHANGE UP
PLATELET # BLD AUTO: 459 K/UL — HIGH (ref 150–400)
POIKILOCYTOSIS BLD QL AUTO: SLIGHT — SIGNIFICANT CHANGE UP
POTASSIUM SERPL-MCNC: 3.7 MMOL/L — SIGNIFICANT CHANGE UP (ref 3.5–5.3)
POTASSIUM SERPL-SCNC: 3.7 MMOL/L — SIGNIFICANT CHANGE UP (ref 3.5–5.3)
RBC # BLD: 3.7 M/UL — LOW (ref 4.4–5.2)
RBC # FLD: 17.6 % — HIGH (ref 11–15.6)
RBC BLD AUTO: ABNORMAL
SODIUM SERPL-SCNC: 140 MMOL/L — SIGNIFICANT CHANGE UP (ref 135–145)
SPECIMEN SOURCE: SIGNIFICANT CHANGE UP
VARIANT LYMPHS # BLD: 2 % — SIGNIFICANT CHANGE UP (ref 0–6)
WBC # BLD: 9 K/UL — SIGNIFICANT CHANGE UP (ref 4.8–10.8)
WBC # FLD AUTO: 9 K/UL — SIGNIFICANT CHANGE UP (ref 4.8–10.8)

## 2019-05-20 PROCEDURE — 99232 SBSQ HOSP IP/OBS MODERATE 35: CPT | Mod: 24

## 2019-05-20 RX ORDER — POTASSIUM CHLORIDE 20 MEQ
20 PACKET (EA) ORAL ONCE
Refills: 0 | Status: COMPLETED | OUTPATIENT
Start: 2019-05-20 | End: 2019-05-20

## 2019-05-20 RX ORDER — HYDROMORPHONE HYDROCHLORIDE 2 MG/ML
0.5 INJECTION INTRAMUSCULAR; INTRAVENOUS; SUBCUTANEOUS ONCE
Refills: 0 | Status: DISCONTINUED | OUTPATIENT
Start: 2019-05-20 | End: 2019-05-20

## 2019-05-20 RX ADMIN — Medication 1 PACKET(S): at 17:31

## 2019-05-20 RX ADMIN — HYDROMORPHONE HYDROCHLORIDE 0.5 MILLIGRAM(S): 2 INJECTION INTRAMUSCULAR; INTRAVENOUS; SUBCUTANEOUS at 14:26

## 2019-05-20 RX ADMIN — TAMSULOSIN HYDROCHLORIDE 0.4 MILLIGRAM(S): 0.4 CAPSULE ORAL at 21:35

## 2019-05-20 RX ADMIN — Medication 20 MILLIEQUIVALENT(S): at 13:45

## 2019-05-20 RX ADMIN — HYDROMORPHONE HYDROCHLORIDE 0.5 MILLIGRAM(S): 2 INJECTION INTRAMUSCULAR; INTRAVENOUS; SUBCUTANEOUS at 01:15

## 2019-05-20 RX ADMIN — PIPERACILLIN AND TAZOBACTAM 25 GRAM(S): 4; .5 INJECTION, POWDER, LYOPHILIZED, FOR SOLUTION INTRAVENOUS at 21:35

## 2019-05-20 RX ADMIN — PIPERACILLIN AND TAZOBACTAM 25 GRAM(S): 4; .5 INJECTION, POWDER, LYOPHILIZED, FOR SOLUTION INTRAVENOUS at 06:44

## 2019-05-20 RX ADMIN — CHLORHEXIDINE GLUCONATE 1 APPLICATION(S): 213 SOLUTION TOPICAL at 11:55

## 2019-05-20 RX ADMIN — Medication 1 PACKET(S): at 06:44

## 2019-05-20 RX ADMIN — ENOXAPARIN SODIUM 40 MILLIGRAM(S): 100 INJECTION SUBCUTANEOUS at 11:55

## 2019-05-20 RX ADMIN — HYDROMORPHONE HYDROCHLORIDE 0.5 MILLIGRAM(S): 2 INJECTION INTRAMUSCULAR; INTRAVENOUS; SUBCUTANEOUS at 00:57

## 2019-05-20 RX ADMIN — PIPERACILLIN AND TAZOBACTAM 25 GRAM(S): 4; .5 INJECTION, POWDER, LYOPHILIZED, FOR SOLUTION INTRAVENOUS at 13:45

## 2019-05-20 NOTE — CHART NOTE - NSCHARTNOTEFT_GEN_A_CORE
Source: Patient [x ]  Family [ ]   other [ ]    Current Diet: Clears this am now upgraded to fulls starting with lunch.  Pt reports taking 1 bottle ensure clears daily with poor po intake        PO intake:  < 50% [x ]   50-75%  [ ]   %  [ ]  other :    Source for PO intake [ x] Patient [ ] family [ ] chart [ ] staff [ ] other    Enteral /Parenteral Nutrition:     Current Weight: 5/15 147#, 5/16 157.4#    % Weight Change     Pertinent Medications: MEDICATIONS  (STANDING):  chlorhexidine 2% Cloths 1 Application(s) Topical daily  enoxaparin Injectable 40 milliGRAM(s) SubCutaneous daily  multiple electrolytes Injection Type 1 1000 milliLiter(s) (100 mL/Hr) IV Continuous <Continuous>  piperacillin/tazobactam IVPB. 3.375 Gram(s) IV Intermittent every 8 hours  potassium phosphate / sodium phosphate powder 1 Packet(s) Oral two times a day  tamsulosin 0.4 milliGRAM(s) Oral at bedtime    MEDICATIONS  (PRN):  HYDROmorphone  Injectable 0.5 milliGRAM(s) IV Push every 3 hours PRN Moderate Pain (4 - 6)    Pertinent Labs: CBC Full  -  ( 20 May 2019 07:43 )  WBC Count : 9.0 K/uL  RBC Count : 3.70 M/uL  Hemoglobin : 9.8 g/dL  Hematocrit : 31.4 %  Platelet Count - Automated : 459 K/uL  Mean Cell Volume : 84.9 fl  Mean Cell Hemoglobin : 26.5 pg  Mean Cell Hemoglobin Concentration : 31.2 g/dL  Auto Neutrophil # : 5.6 K/uL  Auto Lymphocyte # : 1.8 K/uL  Auto Monocyte # : 1.3 K/uL  Auto Eosinophil # : 0.2 K/uL  Auto Basophil # : 0.0 K/uL  Auto Neutrophil % : 61.0 %  Auto Lymphocyte % : 20.0 %  Auto Monocyte % : 14.0 %  Auto Eosinophil % : 2.0 %  Auto Basophil % : x          Skin:     Nutrition focused physical exam previously conducted - found signs of malnutrition [ ]absent [x ]present    Subcutaneous fat loss: [ ] Orbital fat pads region, [ ]Buccal fat region, [ ]Triceps region,  [ ]Ribs region    Muscle wasting: [x ]Temples region, [x ]Clavicle region, [x ]Shoulder region, [ ]Scapula region, [ ]Interosseous region,  [ ]thigh region, [ ]Calf region    Estimated Needs:   [x ] no change since previous assessment  [ ] recalculated:     Current Nutrition Diagnosis:  Malnutrition; Moderate acute related to inadequate protein-energy intake with altered GI function in setting of large adnexal mass (s/p BASILIA/BSO excision, now readmitted with pneumoperitoneum) as evidenced by 25 lb (14.5%) wt loss x 1 month, physical findings, persistent lack of appetite.      Recommendations: Rec Ensure Enlive TID, advance diet as tolerated.     Monitoring and Evaluation:   [ x] PO intake [x ] Tolerance to diet prescription [X] Weights  [X] Follow up per protocol [X] Labs:

## 2019-05-20 NOTE — PROGRESS NOTE ADULT - ASSESSMENT
Patient is a 47yo now POD#5 s/p exploratory laparotomy, abdominal washout of 3L feculent matter, open sigmoidectomy and end colostomy for bowel perforation   previously s/p modified radical hysterectomy BSO, omentectomy, debulking, peritectomy of abdominal cavity for severe endometriosis on 5/7    Cardiac: BP in normal range.     Pulmonary: no active disease, incentive spirometer at bedside. Encouraged to continue using. Up to 750cc.     Neuro: pain well controlled with current regimen.     Endo: no active disease      GI: Clear liquid diet.     : intermittent straight cath with adequate output.    Heme: SCDs when not ambulating, Lovenox for VTE prophylaxis. Hgb noted to be 9.8 this morning.     Skin: wound vac in place with good seal, no signs of infection.     FEN: Plasmalyte at 100cc/hr. Electrolytes wnl. Will continue AM labs.     Lines/tubes: Left subclavian inserted with port reserved for TPN in case necessary. JULIAN drains bilaterally-continue to monitor.     EXT: Bilateral LE swelling, Primary team amde aware.     Management as per colorectal team, will continue to follow. Will dicsuss with Dr. Gonzalez.

## 2019-05-20 NOTE — PROGRESS NOTE ADULT - SUBJECTIVE AND OBJECTIVE BOX
Patient seen at bedside. No acute events over night. Ostomy is function with liquid stool and gas in bag. Pt is tolerating a CLD diet without nausea/vomiting. Kevon drains with serous output. L drain 30cc, R drain 120cc. No other concerns at this time    MEDICATIONS  (STANDING):  chlorhexidine 2% Cloths 1 Application(s) Topical daily  enoxaparin Injectable 40 milliGRAM(s) SubCutaneous daily  multiple electrolytes Injection Type 1 1000 milliLiter(s) (100 mL/Hr) IV Continuous <Continuous>  piperacillin/tazobactam IVPB. 3.375 Gram(s) IV Intermittent every 8 hours  potassium phosphate / sodium phosphate powder 1 Packet(s) Oral two times a day  tamsulosin 0.4 milliGRAM(s) Oral at bedtime    MEDICATIONS  (PRN):  HYDROmorphone  Injectable 0.5 milliGRAM(s) IV Push every 3 hours PRN Moderate Pain (4 - 6)      Vital Signs Last 24 Hrs  T(C): 36.4 (20 May 2019 08:28), Max: 36.4 (20 May 2019 08:28)  T(F): 97.6 (20 May 2019 08:28), Max: 97.6 (20 May 2019 08:28)  HR: 64 (20 May 2019 08:28) (64 - 69)  BP: 136/84 (20 May 2019 08:28) (134/83 - 153/87)  BP(mean): --  RR: 18 (20 May 2019 08:28) (18 - 18)  SpO2: 97% (19 May 2019 16:20) (97% - 98%)    PE  Constitutional: AAOx3  HEENT: EOMI / PERRLA, MMM  Respiratory: non labored breathing.   Cardiovascular: RRR  Gastrointestinal: Abdomen soft, non-tender, non-distended, no rebound tenderness / guarding. Midline wound vac with adequate seal, c/d/i. LLQ colostomy bag with stool and no leakage, c/d/i. R and L kevon drains with serosanguinous drain. Abdominal binder in place.   : dee inserted, clear yellow output      I&O's Detail    19 May 2019 07:01  -  20 May 2019 07:00  --------------------------------------------------------  IN:    multiple electrolytes Injection Type 1: 1200 mL    Oral Fluid: 200 mL    Solution: 200 mL  Total IN: 1600 mL    OUT:    Bulb: 120 mL    Bulb: 30 mL    Intermittent Catheterization - Urethral: 5200 mL  Total OUT: 5350 mL    Total NET: -3750 mL          LABS:                        9.8    9.0   )-----------( 459      ( 20 May 2019 07:43 )             31.4     05-20    140  |  104  |  6.0<L>  ----------------------------<  101  3.7   |  24.0  |  0.45<L>    Ca    8.1<L>      20 May 2019 07:43  Phos  3.8     05-20  Mg     1.9     05-20            RADIOLOGY & ADDITIONAL STUDIES:

## 2019-05-20 NOTE — PROGRESS NOTE ADULT - ASSESSMENT
49yo F POD 5 from a sigmoidectomy with end colostomy.  -OOB and ambulate  -plan w attending on date to D/C Grey catheter  - monitor drain output  -Monitor colostomy output  -Possibly ADAT

## 2019-05-20 NOTE — PROGRESS NOTE ADULT - SUBJECTIVE AND OBJECTIVE BOX
GYNECOLOGIC ONCOLOGY PROGRESS NOTE    POD#5    PROBLEMS:  Endometriosis, severe  Pneumoperitoneum  Sepsis  Pelvic mass    Pt seen and examined at bedside.     SUBJECTIVE:    Patient is without complaints.  Pain well-controlled.  Flatus: gas in colostomy.  Denies Nausea, Vomiting or Diarrhea.  Denies shortness of breath, chest pain or dyspnea on exertion.  Tolerating CLD.    OBJECTIVE:     VITALS:  T(F): 97.6 (05-20-19 @ 08:28), Max: 97.6 (05-20-19 @ 08:28)  HR: 64 (05-20-19 @ 08:28) (64 - 69)  BP: 136/84 (05-20-19 @ 08:28) (134/83 - 153/87)  RR: 18 (05-20-19 @ 08:28) (18 - 18)  SpO2: 97% (05-19-19 @ 16:20) (97% - 98%)    O's Summary ove rpast 12 hours    R. JULIAN-100cc's output , L. JULIAN-0  Straight cath: 3300cc's.    MEDICATIONS  (STANDING):  chlorhexidine 2% Cloths 1 Application(s) Topical daily  enoxaparin Injectable 40 milliGRAM(s) SubCutaneous daily  multiple electrolytes Injection Type 1 1000 milliLiter(s) (100 mL/Hr) IV Continuous <Continuous>  piperacillin/tazobactam IVPB. 3.375 Gram(s) IV Intermittent every 8 hours  potassium phosphate / sodium phosphate powder 1 Packet(s) Oral two times a day  tamsulosin 0.4 milliGRAM(s) Oral at bedtime    MEDICATIONS  (PRN):  HYDROmorphone  Injectable 0.5 milliGRAM(s) IV Push every 3 hours PRN Moderate Pain (4 - 6)      Physical Exam:  Constitutional: NAD  Pulmonary: clear to auscultation bilaterally   Cardiovascular: Regular rate and rhythm   Abdomen: soft, non-tender, non-distended, normal bowel sounds. Wound vac intact with good seal. Serosanguinous fluid noted in bilateral JULIAN's. Stool and gas in ostomy.   Extremities: bilateral lower extremity edema, no calve tenderness.  Incision: Clean, dry, intact.  Without signs of infection or hernia.      LABS:                        9.8    9.0   )-----------( 459      ( 20 May 2019 07:43 )             31.4     05-20    140  |  104  |  6.0<L>  ----------------------------<  101  3.7   |  24.0  |  0.45<L>    Ca    8.1<L>      20 May 2019 07:43  Phos  3.8     05-20  Mg     1.9     05-20

## 2019-05-21 ENCOUNTER — APPOINTMENT (OUTPATIENT)
Dept: GYNECOLOGIC ONCOLOGY | Facility: CLINIC | Age: 49
End: 2019-05-21

## 2019-05-21 ENCOUNTER — TRANSCRIPTION ENCOUNTER (OUTPATIENT)
Age: 49
End: 2019-05-21

## 2019-05-21 LAB
ANION GAP SERPL CALC-SCNC: 9 MMOL/L — SIGNIFICANT CHANGE UP (ref 5–17)
BASOPHILS # BLD AUTO: 0 K/UL — SIGNIFICANT CHANGE UP (ref 0–0.2)
BASOPHILS NFR BLD AUTO: 0.1 % — SIGNIFICANT CHANGE UP (ref 0–2)
BUN SERPL-MCNC: 7 MG/DL — LOW (ref 8–20)
CALCIUM SERPL-MCNC: 8.2 MG/DL — LOW (ref 8.6–10.2)
CHLORIDE SERPL-SCNC: 108 MMOL/L — HIGH (ref 98–107)
CO2 SERPL-SCNC: 24 MMOL/L — SIGNIFICANT CHANGE UP (ref 22–29)
CREAT SERPL-MCNC: 0.51 MG/DL — SIGNIFICANT CHANGE UP (ref 0.5–1.3)
CULTURE RESULTS: SIGNIFICANT CHANGE UP
EOSINOPHIL # BLD AUTO: 0.3 K/UL — SIGNIFICANT CHANGE UP (ref 0–0.5)
EOSINOPHIL NFR BLD AUTO: 3 % — SIGNIFICANT CHANGE UP (ref 0–6)
GLUCOSE SERPL-MCNC: 108 MG/DL — SIGNIFICANT CHANGE UP (ref 70–115)
HCT VFR BLD CALC: 31.2 % — LOW (ref 37–47)
HGB BLD-MCNC: 10 G/DL — LOW (ref 12–16)
LYMPHOCYTES # BLD AUTO: 2 K/UL — SIGNIFICANT CHANGE UP (ref 1–4.8)
LYMPHOCYTES # BLD AUTO: 21.3 % — SIGNIFICANT CHANGE UP (ref 20–55)
MAGNESIUM SERPL-MCNC: 1.8 MG/DL — SIGNIFICANT CHANGE UP (ref 1.6–2.6)
MCHC RBC-ENTMCNC: 27.2 PG — SIGNIFICANT CHANGE UP (ref 27–31)
MCHC RBC-ENTMCNC: 32.1 G/DL — SIGNIFICANT CHANGE UP (ref 32–36)
MCV RBC AUTO: 85 FL — SIGNIFICANT CHANGE UP (ref 81–99)
MONOCYTES # BLD AUTO: 1.4 K/UL — HIGH (ref 0–0.8)
MONOCYTES NFR BLD AUTO: 14.8 % — HIGH (ref 3–10)
NEUTROPHILS # BLD AUTO: 5 K/UL — SIGNIFICANT CHANGE UP (ref 1.8–8)
NEUTROPHILS NFR BLD AUTO: 54.8 % — SIGNIFICANT CHANGE UP (ref 37–73)
ORGANISM # SPEC MICROSCOPIC CNT: SIGNIFICANT CHANGE UP
PHOSPHATE SERPL-MCNC: 3.5 MG/DL — SIGNIFICANT CHANGE UP (ref 2.4–4.7)
PLATELET # BLD AUTO: 466 K/UL — HIGH (ref 150–400)
POTASSIUM SERPL-MCNC: 4 MMOL/L — SIGNIFICANT CHANGE UP (ref 3.5–5.3)
POTASSIUM SERPL-SCNC: 4 MMOL/L — SIGNIFICANT CHANGE UP (ref 3.5–5.3)
RBC # BLD: 3.67 M/UL — LOW (ref 4.4–5.2)
RBC # FLD: 17.9 % — HIGH (ref 11–15.6)
SODIUM SERPL-SCNC: 141 MMOL/L — SIGNIFICANT CHANGE UP (ref 135–145)
SPECIMEN SOURCE: SIGNIFICANT CHANGE UP
WBC # BLD: 9.2 K/UL — SIGNIFICANT CHANGE UP (ref 4.8–10.8)
WBC # FLD AUTO: 9.2 K/UL — SIGNIFICANT CHANGE UP (ref 4.8–10.8)

## 2019-05-21 PROCEDURE — 99232 SBSQ HOSP IP/OBS MODERATE 35: CPT | Mod: 24,GC

## 2019-05-21 RX ORDER — OXYCODONE HYDROCHLORIDE 5 MG/1
10 TABLET ORAL EVERY 6 HOURS
Refills: 0 | Status: DISCONTINUED | OUTPATIENT
Start: 2019-05-21 | End: 2019-05-22

## 2019-05-21 RX ORDER — DOCUSATE SODIUM 100 MG
1 CAPSULE ORAL
Qty: 30 | Refills: 0
Start: 2019-05-21 | End: 2019-06-19

## 2019-05-21 RX ORDER — OXYCODONE HYDROCHLORIDE 5 MG/1
5 TABLET ORAL EVERY 6 HOURS
Refills: 0 | Status: DISCONTINUED | OUTPATIENT
Start: 2019-05-21 | End: 2019-05-22

## 2019-05-21 RX ORDER — ACETAMINOPHEN 500 MG
975 TABLET ORAL EVERY 8 HOURS
Refills: 0 | Status: DISCONTINUED | OUTPATIENT
Start: 2019-05-21 | End: 2019-05-22

## 2019-05-21 RX ORDER — SENNA PLUS 8.6 MG/1
2 TABLET ORAL
Qty: 120 | Refills: 0
Start: 2019-05-21 | End: 2019-06-19

## 2019-05-21 RX ADMIN — Medication 1 PACKET(S): at 17:12

## 2019-05-21 RX ADMIN — PIPERACILLIN AND TAZOBACTAM 25 GRAM(S): 4; .5 INJECTION, POWDER, LYOPHILIZED, FOR SOLUTION INTRAVENOUS at 13:15

## 2019-05-21 RX ADMIN — PIPERACILLIN AND TAZOBACTAM 25 GRAM(S): 4; .5 INJECTION, POWDER, LYOPHILIZED, FOR SOLUTION INTRAVENOUS at 20:48

## 2019-05-21 RX ADMIN — Medication 975 MILLIGRAM(S): at 13:12

## 2019-05-21 RX ADMIN — Medication 975 MILLIGRAM(S): at 20:48

## 2019-05-21 RX ADMIN — CHLORHEXIDINE GLUCONATE 1 APPLICATION(S): 213 SOLUTION TOPICAL at 13:14

## 2019-05-21 RX ADMIN — Medication 1 PACKET(S): at 05:14

## 2019-05-21 RX ADMIN — TAMSULOSIN HYDROCHLORIDE 0.4 MILLIGRAM(S): 0.4 CAPSULE ORAL at 20:48

## 2019-05-21 RX ADMIN — ENOXAPARIN SODIUM 40 MILLIGRAM(S): 100 INJECTION SUBCUTANEOUS at 13:13

## 2019-05-21 RX ADMIN — Medication 975 MILLIGRAM(S): at 21:20

## 2019-05-21 RX ADMIN — PIPERACILLIN AND TAZOBACTAM 25 GRAM(S): 4; .5 INJECTION, POWDER, LYOPHILIZED, FOR SOLUTION INTRAVENOUS at 06:11

## 2019-05-21 RX ADMIN — HYDROMORPHONE HYDROCHLORIDE 0.5 MILLIGRAM(S): 2 INJECTION INTRAMUSCULAR; INTRAVENOUS; SUBCUTANEOUS at 02:35

## 2019-05-21 RX ADMIN — HYDROMORPHONE HYDROCHLORIDE 0.5 MILLIGRAM(S): 2 INJECTION INTRAMUSCULAR; INTRAVENOUS; SUBCUTANEOUS at 02:17

## 2019-05-21 RX ADMIN — Medication 975 MILLIGRAM(S): at 14:04

## 2019-05-21 NOTE — DISCHARGE NOTE PROVIDER - CARE PROVIDER_API CALL
Mague Durand)  Sur[facility]  [facility] [place] [address]  Phone: [phone]  Fax: [phone]  Follow Up Time: 1 week Mague Durand)  Surgery  [address] [place] [address]  Phone: [phone]  Fax: [phone]  Follow Up Time: 1 week    Manpreet Gonzalez)  [facility]  [address] [place] [address]  Phone: [phone]  Fax: [phone]  Follow Up Time:

## 2019-05-21 NOTE — DISCHARGE NOTE PROVIDER - NSDCCPTREATMENT_GEN_ALL_CORE_FT
PRINCIPAL PROCEDURE  Procedure: Exploratory laparotomy  Findings and Treatment:       SECONDARY PROCEDURE  Procedure: Colostomy  Findings and Treatment:     Procedure: Open sigmoidectomy  Findings and Treatment:

## 2019-05-21 NOTE — PROGRESS NOTE ADULT - ATTENDING COMMENTS
Seen and examined.    NAD  Awake and alert  RRR  Nonlabored resp    05-16    136  |  108<H>  |  20.0  ----------------------------<  112  4.1   |  18.0<L>  |  0.86    Ca    6.7<L>      16 May 2019 00:49  Phos  3.6     05-16  Mg     1.6     05-16    TPro  5.1<L>  /  Alb  2.0<L>  /  TBili  0.7  /  DBili  x   /  AST  14  /  ALT  16  /  AlkPhos  73  05-15                        11.5   22.8  )-----------( 584      ( 16 May 2019 00:49 )             33.8       Septic shock from perforated viscous.  JAMIE - improved, hyperchloremic metabolic acidosis.     Abx, during pending bcx results.  Hypotension - Pressor requirement decreasing.  Cont fluid resuscitation as pt responds positively.  Continue to wean pressors as tolerated.  Switch fluids to sodium acetate to decrease chloride levels.  OOB, pulm hygiene.      35 minutes of critical care time spent providing medical care for patient's acute illness/conditions that impairs at least one vital organ system and/or poses a high risk of imminent or life threatening deterioration in the patient's condition. It includes time spent evaluating and treating the patient's acute illness as well as time spent reviewing labs, radiology, discussing goals of care with patient and/or patient's family, and discussing the case with a multidisciplinary team in an effort to prevent further life threatening deterioration or end organ damage. This time is independent of any procedures performed.
Patient seen and examined. Overall doing very well. Plan for discharge home tomorrow with home care and will have wound vac and drains removed prior to discharge. No further antibiotics needed on discharge.
Seen and examined on a.m. rounds.  Details per resident note.  Off pressors, hemodynamically well.  No complaints.  Small amount of stool out of stoma.  NAD.  Abdomen: S/NT/ND, stoma pink, edematous, viable; midline VAC in place.  GNR bacteremia, awaiting speciation; on Zosyn.  May d/c A-line, TLC and dee.  Strict NPO per operating surgeon.  May downgrade to floor.
Patient seen and examined. Doing well. Off pressors. Abdomen soft and non distended. Scant stool in colostomy. JULIAN drains SS. NG removed this morning and dee to be removed. Okay to transfer to floor.
Pt recovering well after ex-lap, end colostomy. Pathology appears to be benign, but outside consultation for some atypical cells still pending. Continue supportive care.

## 2019-05-21 NOTE — DISCHARGE NOTE PROVIDER - HOSPITAL COURSE
47yo F that presented to the hospital s/p hysterectomy with perforated bowel with pneumoperitoneum and feculent peritonitis. On 5/15 she had an exploratory laparotomy with washout, sigmoidectomy and end colostomy. Patient tolerated well the procedure and was transferred to the SICU for close monitoring. On 5/16 patient stated the her abdominal pain improved and had no complaints. On 5/17 patient was off levophed, was able to get out of bed, and her NGT was removed. On 5/18 her dee was removed but she failed her trial of void and a dee was re-inserted. On 5/20 patient was tolerating a CLD and her wound vac was changed. On 5/21 patient was tolerating a regular diet, was OOB, and her pain improved. It was discussed with case management that the next day her wound vac could be removed and wet to dry dressings were going to start to ser up VNS. Ostomy teaching was done. On 5/22 wound vac was removed and wet to dry dressings were applied. Patient was optimized to be discharged.         Wound Care:    Use half a roll of kerlex and wet it with normal saline (not dripping) and apply over midline wound. Put ABDs over the wet dressing with tape. This should be done in a daily basis. Colostomy has to be changed if it leaks.

## 2019-05-21 NOTE — DISCHARGE NOTE PROVIDER - PROVIDER TOKENS
PROVIDER:[TOKEN:[[id number]],FOLLOWUP:[1 week]] PROVIDER:[TOKEN:[[id number]],FOLLOWUP:[1 week]],PROVIDER:[TOKEN:[[id number]]]

## 2019-05-21 NOTE — PROGRESS NOTE ADULT - ASSESSMENT
49yo F POD 6 from a sigmoidectomy with end colostomy - doing well, tolerating diet, pain well controlled. Dispo planning   - OOB and ambulate  - Monitor colostomy output  - dc kevon drains 5/22AM  - dc antibiotics 5/22  - dc wound vac 5/22, WTD  - dc home with VNS 5/22

## 2019-05-21 NOTE — PROGRESS NOTE ADULT - SUBJECTIVE AND OBJECTIVE BOX
GYNECOLOGIC ONCOLOGY PROGRESS NOTE    POD#6    PROBLEMS:  Endometriosis, severe  Pneumoperitoneum  Sepsis  Pelvic mass    Pt seen and examined at bedside.     SUBJECTIVE:    Patient is without complaints. Per the pt, she is advanced to regular diet for lunch   Pain well-controlled.  Flatus: gas in colostomy.  Denies Nausea, Vomiting or Diarrhea.  Denies shortness of breath, chest pain or dyspnea on exertion.  Tolerating FLD     OBJECTIVE:     VITALS:  Vital Signs Last 24 Hrs  T(C): 36.4 (21 May 2019 08:30), Max: 36.9 (20 May 2019 16:23)  T(F): 97.5 (21 May 2019 08:30), Max: 98.5 (20 May 2019 16:23)  HR: 76 (21 May 2019 08:30) (76 - 82)  BP: 122/68 (21 May 2019 08:30) (115/70 - 122/68)  RR: 18 (21 May 2019 08:30) (18 - 18)  SpO2: 96% (21 May 2019 08:30) (96% - 96%)      20 May 2019 07:01  -  21 May 2019 07:00  --------------------------------------------------------  IN:    Oral Fluid: 200 mL    Solution: 200 mL  Total IN: 400 mL    OUT:    Bulb: 50 mL    Bulb: 60 mL    Intermittent Catheterization - Urethral: 2800 mL    Voided: 1750 mL  Total OUT: 4660 mL    Total NET: -4260 mL    Physical Exam:  Constitutional: NAD  Pulmonary: clear to auscultation bilaterally. Incentive spirometry to 1500cc   Cardiovascular: Regular rate and rhythm   Abdomen: soft, non-tender, non-distended, normal bowel sounds. Stool and gas in ostomy.   Extremities: bilateral lower extremity edema, no calve tenderness.  Incision: Wound vac intact with good seal.  Serosanguinous fluid noted in bilateral JULIAN's.       LABS:                                   10.0   9.2   )-----------( 466      ( 21 May 2019 07:22 )             31.2   05-21    141  |  108<H>  |  7.0<L>  ----------------------------<  108  4.0   |  24.0  |  0.51    Ca    8.2<L>      21 May 2019 07:22  Phos  3.5     05-21  Mg     1.8     05-21 GYNECOLOGIC ONCOLOGY PROGRESS NOTE    POD#6    PROBLEMS:  Endometriosis, severe  Pneumoperitoneum  Sepsis  Pelvic mass    Pt seen and examined at bedside.     SUBJECTIVE:    Patient is without complaints. Per the pt, she is advanced to regular diet for lunch   Pain well-controlled.  Flatus: gas in colostomy.  Denies Nausea, Vomiting or Diarrhea.  Denies shortness of breath, chest pain or dyspnea on exertion.  Tolerating FLD     OBJECTIVE:     VITALS:  Vital Signs Last 24 Hrs  T(C): 36.4 (21 May 2019 08:30), Max: 36.9 (20 May 2019 16:23)  T(F): 97.5 (21 May 2019 08:30), Max: 98.5 (20 May 2019 16:23)  HR: 76 (21 May 2019 08:30) (76 - 82)  BP: 122/68 (21 May 2019 08:30) (115/70 - 122/68)  RR: 18 (21 May 2019 08:30) (18 - 18)  SpO2: 96% (21 May 2019 08:30) (96% - 96%)      20 May 2019 07:01  -  21 May 2019 07:00  --------------------------------------------------------  IN:    Oral Fluid: 200 mL    Solution: 200 mL  Total IN: 400 mL    OUT:    Bulb: 50 mL    Bulb: 60 mL    Intermittent Catheterization - Urethral: 2800 mL    Voided: 1750 mL  Total OUT: 4660 mL    Total NET: -4260 mL    Physical Exam:  Constitutional: NAD  Pulmonary: clear to auscultation bilaterally. Incentive spirometry to 1500cc   Cardiovascular: Regular rate and rhythm   Abdomen: soft, non-tender, non-distended, normal bowel sounds. Stool and gas in ostomy.   Extremities: bilateral lower extremity edema, no calve tenderness.  Incision: Wound vac intact with good seal.  Minimal serosanguinous fluid noted in bilateral JULIAN's.       LABS:                                   10.0   9.2   )-----------( 466      ( 21 May 2019 07:22 )             31.2   05-21    141  |  108<H>  |  7.0<L>  ----------------------------<  108  4.0   |  24.0  |  0.51    Ca    8.2<L>      21 May 2019 07:22  Phos  3.5     05-21  Mg     1.8     05-21 GYNECOLOGIC ONCOLOGY PROGRESS NOTE    POD#6    PROBLEMS:  Endometriosis, severe  Pneumoperitoneum  Sepsis  Pelvic mass    Pt seen and examined at bedside with Dr. Gonzalez     SUBJECTIVE:    Patient is without complaints. Per the pt, she is advanced to regular diet for lunch   Pain well-controlled.  Flatus: gas in colostomy.  Denies Nausea, Vomiting or Diarrhea.  Denies shortness of breath, chest pain or dyspnea on exertion.  Tolerating FLD     OBJECTIVE:     VITALS:  Vital Signs Last 24 Hrs  T(C): 36.4 (21 May 2019 08:30), Max: 36.9 (20 May 2019 16:23)  T(F): 97.5 (21 May 2019 08:30), Max: 98.5 (20 May 2019 16:23)  HR: 76 (21 May 2019 08:30) (76 - 82)  BP: 122/68 (21 May 2019 08:30) (115/70 - 122/68)  RR: 18 (21 May 2019 08:30) (18 - 18)  SpO2: 96% (21 May 2019 08:30) (96% - 96%)      20 May 2019 07:01  -  21 May 2019 07:00  --------------------------------------------------------  IN:    Oral Fluid: 200 mL    Solution: 200 mL  Total IN: 400 mL    OUT:    Bulb: 50 mL    Bulb: 60 mL    Intermittent Catheterization - Urethral: 2800 mL    Voided: 1750 mL  Total OUT: 4660 mL    Total NET: -4260 mL    Physical Exam:  Constitutional: NAD  Pulmonary: clear to auscultation bilaterally. Incentive spirometry to 1500cc   Cardiovascular: Regular rate and rhythm   Abdomen: soft, non-tender, non-distended, normal bowel sounds. Stool and gas in ostomy.   Extremities: bilateral lower extremity edema, no calve tenderness.  Incision: Wound vac intact with good seal.  Minimal serosanguinous fluid noted in bilateral JULIAN's.       LABS:                                   10.0   9.2   )-----------( 466      ( 21 May 2019 07:22 )             31.2   05-21    141  |  108<H>  |  7.0<L>  ----------------------------<  108  4.0   |  24.0  |  0.51    Ca    8.2<L>      21 May 2019 07:22  Phos  3.5     05-21  Mg     1.8     05-21

## 2019-05-21 NOTE — DISCHARGE NOTE PROVIDER - NSDCCPCAREPLAN_GEN_ALL_CORE_FT
PRINCIPAL DISCHARGE DIAGNOSIS  Diagnosis: Sepsis  Assessment and Plan of Treatment: Sepsis      SECONDARY DISCHARGE DIAGNOSES  Diagnosis: Pelvic mass  Assessment and Plan of Treatment: Pelvic mass    Diagnosis: Anemia  Assessment and Plan of Treatment:

## 2019-05-21 NOTE — PROGRESS NOTE ADULT - SUBJECTIVE AND OBJECTIVE BOX
Patient seen at bedside. No acute events over night. Wound vac changed yesterday, good seal this morning. Patient reports feeling well. Tolerating a regular diet without nausea/vomiting. Kevon drains with minimal SS output. passed TOV yesterday. Dispo planning    MEDICATIONS  (STANDING):  chlorhexidine 2% Cloths 1 Application(s) Topical daily  enoxaparin Injectable 40 milliGRAM(s) SubCutaneous daily  piperacillin/tazobactam IVPB. 3.375 Gram(s) IV Intermittent every 8 hours  potassium phosphate / sodium phosphate powder 1 Packet(s) Oral two times a day  tamsulosin 0.4 milliGRAM(s) Oral at bedtime    MEDICATIONS  (PRN):  HYDROmorphone  Injectable 0.5 milliGRAM(s) IV Push every 3 hours PRN Moderate Pain (4 - 6)      Vital Signs Last 24 Hrs  T(C): 36.4 (21 May 2019 08:30), Max: 36.9 (20 May 2019 16:23)  T(F): 97.5 (21 May 2019 08:30), Max: 98.5 (20 May 2019 16:23)  HR: 76 (21 May 2019 08:30) (76 - 82)  BP: 122/68 (21 May 2019 08:30) (115/70 - 122/68)  BP(mean): --  RR: 18 (21 May 2019 08:30) (18 - 18)  SpO2: 96% (21 May 2019 08:30) (96% - 96%)    PE  Constitutional: AAOx3  HEENT: EOMI / PERRLA, MMM  Respiratory: non labored breathing.   Cardiovascular: RRR  Gastrointestinal: Abdomen soft, non-tender, non-distended, no rebound tenderness / guarding. Midline wound vac with adequate seal, c/d/i. LLQ colostomy bag with stool and no leakage, c/d/i. R and L kevon drains with minimal serosanguinous drain. Abdominal binder in place.   : voiding spontaneously      I&O's Detail    20 May 2019 07:01  -  21 May 2019 07:00  --------------------------------------------------------  IN:    Oral Fluid: 200 mL    Solution: 200 mL  Total IN: 400 mL    OUT:    Bulb: 50 mL    Bulb: 60 mL    Intermittent Catheterization - Urethral: 2800 mL    Voided: 1750 mL  Total OUT: 4660 mL    Total NET: -4260 mL          LABS:                        10.0   9.2   )-----------( 466      ( 21 May 2019 07:22 )             31.2     05-21    141  |  108<H>  |  7.0<L>  ----------------------------<  108  4.0   |  24.0  |  0.51    Ca    8.2<L>      21 May 2019 07:22  Phos  3.5     05-21  Mg     1.8     05-21            RADIOLOGY & ADDITIONAL STUDIES:

## 2019-05-21 NOTE — PROGRESS NOTE ADULT - ASSESSMENT
Patient is a 47yo now POD#6 s/p exploratory laparotomy, abdominal washout of 3L feculent matter, open sigmoidectomy and end colostomy for bowel perforation   previously s/p modified radical hysterectomy BSO, omentectomy, debulking, peritectomy of abdominal cavity for severe endometriosis on 5/7    Cardiac: BP in normal range.     Pulmonary: no active disease, incentive spirometer at bedside up to 1500cc on AM rounds.     Neuro: pain well controlled with current regimen.     GI: Tolerated FLD. Per pt being advanced to regular diet for lunch.     : Passed trial of void 5/20, now voiding spontaneously     Heme: SCDs when not ambulating, Lovenox for VTE prophylaxis.    Skin: wound vac in place with good seal, no signs of infection.     FEN: Plasmalyte at 100cc/hr. Electrolytes wnl. Will continue AM labs.     EXT: Bilateral LE swelling, Primary team made aware.     Management as per colorectal team, will continue to follow. Will discuss with Dr. Gonzalez. Patient is a 47yo now POD#6 s/p exploratory laparotomy, abdominal washout of 3L feculent matter, open sigmoidectomy and end colostomy for bowel perforation   previously s/p modified radical hysterectomy BSO, omentectomy, debulking, peritectomy of abdominal cavity for severe endometriosis on 5/7    Cardiac: BP in normal range.     Pulmonary: no active disease, incentive spirometer at bedside up to 1500cc on AM rounds.     Neuro: pain well controlled with current regimen.     GI: Tolerated FLD. Per pt being advanced to regular diet for lunch.     ID: Plan to d/c abx 5/22. Devan drains to be pulled 5/22.     : Passed trial of void 5/20, now voiding spontaneously     Heme: SCDs when not ambulating, Lovenox for VTE prophylaxis.    Skin: wound vac in place with good seal, no signs of infection.     FEN: Labs wnl.     EXT: Bilateral LE swelling, Primary team made aware.     Management as per colorectal team, will continue to follow. Will discuss with Dr. Gonzalez.    Disposition: tentative plan for d/c home tomorrow if tolerating regular diet and condition continues to improve Patient is a 49yo now POD#6 s/p exploratory laparotomy, abdominal washout of 3L feculent matter, open sigmoidectomy and end colostomy for bowel perforation   previously s/p modified radical hysterectomy BSO, omentectomy, debulking, peritectomy of abdominal cavity for severe endometriosis on 5/7    Cardiac: BP in normal range.     Pulmonary: no active disease, incentive spirometer at bedside up to 1500cc on AM rounds.     Neuro: pain well controlled with current regimen.     GI: Tolerated FLD. Per pt being advanced to regular diet for lunch.     ID: Plan to d/c abx 5/22. Devan drains to be pulled 5/22.     : Passed trial of void 5/20, now voiding spontaneously     Heme: SCDs when not ambulating, Lovenox for VTE prophylaxis.    Skin: wound vac in place with good seal, no signs of infection.     FEN: Labs wnl.     EXT: Bilateral LE swelling, Primary team made aware.     Management as per colorectal team, will continue to follow.    Disposition: tentative plan for d/c home tomorrow if tolerating regular diet and condition continues to improve

## 2019-05-22 ENCOUNTER — TRANSCRIPTION ENCOUNTER (OUTPATIENT)
Age: 49
End: 2019-05-22

## 2019-05-22 VITALS
OXYGEN SATURATION: 98 % | SYSTOLIC BLOOD PRESSURE: 106 MMHG | TEMPERATURE: 98 F | HEART RATE: 98 BPM | RESPIRATION RATE: 19 BRPM | DIASTOLIC BLOOD PRESSURE: 64 MMHG

## 2019-05-22 LAB — SURGICAL PATHOLOGY STUDY: SIGNIFICANT CHANGE UP

## 2019-05-22 PROCEDURE — 74177 CT ABD & PELVIS W/CONTRAST: CPT

## 2019-05-22 PROCEDURE — 96375 TX/PRO/DX INJ NEW DRUG ADDON: CPT

## 2019-05-22 PROCEDURE — 82330 ASSAY OF CALCIUM: CPT

## 2019-05-22 PROCEDURE — 85027 COMPLETE CBC AUTOMATED: CPT

## 2019-05-22 PROCEDURE — 83605 ASSAY OF LACTIC ACID: CPT

## 2019-05-22 PROCEDURE — 84132 ASSAY OF SERUM POTASSIUM: CPT

## 2019-05-22 PROCEDURE — 87150 DNA/RNA AMPLIFIED PROBE: CPT

## 2019-05-22 PROCEDURE — 85730 THROMBOPLASTIN TIME PARTIAL: CPT

## 2019-05-22 PROCEDURE — 93005 ELECTROCARDIOGRAM TRACING: CPT

## 2019-05-22 PROCEDURE — 99291 CRITICAL CARE FIRST HOUR: CPT | Mod: 25

## 2019-05-22 PROCEDURE — 81001 URINALYSIS AUTO W/SCOPE: CPT

## 2019-05-22 PROCEDURE — 84100 ASSAY OF PHOSPHORUS: CPT

## 2019-05-22 PROCEDURE — 87040 BLOOD CULTURE FOR BACTERIA: CPT

## 2019-05-22 PROCEDURE — 82947 ASSAY GLUCOSE BLOOD QUANT: CPT

## 2019-05-22 PROCEDURE — P9016: CPT

## 2019-05-22 PROCEDURE — 99232 SBSQ HOSP IP/OBS MODERATE 35: CPT | Mod: 24

## 2019-05-22 PROCEDURE — 96365 THER/PROPH/DIAG IV INF INIT: CPT | Mod: XU

## 2019-05-22 PROCEDURE — 83735 ASSAY OF MAGNESIUM: CPT

## 2019-05-22 PROCEDURE — 85610 PROTHROMBIN TIME: CPT

## 2019-05-22 PROCEDURE — 84295 ASSAY OF SERUM SODIUM: CPT

## 2019-05-22 PROCEDURE — 44143 PARTIAL REMOVAL OF COLON: CPT

## 2019-05-22 PROCEDURE — 71045 X-RAY EXAM CHEST 1 VIEW: CPT

## 2019-05-22 PROCEDURE — P9059: CPT

## 2019-05-22 PROCEDURE — 82803 BLOOD GASES ANY COMBINATION: CPT

## 2019-05-22 PROCEDURE — 87086 URINE CULTURE/COLONY COUNT: CPT

## 2019-05-22 PROCEDURE — 82435 ASSAY OF BLOOD CHLORIDE: CPT

## 2019-05-22 PROCEDURE — 80053 COMPREHEN METABOLIC PANEL: CPT

## 2019-05-22 PROCEDURE — 80048 BASIC METABOLIC PNL TOTAL CA: CPT

## 2019-05-22 PROCEDURE — 86901 BLOOD TYPING SEROLOGIC RH(D): CPT

## 2019-05-22 PROCEDURE — 88300 SURGICAL PATH GROSS: CPT

## 2019-05-22 PROCEDURE — 88307 TISSUE EXAM BY PATHOLOGIST: CPT

## 2019-05-22 PROCEDURE — 36415 COLL VENOUS BLD VENIPUNCTURE: CPT

## 2019-05-22 PROCEDURE — 85014 HEMATOCRIT: CPT

## 2019-05-22 PROCEDURE — 86850 RBC ANTIBODY SCREEN: CPT

## 2019-05-22 PROCEDURE — 36430 TRANSFUSION BLD/BLD COMPNT: CPT

## 2019-05-22 PROCEDURE — 86923 COMPATIBILITY TEST ELECTRIC: CPT

## 2019-05-22 PROCEDURE — 86900 BLOOD TYPING SEROLOGIC ABO: CPT

## 2019-05-22 RX ORDER — HYDROMORPHONE HYDROCHLORIDE 2 MG/ML
1 INJECTION INTRAMUSCULAR; INTRAVENOUS; SUBCUTANEOUS ONCE
Refills: 0 | Status: DISCONTINUED | OUTPATIENT
Start: 2019-05-22 | End: 2019-05-22

## 2019-05-22 RX ADMIN — HYDROMORPHONE HYDROCHLORIDE 1 MILLIGRAM(S): 2 INJECTION INTRAMUSCULAR; INTRAVENOUS; SUBCUTANEOUS at 00:47

## 2019-05-22 RX ADMIN — HYDROMORPHONE HYDROCHLORIDE 1 MILLIGRAM(S): 2 INJECTION INTRAMUSCULAR; INTRAVENOUS; SUBCUTANEOUS at 01:17

## 2019-05-22 RX ADMIN — Medication 975 MILLIGRAM(S): at 06:11

## 2019-05-22 RX ADMIN — ENOXAPARIN SODIUM 40 MILLIGRAM(S): 100 INJECTION SUBCUTANEOUS at 12:22

## 2019-05-22 RX ADMIN — PIPERACILLIN AND TAZOBACTAM 25 GRAM(S): 4; .5 INJECTION, POWDER, LYOPHILIZED, FOR SOLUTION INTRAVENOUS at 06:11

## 2019-05-22 RX ADMIN — CHLORHEXIDINE GLUCONATE 1 APPLICATION(S): 213 SOLUTION TOPICAL at 12:22

## 2019-05-22 RX ADMIN — Medication 1 PACKET(S): at 06:11

## 2019-05-22 NOTE — PROGRESS NOTE ADULT - ASSESSMENT
47yo F s/p exlap w sigmoidectomy and end colostomy. Wound vac and b/l kevon tubes removed. Midline incision covered w wet to dry dressing.   -Discharge today w VNS for midline wound  -Ostomy teaching done  -f/u w Dr. Durand in 1 week.

## 2019-05-22 NOTE — PROGRESS NOTE ADULT - SUBJECTIVE AND OBJECTIVE BOX
GYNECOLOGIC ONCOLOGY PROGRESS NOTE    POD#9    PROBLEMS:  Endometriosis, severe  Pneumoperitoneum  Sepsis  Pelvic mass      Pt seen and examined at bedside.     SUBJECTIVE:    Patient is without complaints.  Pain well-controlled.  Flatus: no gas/stool in colostomy  Denies Nausea, Vomiting or Diarrhea.  Denies shortness of breath, chest pain or dyspnea on exertion.  Tolerating diet.    OBJECTIVE:     VITALS:  T(F): 97.5 (05-22-19 @ 08:15), Max: 98 (05-21-19 @ 16:57)  HR: 73 (05-22-19 @ 08:15) (63 - 98)  BP: 116/71 (05-22-19 @ 08:15) (110/69 - 116/74)  RR: 20 (05-22-19 @ 08:15) (18 - 20)    I&O's Summary    21 May 2019 07:01  -  22 May 2019 07:00  --------------------------------------------------------  IN: 300 mL / OUT: 60 mL / NET: 240 mL        MEDICATIONS  (STANDING):  acetaminophen   Tablet .. 975 milliGRAM(s) Oral every 8 hours  chlorhexidine 2% Cloths 1 Application(s) Topical daily  enoxaparin Injectable 40 milliGRAM(s) SubCutaneous daily  piperacillin/tazobactam IVPB. 3.375 Gram(s) IV Intermittent every 8 hours  potassium phosphate / sodium phosphate powder 1 Packet(s) Oral two times a day  tamsulosin 0.4 milliGRAM(s) Oral at bedtime    MEDICATIONS  (PRN):  oxyCODONE    IR 5 milliGRAM(s) Oral every 6 hours PRN Moderate Pain (4 - 6)  oxyCODONE    IR 10 milliGRAM(s) Oral every 6 hours PRN Severe Pain (7 - 10)      Physical Exam:  Constitutional: NAD  Pulmonary: clear to auscultation bilaterally   Cardiovascular: Regular rate and rhythm   Abdomen: soft, non-tender, non-distended, normal bowel sounds. No stool or gas in colostomy. Abdominal binder in place.  Extremities: no lower extremity edema or calve tenderness, Martha's sign negative.  Incision: Wet to dry dressing in place on midline incision. Clean, dry, intact.  Without signs of infection or hernia.      LABS:            RADIOLOGY & ADDITIONAL TESTS:

## 2019-05-22 NOTE — PROGRESS NOTE ADULT - REASON FOR ADMISSION
hypotension, lightheadedness

## 2019-05-22 NOTE — PROGRESS NOTE ADULT - ASSESSMENT
Patient is a 47yo now POD#9 s/p exploratory laparotomy, abdominal washout of 3L feculent matter, open sigmoidectomy and end colostomy for bowel perforation   previously s/p modified radical hysterectomy BSO, omentectomy, debulking, peritectomy of abdominal cavity for severe endometriosis on 5/7    Cardiac: BP in normal range.     Pulmonary: no active disease, incentive spirometer at bedside.    Neuro: pain well controlled with current regimen.     GI: Tolerating full liquid diet.    ID: Abx and Devan drains d/c'd today.    : Passed trial of void 5/20, now voiding spontaneously     Heme: SCDs when not ambulating, Lovenox for VTE prophylaxis.    Skin: wet to dry dressing in place with good seal, no signs of infection.     FEN: Labs wnl.     EXT: LE swelling improved.     Management as per colorectal team, will continue to follow.    Disposition:  d/c home today with stable condition and toleration of regular diet.  F/U with Dr. Gonzalez in 2 weeks.

## 2019-05-22 NOTE — PROGRESS NOTE ADULT - PROBLEM SELECTOR PLAN 1
Continue care per surgical ICU   IV antibiotics  monitor mid-line wound vac  monitor JULIAN output  serial abdominal exams   ventilator /pressor support as needed   Grey catheter   phil tack  CVP monitoring   f.u blood cultures /urine cultures
See above plan

## 2019-05-22 NOTE — DISCHARGE NOTE NURSING/CASE MANAGEMENT/SOCIAL WORK - NSDCDPATPORTLINK_GEN_ALL_CORE
You can access the [facility], offered by [facility], by registering with the following website: [email or web address]

## 2019-05-22 NOTE — PROGRESS NOTE ADULT - SUBJECTIVE AND OBJECTIVE BOX
49yo F examined at bedside and found in no acute distress. No overnight evens. No active complaints. Tolerating a regular diet and having persistent bowel function. Voiding appropriately. Ambulating and getting OOB w/o assistance.  Denies fever, chills, nausea, vomitting, chest pain, SOB, dizziness, abd pain or any other concerning symptoms    Vital Signs Last 24 Hrs  T(C): 36.7 (22 May 2019 00:00), Max: 36.7 (21 May 2019 16:57)  T(F): 98 (22 May 2019 00:00), Max: 98 (21 May 2019 16:57)  HR: 67 (22 May 2019 00:00) (67 - 98)  BP: 116/74 (22 May 2019 00:00) (110/69 - 122/68)  BP(mean): --  RR: 18 (21 May 2019 16:57) (18 - 18)  SpO2: 96% (21 May 2019 08:30) (96% - 96%)    I&O's Detail    20 May 2019 07:01  -  21 May 2019 07:00  --------------------------------------------------------  IN:    Oral Fluid: 200 mL    Solution: 200 mL  Total IN: 400 mL    OUT:    Bulb: 50 mL    Bulb: 60 mL    Intermittent Catheterization - Urethral: 2800 mL    Voided: 1750 mL  Total OUT: 4660 mL    Total NET: -4260 mL      21 May 2019 07:01  -  22 May 2019 02:00  --------------------------------------------------------  IN:    Solution: 100 mL    Solution: 100 mL  Total IN: 200 mL    OUT:    Bulb: 20 mL    Bulb: 40 mL  Total OUT: 60 mL    Total NET: 140 mL          Constitutional: AAOx3  HEENT: EOMI / PERRLA, MMM  Respiratory: non labored breathing.  Cardiovascular: RRR  Gastrointestinal: Abdomen soft, non-tender, non-distended, no rebound tenderness / guarding. Midline wound vac was removed and replaced with wet to dry dressing (wet kerlex covered with ABDs). B/L kevon drains were removed and sites were covered with 4x4 gauze. Colostomy bag was changed. No surrounding skin erythema in all lesions. Abdominal binder in place.       LABS:                        10.0   9.2   )-----------( 466      ( 21 May 2019 07:22 )             31.2     05-21    141  |  108<H>  |  7.0<L>  ----------------------------<  108  4.0   |  24.0  |  0.51    Ca    8.2<L>      21 May 2019 07:22  Phos  3.5     05-21  Mg     1.8     05-21            MEDICATIONS  (STANDING):  acetaminophen   Tablet .. 975 milliGRAM(s) Oral every 8 hours  chlorhexidine 2% Cloths 1 Application(s) Topical daily  enoxaparin Injectable 40 milliGRAM(s) SubCutaneous daily  piperacillin/tazobactam IVPB. 3.375 Gram(s) IV Intermittent every 8 hours  potassium phosphate / sodium phosphate powder 1 Packet(s) Oral two times a day  tamsulosin 0.4 milliGRAM(s) Oral at bedtime    MEDICATIONS  (PRN):  oxyCODONE    IR 5 milliGRAM(s) Oral every 6 hours PRN Moderate Pain (4 - 6)  oxyCODONE    IR 10 milliGRAM(s) Oral every 6 hours PRN Severe Pain (7 - 10)

## 2019-05-22 NOTE — PROGRESS NOTE ADULT - PROVIDER SPECIALTY LIST ADULT
Colorectal Surgery
Gyn Onc
SICU
Colorectal Surgery

## 2019-05-22 NOTE — PROGRESS NOTE ADULT - PROBLEM SELECTOR PROBLEM 1
Pneumoperitoneum

## 2019-05-24 LAB
HCT VFR BLDA CALC: 26 — LOW (ref 39–50)
HGB BLD CALC-MCNC: 8.7 G/DL — LOW (ref 11.5–15.5)
OTHER CELLS CSF MANUAL: 12 ML/DL — LOW (ref 18–22)

## 2019-05-29 ENCOUNTER — APPOINTMENT (OUTPATIENT)
Dept: COLORECTAL SURGERY | Facility: CLINIC | Age: 49
End: 2019-05-29

## 2019-05-29 VITALS
DIASTOLIC BLOOD PRESSURE: 66 MMHG | HEIGHT: 61 IN | HEART RATE: 111 BPM | SYSTOLIC BLOOD PRESSURE: 109 MMHG | RESPIRATION RATE: 14 BRPM | TEMPERATURE: 98.2 F

## 2019-05-30 ENCOUNTER — APPOINTMENT (OUTPATIENT)
Dept: GYNECOLOGIC ONCOLOGY | Facility: CLINIC | Age: 49
End: 2019-05-30
Payer: COMMERCIAL

## 2019-05-30 DIAGNOSIS — N80.1 ENDOMETRIOSIS OF OVARY: ICD-10-CM

## 2019-05-30 DIAGNOSIS — N80.9 ENDOMETRIOSIS, UNSPECIFIED: ICD-10-CM

## 2019-05-30 PROCEDURE — 99024 POSTOP FOLLOW-UP VISIT: CPT

## 2019-05-30 NOTE — ASSESSMENT
[FreeTextEntry1] : 48-year-old female presented for followup after exposure laparotomy and Keny procedure. She's doing well today. Her midline incision is healing well and requires dry dressing. There were no signs of infection. I will see her in 4 weeks at which time I will schedule her a colonoscopy since she's never had one. Advised her to return to regular diet and continue to refrain from lifting greater than 10 pounds.

## 2019-05-30 NOTE — PHYSICAL EXAM
[Respiratory Effort] : normal respiratory effort [Normal Rate and Rhythm] : normal rate and rhythm [No Rash or Lesion] : No rash or lesion [Alert] : alert [Oriented to Person] : oriented to person [Oriented to Place] : oriented to place [Calm] : calm [Abdomen Masses] : No abdominal masses [Tender] : nontender [JVD] : no jugular venous distention  [de-identified] : NAD [de-identified] :  normocephalic, atraumatic.

## 2019-05-30 NOTE — DISCUSSION/SUMMARY
[Clean] : was clean [Dry] : was dry [] : was  [None] : had no drainage [Normal Skin] : normal appearance [Doing Well] : is doing well [Excellent Pain Control] : has excellent pain control [No Sign of Infection] : is showing no signs of infection [Erythema] : was not erythematous [Ecchymosis] : was not ecchymotic [Seroma] : had no seroma [Firm] : soft [Tender] : nontender [Abnormal Bowel Sounds] : normal bowel sounds [Rebound] : no rebound tenderness [Guarding] : no guarding [Incisional Hernia] : no incisional hernia [Mass] : no palpable mass [FreeTextEntry9] : + ostomy pink, clean, no evidence of hernia

## 2019-05-30 NOTE — REASON FOR VISIT
[Post Op] : post op visit [Wound Care] : wound care visit [de-identified] : 5/7/2019 [de-identified] : BASILIA, BSO, debulking, peritonectomy [de-identified] : Pt presents for first post-op check. She had a complicated surgery due to extensive endometrioma from the pelvis into her upper abdomen, she had a delayed leak from sigmoid requiring bowel resection and end-colostomy by Dr. Durand. She is recovering well. She does report the colonstomy is leaking medially but incision healing well. No fevers/chills, heating well, normal formed stool in ostomy.

## 2019-05-30 NOTE — ASSESSMENT
[FreeTextEntry1] : 49 yo s/p BASILIA, BSO, tumor debulking, peritonectomy due to extensive endometriosis of entire abdominal cavity. S/p end colostomy and colon resection. 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

## 2019-05-30 NOTE — HISTORY OF PRESENT ILLNESS
[FreeTextEntry1] : 48-year-old female presented for followup after emergent exploratory laparotomy, sigmoidectomy and end colostomy. She is doing well today and her midline incision is healing well and no longer requires wet-to-dry dressing. Her stoma is functioning well and she is solid stool in her bag. She is eating well however feels very tired on most days.

## 2019-06-25 ENCOUNTER — APPOINTMENT (OUTPATIENT)
Dept: GYNECOLOGIC ONCOLOGY | Facility: CLINIC | Age: 49
End: 2019-06-25
Payer: COMMERCIAL

## 2019-06-25 VITALS
HEART RATE: 89 BPM | HEIGHT: 61 IN | SYSTOLIC BLOOD PRESSURE: 133 MMHG | WEIGHT: 128 LBS | RESPIRATION RATE: 16 BRPM | OXYGEN SATURATION: 99 % | DIASTOLIC BLOOD PRESSURE: 77 MMHG | BODY MASS INDEX: 24.17 KG/M2

## 2019-06-25 PROCEDURE — 99024 POSTOP FOLLOW-UP VISIT: CPT

## 2019-06-25 NOTE — REASON FOR VISIT
[Post Op] : post op visit [Wound Care] : wound care visit [de-identified] : 5/7/2019 [de-identified] : BASILIA, BSO, debulking, peritonectomy [de-identified] : Pt presents for first post-op check. She had a complicated surgery due to extensive endometrioma from the pelvis into her upper abdomen, she had a delayed leak from sigmoid requiring bowel resection and end-colostomy by Dr. Durand. She is recovering well. She does report the colonstomy is leaking medially but incision healing well. No fevers/chills, heating well, normal formed stool in ostomy.

## 2019-06-25 NOTE — ASSESSMENT
[FreeTextEntry1] : 49 yo s/p BASILIA, BSO, tumor debulking, peritonectomy due to extensive endometriosis of entire abdominal cavity. S/p end colostomy and colon resection. 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 \par \par Discussed that i will recommend discussion regarding being on a aromatase inhibitor, but since the tiny cluster of cells was the only evidence of malignancy, I do not recommend chemotherapy or radiation at this time as it is unclear which part of the body the cluster originated in the first place. Removal of the ovaries will decrease her chance of malignant transformation of any residual endometriosis in the first place, but if she would like to take extra precaution she could consider an aromatase inhibitor. All questions answered with patient and mother.

## 2019-06-25 NOTE — DISCUSSION/SUMMARY
[Clean] : was clean [Dry] : was dry [] : was  [Erythema] : was not erythematous [Ecchymosis] : was not ecchymotic [Seroma] : had no seroma [None] : had no drainage [Normal Skin] : normal appearance [Firm] : soft [Tender] : nontender [Abnormal Bowel Sounds] : normal bowel sounds [Rebound] : no rebound tenderness [Guarding] : no guarding [Incisional Hernia] : no incisional hernia [Mass] : no palpable mass [Doing Well] : is doing well [Excellent Pain Control] : has excellent pain control [No Sign of Infection] : is showing no signs of infection [FreeTextEntry9] : + ostomy pink, clean, no evidence of hernia

## 2019-06-25 NOTE — END OF VISIT
[FreeTextEntry3] : RTC in 4-6 months for surveillance check\par Referral to [facility]an to medical oncology

## 2019-06-26 ENCOUNTER — OUTPATIENT (OUTPATIENT)
Dept: OUTPATIENT SERVICES | Facility: HOSPITAL | Age: 49
LOS: 1 days | Discharge: HOME | End: 2019-06-26

## 2019-06-26 DIAGNOSIS — C54.1 MALIGNANT NEOPLASM OF ENDOMETRIUM: ICD-10-CM

## 2019-06-26 DIAGNOSIS — Z98.890 OTHER SPECIFIED POSTPROCEDURAL STATES: Chronic | ICD-10-CM

## 2019-06-28 ENCOUNTER — APPOINTMENT (OUTPATIENT)
Dept: COLORECTAL SURGERY | Facility: CLINIC | Age: 49
End: 2019-06-28
Payer: COMMERCIAL

## 2019-06-28 VITALS
WEIGHT: 128 LBS | HEART RATE: 81 BPM | HEIGHT: 61 IN | BODY MASS INDEX: 24.17 KG/M2 | DIASTOLIC BLOOD PRESSURE: 75 MMHG | SYSTOLIC BLOOD PRESSURE: 127 MMHG | RESPIRATION RATE: 14 BRPM

## 2019-06-28 PROCEDURE — 99213 OFFICE O/P EST LOW 20 MIN: CPT | Mod: 57

## 2019-06-28 PROCEDURE — 99024 POSTOP FOLLOW-UP VISIT: CPT | Mod: 57

## 2019-06-28 NOTE — ASSESSMENT
[FreeTextEntry1] : 49-year-old female presented for followup after exposure laparotomy and Keny procedure. She's doing well today. Her midline incision is healing well and requires dry dressing. There were no signs of infection. I recommend a colonoscopy since she has never had one. The risks and benefits of colorectal cancer screening with colonoscopy was discussed including, but not limited to, bleeding, infection, perforation of colon, injury to surrounding organs and missed polyp or cancer. Patient was given Miralax and Dulcolax bowel prep instructions. The patient understands the risks and wishes to schedule for colonoscopy at the earliest convenience.\par \par Patient will need the following preoperative testings: \par EKG, CBC, CMP, INR/PT, PTT, and UA\par \par Patient will need the following preoperative risk assessment evaluation:\par none\par

## 2019-06-28 NOTE — HISTORY OF PRESENT ILLNESS
[FreeTextEntry1] : 48-year-old female presented for followup after emergent exploratory laparotomy, sigmoidectomy and end colostomy. She is doing well today and her midline incision is healing well and no longer requires wet-to-dry dressing. Her stoma is functioning well and she is solid stool in her bag.

## 2019-06-28 NOTE — PHYSICAL EXAM
[Respiratory Effort] : normal respiratory effort [No Rash or Lesion] : No rash or lesion [Normal Rate and Rhythm] : normal rate and rhythm [Alert] : alert [Oriented to Place] : oriented to place [Calm] : calm [Oriented to Person] : oriented to person [Abdomen Masses] : No abdominal masses [de-identified] : grandulating midline incision [JVD] : no jugular venous distention  [Tender] : nontender [de-identified] : NAD [de-identified] :  normocephalic, atraumatic.

## 2019-07-01 ENCOUNTER — APPOINTMENT (OUTPATIENT)
Dept: HEMATOLOGY ONCOLOGY | Facility: CLINIC | Age: 49
End: 2019-07-01
Payer: COMMERCIAL

## 2019-07-01 ENCOUNTER — RESULT REVIEW (OUTPATIENT)
Age: 49
End: 2019-07-01

## 2019-07-01 VITALS
SYSTOLIC BLOOD PRESSURE: 117 MMHG | TEMPERATURE: 98.2 F | HEART RATE: 90 BPM | OXYGEN SATURATION: 99 % | DIASTOLIC BLOOD PRESSURE: 73 MMHG | HEIGHT: 61 IN | WEIGHT: 129.05 LBS | BODY MASS INDEX: 24.37 KG/M2

## 2019-07-01 DIAGNOSIS — Z80.51 FAMILY HISTORY OF MALIGNANT NEOPLASM OF KIDNEY: ICD-10-CM

## 2019-07-01 DIAGNOSIS — Z80.0 FAMILY HISTORY OF MALIGNANT NEOPLASM OF DIGESTIVE ORGANS: ICD-10-CM

## 2019-07-01 LAB
BASOPHILS # BLD AUTO: 0.1 K/UL — SIGNIFICANT CHANGE UP (ref 0–0.2)
BASOPHILS NFR BLD AUTO: 0.7 % — SIGNIFICANT CHANGE UP (ref 0–2)
EOSINOPHIL # BLD AUTO: 0.1 K/UL — SIGNIFICANT CHANGE UP (ref 0–0.5)
EOSINOPHIL NFR BLD AUTO: 1.2 % — SIGNIFICANT CHANGE UP (ref 0–6)
HCT VFR BLD CALC: 42.8 % — SIGNIFICANT CHANGE UP (ref 34.5–45)
HGB BLD-MCNC: 13.3 G/DL — SIGNIFICANT CHANGE UP (ref 11.5–15.5)
LYMPHOCYTES # BLD AUTO: 2.8 K/UL — SIGNIFICANT CHANGE UP (ref 1–3.3)
LYMPHOCYTES # BLD AUTO: 35.9 % — SIGNIFICANT CHANGE UP (ref 13–44)
MCHC RBC-ENTMCNC: 27.6 PG — SIGNIFICANT CHANGE UP (ref 27–34)
MCHC RBC-ENTMCNC: 31.1 G/DL — LOW (ref 32–36)
MCV RBC AUTO: 88.5 FL — SIGNIFICANT CHANGE UP (ref 80–100)
MONOCYTES # BLD AUTO: 0.4 K/UL — SIGNIFICANT CHANGE UP (ref 0–0.9)
MONOCYTES NFR BLD AUTO: 5.7 % — SIGNIFICANT CHANGE UP (ref 2–14)
NEUTROPHILS # BLD AUTO: 4.4 K/UL — SIGNIFICANT CHANGE UP (ref 1.8–7.4)
NEUTROPHILS NFR BLD AUTO: 56.5 % — SIGNIFICANT CHANGE UP (ref 43–77)
PLATELET # BLD AUTO: 298 K/UL — SIGNIFICANT CHANGE UP (ref 150–400)
RBC # BLD: 4.84 M/UL — SIGNIFICANT CHANGE UP (ref 3.8–5.2)
RBC # FLD: 17.4 % — HIGH (ref 10.3–14.5)
WBC # BLD: 7.7 K/UL — SIGNIFICANT CHANGE UP (ref 3.8–10.5)
WBC # FLD AUTO: 7.7 K/UL — SIGNIFICANT CHANGE UP (ref 3.8–10.5)

## 2019-07-01 PROCEDURE — 99204 OFFICE O/P NEW MOD 45 MIN: CPT

## 2019-07-01 RX ORDER — SODIUM CHLORIDE 9 G/ML
0.9 INJECTION, SOLUTION INTRAVENOUS
Qty: 1 | Refills: 2 | Status: DISCONTINUED | COMMUNITY
Start: 2019-05-29 | End: 2019-07-01

## 2019-07-01 RX ORDER — NAPROXEN 500 MG/1
500 TABLET ORAL
Qty: 30 | Refills: 0 | Status: DISCONTINUED | COMMUNITY
Start: 2019-05-22 | End: 2019-07-01

## 2019-07-01 RX ORDER — DOCUSATE SODIUM 100 MG/1
100 CAPSULE, LIQUID FILLED ORAL
Qty: 30 | Refills: 0 | Status: DISCONTINUED | COMMUNITY
Start: 2019-05-21 | End: 2019-07-01

## 2019-07-01 RX ORDER — CEPHALEXIN 500 MG/1
500 CAPSULE ORAL
Qty: 20 | Refills: 0 | Status: DISCONTINUED | COMMUNITY
Start: 2019-04-23 | End: 2019-07-01

## 2019-07-01 RX ORDER — ASCORBIC ACID 500 MG
500 TABLET ORAL
Refills: 0 | Status: ACTIVE | COMMUNITY

## 2019-07-01 RX ORDER — OXYCODONE AND ACETAMINOPHEN 5; 325 MG/1; MG/1
5-325 TABLET ORAL
Qty: 20 | Refills: 0 | Status: DISCONTINUED | COMMUNITY
Start: 2019-05-10 | End: 2019-07-01

## 2019-07-01 NOTE — RESULTS/DATA
[FreeTextEntry1] : 5/15/19 Pathology:\par Intra-abdominal foreign body, removal:  Blood clots\par Sigmoid, resection:  Mucosal necrosis with transmural acute and chronic inflammation.  The process extends to one margin of resection (slide 1A).  Three (3) lymph nodes, one with benign glandular inclusion.\par \par 5/15/19 CT A/P:\par Moderate amount retained fecal material in the colon, greatest in the right hemicolon. Mild wall thickening involving the distal transverse colon, descending colon, and proximal sigmoid colon, possibly reactive to the large collection.  Large fluid collection which appears to be extraperitoneal layering anterior to the omentum measuring 24.7 x 9.4 x \par 20.9 cm (craniocaudad x anteroposterior x transverse). There is a component of the collection which extends posteriorly on the left into the retroperitoneum along the anterior aspect of the psoas extending to the posterior aspect of the splenic flexure (approximately 20 cm in craniocaudad dimension). Skin staples are seen within the anterior abdominal wall. Air is seen within the anterior abdominal wall which may be postoperative in etiology.\par \par 5/8/19 Cytopathology:\par Abdominal fluid:  NEGATIVE FOR MALIGNANT CELLS.  Mostly fibrin and reactive mesothelial cells.\par \par 5/7/19 Pathology:\par Omental lymph node: Markedly reactive, hemorrhagic lymph node with hemosiderin laden macrophages.\par Cyst wall(1): Hemorrhagic inflamed cyst wall without epithelial lining.\par Right hepatic flexure lymph node: Markedly reactive, hemorrhagic lymph node with hemosiderin laden macrophages.\par Omentum: Omental fat with marked reactive fibrosis, hemorrhage and chronic inflammation; marked serosal adhesions.\par Right pelvic lymph node: Markedly reactive, hemorrhagic lymph node with hemosiderin laden macrophages.\par Left pelvic lymph node: Markedly reactive, hemorrhagic lymph node with hemosiderin laden macrophages.\par Cyst wall(2):  Cyst wall, markedly inflamed and hemorrhagic with cholesterol crystals and without epithelial lining.\par ***Uterus, cervix and adnexa:  Histologically unremarkable cervix. Weakly proliferative endometrium and endometrial polyp. Cysts located outside the uterus, involving serosa, with glandular stromal structures, consistent with endometriosis.\par ****Addendum****\par Case sent for outside consultation at [facility] with Dr. Tina Mckay.\par The atypical clusters of glands, in part 8. "Uterus, cervix and adnexa", have been diagnosed as Low grade endometrioid adenocarcinoma, FIGO grade 1,  with focal sex cord like differentiation, arising in a background of endometriosis, and involving the uterine serosa.\par \par 4/23/19 CTA C/A/P:\par There is a large complex cystic mass occupying the lower abdomen, measuring 21.0 (AP) x 24.2 (CC) x 33.1 (TR) cm.  The exact origin of this mass is uncertain. The left posterior wall of this mass is discontinuous, suggestive of rupture. There is a large amount of abdominal ascites.  Additional complex left adnexal cystic mass with septations and apparent mural nodules measuring 9.4 (AP) x 15.5 (CC) x 16.6 (TR) cm.

## 2019-07-01 NOTE — REVIEW OF SYSTEMS
[Negative] : Allergic/Immunologic [Chills] : chills [Night Sweats] : night sweats [Recent Change In Weight] : ~T recent weight change [Joint Stiffness] : joint stiffness [Insomnia] : insomnia

## 2019-07-01 NOTE — PHYSICAL EXAM
[Restricted in physically strenuous activity but ambulatory and able to carry out work of a light or sedentary nature] : Status 1- Restricted in physically strenuous activity but ambulatory and able to carry out work of a light or sedentary nature, e.g., light house work, office work [Normal] : affect appropriate [de-identified] : positive ostomy.  Vertical incision healing with no evidence of drainage or infection.

## 2019-07-01 NOTE — CONSULT LETTER
[Dear  ___] : Dear  [unfilled], [Consult Letter:] : I had the pleasure of evaluating your patient, [unfilled]. [Please see my note below.] : Please see my note below. [Consult Closing:] : Thank you very much for allowing me to participate in the care of this patient.  If you have any questions, please do not hesitate to contact me. [Sincerely,] : Sincerely, [DrFabiana  ___] : Dr. WESLEY [FreeTextEntry3] : Dr. Macarena Yarbrough

## 2019-07-01 NOTE — HISTORY OF PRESENT ILLNESS
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  She presented to [facility] ED on 4/23/19 s/p mechanical fall outside her place of work.  A CT A/P incidentally showed a complex cystic left adnexal mass measuring 17 cm. Massive cystic lesion occupying the lower abdomen measuring 33 cm. It was uncertain whether this represents a large peritoneal metastasis or a component of the above described cystic left adnexal mass, or possibly a right adnexal mass. There was suspicion for rupture of this mass with a large amount of abdominal ascites.  On 5/7/19 Dr. Manpreet Gonzalez performed a BASILIA, BSO, debulking and peritonectomy.  Pathology initially was benign.  An addendum was later issued which showed low grade endometrioid adenocarcinoma, FIGO grade 1, with focal sex cord like differentiation, arising in a background of endometriosis, and involving the uterine serosa.  She was discharged on 5/12/19 but returned to the ED on 5/15/19 c/o abdominal distention, N/V, dizziness, weakness, multiple falls and a fever of 104.  She was found to be hypotensive and tachycardic.  A CT A/P showed a large fluid collection which appears to be extraperitoneal layering anterior to the omentum measuring 24.7 x 9.4 x 20.9 cm. There is a component of the collection which extends posteriorly on the left into the retroperitoneum along the anterior aspect of the psoas extending to the posterior aspect of the splenic flexure (approximately 20 cm in craniocaudad dimension). On 5/17/19 Dr. Mague Durand performed an emergent exploratory laparotomy, sigmoidectomy and end colostomy.  Pathology was benign.   [de-identified] : Patient presents for initial evaluation.  She is s/p a complicated surgery due to extensive endometrioma from the pelvis into her upper abdomen, with a delayed leak from the sigmoid requiring bowel resection and end-colostomy by Dr. Durand. She is recovering well.  Denies any pain. No fevers/chills, healing well, normal formed stool in ostomy.

## 2019-07-02 LAB
ALBUMIN SERPL ELPH-MCNC: 4.3 G/DL
ALP BLD-CCNC: 74 U/L
ALT SERPL-CCNC: 38 U/L
ANION GAP SERPL CALC-SCNC: 12 MMOL/L
AST SERPL-CCNC: 27 U/L
BILIRUB SERPL-MCNC: <0.2 MG/DL
BUN SERPL-MCNC: 19 MG/DL
CALCIUM SERPL-MCNC: 9.7 MG/DL
CANCER AG125 SERPL-ACNC: 11 U/ML
CHLORIDE SERPL-SCNC: 103 MMOL/L
CO2 SERPL-SCNC: 28 MMOL/L
CREAT SERPL-MCNC: 0.69 MG/DL
GLUCOSE SERPL-MCNC: 113 MG/DL
HBV CORE IGG+IGM SER QL: NONREACTIVE
HBV SURFACE AB SER QL: NONREACTIVE
HBV SURFACE AG SER QL: NONREACTIVE
HCV AB SER QL: NONREACTIVE
HCV S/CO RATIO: 0.28 S/CO
INR PPP: 0.96 RATIO
MAGNESIUM SERPL-MCNC: 2.1 MG/DL
POTASSIUM SERPL-SCNC: 4.3 MMOL/L
PROT SERPL-MCNC: 7.8 G/DL
PT BLD: 10.9 SEC
SODIUM SERPL-SCNC: 143 MMOL/L

## 2019-08-01 ENCOUNTER — OUTPATIENT (OUTPATIENT)
Dept: OUTPATIENT SERVICES | Facility: HOSPITAL | Age: 49
LOS: 1 days | End: 2019-08-01
Payer: COMMERCIAL

## 2019-08-01 VITALS
HEIGHT: 61 IN | TEMPERATURE: 97 F | WEIGHT: 136.47 LBS | SYSTOLIC BLOOD PRESSURE: 129 MMHG | HEART RATE: 75 BPM | RESPIRATION RATE: 16 BRPM | DIASTOLIC BLOOD PRESSURE: 84 MMHG

## 2019-08-01 DIAGNOSIS — Z29.9 ENCOUNTER FOR PROPHYLACTIC MEASURES, UNSPECIFIED: ICD-10-CM

## 2019-08-01 DIAGNOSIS — Z01.818 ENCOUNTER FOR OTHER PREPROCEDURAL EXAMINATION: ICD-10-CM

## 2019-08-01 DIAGNOSIS — Z90.710 ACQUIRED ABSENCE OF BOTH CERVIX AND UTERUS: Chronic | ICD-10-CM

## 2019-08-01 DIAGNOSIS — Z12.9 ENCOUNTER FOR SCREENING FOR MALIGNANT NEOPLASM, SITE UNSPECIFIED: ICD-10-CM

## 2019-08-01 DIAGNOSIS — Z93.3 COLOSTOMY STATUS: ICD-10-CM

## 2019-08-01 DIAGNOSIS — Z98.890 OTHER SPECIFIED POSTPROCEDURAL STATES: Chronic | ICD-10-CM

## 2019-08-01 DIAGNOSIS — Z93.3 COLOSTOMY STATUS: Chronic | ICD-10-CM

## 2019-08-01 LAB
ALBUMIN SERPL ELPH-MCNC: 4.1 G/DL — SIGNIFICANT CHANGE UP (ref 3.3–5.2)
ALP SERPL-CCNC: 69 U/L — SIGNIFICANT CHANGE UP (ref 40–120)
ALT FLD-CCNC: 27 U/L — SIGNIFICANT CHANGE UP
ANION GAP SERPL CALC-SCNC: 11 MMOL/L — SIGNIFICANT CHANGE UP (ref 5–17)
APPEARANCE UR: CLEAR — SIGNIFICANT CHANGE UP
APTT BLD: 29.5 SEC — SIGNIFICANT CHANGE UP (ref 27.5–36.3)
AST SERPL-CCNC: 25 U/L — SIGNIFICANT CHANGE UP
BILIRUB SERPL-MCNC: 0.2 MG/DL — LOW (ref 0.4–2)
BILIRUB UR-MCNC: NEGATIVE — SIGNIFICANT CHANGE UP
BLD GP AB SCN SERPL QL: SIGNIFICANT CHANGE UP
BUN SERPL-MCNC: 14 MG/DL — SIGNIFICANT CHANGE UP (ref 8–20)
CALCIUM SERPL-MCNC: 9.8 MG/DL — SIGNIFICANT CHANGE UP (ref 8.6–10.2)
CHLORIDE SERPL-SCNC: 101 MMOL/L — SIGNIFICANT CHANGE UP (ref 98–107)
CO2 SERPL-SCNC: 28 MMOL/L — SIGNIFICANT CHANGE UP (ref 22–29)
COLOR SPEC: YELLOW — SIGNIFICANT CHANGE UP
COMMENT - BLOOD BANK: SIGNIFICANT CHANGE UP
CREAT SERPL-MCNC: 0.65 MG/DL — SIGNIFICANT CHANGE UP (ref 0.5–1.3)
DIFF PNL FLD: NEGATIVE — SIGNIFICANT CHANGE UP
GLUCOSE SERPL-MCNC: 108 MG/DL — SIGNIFICANT CHANGE UP (ref 70–115)
GLUCOSE UR QL: NEGATIVE MG/DL — SIGNIFICANT CHANGE UP
HBA1C BLD-MCNC: 5.6 % — SIGNIFICANT CHANGE UP (ref 4–5.6)
HCT VFR BLD CALC: 41.3 % — SIGNIFICANT CHANGE UP (ref 34.5–45)
HGB BLD-MCNC: 13.3 G/DL — SIGNIFICANT CHANGE UP (ref 11.5–15.5)
INR BLD: 0.97 RATIO — SIGNIFICANT CHANGE UP (ref 0.88–1.16)
KETONES UR-MCNC: NEGATIVE — SIGNIFICANT CHANGE UP
LEUKOCYTE ESTERASE UR-ACNC: NEGATIVE — SIGNIFICANT CHANGE UP
MCHC RBC-ENTMCNC: 28.7 PG — SIGNIFICANT CHANGE UP (ref 27–34)
MCHC RBC-ENTMCNC: 32.2 GM/DL — SIGNIFICANT CHANGE UP (ref 32–36)
MCV RBC AUTO: 89 FL — SIGNIFICANT CHANGE UP (ref 80–100)
NITRITE UR-MCNC: NEGATIVE — SIGNIFICANT CHANGE UP
PH UR: 7 — SIGNIFICANT CHANGE UP (ref 5–8)
PLATELET # BLD AUTO: 268 K/UL — SIGNIFICANT CHANGE UP (ref 150–400)
POTASSIUM SERPL-MCNC: 4.4 MMOL/L — SIGNIFICANT CHANGE UP (ref 3.5–5.3)
POTASSIUM SERPL-SCNC: 4.4 MMOL/L — SIGNIFICANT CHANGE UP (ref 3.5–5.3)
PROT SERPL-MCNC: 7.8 G/DL — SIGNIFICANT CHANGE UP (ref 6.6–8.7)
PROT UR-MCNC: NEGATIVE MG/DL — SIGNIFICANT CHANGE UP
PROTHROM AB SERPL-ACNC: 11.1 SEC — SIGNIFICANT CHANGE UP (ref 10–12.9)
RBC # BLD: 4.64 M/UL — SIGNIFICANT CHANGE UP (ref 3.8–5.2)
RBC # FLD: 16.1 % — HIGH (ref 10.3–14.5)
SODIUM SERPL-SCNC: 140 MMOL/L — SIGNIFICANT CHANGE UP (ref 135–145)
SP GR SPEC: 1.01 — SIGNIFICANT CHANGE UP (ref 1.01–1.02)
UROBILINOGEN FLD QL: NEGATIVE MG/DL — SIGNIFICANT CHANGE UP
WBC # BLD: 5.01 K/UL — SIGNIFICANT CHANGE UP (ref 3.8–10.5)
WBC # FLD AUTO: 5.01 K/UL — SIGNIFICANT CHANGE UP (ref 3.8–10.5)

## 2019-08-01 PROCEDURE — 80053 COMPREHEN METABOLIC PANEL: CPT

## 2019-08-01 PROCEDURE — 85610 PROTHROMBIN TIME: CPT

## 2019-08-01 PROCEDURE — 85027 COMPLETE CBC AUTOMATED: CPT

## 2019-08-01 PROCEDURE — 85730 THROMBOPLASTIN TIME PARTIAL: CPT

## 2019-08-01 PROCEDURE — 93010 ELECTROCARDIOGRAM REPORT: CPT

## 2019-08-01 PROCEDURE — 83036 HEMOGLOBIN GLYCOSYLATED A1C: CPT

## 2019-08-01 PROCEDURE — 86900 BLOOD TYPING SEROLOGIC ABO: CPT

## 2019-08-01 PROCEDURE — G0463: CPT

## 2019-08-01 PROCEDURE — 81003 URINALYSIS AUTO W/O SCOPE: CPT

## 2019-08-01 PROCEDURE — 36415 COLL VENOUS BLD VENIPUNCTURE: CPT

## 2019-08-01 PROCEDURE — 86901 BLOOD TYPING SEROLOGIC RH(D): CPT

## 2019-08-01 PROCEDURE — 86850 RBC ANTIBODY SCREEN: CPT

## 2019-08-01 PROCEDURE — 93005 ELECTROCARDIOGRAM TRACING: CPT

## 2019-08-01 PROCEDURE — 87086 URINE CULTURE/COLONY COUNT: CPT

## 2019-08-01 NOTE — H&P PST ADULT - ASSESSMENT
Pleasant 49 year old female in NAD presents with history of colostomy (may 2019 ) s/p hysterectomy and sepsis  .Pt is scheduled for colonoscopy on Aug 8 and possible colostomy reversal on 8/15/19. S/W link at DR. Durand office and will book reversal after colonoscopy.   OPIOID RISK TOOL    BEATRIZ EACH BOX THAT APPLIES AND ADD TOTALS AT THE END    FAMILY HISTORY OF SUBSTANCE ABUSE                 FEMALE         MALE                                                Alcohol                             [  ]1 pt          [  ]3pts                                               Illegal Durgs                     [  ]2 pts        [  ]3pts                                               Rx Drugs                           [  ]4 pts        [  ]4 pts    PERSONAL HISTORY OF SUBSTANCE ABUSE                                                                                          Alcohol                             [  ]3 pts       [  ]3 pts                                               Illegal Durgs                     [  ]4 pts        [  ]4 pts                                               Rx Drugs                           [  ]5 pts        [  ]5 pts    AGE BETWEEN 16-45 YEARS                                      [  ]1 pt         [  ]1 pt    HISTORY OF PREADOLESCENT   SEXUAL ABUSE                                                             [  ]3 pts        [  ]0pts    PSYCHOLOGICAL DISEASE                     ADD, OCD, Bipolar, Schizophrenia        [  ]2 pts         [  ]2 pts                      Depression                                               [  ]1 pt           [  ]1 pt           SCORING TOTAL   (add numbers and type here)              (***0)                                     A score of 3 or lower indicated LOW risk for future opiod abuse  A score of 4 to 7 indicated moderate risk for future opiod abuse  A score of 8 or higher indicates a high risk for opiod abuse  CAPRINI VTE 2.0 SCORE [CLOT updated 2019]    AGE RELATED RISK FACTORS                                                       MOBILITY RELATED FACTORS  [XX ] Age 41-60 years                                            (1 Point)                    [ ] Bed rest                                                        (1 Point)  [ ] Age: 61-74 years                                           (2 Points)                  [ ] Plaster cast                                                   (2 Points)  [ ] Age= 75 years                                              (3 Points)                    [ ] Bed bound for more than 72 hours                 (2 Points)    DISEASE RELATED RISK FACTORS                                               GENDER SPECIFIC FACTORS  [ ] Edema in the lower extremities                       (1 Point)              [ ] Pregnancy                                                     (1 Point)  [ ] Varicose veins                                               (1 Point)                     [ ] Post-partum < 6 weeks                                   (1 Point)             [ ] BMI > 25 Kg/m2                                            (1 Point)                     [ ] Hormonal therapy  or oral contraception          (1 Point)                 [ ] Sepsis (in the previous month)                        (1 Point)               [ ] History of pregnancy complications                 (1 point)  [ ] Pneumonia or serious lung disease                                               [ ] Unexplained or recurrent                     (1 Point)           (in the previous month)                               (1 Point)  [ ] Abnormal pulmonary function test                     (1 Point)                 SURGERY RELATED RISK FACTORS  [ ] Acute myocardial infarction                              (1 Point)               [ ]  Section                                             (1 Point)  [ ] Congestive heart failure (in the previous month)  (1 Point)      [X ] Minor surgery                                                  (1 Point)   [ ] Inflammatory bowel disease                             (1 Point)               [ ] Arthroscopic surgery                                        (2 Points)  [ ] Central venous access                                      (2 Points)                [ ] General surgery lasting more than 45 minutes (2 points)  [ X] Malignancy- Present or previous                   (2 Points)                [ ] Elective arthroplasty                                         (5 points)    [ ] Stroke (in the previous month)                          (5 Points)                                                                                                                                                           HEMATOLOGY RELATED FACTORS                                                 TRAUMA RELATED RISK FACTORS  [ ] Prior episodes of VTE                                     (3 Points)                [ ] Fracture of the hip, pelvis, or leg                       (5 Points)  [ ] Positive family history for VTE                         (3 Points)             [ ] Acute spinal cord injury (in the previous month)  (5 Points)  [ ] Prothrombin 56327 A                                     (3 Points)               [ ] Paralysis  (less than 1 month)                             (5 Points)  [ ] Factor V Leiden                                             (3 Points)                  [ ] Multiple Trauma within 1 month                        (5 Points)  [ ] Lupus anticoagulants                                     (3 Points)                                                           [ ] Anticardiolipin antibodies                               (3 Points)                                                       [ ] High homocysteine in the blood                      (3 Points)                                             [ ] Other congenital or acquired thrombophilia      (3 Points)                                                [ ] Heparin induced thrombocytopenia                  (3 Points)                                     Total Score [      4    ]

## 2019-08-01 NOTE — H&P PST ADULT - NSICDXPASTSURGICALHX_GEN_ALL_CORE_FT
PAST SURGICAL HISTORY:  H/O removal of cyst     History of hysterectomy     S/P colostomy may15 2019

## 2019-08-01 NOTE — H&P PST ADULT - NSICDXPASTMEDICALHX_GEN_ALL_CORE_FT
PAST MEDICAL HISTORY:  Anemia     Endometriosis     S/P colostomy 5/15/19 sepsis after hysterectomy PAST MEDICAL HISTORY:  Anemia     Endometrial adenocarcinoma     Endometriosis     S/P colostomy 5/15/19 sepsis after hysterectomy

## 2019-08-01 NOTE — H&P PST ADULT - HISTORY OF PRESENT ILLNESS
49 year old female presents for colonoscopy . pt has never had one prior. S/p hysterectomy on MAy 7, 2019 for endometriosis. Pt was discharged and developed sepsis. Readmitted to hospital and colostomy was done on 5/15/19 . pt states she may have colostomy reversal on 8/15/19 after colonoscopy .Denies pain , performing colostomy care without difficulty.

## 2019-08-01 NOTE — H&P PST ADULT - NSICDXPROBLEM_GEN_ALL_CORE_FT
PROBLEM DIAGNOSES  Problem: Need for prophylactic measure  Assessment and Plan: caprini score 4     Problem: Colostomy present  Assessment and Plan: colostomy reversal possibly 8/15/19    Problem: Screening for cancer  Assessment and Plan: colonoscopy  8/8/19   medical clearance DR. Da Silva

## 2019-08-02 PROBLEM — Z93.3 COLOSTOMY STATUS: Chronic | Status: ACTIVE | Noted: 2019-08-01

## 2019-08-02 PROBLEM — N80.9 ENDOMETRIOSIS, UNSPECIFIED: Chronic | Status: ACTIVE | Noted: 2019-08-01

## 2019-08-02 LAB
CULTURE RESULTS: NO GROWTH — SIGNIFICANT CHANGE UP
SPECIMEN SOURCE: SIGNIFICANT CHANGE UP

## 2019-08-02 RX ORDER — CEFOTETAN DISODIUM 1 G
2 VIAL (EA) INJECTION ONCE
Refills: 0 | Status: COMPLETED | OUTPATIENT
Start: 2019-08-15 | End: 2019-08-15

## 2019-08-07 ENCOUNTER — TRANSCRIPTION ENCOUNTER (OUTPATIENT)
Age: 49
End: 2019-08-07

## 2019-08-08 ENCOUNTER — OUTPATIENT (OUTPATIENT)
Dept: OUTPATIENT SERVICES | Facility: HOSPITAL | Age: 49
LOS: 1 days | End: 2019-08-08
Payer: COMMERCIAL

## 2019-08-08 ENCOUNTER — OTHER (OUTPATIENT)
Age: 49
End: 2019-08-08

## 2019-08-08 ENCOUNTER — RESULT REVIEW (OUTPATIENT)
Age: 49
End: 2019-08-08

## 2019-08-08 ENCOUNTER — APPOINTMENT (OUTPATIENT)
Dept: COLORECTAL SURGERY | Facility: HOSPITAL | Age: 49
End: 2019-08-08
Payer: COMMERCIAL

## 2019-08-08 DIAGNOSIS — Z90.710 ACQUIRED ABSENCE OF BOTH CERVIX AND UTERUS: Chronic | ICD-10-CM

## 2019-08-08 DIAGNOSIS — Z98.890 OTHER SPECIFIED POSTPROCEDURAL STATES: Chronic | ICD-10-CM

## 2019-08-08 DIAGNOSIS — Z93.3 COLOSTOMY STATUS: Chronic | ICD-10-CM

## 2019-08-08 DIAGNOSIS — Z12.11 ENCOUNTER FOR SCREENING FOR MALIGNANT NEOPLASM OF COLON: ICD-10-CM

## 2019-08-08 PROCEDURE — 45380 COLONOSCOPY AND BIOPSY: CPT | Mod: PT

## 2019-08-08 PROCEDURE — 88305 TISSUE EXAM BY PATHOLOGIST: CPT | Mod: 26

## 2019-08-08 PROCEDURE — 88305 TISSUE EXAM BY PATHOLOGIST: CPT

## 2019-08-08 PROCEDURE — 45378 DIAGNOSTIC COLONOSCOPY: CPT | Mod: 79

## 2019-08-12 LAB — SURGICAL PATHOLOGY STUDY: SIGNIFICANT CHANGE UP

## 2019-08-13 ENCOUNTER — OTHER (OUTPATIENT)
Age: 49
End: 2019-08-13

## 2019-08-14 ENCOUNTER — TRANSCRIPTION ENCOUNTER (OUTPATIENT)
Age: 49
End: 2019-08-14

## 2019-08-15 ENCOUNTER — INPATIENT (INPATIENT)
Facility: HOSPITAL | Age: 49
LOS: 2 days | Discharge: HOME | DRG: 337 | End: 2019-08-18
Attending: STUDENT IN AN ORGANIZED HEALTH CARE EDUCATION/TRAINING PROGRAM | Admitting: STUDENT IN AN ORGANIZED HEALTH CARE EDUCATION/TRAINING PROGRAM
Payer: COMMERCIAL

## 2019-08-15 ENCOUNTER — RESULT REVIEW (OUTPATIENT)
Age: 49
End: 2019-08-15

## 2019-08-15 ENCOUNTER — APPOINTMENT (OUTPATIENT)
Dept: COLORECTAL SURGERY | Facility: HOSPITAL | Age: 49
End: 2019-08-15
Payer: COMMERCIAL

## 2019-08-15 VITALS
TEMPERATURE: 98 F | OXYGEN SATURATION: 99 % | SYSTOLIC BLOOD PRESSURE: 116 MMHG | WEIGHT: 136.47 LBS | HEIGHT: 61 IN | HEART RATE: 93 BPM | DIASTOLIC BLOOD PRESSURE: 73 MMHG | RESPIRATION RATE: 16 BRPM

## 2019-08-15 DIAGNOSIS — Z93.3 COLOSTOMY STATUS: Chronic | ICD-10-CM

## 2019-08-15 DIAGNOSIS — Z01.818 ENCOUNTER FOR OTHER PREPROCEDURAL EXAMINATION: ICD-10-CM

## 2019-08-15 DIAGNOSIS — Z90.710 ACQUIRED ABSENCE OF BOTH CERVIX AND UTERUS: Chronic | ICD-10-CM

## 2019-08-15 DIAGNOSIS — Z98.890 OTHER SPECIFIED POSTPROCEDURAL STATES: Chronic | ICD-10-CM

## 2019-08-15 DIAGNOSIS — Z93.3 COLOSTOMY STATUS: ICD-10-CM

## 2019-08-15 LAB
BLD GP AB SCN SERPL QL: SIGNIFICANT CHANGE UP
GLUCOSE BLDC GLUCOMTR-MCNC: 122 MG/DL — HIGH (ref 70–99)
GLUCOSE BLDC GLUCOMTR-MCNC: 158 MG/DL — HIGH (ref 70–99)
GLUCOSE BLDC GLUCOMTR-MCNC: 165 MG/DL — HIGH (ref 70–99)

## 2019-08-15 PROCEDURE — 44227 LAP CLOSE ENTEROSTOMY: CPT | Mod: 58

## 2019-08-15 PROCEDURE — 88305 TISSUE EXAM BY PATHOLOGIST: CPT | Mod: 26

## 2019-08-15 PROCEDURE — 88307 TISSUE EXAM BY PATHOLOGIST: CPT | Mod: 26

## 2019-08-15 PROCEDURE — 88304 TISSUE EXAM BY PATHOLOGIST: CPT | Mod: 26

## 2019-08-15 RX ORDER — DEXTROSE 50 % IN WATER 50 %
15 SYRINGE (ML) INTRAVENOUS ONCE
Refills: 0 | Status: DISCONTINUED | OUTPATIENT
Start: 2019-08-15 | End: 2019-08-16

## 2019-08-15 RX ORDER — ACETAMINOPHEN 500 MG
650 TABLET ORAL EVERY 6 HOURS
Refills: 0 | Status: DISCONTINUED | OUTPATIENT
Start: 2019-08-15 | End: 2019-08-16

## 2019-08-15 RX ORDER — ALVIMOPAN 12 MG/1
12 CAPSULE ORAL
Refills: 0 | Status: DISCONTINUED | OUTPATIENT
Start: 2019-08-15 | End: 2019-08-18

## 2019-08-15 RX ORDER — SODIUM CHLORIDE 9 MG/ML
1000 INJECTION, SOLUTION INTRAVENOUS
Refills: 0 | Status: DISCONTINUED | OUTPATIENT
Start: 2019-08-15 | End: 2019-08-15

## 2019-08-15 RX ORDER — DEXTROSE 50 % IN WATER 50 %
12.5 SYRINGE (ML) INTRAVENOUS ONCE
Refills: 0 | Status: DISCONTINUED | OUTPATIENT
Start: 2019-08-15 | End: 2019-08-16

## 2019-08-15 RX ORDER — DEXTROSE 50 % IN WATER 50 %
25 SYRINGE (ML) INTRAVENOUS ONCE
Refills: 0 | Status: DISCONTINUED | OUTPATIENT
Start: 2019-08-15 | End: 2019-08-16

## 2019-08-15 RX ORDER — HYDROMORPHONE HYDROCHLORIDE 2 MG/ML
0.5 INJECTION INTRAMUSCULAR; INTRAVENOUS; SUBCUTANEOUS EVERY 4 HOURS
Refills: 0 | Status: DISCONTINUED | OUTPATIENT
Start: 2019-08-15 | End: 2019-08-16

## 2019-08-15 RX ORDER — HYDROMORPHONE HYDROCHLORIDE 2 MG/ML
1 INJECTION INTRAMUSCULAR; INTRAVENOUS; SUBCUTANEOUS
Refills: 0 | Status: DISCONTINUED | OUTPATIENT
Start: 2019-08-15 | End: 2019-08-15

## 2019-08-15 RX ORDER — HYDROMORPHONE HYDROCHLORIDE 2 MG/ML
1 INJECTION INTRAMUSCULAR; INTRAVENOUS; SUBCUTANEOUS EVERY 4 HOURS
Refills: 0 | Status: DISCONTINUED | OUTPATIENT
Start: 2019-08-15 | End: 2019-08-16

## 2019-08-15 RX ORDER — INSULIN LISPRO 100/ML
VIAL (ML) SUBCUTANEOUS EVERY 6 HOURS
Refills: 0 | Status: DISCONTINUED | OUTPATIENT
Start: 2019-08-15 | End: 2019-08-16

## 2019-08-15 RX ORDER — ENOXAPARIN SODIUM 100 MG/ML
40 INJECTION SUBCUTANEOUS DAILY
Refills: 0 | Status: DISCONTINUED | OUTPATIENT
Start: 2019-08-15 | End: 2019-08-18

## 2019-08-15 RX ORDER — SODIUM CHLORIDE 9 MG/ML
3 INJECTION INTRAMUSCULAR; INTRAVENOUS; SUBCUTANEOUS EVERY 8 HOURS
Refills: 0 | Status: DISCONTINUED | OUTPATIENT
Start: 2019-08-15 | End: 2019-08-15

## 2019-08-15 RX ORDER — SODIUM CHLORIDE 9 MG/ML
1000 INJECTION, SOLUTION INTRAVENOUS
Refills: 0 | Status: DISCONTINUED | OUTPATIENT
Start: 2019-08-15 | End: 2019-08-16

## 2019-08-15 RX ORDER — CEFOTETAN DISODIUM 1 G
2 VIAL (EA) INJECTION EVERY 12 HOURS
Refills: 0 | Status: DISCONTINUED | OUTPATIENT
Start: 2019-08-15 | End: 2019-08-16

## 2019-08-15 RX ORDER — GLUCAGON INJECTION, SOLUTION 0.5 MG/.1ML
1 INJECTION, SOLUTION SUBCUTANEOUS ONCE
Refills: 0 | Status: DISCONTINUED | OUTPATIENT
Start: 2019-08-15 | End: 2019-08-18

## 2019-08-15 RX ORDER — ONDANSETRON 8 MG/1
4 TABLET, FILM COATED ORAL ONCE
Refills: 0 | Status: DISCONTINUED | OUTPATIENT
Start: 2019-08-15 | End: 2019-08-15

## 2019-08-15 RX ORDER — ONDANSETRON 8 MG/1
4 TABLET, FILM COATED ORAL EVERY 6 HOURS
Refills: 0 | Status: DISCONTINUED | OUTPATIENT
Start: 2019-08-15 | End: 2019-08-18

## 2019-08-15 RX ORDER — FENTANYL CITRATE 50 UG/ML
50 INJECTION INTRAVENOUS
Refills: 0 | Status: DISCONTINUED | OUTPATIENT
Start: 2019-08-15 | End: 2019-08-15

## 2019-08-15 RX ORDER — ALVIMOPAN 12 MG/1
12 CAPSULE ORAL ONCE
Refills: 0 | Status: COMPLETED | OUTPATIENT
Start: 2019-08-15 | End: 2019-08-15

## 2019-08-15 RX ADMIN — Medication 100 GRAM(S): at 18:35

## 2019-08-15 RX ADMIN — HYDROMORPHONE HYDROCHLORIDE 1 MILLIGRAM(S): 2 INJECTION INTRAMUSCULAR; INTRAVENOUS; SUBCUTANEOUS at 15:05

## 2019-08-15 RX ADMIN — ALVIMOPAN 12 MILLIGRAM(S): 12 CAPSULE ORAL at 18:35

## 2019-08-15 RX ADMIN — HYDROMORPHONE HYDROCHLORIDE 1 MILLIGRAM(S): 2 INJECTION INTRAMUSCULAR; INTRAVENOUS; SUBCUTANEOUS at 18:35

## 2019-08-15 RX ADMIN — Medication 100 GRAM(S): at 09:40

## 2019-08-15 RX ADMIN — SODIUM CHLORIDE 100 MILLILITER(S): 9 INJECTION, SOLUTION INTRAVENOUS at 18:35

## 2019-08-15 RX ADMIN — ALVIMOPAN 12 MILLIGRAM(S): 12 CAPSULE ORAL at 07:56

## 2019-08-15 RX ADMIN — HYDROMORPHONE HYDROCHLORIDE 1 MILLIGRAM(S): 2 INJECTION INTRAMUSCULAR; INTRAVENOUS; SUBCUTANEOUS at 14:09

## 2019-08-15 NOTE — PROGRESS NOTE ADULT - SUBJECTIVE AND OBJECTIVE BOX
Post-op Check    Subjective:  Pt offers no acute complaints at this time. Pain well controlled on current regiment. Denies chest pain, SOB, palpitations.     STATUS POST:  Colorectal anastomosis ,Laparoscopic take-down of colostomy, operative findings laparoscopic and hand assisted extensive lysis of adhesions to anterior abdominal wall. takedown of end colostomy. colorectal anastomosis with EEA stapler. negative leak test x2. prior ostomy site packed with 4x4 gauze in left lower quadrant. incisions closed with staples. mini laparotomy incision covered with provena vac    POST OPERATIVE DAY #: 0    MEDICATIONS  (STANDING):  alvimopan 12 milliGRAM(s) Oral two times a day  cefoTEtan  IVPB 2 Gram(s) IV Intermittent every 12 hours  dextrose 5%. 1000 milliLiter(s) (50 mL/Hr) IV Continuous <Continuous>  dextrose 50% Injectable 12.5 Gram(s) IV Push once  dextrose 50% Injectable 25 Gram(s) IV Push once  dextrose 50% Injectable 25 Gram(s) IV Push once  enoxaparin Injectable 40 milliGRAM(s) SubCutaneous daily  insulin lispro (HumaLOG) corrective regimen sliding scale   SubCutaneous every 6 hours  lactated ringers. 1000 milliLiter(s) (100 mL/Hr) IV Continuous <Continuous>    MEDICATIONS  (PRN):  acetaminophen   Tablet .. 650 milliGRAM(s) Oral every 6 hours PRN Mild Pain (1 - 3)  dextrose 40% Gel 15 Gram(s) Oral once PRN Blood Glucose LESS THAN 70 milliGRAM(s)/deciliter  glucagon  Injectable 1 milliGRAM(s) IntraMuscular once PRN Glucose LESS THAN 70 milligrams/deciliter  HYDROmorphone  Injectable 0.5 milliGRAM(s) IV Push every 4 hours PRN Moderate Pain (4 - 6)  HYDROmorphone  Injectable 1 milliGRAM(s) IV Push every 4 hours PRN Severe Pain (7 - 10)  ondansetron Injectable 4 milliGRAM(s) IV Push every 6 hours PRN Nausea      Vital Signs Last 24 Hrs  T(C): 36.8 (15 Aug 2019 19:51), Max: 36.9 (15 Aug 2019 13:44)  T(F): 98.2 (15 Aug 2019 19:51), Max: 98.5 (15 Aug 2019 13:44)  HR: 72 (15 Aug 2019 19:51) (70 - 93)  BP: 118/61 (15 Aug 2019 19:51) (100/57 - 121/67)  BP(mean): --  RR: 18 (15 Aug 2019 19:51) (12 - 18)  SpO2: 96% (15 Aug 2019 19:51) (96% - 100%)    Physical Exam:    Constitutional: NAD  HEENT: PERRL, EOMI  Neck: No JVD, FROM without pain  Respiratory: no accessory muscle use, respirations non-labored  GI: abdomen softly distended, midline prevena vac well seated, old ostomy packed with minimal serous sanguinous drainage on dressing, no flatus, no bm   : dee with clear yellow urine   Neurological: A&O x 3; without gross deficit    A:     P:  Continue current care  Pain control  OOB as tolerated  Encourage IS  DVT ppx  npo for now advance diet slowly  continue tight glucose control with insulin sliding scale Post-op Check    Subjective:  Pt offers no acute complaints at this time. Pain well controlled on current regiment. Denies chest pain, SOB, palpitations.     STATUS POST:  Colorectal anastomosis ,Laparoscopic take-down of colostomy, operative findings laparoscopic and hand assisted extensive lysis of adhesions to anterior abdominal wall. takedown of end colostomy. colorectal anastomosis with EEA stapler. negative leak test x2. prior ostomy site packed with 4x4 gauze in left lower quadrant. incisions closed with staples. mini laparotomy incision covered with provena vac    POST OPERATIVE DAY #: 0    MEDICATIONS  (STANDING):  alvimopan 12 milliGRAM(s) Oral two times a day  cefoTEtan  IVPB 2 Gram(s) IV Intermittent every 12 hours  dextrose 5%. 1000 milliLiter(s) (50 mL/Hr) IV Continuous <Continuous>  dextrose 50% Injectable 12.5 Gram(s) IV Push once  dextrose 50% Injectable 25 Gram(s) IV Push once  dextrose 50% Injectable 25 Gram(s) IV Push once  enoxaparin Injectable 40 milliGRAM(s) SubCutaneous daily  insulin lispro (HumaLOG) corrective regimen sliding scale   SubCutaneous every 6 hours  lactated ringers. 1000 milliLiter(s) (100 mL/Hr) IV Continuous <Continuous>    MEDICATIONS  (PRN):  acetaminophen   Tablet .. 650 milliGRAM(s) Oral every 6 hours PRN Mild Pain (1 - 3)  dextrose 40% Gel 15 Gram(s) Oral once PRN Blood Glucose LESS THAN 70 milliGRAM(s)/deciliter  glucagon  Injectable 1 milliGRAM(s) IntraMuscular once PRN Glucose LESS THAN 70 milligrams/deciliter  HYDROmorphone  Injectable 0.5 milliGRAM(s) IV Push every 4 hours PRN Moderate Pain (4 - 6)  HYDROmorphone  Injectable 1 milliGRAM(s) IV Push every 4 hours PRN Severe Pain (7 - 10)  ondansetron Injectable 4 milliGRAM(s) IV Push every 6 hours PRN Nausea      Vital Signs Last 24 Hrs  T(C): 36.8 (15 Aug 2019 19:51), Max: 36.9 (15 Aug 2019 13:44)  T(F): 98.2 (15 Aug 2019 19:51), Max: 98.5 (15 Aug 2019 13:44)  HR: 72 (15 Aug 2019 19:51) (70 - 93)  BP: 118/61 (15 Aug 2019 19:51) (100/57 - 121/67)  BP(mean): --  RR: 18 (15 Aug 2019 19:51) (12 - 18)  SpO2: 96% (15 Aug 2019 19:51) (96% - 100%)    Physical Exam:    Constitutional: NAD  HEENT: PERRL, EOMI  Neck: No JVD, FROM without pain  Respiratory: no accessory muscle use, respirations non-labored  GI: abdomen softly distended, midline prevena vac well seated, old ostomy packed with minimal serous sanguinous drainage on dressing, no flatus, no bm   : dee with clear yellow urine   Neurological: A&O x 3; without gross deficit    A:     P:  Continue current care  Pain control  OOB as tolerated  Encourage IS  DVT ppx  npo for now advance diet slowly once bowel function returns   continue tight glucose control with insulin sliding scale

## 2019-08-15 NOTE — PROGRESS NOTE ADULT - PROVIDER SPECIALTY LIST ADULT
Colorectal Surgery possibly 4 years ago done due to vertigo episode at [facility]

## 2019-08-15 NOTE — PROGRESS NOTE ADULT - PROBLEM SELECTOR PLAN 1
this is a 50 y/o F s/p colostomy reversal  continue NPO  IV fluids  monitor abdominal exams  continue prevena vac  daily packing changes to old ostomy site, consider wound vac for advanced healing  pain control  advance diet slowly once bf returns on discretion of attending  dvt ppx  scd  incentive spirometer  monitor for any hemodynamic instablity

## 2019-08-15 NOTE — BRIEF OPERATIVE NOTE - OPERATION/FINDINGS
laparoscopic and hand assisted extensive lysis of adhesions to anterior abdominal wall. takedown of end colostomy. colorectal anastomosis with EEA stapler. negative leak test x2. prior ostomy site packed with 4x4 gauze in left lower quadrant. incisions closed with staples. mini laparotomy incision covered with provena vac

## 2019-08-15 NOTE — BRIEF OPERATIVE NOTE - NSICDXBRIEFPROCEDURE_GEN_ALL_CORE_FT
PROCEDURES:  Colorectal anastomosis 15-Aug-2019 13:52:25  Venita Soriano  Laparoscopic take-down of colostomy 15-Aug-2019 13:52:01  Venita Soriano

## 2019-08-16 LAB
ANION GAP SERPL CALC-SCNC: 10 MMOL/L — SIGNIFICANT CHANGE UP (ref 5–17)
BASOPHILS # BLD AUTO: 0.02 K/UL — SIGNIFICANT CHANGE UP (ref 0–0.2)
BASOPHILS NFR BLD AUTO: 0.1 % — SIGNIFICANT CHANGE UP (ref 0–2)
BUN SERPL-MCNC: 10 MG/DL — SIGNIFICANT CHANGE UP (ref 8–20)
CALCIUM SERPL-MCNC: 9.2 MG/DL — SIGNIFICANT CHANGE UP (ref 8.6–10.2)
CHLORIDE SERPL-SCNC: 103 MMOL/L — SIGNIFICANT CHANGE UP (ref 98–107)
CO2 SERPL-SCNC: 26 MMOL/L — SIGNIFICANT CHANGE UP (ref 22–29)
CREAT SERPL-MCNC: 0.64 MG/DL — SIGNIFICANT CHANGE UP (ref 0.5–1.3)
EOSINOPHIL # BLD AUTO: 0 K/UL — SIGNIFICANT CHANGE UP (ref 0–0.5)
EOSINOPHIL NFR BLD AUTO: 0 % — SIGNIFICANT CHANGE UP (ref 0–6)
GLUCOSE BLDC GLUCOMTR-MCNC: 105 MG/DL — HIGH (ref 70–99)
GLUCOSE BLDC GLUCOMTR-MCNC: 132 MG/DL — HIGH (ref 70–99)
GLUCOSE BLDC GLUCOMTR-MCNC: 88 MG/DL — SIGNIFICANT CHANGE UP (ref 70–99)
GLUCOSE SERPL-MCNC: 100 MG/DL — SIGNIFICANT CHANGE UP (ref 70–115)
HCT VFR BLD CALC: 35.2 % — SIGNIFICANT CHANGE UP (ref 34.5–45)
HGB BLD-MCNC: 11.3 G/DL — LOW (ref 11.5–15.5)
IMM GRANULOCYTES NFR BLD AUTO: 0.7 % — SIGNIFICANT CHANGE UP (ref 0–1.5)
LYMPHOCYTES # BLD AUTO: 1.66 K/UL — SIGNIFICANT CHANGE UP (ref 1–3.3)
LYMPHOCYTES # BLD AUTO: 12.1 % — LOW (ref 13–44)
MAGNESIUM SERPL-MCNC: 1.6 MG/DL — SIGNIFICANT CHANGE UP (ref 1.6–2.6)
MCHC RBC-ENTMCNC: 28.3 PG — SIGNIFICANT CHANGE UP (ref 27–34)
MCHC RBC-ENTMCNC: 32.1 GM/DL — SIGNIFICANT CHANGE UP (ref 32–36)
MCV RBC AUTO: 88.2 FL — SIGNIFICANT CHANGE UP (ref 80–100)
MONOCYTES # BLD AUTO: 1.22 K/UL — HIGH (ref 0–0.9)
MONOCYTES NFR BLD AUTO: 8.9 % — SIGNIFICANT CHANGE UP (ref 2–14)
NEUTROPHILS # BLD AUTO: 10.77 K/UL — HIGH (ref 1.8–7.4)
NEUTROPHILS NFR BLD AUTO: 78.2 % — HIGH (ref 43–77)
PHOSPHATE SERPL-MCNC: 4.4 MG/DL — SIGNIFICANT CHANGE UP (ref 2.4–4.7)
PLATELET # BLD AUTO: 216 K/UL — SIGNIFICANT CHANGE UP (ref 150–400)
POTASSIUM SERPL-MCNC: 3.8 MMOL/L — SIGNIFICANT CHANGE UP (ref 3.5–5.3)
POTASSIUM SERPL-SCNC: 3.8 MMOL/L — SIGNIFICANT CHANGE UP (ref 3.5–5.3)
RBC # BLD: 3.99 M/UL — SIGNIFICANT CHANGE UP (ref 3.8–5.2)
RBC # FLD: 15.4 % — HIGH (ref 10.3–14.5)
SODIUM SERPL-SCNC: 139 MMOL/L — SIGNIFICANT CHANGE UP (ref 135–145)
WBC # BLD: 13.76 K/UL — HIGH (ref 3.8–10.5)
WBC # FLD AUTO: 13.76 K/UL — HIGH (ref 3.8–10.5)

## 2019-08-16 RX ORDER — ACETAMINOPHEN 500 MG
650 TABLET ORAL EVERY 6 HOURS
Refills: 0 | Status: DISCONTINUED | OUTPATIENT
Start: 2019-08-16 | End: 2019-08-18

## 2019-08-16 RX ORDER — KETOROLAC TROMETHAMINE 30 MG/ML
15 SYRINGE (ML) INJECTION EVERY 6 HOURS
Refills: 0 | Status: DISCONTINUED | OUTPATIENT
Start: 2019-08-16 | End: 2019-08-18

## 2019-08-16 RX ORDER — SODIUM CHLORIDE 9 MG/ML
1000 INJECTION, SOLUTION INTRAVENOUS
Refills: 0 | Status: DISCONTINUED | OUTPATIENT
Start: 2019-08-16 | End: 2019-08-18

## 2019-08-16 RX ADMIN — HYDROMORPHONE HYDROCHLORIDE 0.5 MILLIGRAM(S): 2 INJECTION INTRAMUSCULAR; INTRAVENOUS; SUBCUTANEOUS at 08:42

## 2019-08-16 RX ADMIN — Medication 15 MILLIGRAM(S): at 18:19

## 2019-08-16 RX ADMIN — Medication 100 GRAM(S): at 06:20

## 2019-08-16 RX ADMIN — ALVIMOPAN 12 MILLIGRAM(S): 12 CAPSULE ORAL at 18:19

## 2019-08-16 RX ADMIN — Medication 650 MILLIGRAM(S): at 13:01

## 2019-08-16 RX ADMIN — HYDROMORPHONE HYDROCHLORIDE 0.5 MILLIGRAM(S): 2 INJECTION INTRAMUSCULAR; INTRAVENOUS; SUBCUTANEOUS at 08:51

## 2019-08-16 RX ADMIN — ENOXAPARIN SODIUM 40 MILLIGRAM(S): 100 INJECTION SUBCUTANEOUS at 13:01

## 2019-08-16 RX ADMIN — Medication 15 MILLIGRAM(S): at 13:50

## 2019-08-16 RX ADMIN — Medication 15 MILLIGRAM(S): at 13:02

## 2019-08-16 RX ADMIN — Medication 650 MILLIGRAM(S): at 13:50

## 2019-08-16 RX ADMIN — Medication 650 MILLIGRAM(S): at 18:19

## 2019-08-16 RX ADMIN — ALVIMOPAN 12 MILLIGRAM(S): 12 CAPSULE ORAL at 06:20

## 2019-08-16 NOTE — PROGRESS NOTE ADULT - PROBLEM SELECTOR PLAN 1
s/p colostomy reversal  continue NPO, advance diet slowly, when to start at discretion of attending  continue dee for now, monitor output  pain control  dvt ppx w/scd  out of bed  incentive spirometer  tight glucose control via insulin sliding scale  monitor hemodynamics s/p colostomy reversal  continue NPO, advance diet slowly, when to start at discretion of attending  discontinue dee, TOV   pain control  dvt ppx w/scd  out of bed  incentive spirometer  tight glucose control via insulin sliding scale  monitor hemodynamics

## 2019-08-16 NOTE — PROGRESS NOTE ADULT - SUBJECTIVE AND OBJECTIVE BOX
OVERNIGHT EVENTS: Patient status post colostomy reversal had extensive JG, did well overnight and pain well controlled on current regimen.       STATUS POST:  laparoscopic and hand assisted extensive lysis of adhesions to anterior abdominal wall. takedown of end colostomy. colorectal anastomosis with EEA stapler. negative leak test x2. prior ostomy site packed with 4x4 gauze in left lower quadrant. incisions closed with staples. mini laparotomy incision covered with provena vac    POST OPERATIVE DAY #: 1      cefoTEtan  IVPB 2 Gram(s) IV Intermittent every 12 hours  enoxaparin Injectable 40 milliGRAM(s) SubCutaneous daily      Vital Signs Last 24 Hrs  T(C): 36.8 (15 Aug 2019 19:51), Max: 36.9 (15 Aug 2019 13:44)  T(F): 98.2 (15 Aug 2019 19:51), Max: 98.5 (15 Aug 2019 13:44)  HR: 72 (15 Aug 2019 19:51) (70 - 93)  BP: 118/61 (15 Aug 2019 19:51) (100/57 - 121/67)  BP(mean): --  RR: 18 (15 Aug 2019 19:51) (12 - 18)  SpO2: 96% (15 Aug 2019 19:51) (96% - 100%)  I&O's Detail    15 Aug 2019 07:01  -  16 Aug 2019 02:59  --------------------------------------------------------  IN:    lactated ringers.: 400 mL  Total IN: 400 mL    OUT:    Indwelling Catheter - Urethral: 500 mL    Voided: 375 mL  Total OUT: 875 mL    Total NET: -475 mL          General: NAD, resting comfortably in bed  C/V: NSR  Pulm: Nonlabored breathing, no respiratory distress  Abd: softly distended, appropriate mild ttp, midline prevena vac well seated with good suction, old ostomy site packed dressing with mild to moderate serous-sanguinous drainage , no bf as of yet  Extrem: WWP, no edema, SCDs in place  Grey: with clear yellow urine       LABS:                RADIOLOGY & ADDITIONAL STUDIES:

## 2019-08-17 ENCOUNTER — TRANSCRIPTION ENCOUNTER (OUTPATIENT)
Age: 49
End: 2019-08-17

## 2019-08-17 LAB
ANION GAP SERPL CALC-SCNC: 8 MMOL/L — SIGNIFICANT CHANGE UP (ref 5–17)
BUN SERPL-MCNC: 8 MG/DL — SIGNIFICANT CHANGE UP (ref 8–20)
CALCIUM SERPL-MCNC: 8.8 MG/DL — SIGNIFICANT CHANGE UP (ref 8.6–10.2)
CHLORIDE SERPL-SCNC: 101 MMOL/L — SIGNIFICANT CHANGE UP (ref 98–107)
CO2 SERPL-SCNC: 28 MMOL/L — SIGNIFICANT CHANGE UP (ref 22–29)
CREAT SERPL-MCNC: 0.6 MG/DL — SIGNIFICANT CHANGE UP (ref 0.5–1.3)
GLUCOSE SERPL-MCNC: 106 MG/DL — SIGNIFICANT CHANGE UP (ref 70–115)
HCT VFR BLD CALC: 33.6 % — LOW (ref 34.5–45)
HGB BLD-MCNC: 10.6 G/DL — LOW (ref 11.5–15.5)
MAGNESIUM SERPL-MCNC: 1.7 MG/DL — SIGNIFICANT CHANGE UP (ref 1.6–2.6)
MCHC RBC-ENTMCNC: 28.3 PG — SIGNIFICANT CHANGE UP (ref 27–34)
MCHC RBC-ENTMCNC: 31.5 GM/DL — LOW (ref 32–36)
MCV RBC AUTO: 89.6 FL — SIGNIFICANT CHANGE UP (ref 80–100)
PHOSPHATE SERPL-MCNC: 2.8 MG/DL — SIGNIFICANT CHANGE UP (ref 2.4–4.7)
PLATELET # BLD AUTO: 187 K/UL — SIGNIFICANT CHANGE UP (ref 150–400)
POTASSIUM SERPL-MCNC: 3.6 MMOL/L — SIGNIFICANT CHANGE UP (ref 3.5–5.3)
POTASSIUM SERPL-SCNC: 3.6 MMOL/L — SIGNIFICANT CHANGE UP (ref 3.5–5.3)
RBC # BLD: 3.75 M/UL — LOW (ref 3.8–5.2)
RBC # FLD: 15.4 % — HIGH (ref 10.3–14.5)
SODIUM SERPL-SCNC: 137 MMOL/L — SIGNIFICANT CHANGE UP (ref 135–145)
WBC # BLD: 9.18 K/UL — SIGNIFICANT CHANGE UP (ref 3.8–10.5)
WBC # FLD AUTO: 9.18 K/UL — SIGNIFICANT CHANGE UP (ref 3.8–10.5)

## 2019-08-17 RX ORDER — ACETAMINOPHEN 500 MG
2 TABLET ORAL
Qty: 0 | Refills: 0 | DISCHARGE
Start: 2019-08-17

## 2019-08-17 RX ADMIN — Medication 15 MILLIGRAM(S): at 17:35

## 2019-08-17 RX ADMIN — Medication 650 MILLIGRAM(S): at 12:17

## 2019-08-17 RX ADMIN — Medication 15 MILLIGRAM(S): at 06:29

## 2019-08-17 RX ADMIN — Medication 15 MILLIGRAM(S): at 00:12

## 2019-08-17 RX ADMIN — Medication 650 MILLIGRAM(S): at 11:17

## 2019-08-17 RX ADMIN — ALVIMOPAN 12 MILLIGRAM(S): 12 CAPSULE ORAL at 17:17

## 2019-08-17 RX ADMIN — Medication 15 MILLIGRAM(S): at 17:19

## 2019-08-17 RX ADMIN — ENOXAPARIN SODIUM 40 MILLIGRAM(S): 100 INJECTION SUBCUTANEOUS at 11:17

## 2019-08-17 RX ADMIN — Medication 650 MILLIGRAM(S): at 01:23

## 2019-08-17 RX ADMIN — Medication 15 MILLIGRAM(S): at 11:33

## 2019-08-17 RX ADMIN — Medication 650 MILLIGRAM(S): at 00:13

## 2019-08-17 RX ADMIN — Medication 650 MILLIGRAM(S): at 17:18

## 2019-08-17 RX ADMIN — ALVIMOPAN 12 MILLIGRAM(S): 12 CAPSULE ORAL at 05:49

## 2019-08-17 RX ADMIN — Medication 650 MILLIGRAM(S): at 05:48

## 2019-08-17 RX ADMIN — Medication 15 MILLIGRAM(S): at 00:23

## 2019-08-17 RX ADMIN — Medication 15 MILLIGRAM(S): at 11:17

## 2019-08-17 RX ADMIN — Medication 15 MILLIGRAM(S): at 05:48

## 2019-08-17 RX ADMIN — Medication 650 MILLIGRAM(S): at 18:18

## 2019-08-17 RX ADMIN — Medication 650 MILLIGRAM(S): at 06:29

## 2019-08-17 NOTE — DISCHARGE NOTE PROVIDER - NSDCCPCAREPLAN_GEN_ALL_CORE_FT
PRINCIPAL DISCHARGE DIAGNOSIS  Diagnosis: S/P colostomy takedown  Assessment and Plan of Treatment: Follow Up: Please call to make an appointment with Dr. Durand 10-14 days after discharge. Also, please call to make an appointment with your primary care physician as per your usual schedule.   Activity: May return to normal activities as tolerated, however refrain from heavy lifting >10-15 pounds.   Diet: May continue regular diet.  Medications: Please take all home medications as prescribed by your primary care doctor. You are encouraged to take over-the-counter tylenol and/or ibuprofen for pain relief.   Wound Care: Please, keep wound site clean and dry. You may shower, but do not bathe.   Patient is advised to RETURN TO THE EMERGENCY DEPARTMENT for any of the following - worsening pain, fever/chills, nausea/vomiting, alterned mental status, chest pain, shortness of breath, or any other new/worsening symptoms.

## 2019-08-17 NOTE — DISCHARGE NOTE PROVIDER - NSDCFUADDAPPT_GEN_ALL_CORE_FT
Call office Dr. Durand monday ( 8/19/19 ) to schedule follow up appointment for 7-10 days from discharge.

## 2019-08-17 NOTE — PROGRESS NOTE ADULT - SUBJECTIVE AND OBJECTIVE BOX
HPI/OVERNIGHT EVENTS:  Grey discontinued yesterday and antibiotics completed for 24hr course postoperatively. No acute events overnight. Feels well this morning and is tolerating a clear liquid diet. Pain has been well controlled. Denies any nausea, vomiting, fever, chills, chest pain, shortness of breath, or any new or concerning symptoms.     MEDICATIONS  (STANDING):  acetaminophen   Tablet .. 650 milliGRAM(s) Oral every 6 hours  alvimopan 12 milliGRAM(s) Oral two times a day  enoxaparin Injectable 40 milliGRAM(s) SubCutaneous daily  ketorolac   Injectable 15 milliGRAM(s) IV Push every 6 hours  lactated ringers. 1000 milliLiter(s) (50 mL/Hr) IV Continuous <Continuous>    MEDICATIONS  (PRN):  glucagon  Injectable 1 milliGRAM(s) IntraMuscular once PRN Glucose LESS THAN 70 milligrams/deciliter  ondansetron Injectable 4 milliGRAM(s) IV Push every 6 hours PRN Nausea      Vital Signs Last 24 Hrs  T(C): 36.5 (16 Aug 2019 19:15), Max: 36.7 (16 Aug 2019 05:16)  T(F): 97.7 (16 Aug 2019 19:15), Max: 98.1 (16 Aug 2019 05:16)  HR: 90 (16 Aug 2019 19:15) (70 - 90)  BP: 116/65 (16 Aug 2019 19:15) (110/65 - 117/74)  BP(mean): --  RR: 18 (16 Aug 2019 19:15) (18 - 18)  SpO2: 97% (16 Aug 2019 19:15) (97% - 100%)    Constitutional: patient resting comfortably in bed, in no acute distress  HEENT: EOMI / PERRL b/l, no active drainage or redness  Neck: No JVD, full ROM without pain  Respiratory: respirations are unlabored, no accessory muscle use, no conversational dyspnea  Cardiovascular: regular rate & rhythm  Gastrointestinal: Abdomen soft, non-tender, non-distended, no rebound tenderness / guarding  Neurological: GCS: 15 (4/5/6). A&O x 3; no gross sensory / motor / coordination deficits  Psychiatric: Normal mood, normal affect  Musculoskeletal: No joint pain, swelling or deformity; no limitation of movement      I&O's Detail    15 Aug 2019 07:01  -  16 Aug 2019 07:00  --------------------------------------------------------  IN:    lactated ringers.: 1200 mL    lactated ringers.: 400 mL  Total IN: 1600 mL    OUT:    Indwelling Catheter - Urethral: 800 mL    Voided: 375 mL  Total OUT: 1175 mL    Total NET: 425 mL      16 Aug 2019 07:01  -  17 Aug 2019 01:50  --------------------------------------------------------  IN:  Total IN: 0 mL    OUT:    Indwelling Catheter - Urethral: 150 mL    Voided: 500 mL  Total OUT: 650 mL    Total NET: -650 mL          LABS:                        11.3   13.76 )-----------( 216      ( 16 Aug 2019 10:39 )             35.2     08-16    139  |  103  |  10.0  ----------------------------<  100  3.8   |  26.0  |  0.64    Ca    9.2      16 Aug 2019 10:39  Phos  4.4     08-16  Mg     1.6     08-16 HPI/OVERNIGHT EVENTS:  Grey discontinued yesterday and antibiotics completed for 24hr course postoperatively. No acute events overnight. Feels well this morning and is tolerating a clear liquid diet. Pain has been well controlled. Denies any nausea, vomiting, fever, chills, chest pain, shortness of breath, or any new or concerning symptoms.     MEDICATIONS  (STANDING):  acetaminophen   Tablet .. 650 milliGRAM(s) Oral every 6 hours  alvimopan 12 milliGRAM(s) Oral two times a day  enoxaparin Injectable 40 milliGRAM(s) SubCutaneous daily  ketorolac   Injectable 15 milliGRAM(s) IV Push every 6 hours  lactated ringers. 1000 milliLiter(s) (50 mL/Hr) IV Continuous <Continuous>    MEDICATIONS  (PRN):  glucagon  Injectable 1 milliGRAM(s) IntraMuscular once PRN Glucose LESS THAN 70 milligrams/deciliter  ondansetron Injectable 4 milliGRAM(s) IV Push every 6 hours PRN Nausea      Vital Signs Last 24 Hrs  T(C): 36.5 (16 Aug 2019 19:15), Max: 36.7 (16 Aug 2019 05:16)  T(F): 97.7 (16 Aug 2019 19:15), Max: 98.1 (16 Aug 2019 05:16)  HR: 90 (16 Aug 2019 19:15) (70 - 90)  BP: 116/65 (16 Aug 2019 19:15) (110/65 - 117/74)  BP(mean): --  RR: 18 (16 Aug 2019 19:15) (18 - 18)  SpO2: 97% (16 Aug 2019 19:15) (97% - 100%)    Constitutional: patient resting comfortably in bed, in no acute distress  HEENT: EOMI  Respiratory: respirations are unlabored, no accessory muscle use, no conversational dyspnea  Cardiovascular: regular rate & rhythm  Gastrointestinal: Abdomen soft, non-distended, appropriately tender to palpation. Midline prevena in place, vac sealed with good suction. Ostomy site packed with occlusive dressing, stained with serosanguinous fluid- removed.   Neurological: GCS: 15 (4/5/6). A&O x 3; no focal deficits        I&O's Detail    15 Aug 2019 07:01  -  16 Aug 2019 07:00  --------------------------------------------------------  IN:    lactated ringers.: 1200 mL    lactated ringers.: 400 mL  Total IN: 1600 mL    OUT:    Indwelling Catheter - Urethral: 800 mL    Voided: 375 mL  Total OUT: 1175 mL    Total NET: 425 mL      16 Aug 2019 07:01  -  17 Aug 2019 01:50  --------------------------------------------------------  IN:  Total IN: 0 mL    OUT:    Indwelling Catheter - Urethral: 150 mL    Voided: 500 mL  Total OUT: 650 mL    Total NET: -650 mL        LABS:                        11.3   13.76 )-----------( 216      ( 16 Aug 2019 10:39 )             35.2     08-16    139  |  103  |  10.0  ----------------------------<  100  3.8   |  26.0  |  0.64    Ca    9.2      16 Aug 2019 10:39  Phos  4.4     08-16  Mg     1.6     08-16

## 2019-08-17 NOTE — DISCHARGE NOTE PROVIDER - CARE PROVIDER_API CALL
Mague Durand)  Surgery  [address] [place] [address]  Phone: [phone]  Fax: [phone]  Follow Up Time:

## 2019-08-17 NOTE — DISCHARGE NOTE PROVIDER - NSDCACTIVITY_GEN_ALL_CORE
No heavy lifting/straining/Showering allowed No heavy lifting/straining/Do not drive or operate machinery/Stairs allowed/Walking - Indoors allowed/Walking - Outdoors allowed/Showering allowed

## 2019-08-17 NOTE — PROGRESS NOTE ADULT - ASSESSMENT
S/P colostomy.  Plan: s/p colostomy reversal  continue NPO, advance diet slowly, when to start at discretion of attending  discontinue dee, TOV   pain control  dvt ppx w/scd  out of bed  incentive spirometer  tight glucose control via insulin sliding scale  monitor hemodynamics. 50 yo F POD2 s/p Keny's reversal and extensive JG who is feeling and progressing well. She is tolerating her clear liquid diet. Still without gas or bowel movements.     - Remove packing from ostomy site, replace with dry gauze and tape  - Prevena in place until POD5 (8/20)  - Monitor bowel function  - pain control  - dvt ppx w/ scd  - out of bed  - incentive spirometer

## 2019-08-17 NOTE — PROGRESS NOTE ADULT - SUBJECTIVE AND OBJECTIVE BOX
Seen and examined with Dr. Durand      Hospital Day #    POD # 2 s/p laparoscopic hang assisted closure colostomy    IV: LR @ 50cc/hr    I&O's Summary    16 Aug 2019 07:01  -  17 Aug 2019 07:00  --------------------------------------------------------  IN: 0 mL / OUT: 650 mL / NET: -650 mL        diet: clear liquids, tolerating , denies nausea or vomting    Patient: afebrile VSS awake and alert resting in bed , no acute distress- feeling better today.      T(C): 36.9 (08-17-19 @ 07:15), Max: 36.9 (08-17-19 @ 07:15)  HR: 91 (08-17-19 @ 07:15) (90 - 91)  BP: 115/67 (08-17-19 @ 07:15) (110/65 - 116/65)  RR: 18 (08-17-19 @ 07:15) (18 - 18)  SpO2: 96% (08-17-19 @ 07:15) (96% - 97%)  Wt(kg): --    chest: good air exchange, denies SOB or chest pain   Abdomen: soft , minimally distended, minimal surgical, incisional discomfort, relief with meds.  surgical sites clean and dry, dressing in place  ( provena vac ) + BS scattered , + flatus , small bm bloody ( expected ) explained to patient.   output: voiding well with out difficulty  Extremities: warm to toes with out calf pain or tenderness, OOB, encouraged ambulation .            ***LABS NOTED AND REVIEWED 8/17/19***                   10.6   9.18  )-----------( 187      ( 17 Aug 2019 07:19 )   wbc down from 13.7             33.6     08-17    137  |  101  |  8.0  ----------------------------<  106  3.6   |  28.0  |  0.60    Ca    8.8      17 Aug 2019 07:19  Phos  2.8     08-17  Mg     1.7     08-17          xrays:    PAST MEDICAL & SURGICAL HISTORY:  Endometrial adenocarcinoma  Endometriosis  S/P colostomy: 5/15/19 sepsis after hysterectomy  Anemia  S/P colostomy: may15 2019  History of hysterectomy  H/O removal of cyst          Impression: stable POD # 2 , tolerated surgery well, feeling better, tolerating PO, WBC wnl, surgical sites clean and stable , minimal discomfort relief with meds.   Plan: continue present care and management, encouraged OOB and ambulation, advance diet full fluids, discharge planning possible 24 hrs if continues to improve.

## 2019-08-17 NOTE — DISCHARGE NOTE PROVIDER - HOSPITAL COURSE
Patient was admitted through [facility] for Colostomy reversal. On 8/15, patient underwent a laparoscopic hand-assisted colostomy reversal. There were no complications with the procedure. Prevena vac was placed over midline incision and old ostomy site was packed with gauze. Post-operatively tight glucose control was achieved with ISS. Grey was d/c the next day and patient passed TOV. Diet was progressed slowly as bowel function returned. Pain was well controlled. Patient was OOB and ambulating without assistance. Voiding spontaneously. Patient was stable for discharge on 8/18/19.

## 2019-08-18 ENCOUNTER — TRANSCRIPTION ENCOUNTER (OUTPATIENT)
Age: 49
End: 2019-08-18

## 2019-08-18 VITALS
DIASTOLIC BLOOD PRESSURE: 76 MMHG | SYSTOLIC BLOOD PRESSURE: 127 MMHG | OXYGEN SATURATION: 97 % | HEART RATE: 87 BPM | RESPIRATION RATE: 18 BRPM

## 2019-08-18 PROCEDURE — 84100 ASSAY OF PHOSPHORUS: CPT

## 2019-08-18 PROCEDURE — 80048 BASIC METABOLIC PNL TOTAL CA: CPT

## 2019-08-18 PROCEDURE — 36415 COLL VENOUS BLD VENIPUNCTURE: CPT

## 2019-08-18 PROCEDURE — 88305 TISSUE EXAM BY PATHOLOGIST: CPT

## 2019-08-18 PROCEDURE — 86900 BLOOD TYPING SEROLOGIC ABO: CPT

## 2019-08-18 PROCEDURE — 82962 GLUCOSE BLOOD TEST: CPT

## 2019-08-18 PROCEDURE — 86901 BLOOD TYPING SEROLOGIC RH(D): CPT

## 2019-08-18 PROCEDURE — C1889: CPT

## 2019-08-18 PROCEDURE — 88304 TISSUE EXAM BY PATHOLOGIST: CPT

## 2019-08-18 PROCEDURE — 88307 TISSUE EXAM BY PATHOLOGIST: CPT

## 2019-08-18 PROCEDURE — 85027 COMPLETE CBC AUTOMATED: CPT

## 2019-08-18 PROCEDURE — 83735 ASSAY OF MAGNESIUM: CPT

## 2019-08-18 PROCEDURE — 86850 RBC ANTIBODY SCREEN: CPT

## 2019-08-18 RX ADMIN — Medication 15 MILLIGRAM(S): at 00:06

## 2019-08-18 RX ADMIN — Medication 650 MILLIGRAM(S): at 00:06

## 2019-08-18 RX ADMIN — Medication 15 MILLIGRAM(S): at 06:32

## 2019-08-18 RX ADMIN — Medication 650 MILLIGRAM(S): at 06:32

## 2019-08-18 RX ADMIN — ENOXAPARIN SODIUM 40 MILLIGRAM(S): 100 INJECTION SUBCUTANEOUS at 13:24

## 2019-08-18 RX ADMIN — Medication 15 MILLIGRAM(S): at 05:09

## 2019-08-18 RX ADMIN — Medication 15 MILLIGRAM(S): at 13:38

## 2019-08-18 RX ADMIN — Medication 650 MILLIGRAM(S): at 05:09

## 2019-08-18 RX ADMIN — Medication 650 MILLIGRAM(S): at 13:24

## 2019-08-18 RX ADMIN — Medication 650 MILLIGRAM(S): at 13:53

## 2019-08-18 RX ADMIN — Medication 15 MILLIGRAM(S): at 01:11

## 2019-08-18 RX ADMIN — Medication 15 MILLIGRAM(S): at 13:24

## 2019-08-18 RX ADMIN — ALVIMOPAN 12 MILLIGRAM(S): 12 CAPSULE ORAL at 05:09

## 2019-08-18 RX ADMIN — Medication 650 MILLIGRAM(S): at 01:11

## 2019-08-18 NOTE — PROGRESS NOTE ADULT - SUBJECTIVE AND OBJECTIVE BOX
Hospital Day :   POD # 3 s/p laparoscopic assisted closure colostomy    IV: LR @ 75 cc/hr    I&O's Summary    17 Aug 2019 07:01  -  18 Aug 2019 07:00  --------------------------------------------------------  IN: 480 mL / OUT: 1100 mL / NET: -620 mL        diet: full fluids - tolerating well denies nausea or vomiting     Patient: afebrile VSS awake and alert resting comfortable in bed , no acute changes.     T(C): 36.6 (08-18-19 @ 05:00), Max: 36.6 (08-18-19 @ 05:00)  HR: 87 (08-18-19 @ 07:20) (71 - 87)  BP: 127/76 (08-18-19 @ 07:20) (107/75 - 128/80)  RR: 18 (08-18-19 @ 07:20) (18 - 18)  SpO2: 97% (08-18-19 @ 07:20) (97% - 98%)  Wt(kg): --    chest: good air exchange, denies SOB, or chest pain   Abdomen: soft non -distended, mild surgical incisional discomfort ( provena vac ) dressing in place , old colostomy site dressing in place sites clean and stable - change old ostomy site today , +bs , +flatus no bm yet   output: voiding adequately, without difficulty   Extremities: warm to toes without calf pain or tenderness, OOB and ambulates well                           10.6   9.18  )-----------( 187      ( 17 Aug 2019 07:19 )             33.6     08-17    137  |  101  |  8.0  ----------------------------<  106  3.6   |  28.0  |  0.60    Ca    8.8      17 Aug 2019 07:19  Phos  2.8     08-17  Mg     1.7     08-17          xrays:    PAST MEDICAL & SURGICAL HISTORY:  Endometrial adenocarcinoma  Endometriosis  S/P colostomy: 5/15/19 sepsis after hysterectomy  Anemia  S/P colostomy: may15 2019  History of hysterectomy  H/O removal of cyst          Impression: stable POD # 3 , tolerated surgery well, continues to improve , tolerating PO full fluids   discuss with Surgeon present situation and continued care  Plan: continue present care and management, change old colostomy site dressing this morning, discharge planning Hospital Day :   POD # 3 s/p laparoscopic assisted closure colostomy    IV: LR @ 75 cc/hr    I&O's Summary    17 Aug 2019 07:01  -  18 Aug 2019 07:00  --------------------------------------------------------  IN: 480 mL / OUT: 1100 mL / NET: -620 mL        diet: full fluids - tolerating well denies nausea or vomiting     Patient: afebrile VSS awake and alert resting comfortable in bed , no acute changes.     T(C): 36.6 (08-18-19 @ 05:00), Max: 36.6 (08-18-19 @ 05:00)  HR: 87 (08-18-19 @ 07:20) (71 - 87)  BP: 127/76 (08-18-19 @ 07:20) (107/75 - 128/80)  RR: 18 (08-18-19 @ 07:20) (18 - 18)  SpO2: 97% (08-18-19 @ 07:20) (97% - 98%)  Wt(kg): --    chest: good air exchange, denies SOB, or chest pain   Abdomen: soft non -distended, mild surgical incisional discomfort ( provena vac ) dressing in place , old colostomy site dressing in place sites clean and stable - change old ostomy site today , +bs , +flatus no bm yet   output: voiding adequately, without difficulty   Extremities: warm to toes without calf pain or tenderness, OOB and ambulates well     **ADDENDUM 10:20 AM -- old colostomy site dressing removed and packing taken out cleaned with saline and dried - woun d clean minimal drainage small packing replaced and dressing applied and secured in place .                          10.6   9.18  )-----------( 187      ( 17 Aug 2019 07:19 )             33.6     08-17    137  |  101  |  8.0  ----------------------------<  106  3.6   |  28.0  |  0.60    Ca    8.8      17 Aug 2019 07:19  Phos  2.8     08-17  Mg     1.7     08-17          xrays:    PAST MEDICAL & SURGICAL HISTORY:  Endometrial adenocarcinoma  Endometriosis  S/P colostomy: 5/15/19 sepsis after hysterectomy  Anemia  S/P colostomy: may15 2019  History of hysterectomy  H/O removal of cyst          Impression: stable POD # 3 , tolerated surgery well, continues to improve , tolerating PO full fluids   discuss with Surgeon present situation and continued care  Plan: continue present care and management, change old colostomy site dressing this morning, discharge planning

## 2019-08-18 NOTE — DISCHARGE NOTE NURSING/CASE MANAGEMENT/SOCIAL WORK - NSDCDPATPORTLINK_GEN_ALL_CORE
You can access the [facility], offered by [facility], by registering with the following website: [email or web address]

## 2019-08-21 LAB — SURGICAL PATHOLOGY STUDY: SIGNIFICANT CHANGE UP

## 2019-08-27 ENCOUNTER — APPOINTMENT (OUTPATIENT)
Dept: COLORECTAL SURGERY | Facility: CLINIC | Age: 49
End: 2019-08-27
Payer: COMMERCIAL

## 2019-08-27 VITALS
RESPIRATION RATE: 14 BRPM | SYSTOLIC BLOOD PRESSURE: 127 MMHG | HEART RATE: 83 BPM | BODY MASS INDEX: 24.35 KG/M2 | HEIGHT: 61 IN | DIASTOLIC BLOOD PRESSURE: 77 MMHG | WEIGHT: 129 LBS | TEMPERATURE: 97.3 F

## 2019-08-27 PROCEDURE — 99024 POSTOP FOLLOW-UP VISIT: CPT

## 2019-08-27 NOTE — HISTORY OF PRESENT ILLNESS
[FreeTextEntry1] : 48-year-old female presented for followup after laparoscopic Jefferson's reversal. She is doing well today and has no complaints. She is returned to regular diet.

## 2019-08-27 NOTE — PHYSICAL EXAM
[Respiratory Effort] : normal respiratory effort [Normal Rate and Rhythm] : normal rate and rhythm [No Rash or Lesion] : No rash or lesion [Alert] : alert [Oriented to Person] : oriented to person [Oriented to Place] : oriented to place [Calm] : calm [Abdomen Masses] : No abdominal masses [Tender] : nontender [JVD] : no jugular venous distention  [de-identified] : s [de-identified] : stables removed [de-identified] : NAD [de-identified] :  normocephalic, atraumatic.

## 2019-08-27 NOTE — ASSESSMENT
[FreeTextEntry1] : 49-year-old female presented for followup after laparoscopic Jefferson's reversal. She's doing well today his room return to regular diet. She has about 2 to 4 bowel movements per day. She has no pain. Staples removed and no hernias were identified. I recommend refraining from heavy lifting for another 4-6 weeks.

## 2019-09-06 ENCOUNTER — FORM ENCOUNTER (OUTPATIENT)
Age: 49
End: 2019-09-06

## 2019-09-07 ENCOUNTER — APPOINTMENT (OUTPATIENT)
Dept: MAMMOGRAPHY | Facility: CLINIC | Age: 49
End: 2019-09-07
Payer: COMMERCIAL

## 2019-09-07 ENCOUNTER — OUTPATIENT (OUTPATIENT)
Dept: OUTPATIENT SERVICES | Facility: HOSPITAL | Age: 49
LOS: 1 days | End: 2019-09-07
Payer: COMMERCIAL

## 2019-09-07 DIAGNOSIS — Z98.890 OTHER SPECIFIED POSTPROCEDURAL STATES: Chronic | ICD-10-CM

## 2019-09-07 DIAGNOSIS — Z90.710 ACQUIRED ABSENCE OF BOTH CERVIX AND UTERUS: Chronic | ICD-10-CM

## 2019-09-07 DIAGNOSIS — Z12.31 ENCOUNTER FOR SCREENING MAMMOGRAM FOR MALIGNANT NEOPLASM OF BREAST: ICD-10-CM

## 2019-09-07 DIAGNOSIS — Z93.3 COLOSTOMY STATUS: Chronic | ICD-10-CM

## 2019-09-07 PROCEDURE — 77067 SCR MAMMO BI INCL CAD: CPT | Mod: 26

## 2019-09-07 PROCEDURE — 77063 BREAST TOMOSYNTHESIS BI: CPT

## 2019-09-07 PROCEDURE — 77067 SCR MAMMO BI INCL CAD: CPT

## 2019-09-07 PROCEDURE — 77063 BREAST TOMOSYNTHESIS BI: CPT | Mod: 26

## 2019-09-18 ENCOUNTER — FORM ENCOUNTER (OUTPATIENT)
Age: 49
End: 2019-09-18

## 2019-09-19 ENCOUNTER — APPOINTMENT (OUTPATIENT)
Dept: MAMMOGRAPHY | Facility: CLINIC | Age: 49
End: 2019-09-19
Payer: COMMERCIAL

## 2019-09-19 ENCOUNTER — APPOINTMENT (OUTPATIENT)
Dept: ULTRASOUND IMAGING | Facility: CLINIC | Age: 49
End: 2019-09-19
Payer: COMMERCIAL

## 2019-09-19 ENCOUNTER — OUTPATIENT (OUTPATIENT)
Dept: OUTPATIENT SERVICES | Facility: HOSPITAL | Age: 49
LOS: 1 days | End: 2019-09-19
Payer: COMMERCIAL

## 2019-09-19 DIAGNOSIS — Z93.3 COLOSTOMY STATUS: Chronic | ICD-10-CM

## 2019-09-19 DIAGNOSIS — Z90.710 ACQUIRED ABSENCE OF BOTH CERVIX AND UTERUS: Chronic | ICD-10-CM

## 2019-09-19 DIAGNOSIS — Z98.890 OTHER SPECIFIED POSTPROCEDURAL STATES: Chronic | ICD-10-CM

## 2019-09-19 DIAGNOSIS — C56.9 MALIGNANT NEOPLASM OF UNSPECIFIED OVARY: ICD-10-CM

## 2019-09-19 PROCEDURE — 77065 DX MAMMO INCL CAD UNI: CPT | Mod: 26,RT

## 2019-09-19 PROCEDURE — G0279: CPT | Mod: 26

## 2019-09-19 PROCEDURE — 76642 ULTRASOUND BREAST LIMITED: CPT

## 2019-09-19 PROCEDURE — 77065 DX MAMMO INCL CAD UNI: CPT

## 2019-09-19 PROCEDURE — 76642 ULTRASOUND BREAST LIMITED: CPT | Mod: 26,RT

## 2019-09-19 PROCEDURE — G0279: CPT

## 2019-09-22 ENCOUNTER — FORM ENCOUNTER (OUTPATIENT)
Age: 49
End: 2019-09-22

## 2019-09-23 ENCOUNTER — APPOINTMENT (OUTPATIENT)
Dept: ULTRASOUND IMAGING | Facility: CLINIC | Age: 49
End: 2019-09-23
Payer: COMMERCIAL

## 2019-09-23 ENCOUNTER — OUTPATIENT (OUTPATIENT)
Dept: OUTPATIENT SERVICES | Facility: HOSPITAL | Age: 49
LOS: 1 days | End: 2019-09-23
Payer: COMMERCIAL

## 2019-09-23 ENCOUNTER — RESULT REVIEW (OUTPATIENT)
Age: 49
End: 2019-09-23

## 2019-09-23 DIAGNOSIS — Z93.3 COLOSTOMY STATUS: Chronic | ICD-10-CM

## 2019-09-23 DIAGNOSIS — Z98.890 OTHER SPECIFIED POSTPROCEDURAL STATES: Chronic | ICD-10-CM

## 2019-09-23 DIAGNOSIS — Z90.710 ACQUIRED ABSENCE OF BOTH CERVIX AND UTERUS: Chronic | ICD-10-CM

## 2019-09-23 DIAGNOSIS — C56.9 MALIGNANT NEOPLASM OF UNSPECIFIED OVARY: ICD-10-CM

## 2019-09-23 PROCEDURE — 88342 IMHCHEM/IMCYTCHM 1ST ANTB: CPT | Mod: 26

## 2019-09-23 PROCEDURE — 88305 TISSUE EXAM BY PATHOLOGIST: CPT | Mod: 26

## 2019-09-23 PROCEDURE — 19083 BX BREAST 1ST LESION US IMAG: CPT

## 2019-09-23 PROCEDURE — 88342 IMHCHEM/IMCYTCHM 1ST ANTB: CPT

## 2019-09-23 PROCEDURE — 77065 DX MAMMO INCL CAD UNI: CPT | Mod: 26,RT

## 2019-09-23 PROCEDURE — 19083 BX BREAST 1ST LESION US IMAG: CPT | Mod: RT

## 2019-09-23 PROCEDURE — A4648: CPT

## 2019-09-23 PROCEDURE — 88341 IMHCHEM/IMCYTCHM EA ADD ANTB: CPT

## 2019-09-23 PROCEDURE — 88305 TISSUE EXAM BY PATHOLOGIST: CPT

## 2019-09-23 PROCEDURE — 77065 DX MAMMO INCL CAD UNI: CPT

## 2019-09-23 PROCEDURE — 88341 IMHCHEM/IMCYTCHM EA ADD ANTB: CPT | Mod: 26

## 2019-09-27 ENCOUNTER — APPOINTMENT (OUTPATIENT)
Dept: COLORECTAL SURGERY | Facility: CLINIC | Age: 49
End: 2019-09-27
Payer: COMMERCIAL

## 2019-09-27 VITALS
SYSTOLIC BLOOD PRESSURE: 129 MMHG | TEMPERATURE: 97.8 F | HEART RATE: 89 BPM | DIASTOLIC BLOOD PRESSURE: 75 MMHG | BODY MASS INDEX: 27.46 KG/M2 | OXYGEN SATURATION: 99 % | HEIGHT: 61 IN | WEIGHT: 145.44 LBS

## 2019-09-27 PROCEDURE — 99024 POSTOP FOLLOW-UP VISIT: CPT

## 2019-10-01 NOTE — PHYSICAL EXAM
[Respiratory Effort] : normal respiratory effort [Normal Rate and Rhythm] : normal rate and rhythm [No Rash or Lesion] : No rash or lesion [Alert] : alert [Oriented to Person] : oriented to person [Oriented to Place] : oriented to place [Calm] : calm [Abdomen Masses] : No abdominal masses [Tender] : nontender [de-identified] : Incision well-healed [JVD] : no jugular venous distention  [de-identified] : NAD [de-identified] :  normocephalic, atraumatic.

## 2019-10-01 NOTE — ASSESSMENT
[FreeTextEntry1] : 49-year-old female presented for followup after laparoscopic Jefferson's reversal. She's doing well today his room return to regular diet. She has about 2 to 4 bowel movements per day. She has no pain. her incision is healed. She may return to regular activities. Followup as needed.

## 2019-10-02 NOTE — REVIEW OF SYSTEMS
[Chills] : chills [Recent Change In Weight] : ~T recent weight change [Night Sweats] : night sweats [Joint Stiffness] : joint stiffness [Insomnia] : insomnia [Negative] : Allergic/Immunologic

## 2019-10-03 ENCOUNTER — OUTPATIENT (OUTPATIENT)
Dept: OUTPATIENT SERVICES | Facility: HOSPITAL | Age: 49
LOS: 1 days | Discharge: HOME | End: 2019-10-03

## 2019-10-03 DIAGNOSIS — Z93.3 COLOSTOMY STATUS: Chronic | ICD-10-CM

## 2019-10-03 DIAGNOSIS — Z90.710 ACQUIRED ABSENCE OF BOTH CERVIX AND UTERUS: Chronic | ICD-10-CM

## 2019-10-03 DIAGNOSIS — Z98.890 OTHER SPECIFIED POSTPROCEDURAL STATES: Chronic | ICD-10-CM

## 2019-10-03 DIAGNOSIS — C54.1 MALIGNANT NEOPLASM OF ENDOMETRIUM: ICD-10-CM

## 2019-10-07 ENCOUNTER — APPOINTMENT (OUTPATIENT)
Dept: HEMATOLOGY ONCOLOGY | Facility: CLINIC | Age: 49
End: 2019-10-07
Payer: COMMERCIAL

## 2019-10-07 ENCOUNTER — RESULT REVIEW (OUTPATIENT)
Age: 49
End: 2019-10-07

## 2019-10-07 ENCOUNTER — LABORATORY RESULT (OUTPATIENT)
Age: 49
End: 2019-10-07

## 2019-10-07 VITALS
WEIGHT: 146.19 LBS | SYSTOLIC BLOOD PRESSURE: 147 MMHG | TEMPERATURE: 98 F | OXYGEN SATURATION: 98 % | HEART RATE: 88 BPM | HEIGHT: 61 IN | BODY MASS INDEX: 27.6 KG/M2 | DIASTOLIC BLOOD PRESSURE: 76 MMHG

## 2019-10-07 LAB
BASOPHILS # BLD AUTO: 0 K/UL — SIGNIFICANT CHANGE UP (ref 0–0.2)
BASOPHILS NFR BLD AUTO: 0.7 % — SIGNIFICANT CHANGE UP (ref 0–2)
EOSINOPHIL # BLD AUTO: 0.1 K/UL — SIGNIFICANT CHANGE UP (ref 0–0.5)
EOSINOPHIL NFR BLD AUTO: 1.2 % — SIGNIFICANT CHANGE UP (ref 0–6)
HCT VFR BLD CALC: 39.2 % — SIGNIFICANT CHANGE UP (ref 34.5–45)
HGB BLD-MCNC: 12.6 G/DL — SIGNIFICANT CHANGE UP (ref 11.5–15.5)
LYMPHOCYTES # BLD AUTO: 2.6 K/UL — SIGNIFICANT CHANGE UP (ref 1–3.3)
LYMPHOCYTES # BLD AUTO: 42.3 % — SIGNIFICANT CHANGE UP (ref 13–44)
MCHC RBC-ENTMCNC: 29.3 PG — SIGNIFICANT CHANGE UP (ref 27–34)
MCHC RBC-ENTMCNC: 32.1 G/DL — SIGNIFICANT CHANGE UP (ref 32–36)
MCV RBC AUTO: 91.3 FL — SIGNIFICANT CHANGE UP (ref 80–100)
MONOCYTES # BLD AUTO: 0.4 K/UL — SIGNIFICANT CHANGE UP (ref 0–0.9)
MONOCYTES NFR BLD AUTO: 5.9 % — SIGNIFICANT CHANGE UP (ref 2–14)
NEUTROPHILS # BLD AUTO: 3.1 K/UL — SIGNIFICANT CHANGE UP (ref 1.8–7.4)
NEUTROPHILS NFR BLD AUTO: 49.9 % — SIGNIFICANT CHANGE UP (ref 43–77)
PLATELET # BLD AUTO: 348 K/UL — SIGNIFICANT CHANGE UP (ref 150–400)
RBC # BLD: 4.29 M/UL — SIGNIFICANT CHANGE UP (ref 3.8–5.2)
RBC # FLD: 14.6 % — HIGH (ref 10.3–14.5)
WBC # BLD: 6.2 K/UL — SIGNIFICANT CHANGE UP (ref 3.8–10.5)
WBC # FLD AUTO: 6.2 K/UL — SIGNIFICANT CHANGE UP (ref 3.8–10.5)

## 2019-10-07 PROCEDURE — 99215 OFFICE O/P EST HI 40 MIN: CPT

## 2019-10-08 LAB
ALBUMIN SERPL ELPH-MCNC: 4.5 G/DL
ALP BLD-CCNC: 66 U/L
ALT SERPL-CCNC: 22 U/L
ANION GAP SERPL CALC-SCNC: 13 MMOL/L
AST SERPL-CCNC: 18 U/L
BILIRUB SERPL-MCNC: 0.3 MG/DL
BUN SERPL-MCNC: 19 MG/DL
CALCIUM SERPL-MCNC: 9.5 MG/DL
CHLORIDE SERPL-SCNC: 102 MMOL/L
CO2 SERPL-SCNC: 26 MMOL/L
CREAT SERPL-MCNC: 0.68 MG/DL
GLUCOSE SERPL-MCNC: 105 MG/DL
MAGNESIUM SERPL-MCNC: 2.2 MG/DL
POTASSIUM SERPL-SCNC: 3.8 MMOL/L
PROT SERPL-MCNC: 7.7 G/DL
SODIUM SERPL-SCNC: 141 MMOL/L

## 2019-10-09 NOTE — HISTORY OF PRESENT ILLNESS
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  She presented to [facility] ED on 4/23/19 s/p mechanical fall outside her place of work.  A CT A/P incidentally showed a complex cystic left adnexal mass measuring 17 cm. Massive cystic lesion occupying the lower abdomen measuring 33 cm. It was uncertain whether this represents a large peritoneal metastasis or a component of the above described cystic left adnexal mass, or possibly a right adnexal mass. There was suspicion for rupture of this mass with a large amount of abdominal ascites.  On 5/7/19 Dr. Manpreet Gonzalez performed a BASILIA, BSO, debulking and peritonectomy.  Pathology initially was benign.  An addendum was later issued which showed low grade endometrioid adenocarcinoma, FIGO grade 1, with focal sex cord like differentiation, arising in a background of endometriosis, and involving the uterine serosa.  She was discharged on 5/12/19 but returned to the ED on 5/15/19 c/o abdominal distention, N/V, dizziness, weakness, multiple falls and a fever of 104.  She was found to be hypotensive and tachycardic.  A CT A/P showed a large fluid collection which appears to be extraperitoneal layering anterior to the omentum measuring 24.7 x 9.4 x 20.9 cm. There is a component of the collection which extends posteriorly on the left into the retroperitoneum along the anterior aspect of the psoas extending to the posterior aspect of the splenic flexure (approximately 20 cm in craniocaudad dimension). On 5/17/19 Dr. Mague Durand performed an emergent exploratory laparotomy, sigmoidectomy and end colostomy.  Pathology was benign.   [de-identified] : FHx of DCIS (sister) [de-identified] : Patient presents for follow-up. She is s/p a complicated surgery due to extensive endometrioma from the pelvis into her upper abdomen, with a delayed leak from the sigmoid requiring bowel resection and end-colostomy by Dr. Durand. She is recovering well.  Denies any pain. No fevers/chills, healing well, normal formed stool in ostomy. Patient presents today to discuss findings from her 9/23/19 biopsy.

## 2019-10-09 NOTE — RESULTS/DATA
[FreeTextEntry1] : 9/23/19 Pathology: \par Right breast, "11:00, 5 cm from the nipple", ultrasound guided core needle biopsy:\par -Invasive ductal carcinoma, grade 1.\par -See note.\par Note: Immunohistochemical stains for p63 and calponin are performed on block 1-B at [facility] and interpreted at [place] as follows:\par Calponin, p63: negative\par Microscopic evaluation reveals an invasive ductal carcinoma (Lizzette score 1+2+1), grade 1. The carcinoma measures at least 1.1 cm in its greatest linear expansion in this biopsy. \par There are no calcifications nor necrosis present.\par *Addendum*\par IMMUNOSTAINS PERFORMED AND INTERPRETED ON BLOCK " B " BY [facility],\par HER2/ELADIO BY IHC: 1 + / Negative\par ER: Positive, 90 - 95 %, strong\par MT: Positive, 15 - 20 %, strong\par \par 9/23/19 Mammo: Status post core needle biopsy of the right breast with clip placement. Recommendations for further followup will be based on pathology results.\par \par 9/19/19 US Breast/Mammo: Ill-defined hypoechoic area in the right breast at the 11:00 axis, corresponding to mammographically seen distortion. Ultrasound-guided core biopsy is advised.\par \par 5/15/19 Pathology:\par Intra-abdominal foreign body, removal:  Blood clots\par Sigmoid, resection:  Mucosal necrosis with transmural acute and chronic inflammation.  The process extends to one margin of resection (slide 1A).  Three (3) lymph nodes, one with benign glandular inclusion.\par \par 5/15/19 CT A/P:\par Moderate amount retained fecal material in the colon, greatest in the right hemicolon. Mild wall thickening involving the distal transverse colon, descending colon, and proximal sigmoid colon, possibly reactive to the large collection.  Large fluid collection which appears to be extraperitoneal layering anterior to the omentum measuring 24.7 x 9.4 x \par 20.9 cm (craniocaudad x anteroposterior x transverse). There is a component of the collection which extends posteriorly on the left into the retroperitoneum along the anterior aspect of the psoas extending to the posterior aspect of the splenic flexure (approximately 20 cm in craniocaudad dimension). Skin staples are seen within the anterior abdominal wall. Air is seen within the anterior abdominal wall which may be postoperative in etiology.\par \par 5/8/19 Cytopathology:\par Abdominal fluid:  NEGATIVE FOR MALIGNANT CELLS.  Mostly fibrin and reactive mesothelial cells.\par \par 5/7/19 Pathology:\par Omental lymph node: Markedly reactive, hemorrhagic lymph node with hemosiderin laden macrophages.\par Cyst wall(1): Hemorrhagic inflamed cyst wall without epithelial lining.\par Right hepatic flexure lymph node: Markedly reactive, hemorrhagic lymph node with hemosiderin laden macrophages.\par Omentum: Omental fat with marked reactive fibrosis, hemorrhage and chronic inflammation; marked serosal adhesions.\par Right pelvic lymph node: Markedly reactive, hemorrhagic lymph node with hemosiderin laden macrophages.\par Left pelvic lymph node: Markedly reactive, hemorrhagic lymph node with hemosiderin laden macrophages.\par Cyst wall(2):  Cyst wall, markedly inflamed and hemorrhagic with cholesterol crystals and without epithelial lining.\par ***Uterus, cervix and adnexa:  Histologically unremarkable cervix. Weakly proliferative endometrium and endometrial polyp. Cysts located outside the uterus, involving serosa, with glandular stromal structures, consistent with endometriosis.\par ****Addendum****\par Case sent for outside consultation at [facility] with Dr. Tina Mckay.\par The atypical clusters of glands, in part 8. "Uterus, cervix and adnexa", have been diagnosed as Low grade endometrioid adenocarcinoma, FIGO grade 1,  with focal sex cord like differentiation, arising in a background of endometriosis, and involving the uterine serosa.\par \par 4/23/19 CT C/A/P:\par There is a large complex cystic mass occupying the lower abdomen, measuring 21.0 (AP) x 24.2 (CC) x 33.1 (TR) cm.  The exact origin of this mass is uncertain. The left posterior wall of this mass is discontinuous, suggestive of rupture. There is a large amount of abdominal ascites.  Additional complex left adnexal cystic mass with septations and apparent mural nodules measuring 9.4 (AP) x 15.5 (CC) x 16.6 (TR) cm.

## 2019-10-09 NOTE — PHYSICAL EXAM
[Restricted in physically strenuous activity but ambulatory and able to carry out work of a light or sedentary nature] : Status 1- Restricted in physically strenuous activity but ambulatory and able to carry out work of a light or sedentary nature, e.g., light house work, office work [Normal] : affect appropriate [de-identified] : Non-discrete fullness in RUQ of right breast. No other masses palpable.  [de-identified] : positive ostomy.  Vertical incision healing with no evidence of drainage or infection.

## 2019-10-11 ENCOUNTER — APPOINTMENT (OUTPATIENT)
Dept: SURGERY | Facility: CLINIC | Age: 49
End: 2019-10-11
Payer: COMMERCIAL

## 2019-10-11 VITALS
HEIGHT: 61 IN | BODY MASS INDEX: 27.56 KG/M2 | SYSTOLIC BLOOD PRESSURE: 110 MMHG | HEART RATE: 98 BPM | WEIGHT: 146 LBS | DIASTOLIC BLOOD PRESSURE: 68 MMHG

## 2019-10-11 PROCEDURE — 99205 OFFICE O/P NEW HI 60 MIN: CPT

## 2019-10-14 ENCOUNTER — FORM ENCOUNTER (OUTPATIENT)
Age: 49
End: 2019-10-14

## 2019-10-15 ENCOUNTER — OUTPATIENT (OUTPATIENT)
Dept: OUTPATIENT SERVICES | Facility: HOSPITAL | Age: 49
LOS: 1 days | End: 2019-10-15
Payer: COMMERCIAL

## 2019-10-15 ENCOUNTER — APPOINTMENT (OUTPATIENT)
Dept: MRI IMAGING | Facility: CLINIC | Age: 49
End: 2019-10-15
Payer: COMMERCIAL

## 2019-10-15 DIAGNOSIS — Z00.8 ENCOUNTER FOR OTHER GENERAL EXAMINATION: ICD-10-CM

## 2019-10-15 DIAGNOSIS — Z93.3 COLOSTOMY STATUS: Chronic | ICD-10-CM

## 2019-10-15 DIAGNOSIS — Z98.890 OTHER SPECIFIED POSTPROCEDURAL STATES: Chronic | ICD-10-CM

## 2019-10-15 DIAGNOSIS — Z90.710 ACQUIRED ABSENCE OF BOTH CERVIX AND UTERUS: Chronic | ICD-10-CM

## 2019-10-15 PROCEDURE — C8908: CPT

## 2019-10-15 PROCEDURE — C8937: CPT

## 2019-10-15 PROCEDURE — 77049 MRI BREAST C-+ W/CAD BI: CPT | Mod: 26

## 2019-10-16 ENCOUNTER — APPOINTMENT (OUTPATIENT)
Dept: PLASTIC SURGERY | Facility: CLINIC | Age: 49
End: 2019-10-16
Payer: COMMERCIAL

## 2019-10-16 VITALS
HEIGHT: 61 IN | BODY MASS INDEX: 28.13 KG/M2 | OXYGEN SATURATION: 100 % | DIASTOLIC BLOOD PRESSURE: 77 MMHG | WEIGHT: 149 LBS | TEMPERATURE: 97.9 F | HEART RATE: 81 BPM | SYSTOLIC BLOOD PRESSURE: 121 MMHG

## 2019-10-16 PROCEDURE — 99204 OFFICE O/P NEW MOD 45 MIN: CPT

## 2019-10-17 NOTE — HISTORY OF PRESENT ILLNESS
[FreeTextEntry1] : 50 yo woman with newly diagnosed breast cancer.  Her surgical plan is bilateral mastectomy.  She presents today to discuss reconstructive options.\par \par She is a non-smoker, has no history of blood clots, and has no family history of bleeding disorders or early breast cancer.\par \par She is interested in autologous reconstruction.

## 2019-10-29 ENCOUNTER — FORM ENCOUNTER (OUTPATIENT)
Age: 49
End: 2019-10-29

## 2019-10-29 ENCOUNTER — APPOINTMENT (OUTPATIENT)
Dept: SURGERY | Facility: CLINIC | Age: 49
End: 2019-10-29

## 2019-10-30 ENCOUNTER — APPOINTMENT (OUTPATIENT)
Dept: MRI IMAGING | Facility: CLINIC | Age: 49
End: 2019-10-30
Payer: COMMERCIAL

## 2019-10-30 ENCOUNTER — OUTPATIENT (OUTPATIENT)
Dept: OUTPATIENT SERVICES | Facility: HOSPITAL | Age: 49
LOS: 1 days | End: 2019-10-30
Payer: COMMERCIAL

## 2019-10-30 DIAGNOSIS — Z90.710 ACQUIRED ABSENCE OF BOTH CERVIX AND UTERUS: Chronic | ICD-10-CM

## 2019-10-30 DIAGNOSIS — Z98.890 OTHER SPECIFIED POSTPROCEDURAL STATES: Chronic | ICD-10-CM

## 2019-10-30 DIAGNOSIS — Z93.3 COLOSTOMY STATUS: Chronic | ICD-10-CM

## 2019-10-30 DIAGNOSIS — C50.919 MALIGNANT NEOPLASM OF UNSPECIFIED SITE OF UNSPECIFIED FEMALE BREAST: ICD-10-CM

## 2019-10-30 PROCEDURE — 74185 MRA ABD W OR W/O CNTRST: CPT | Mod: 26

## 2019-10-30 PROCEDURE — C8902: CPT

## 2019-10-30 PROCEDURE — A9585: CPT

## 2019-10-30 PROCEDURE — 72198 MR ANGIO PELVIS W/O & W/DYE: CPT | Mod: 26

## 2019-10-30 PROCEDURE — C8920: CPT

## 2019-10-30 NOTE — REVIEW OF SYSTEMS
[Chills] : chills [Night Sweats] : night sweats [Recent Change In Weight] : ~T recent weight change [Joint Stiffness] : joint stiffness [Insomnia] : insomnia [Negative] : Allergic/Immunologic

## 2019-11-01 ENCOUNTER — APPOINTMENT (OUTPATIENT)
Dept: HEMATOLOGY ONCOLOGY | Facility: CLINIC | Age: 49
End: 2019-11-01
Payer: COMMERCIAL

## 2019-11-01 ENCOUNTER — RESULT REVIEW (OUTPATIENT)
Age: 49
End: 2019-11-01

## 2019-11-01 VITALS
TEMPERATURE: 98.1 F | WEIGHT: 150.31 LBS | SYSTOLIC BLOOD PRESSURE: 123 MMHG | OXYGEN SATURATION: 100 % | DIASTOLIC BLOOD PRESSURE: 80 MMHG | HEART RATE: 78 BPM | HEIGHT: 61 IN | BODY MASS INDEX: 28.38 KG/M2

## 2019-11-01 LAB
BASOPHILS # BLD AUTO: 0.1 K/UL — SIGNIFICANT CHANGE UP (ref 0–0.2)
BASOPHILS NFR BLD AUTO: 0.7 % — SIGNIFICANT CHANGE UP (ref 0–2)
EOSINOPHIL # BLD AUTO: 0.1 K/UL — SIGNIFICANT CHANGE UP (ref 0–0.5)
EOSINOPHIL NFR BLD AUTO: 1 % — SIGNIFICANT CHANGE UP (ref 0–6)
HCT VFR BLD CALC: 42.9 % — SIGNIFICANT CHANGE UP (ref 34.5–45)
HGB BLD-MCNC: 13.7 G/DL — SIGNIFICANT CHANGE UP (ref 11.5–15.5)
LYMPHOCYTES # BLD AUTO: 2.7 K/UL — SIGNIFICANT CHANGE UP (ref 1–3.3)
LYMPHOCYTES # BLD AUTO: 35.9 % — SIGNIFICANT CHANGE UP (ref 13–44)
MCHC RBC-ENTMCNC: 29.6 PG — SIGNIFICANT CHANGE UP (ref 27–34)
MCHC RBC-ENTMCNC: 31.8 G/DL — LOW (ref 32–36)
MCV RBC AUTO: 93 FL — SIGNIFICANT CHANGE UP (ref 80–100)
MONOCYTES # BLD AUTO: 0.5 K/UL — SIGNIFICANT CHANGE UP (ref 0–0.9)
MONOCYTES NFR BLD AUTO: 6.4 % — SIGNIFICANT CHANGE UP (ref 2–14)
NEUTROPHILS # BLD AUTO: 4.2 K/UL — SIGNIFICANT CHANGE UP (ref 1.8–7.4)
NEUTROPHILS NFR BLD AUTO: 56 % — SIGNIFICANT CHANGE UP (ref 43–77)
PLATELET # BLD AUTO: 296 K/UL — SIGNIFICANT CHANGE UP (ref 150–400)
RBC # BLD: 4.61 M/UL — SIGNIFICANT CHANGE UP (ref 3.8–5.2)
RBC # FLD: 13.8 % — SIGNIFICANT CHANGE UP (ref 10.3–14.5)
WBC # BLD: 7.4 K/UL — SIGNIFICANT CHANGE UP (ref 3.8–10.5)
WBC # FLD AUTO: 7.4 K/UL — SIGNIFICANT CHANGE UP (ref 3.8–10.5)

## 2019-11-01 PROCEDURE — 99214 OFFICE O/P EST MOD 30 MIN: CPT

## 2019-11-01 NOTE — PHYSICAL EXAM
[Restricted in physically strenuous activity but ambulatory and able to carry out work of a light or sedentary nature] : Status 1- Restricted in physically strenuous activity but ambulatory and able to carry out work of a light or sedentary nature, e.g., light house work, office work [Normal] : affect appropriate [de-identified] : Non-discrete fullness in RUQ of right breast. No other masses palpable.  [de-identified] : positive ostomy.  Vertical incision healing with no evidence of drainage or infection.

## 2019-11-01 NOTE — RESULTS/DATA
[FreeTextEntry1] : 10/15/19 MRI Breast:\par 1.4 cm abnormal enhancement/biopsy clip in the upper outer posterior right breast, corresponding to newly diagnosed malignancy. No MRI evidence of multicentric or contralateral disease. \par RECOMMENDATION: Surgical or oncologic management. \par BI-RADS 6- Known Biopsy-Proven Malignancy \par \par 9/23/19 Pathology: \par Right breast, "11:00, 5 cm from the nipple", ultrasound guided core needle biopsy:\par -Invasive ductal carcinoma, grade 1.\par -See note.\par Note: Immunohistochemical stains for p63 and calponin are performed on block 1-B at [facility] and interpreted at [place] as follows:\par Calponin, p63: negative\par Microscopic evaluation reveals an invasive ductal carcinoma ([place] score 1+2+1), grade 1. The carcinoma measures at least 1.1 cm in its greatest linear expansion in this biopsy. \par There are no calcifications nor necrosis present.\par *Addendum*\par IMMUNOSTAINS PERFORMED AND INTERPRETED ON BLOCK " B " BY [facility],\par HER2/ELADIO BY IHC: 1 + / Negative\par ER: Positive, 90 - 95 %, strong\par [place]: Positive, 15 - 20 %, strong\par \par 9/23/19 Mammo: Status post core needle biopsy of the right breast with clip placement. Recommendations for further followup will be based on pathology results.\par \par 9/19/19 US Breast/Mammo: Ill-defined hypoechoic area in the right breast at the 11:00 axis, corresponding to mammographically seen distortion. Ultrasound-guided core biopsy is advised.\par \par 5/15/19 Pathology:\par Intra-abdominal foreign body, removal:  Blood clots\par Sigmoid, resection:  Mucosal necrosis with transmural acute and chronic inflammation.  The process extends to one margin of resection (slide 1A).  Three (3) lymph nodes, one with benign glandular inclusion.\par \par 5/15/19 CT A/P:\par Moderate amount retained fecal material in the colon, greatest in the right hemicolon. Mild wall thickening involving the distal transverse colon, descending colon, and proximal sigmoid colon, possibly reactive to the large collection.  Large fluid collection which appears to be extraperitoneal layering anterior to the omentum measuring 24.7 x 9.4 x \par 20.9 cm (craniocaudad x anteroposterior x transverse). There is a component of the collection which extends posteriorly on the left into the retroperitoneum along the anterior aspect of the psoas extending to the posterior aspect of the splenic flexure (approximately 20 cm in craniocaudad dimension). Skin staples are seen within the anterior abdominal wall. Air is seen within the anterior abdominal wall which may be postoperative in etiology.\par \par 5/8/19 Cytopathology:\par Abdominal fluid:  NEGATIVE FOR MALIGNANT CELLS.  Mostly fibrin and reactive mesothelial cells.\par \par 5/7/19 Pathology:\par Omental lymph node: Markedly reactive, hemorrhagic lymph node with hemosiderin laden macrophages.\par Cyst wall(1): Hemorrhagic inflamed cyst wall without epithelial lining.\par Right hepatic flexure lymph node: Markedly reactive, hemorrhagic lymph node with hemosiderin laden macrophages.\par Omentum: Omental fat with marked reactive fibrosis, hemorrhage and chronic inflammation; marked serosal adhesions.\par Right pelvic lymph node: Markedly reactive, hemorrhagic lymph node with hemosiderin laden macrophages.\par Left pelvic lymph node: Markedly reactive, hemorrhagic lymph node with hemosiderin laden macrophages.\par Cyst wall(2):  Cyst wall, markedly inflamed and hemorrhagic with cholesterol crystals and without epithelial lining.\par ***Uterus, cervix and adnexa:  Histologically unremarkable cervix. Weakly proliferative endometrium and endometrial polyp. Cysts located outside the uterus, involving serosa, with glandular stromal structures, consistent with endometriosis.\par ****Addendum****\par Case sent for outside consultation at [facility] with Dr. Tina Mckay.\par The atypical clusters of glands, in part 8. "Uterus, cervix and adnexa", have been diagnosed as Low grade endometrioid adenocarcinoma, FIGO grade 1,  with focal sex cord like differentiation, arising in a background of endometriosis, and involving the uterine serosa.\par \par 4/23/19 CT C/A/P:\par There is a large complex cystic mass occupying the lower abdomen, measuring 21.0 (AP) x 24.2 (CC) x 33.1 (TR) cm.  The exact origin of this mass is uncertain. The left posterior wall of this mass is discontinuous, suggestive of rupture. There is a large amount of abdominal ascites.  Additional complex left adnexal cystic mass with septations and apparent mural nodules measuring 9.4 (AP) x 15.5 (CC) x 16.6 (TR) cm.

## 2019-11-01 NOTE — HISTORY OF PRESENT ILLNESS
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  She presented to [facility] ED on 4/23/19 s/p mechanical fall outside her place of work.  A CT A/P incidentally showed a complex cystic left adnexal mass measuring 17 cm. Massive cystic lesion occupying the lower abdomen measuring 33 cm. It was uncertain whether this represents a large peritoneal metastasis or a component of the above described cystic left adnexal mass, or possibly a right adnexal mass. There was suspicion for rupture of this mass with a large amount of abdominal ascites.  On 5/7/19 Dr. Manpreet Gonzalez performed a BASILIA, BSO, debulking and peritonectomy.  Pathology initially was benign.  An addendum was later issued which showed low grade endometrioid adenocarcinoma, FIGO grade 1, with focal sex cord like differentiation, arising in a background of endometriosis, and involving the uterine serosa.  She was discharged on 5/12/19 but returned to the ED on 5/15/19 c/o abdominal distention, N/V, dizziness, weakness, multiple falls and a fever of 104.  She was found to be hypotensive and tachycardic.  A CT A/P showed a large fluid collection which appears to be extraperitoneal layering anterior to the omentum measuring 24.7 x 9.4 x 20.9 cm. There is a component of the collection which extends posteriorly on the left into the retroperitoneum along the anterior aspect of the psoas extending to the posterior aspect of the splenic flexure (approximately 20 cm in craniocaudad dimension). On 5/17/19 Dr. Mague Durand performed an emergent exploratory laparotomy, sigmoidectomy and end colostomy.  Pathology was benign.   [de-identified] : FHx of DCIS (sister) [de-identified] : Patient presents for follow-up. She is s/p BASILIA BSO, debulking and peritonectomy due to extensive endometrioid adenocarcinoma, with a delayed leak from the sigmoid requiring bowel resection and end-colostomy by Dr. Durand. She is recovering well.  Denies any pain. No fevers/chills, healing well, normal formed stool in ostomy. Recently diagnosed with right breast cancer, ER+ [place]+ HER2-.

## 2019-11-03 NOTE — ASSESSMENT
[FreeTextEntry1] : 48 yo postmenopausal female presents with newly diagnosed right breast cancer (IDC grade 1 ER + [place] + [id number] negative ).  MRI evaluation of the breast is recommended in addition to consultations with Radiation Oncology and Plastic Surgery.\par 1. MRI of the breast\par 2. Genetic testing\par 3. Consult with Dr. Licea\par 4. Consult with Dr. Caldwell or Dr. Pop-Radiation Oncology\par 5. Follow up in 1 week for final surgical decision

## 2019-11-03 NOTE — HISTORY OF PRESENT ILLNESS
[FreeTextEntry1] : Referring Physician: Linnea BRICE\par \par I had the pleasure of seeing Sera Francisco in the office for a new visit breast evaluation secondary to abnormal right breast imaging demonstrating a distortion in the upper outer quadrant and biopsy demonstrating right breast grade 1 invasive ductal cancer ER 90-95% ND 15-20% Etw5pop negative.  She is accompanied by her mother. \par \par She is a micheal 50 yo postmenopausal female who recently was treated for endometroid adenocarcinoma at the age of 48.  She underwent BASILIA, BSO debulking and peritonectomy requiring a return to OR for leak/sepsis and colostomy.  She underwent colostomy reversal in 8/2019. She has been in her usual state of health until recently undergo abnormal breast imaging and being diagnosed with breast cancer.\par \par She has a significant family history of breast cancer with sister dx DCIS at age 44, MAunt dx sixties and Aunt dx with pancreatic cancer Cousin dx renal cell carcinoma in her 40s, GF dx stomach cancer in his sixties and cousin dx signet ring cell carcinoma at age 72. \par \par Imaging: [facility]\par 9/7/2019 Bilateral screening mammogram/tomosynthesis\par Impression: Right breast upper outer 5 cm from the nipple area of distortion for which diagnostic mammogram and ultrasound as needed is recommended.  \par 9/19/2019 Right diagnostic mammogram/ultrasound/xavier\par Impression: At the 11:00 position 5cm from the nipple, there is an ill defined hypoechoic area with posterior shadowing, corresponding to mammographically seen distortion. US guided core biopsy is recommended. BIRADS 4C\par 9/23/2019 Right post procedure mammogram \par Impression: Status post core needle biopsy of the right breast with clip placement. \par 9/23/2019 US guided core biopsy of the right breast\par 11:00 5cm from the nipple; invasive ductal carcinoma grade 1\par \par We reviewed the clinical examination, imaging and biopsy results. \par Sera and her mother understand that the diagnosis is right breast cancer, hormone positive grade 1. They understands that the area of malignancy appears to be <2cm and there is not a palpable breast mass on adenopathy on clinical examination.\par \par We discussed treatment options.\par \par Breast conservation was detailed and reviewed with placement of wire localization v BENJAMIN  placement. She understands technical aspects and the need for clear margins. We discussed sentinel lymph node biopsy requiring blue dye and/or nuclear medicine radiotracer. She understands that overall survival is equivalent with breast conservation versus right mastectomy with sentinel lymph node biopsy possible axillary dissection.\par \par We discussed the rate of lymphedema with sentinel lymph node biopsy of 7-10% versus axillary dissection of lymphedema with 20-25%. \par \par We also discussed the technical aspects of right mastectomy with SLNB possible ALND. We discussed options of nipple sparing, skin sparing and simple mastectomy.\par \par We also discussed the option of left risk reduction mastectomy. She understands locoregional recurrence is higher with BCT however overall survival is the same.\par \par We discussed the risks v benefits of surgery with specifics of various technical aspects of surgery.\par \par We also discussed the possible need for radiation treatment, duration of treatment and risks v benefits.\par We also discussed hormonal therapy or systemic therapy. We discussed risks v benefits of treatment.\par \par She understands that MRI of the breast will be recommended. Consult with Plastic Surgery, Radiation Oncology and she will follow up with Dr. Yarbrough.\par \par We will order oncotype testing after surery.\par \par All questions were answered.\par She understands and agrees to the plan.\par \par

## 2019-11-03 NOTE — PHYSICAL EXAM
[Normocephalic] : normocephalic [Atraumatic] : atraumatic [EOMI] : extra ocular movement intact [PERRL] : pupils equal, round and reactive to light [Sclera nonicteric] : sclera nonicteric [Supple] : supple [No Supraclavicular Adenopathy] : no supraclavicular adenopathy [Examined in the supine and seated position] : examined in the supine and seated position [No dominant masses] : no dominant masses in right breast  [No dominant masses] : no dominant masses left breast [No Nipple Retraction] : no left nipple retraction [No Nipple Discharge] : no left nipple discharge [No Axillary Lymphadenopathy] : no left axillary lymphadenopathy [No Edema] : no edema [No Rashes] : no rashes [No Ulceration] : no ulceration [Breast Nipple Inversion] : nipples not inverted [Breast Nipple Retraction] : nipples not retracted [Breast Nipple Flattening] : nipples not flattened [Breast Nipple Fissures] : nipples not fissured [Breast Abnormal Lactation (Galactorrhea)] : no galactorrhea [Breast Abnormal Secretion Bloody Fluid] : no bloody discharge [Breast Abnormal Secretion Serous Fluid] : no serous discharge [Breast Abnormal Secretion Opalescent Fluid] : no milky discharge [de-identified] : No supraclavicular or axillary adenopathy. Breast with thickened area in the upper outer quadrant. No skin changes. Everted nipple without discharge.  [de-identified] : No supraclavicular or axillary adenopathy. No dominant breast mass, no skin changes. Everted nipple without discharge.

## 2019-11-04 LAB
ALBUMIN SERPL ELPH-MCNC: 4.4 G/DL
ALP BLD-CCNC: 70 U/L
ALT SERPL-CCNC: 25 U/L
ANION GAP SERPL CALC-SCNC: 15 MMOL/L
AST SERPL-CCNC: 23 U/L
BILIRUB SERPL-MCNC: 0.4 MG/DL
BUN SERPL-MCNC: 22 MG/DL
CALCIUM SERPL-MCNC: 9.9 MG/DL
CHLORIDE SERPL-SCNC: 101 MMOL/L
CO2 SERPL-SCNC: 26 MMOL/L
CREAT SERPL-MCNC: 0.71 MG/DL
GLUCOSE SERPL-MCNC: 88 MG/DL
MAGNESIUM SERPL-MCNC: 2.2 MG/DL
POTASSIUM SERPL-SCNC: 4.2 MMOL/L
PROT SERPL-MCNC: 7.7 G/DL
SODIUM SERPL-SCNC: 141 MMOL/L

## 2019-11-05 ENCOUNTER — APPOINTMENT (OUTPATIENT)
Dept: SURGERY | Facility: CLINIC | Age: 49
End: 2019-11-05
Payer: COMMERCIAL

## 2019-11-05 VITALS
DIASTOLIC BLOOD PRESSURE: 75 MMHG | SYSTOLIC BLOOD PRESSURE: 124 MMHG | HEIGHT: 61 IN | BODY MASS INDEX: 28.7 KG/M2 | WEIGHT: 152 LBS | HEART RATE: 82 BPM

## 2019-11-05 PROCEDURE — 99215 OFFICE O/P EST HI 40 MIN: CPT

## 2019-11-15 ENCOUNTER — OUTPATIENT (OUTPATIENT)
Dept: OUTPATIENT SERVICES | Facility: HOSPITAL | Age: 49
LOS: 1 days | End: 2019-11-15
Payer: COMMERCIAL

## 2019-11-15 ENCOUNTER — RESULT REVIEW (OUTPATIENT)
Age: 49
End: 2019-11-15

## 2019-11-15 DIAGNOSIS — Z93.3 COLOSTOMY STATUS: Chronic | ICD-10-CM

## 2019-11-15 DIAGNOSIS — Z90.710 ACQUIRED ABSENCE OF BOTH CERVIX AND UTERUS: Chronic | ICD-10-CM

## 2019-11-15 DIAGNOSIS — C50.911 MALIGNANT NEOPLASM OF UNSPECIFIED SITE OF RIGHT FEMALE BREAST: ICD-10-CM

## 2019-11-15 DIAGNOSIS — Z98.890 OTHER SPECIFIED POSTPROCEDURAL STATES: Chronic | ICD-10-CM

## 2019-11-15 PROCEDURE — 88321 CONSLTJ&REPRT SLD PREP ELSWR: CPT

## 2019-11-16 DIAGNOSIS — C50.911 MALIGNANT NEOPLASM OF UNSPECIFIED SITE OF RIGHT FEMALE BREAST: ICD-10-CM

## 2019-11-20 ENCOUNTER — OUTPATIENT (OUTPATIENT)
Dept: OUTPATIENT SERVICES | Facility: HOSPITAL | Age: 49
LOS: 1 days | End: 2019-11-20
Payer: COMMERCIAL

## 2019-11-20 VITALS
WEIGHT: 154.1 LBS | OXYGEN SATURATION: 100 % | HEIGHT: 61 IN | TEMPERATURE: 98 F | SYSTOLIC BLOOD PRESSURE: 115 MMHG | DIASTOLIC BLOOD PRESSURE: 68 MMHG | RESPIRATION RATE: 16 BRPM | HEART RATE: 74 BPM

## 2019-11-20 DIAGNOSIS — Z90.710 ACQUIRED ABSENCE OF BOTH CERVIX AND UTERUS: Chronic | ICD-10-CM

## 2019-11-20 DIAGNOSIS — Z29.9 ENCOUNTER FOR PROPHYLACTIC MEASURES, UNSPECIFIED: ICD-10-CM

## 2019-11-20 DIAGNOSIS — Z01.818 ENCOUNTER FOR OTHER PREPROCEDURAL EXAMINATION: ICD-10-CM

## 2019-11-20 DIAGNOSIS — Z98.890 OTHER SPECIFIED POSTPROCEDURAL STATES: Chronic | ICD-10-CM

## 2019-11-20 DIAGNOSIS — Z93.3 COLOSTOMY STATUS: Chronic | ICD-10-CM

## 2019-11-20 DIAGNOSIS — C50.911 MALIGNANT NEOPLASM OF UNSPECIFIED SITE OF RIGHT FEMALE BREAST: ICD-10-CM

## 2019-11-20 LAB
ANION GAP SERPL CALC-SCNC: 6 MMOL/L — SIGNIFICANT CHANGE UP (ref 5–17)
APPEARANCE UR: CLEAR — SIGNIFICANT CHANGE UP
APTT BLD: 30.2 SEC — SIGNIFICANT CHANGE UP (ref 27.5–36.3)
BASOPHILS # BLD AUTO: 0.02 K/UL — SIGNIFICANT CHANGE UP (ref 0–0.2)
BASOPHILS NFR BLD AUTO: 0.3 % — SIGNIFICANT CHANGE UP (ref 0–2)
BILIRUB UR-MCNC: NEGATIVE — SIGNIFICANT CHANGE UP
BUN SERPL-MCNC: 15 MG/DL — SIGNIFICANT CHANGE UP (ref 7–23)
CALCIUM SERPL-MCNC: 9.1 MG/DL — SIGNIFICANT CHANGE UP (ref 8.5–10.1)
CHLORIDE SERPL-SCNC: 106 MMOL/L — SIGNIFICANT CHANGE UP (ref 96–108)
CO2 SERPL-SCNC: 29 MMOL/L — SIGNIFICANT CHANGE UP (ref 22–31)
COLOR SPEC: YELLOW — SIGNIFICANT CHANGE UP
CREAT SERPL-MCNC: 0.77 MG/DL — SIGNIFICANT CHANGE UP (ref 0.5–1.3)
DIFF PNL FLD: NEGATIVE — SIGNIFICANT CHANGE UP
EOSINOPHIL # BLD AUTO: 0.14 K/UL — SIGNIFICANT CHANGE UP (ref 0–0.5)
EOSINOPHIL NFR BLD AUTO: 2.1 % — SIGNIFICANT CHANGE UP (ref 0–6)
GLUCOSE SERPL-MCNC: 97 MG/DL — SIGNIFICANT CHANGE UP (ref 70–99)
GLUCOSE UR QL: NEGATIVE MG/DL — SIGNIFICANT CHANGE UP
HCT VFR BLD CALC: 41.4 % — SIGNIFICANT CHANGE UP (ref 34.5–45)
HGB BLD-MCNC: 13.3 G/DL — SIGNIFICANT CHANGE UP (ref 11.5–15.5)
IMM GRANULOCYTES NFR BLD AUTO: 0.1 % — SIGNIFICANT CHANGE UP (ref 0–1.5)
INR BLD: 0.95 RATIO — SIGNIFICANT CHANGE UP (ref 0.88–1.16)
KETONES UR-MCNC: NEGATIVE — SIGNIFICANT CHANGE UP
LEUKOCYTE ESTERASE UR-ACNC: ABNORMAL
LYMPHOCYTES # BLD AUTO: 2.37 K/UL — SIGNIFICANT CHANGE UP (ref 1–3.3)
LYMPHOCYTES # BLD AUTO: 35.1 % — SIGNIFICANT CHANGE UP (ref 13–44)
MCHC RBC-ENTMCNC: 30.4 PG — SIGNIFICANT CHANGE UP (ref 27–34)
MCHC RBC-ENTMCNC: 32.1 GM/DL — SIGNIFICANT CHANGE UP (ref 32–36)
MCV RBC AUTO: 94.5 FL — SIGNIFICANT CHANGE UP (ref 80–100)
MONOCYTES # BLD AUTO: 0.39 K/UL — SIGNIFICANT CHANGE UP (ref 0–0.9)
MONOCYTES NFR BLD AUTO: 5.8 % — SIGNIFICANT CHANGE UP (ref 2–14)
NEUTROPHILS # BLD AUTO: 3.83 K/UL — SIGNIFICANT CHANGE UP (ref 1.8–7.4)
NEUTROPHILS NFR BLD AUTO: 56.6 % — SIGNIFICANT CHANGE UP (ref 43–77)
NITRITE UR-MCNC: NEGATIVE — SIGNIFICANT CHANGE UP
PH UR: 7 — SIGNIFICANT CHANGE UP (ref 5–8)
PLATELET # BLD AUTO: 277 K/UL — SIGNIFICANT CHANGE UP (ref 150–400)
POTASSIUM SERPL-MCNC: 3.9 MMOL/L — SIGNIFICANT CHANGE UP (ref 3.5–5.3)
POTASSIUM SERPL-SCNC: 3.9 MMOL/L — SIGNIFICANT CHANGE UP (ref 3.5–5.3)
PROT UR-MCNC: NEGATIVE MG/DL — SIGNIFICANT CHANGE UP
PROTHROM AB SERPL-ACNC: 10.5 SEC — SIGNIFICANT CHANGE UP (ref 10–12.9)
RBC # BLD: 4.38 M/UL — SIGNIFICANT CHANGE UP (ref 3.8–5.2)
RBC # FLD: 13.7 % — SIGNIFICANT CHANGE UP (ref 10.3–14.5)
SODIUM SERPL-SCNC: 141 MMOL/L — SIGNIFICANT CHANGE UP (ref 135–145)
SP GR SPEC: 1 — LOW (ref 1.01–1.02)
UROBILINOGEN FLD QL: NEGATIVE MG/DL — SIGNIFICANT CHANGE UP
WBC # BLD: 6.76 K/UL — SIGNIFICANT CHANGE UP (ref 3.8–10.5)
WBC # FLD AUTO: 6.76 K/UL — SIGNIFICANT CHANGE UP (ref 3.8–10.5)

## 2019-11-20 PROCEDURE — 93005 ELECTROCARDIOGRAM TRACING: CPT

## 2019-11-20 PROCEDURE — 85610 PROTHROMBIN TIME: CPT

## 2019-11-20 PROCEDURE — 93010 ELECTROCARDIOGRAM REPORT: CPT

## 2019-11-20 PROCEDURE — 85025 COMPLETE CBC W/AUTO DIFF WBC: CPT

## 2019-11-20 PROCEDURE — 86900 BLOOD TYPING SEROLOGIC ABO: CPT

## 2019-11-20 PROCEDURE — 80048 BASIC METABOLIC PNL TOTAL CA: CPT

## 2019-11-20 PROCEDURE — 85730 THROMBOPLASTIN TIME PARTIAL: CPT

## 2019-11-20 PROCEDURE — 86901 BLOOD TYPING SEROLOGIC RH(D): CPT

## 2019-11-20 PROCEDURE — 86850 RBC ANTIBODY SCREEN: CPT

## 2019-11-20 PROCEDURE — 36415 COLL VENOUS BLD VENIPUNCTURE: CPT

## 2019-11-20 PROCEDURE — G0463: CPT | Mod: 25

## 2019-11-20 PROCEDURE — 81001 URINALYSIS AUTO W/SCOPE: CPT

## 2019-11-20 NOTE — ASSESSMENT
[FreeTextEntry1] : 48 yo perimenopausal female presents with newly diagnosed right breast cancer (Stage 1, (1.4cm IDC grade 2 ER+ [place]+ [id number] negative). She has chosen to undergo bilateral mastectomy with right sentinel lymph node biopsy possible axillary dissection and understands the technical aspects of surgery, including risks v benefits and risk of lymphedema.\par 1. Medical clearance\par 2. Plan for above procedure with immediate reconstruction with Dr. Licea

## 2019-11-20 NOTE — HISTORY OF PRESENT ILLNESS
[FreeTextEntry1] : I had the pleasure of seeing Sera Francisco in the office for a follow up clinical breast evaluation secondary to newly diagnosed Stage 1 right breast cancer (IDC grade 1 ER 90-95% PR15-20% [id number] negative)\par \par She underwent MRI of the breast 10/15/2019 which demonstrated the right breast cancer to measure 1.4cm without adenopathy. No MR evidence of multicentric or contralateral disease.  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 Further clarification on this variance will be obtained from the updated test as soon as it is available.\par \par Surgical decision has been made by Sera for bilateral mastectomy with right sentinel lymph node biopsy possible axillary dissection.\par \par We discussed risks v benefits including technical aspects of the procedure. She understands and all questions were answered. \par

## 2019-11-20 NOTE — H&P PST ADULT - HISTORY OF PRESENT ILLNESS
49 year old female with right breast cancer found on routine mammogram; pt denies breast pain or discharge; she presents to PST for planned bilateral breast mastectomy with right breast sentinel lymph node biopsy and KWAME flap

## 2019-11-20 NOTE — H&P PST ADULT - ASSESSMENT
49 year old female presents to PST for planned bilateral breast mastectomy with right breast sentinel lymph node biopsy and KWAME flap     Plan:  1. PST instructions given ; NPO post midnight   2. Labs EKG  as per surgeon request   3. EZ wash instructions given   4. Medical Optimization  with Dr Max Da Silva   5. Stop NSAIDS ( Aspirin Alev Motrin Mobic Diclofenac), herbal supplements , MVI , Vitamin fish oil 7 days prior to surgery  unless directed by surgeon or cardiologist;         CAPRINI SCORE [CLOT]    AGE RELATED RISK FACTORS                                                       MOBILITY RELATED FACTORS  [x ] Age 41-60 years                                            (1 Point)                  [ ] Bed rest                                                        (1 Point)  [ ] Age: 61-74 years                                           (2 Points)                 [ ] Plaster cast                                                   (2 Points)  [ ] Age= 75 years                                              (3 Points)                 [ ] Bed bound for more than 72 hours                 (2 Points)    DISEASE RELATED RISK FACTORS                                               GENDER SPECIFIC FACTORS  [ ] Edema in the lower extremities                       (1 Point)                  [ ] Pregnancy                                                     (1 Point)  [ ] Varicose veins                                               (1 Point)                  [ ] Post-partum < 6 weeks                                   (1 Point)             [x ] BMI > 25 Kg/m2                                            (1 Point)                  [ ] Hormonal therapy  or oral contraception          (1 Point)                 [ ] Sepsis (in the previous month)                        (1 Point)                  [ ] History of pregnancy complications                 (1 point)  [ ] Pneumonia or serious lung disease                                               [ ] Unexplained or recurrent                     (1 Point)           (in the previous month)                               (1 Point)  [ ] Abnormal pulmonary function test                     (1 Point)                 SURGERY RELATED RISK FACTORS  [ ] Acute myocardial infarction                              (1 Point)                 [ ]  Section                                             (1 Point)  [ ] Congestive heart failure (in the previous month)  (1 Point)               [ ] Minor surgery                                                  (1 Point)   [ ] Inflammatory bowel disease                             (1 Point)                 [ ] Arthroscopic surgery                                        (2 Points)  [ ] Central venous access                                      (2 Points)                [ x] General surgery lasting more than 45 minutes   (2 Points)       [ ] Stroke (in the previous month)                          (5 Points)               [ ] Elective arthroplasty                                         (5 Points)            (x ) past and present malignancy                             ( 2 points)                                                                                                                                    HEMATOLOGY RELATED FACTORS                                                 TRAUMA RELATED RISK FACTORS  [ ] Prior episodes of VTE                                     (3 Points)                 [ ] Fracture of the hip, pelvis, or leg                       (5 Points)  [ ] Positive family history for VTE                         (3 Points)                 [ ] Acute spinal cord injury (in the previous month)  (5 Points)  [ ] Prothrombin 58119 A                                     (3 Points)                 [ ] Paralysis  (less than 1 month)                             (5 Points)  [ ] Factor V Leiden                                             (3 Points)                  [ ] Multiple Trauma within 1 month                        (5 Points)  [ ] Lupus anticoagulants                                     (3 Points)                                                           [ ] Anticardiolipin antibodies                               (3 Points)                                                       [ ] High homocysteine in the blood                      (3 Points)                                             [ ] Other congenital or acquired thrombophilia      (3 Points)                                                [ ] Heparin induced thrombocytopenia                  (3 Points)                                          Total Score [       6   ]

## 2019-11-20 NOTE — PHYSICAL EXAM
[Normocephalic] : normocephalic [Atraumatic] : atraumatic [EOMI] : extra ocular movement intact [PERRL] : pupils equal, round and reactive to light [Sclera nonicteric] : sclera nonicteric [Supple] : supple [No Supraclavicular Adenopathy] : no supraclavicular adenopathy [Examined in the supine and seated position] : examined in the supine and seated position [No dominant masses] : no dominant masses in right breast  [No dominant masses] : no dominant masses left breast [No Nipple Retraction] : no left nipple retraction [No Nipple Discharge] : no left nipple discharge [No Axillary Lymphadenopathy] : no left axillary lymphadenopathy [No Edema] : no edema [No Rashes] : no rashes [No Ulceration] : no ulceration [Breast Nipple Inversion] : nipples not inverted [Breast Nipple Retraction] : nipples not retracted [Breast Nipple Fissures] : nipples not fissured [Breast Nipple Flattening] : nipples not flattened [Breast Abnormal Secretion Serous Fluid] : no serous discharge [Breast Abnormal Secretion Bloody Fluid] : no bloody discharge [Breast Abnormal Lactation (Galactorrhea)] : no galactorrhea [de-identified] : No supraclavicular or axillary adenopathy. No dominant breast mass, no skin changes. Everted nipple without discharge.  [de-identified] : No supraclavicular or axillary adenopathy. Breast with thickened area in the upper outer quadrant. No skin changes. Everted nipple without discharge.  [Breast Abnormal Secretion Opalescent Fluid] : no milky discharge

## 2019-11-20 NOTE — H&P PST ADULT - ATTENDING COMMENTS
Patient seen and examined. She has right breast cancer and is undergoing bilateral skin sparing mastectomy with right sentinel lymph node biopsy possible axillary dissection. She understands technical aspects of surgery as well as risks v benefits. All questions were answered.

## 2019-11-20 NOTE — H&P PST ADULT - NSICDXPASTSURGICALHX_GEN_ALL_CORE_FT
PAST SURGICAL HISTORY:  H/O removal of cyst     History of colostomy reversal 8/2019    History of hysterectomy     S/P colostomy may15 2019

## 2019-11-20 NOTE — H&P PST ADULT - NSICDXPASTMEDICALHX_GEN_ALL_CORE_FT
PAST MEDICAL HISTORY:  Anemia     Endometrial adenocarcinoma no chemo    Endometriosis     Leaky heart valve     S/P colostomy 5/15/19 sepsis after hysterectomy    Thyroid nodule

## 2019-11-21 DIAGNOSIS — C50.911 MALIGNANT NEOPLASM OF UNSPECIFIED SITE OF RIGHT FEMALE BREAST: ICD-10-CM

## 2019-11-21 DIAGNOSIS — Z01.818 ENCOUNTER FOR OTHER PREPROCEDURAL EXAMINATION: ICD-10-CM

## 2019-11-25 ENCOUNTER — RESULT REVIEW (OUTPATIENT)
Age: 49
End: 2019-11-25

## 2019-11-25 ENCOUNTER — INPATIENT (INPATIENT)
Facility: HOSPITAL | Age: 49
LOS: 1 days | Discharge: HOME | DRG: 580 | End: 2019-11-27
Attending: SURGERY | Admitting: SURGERY
Payer: COMMERCIAL

## 2019-11-25 ENCOUNTER — APPOINTMENT (OUTPATIENT)
Dept: SURGERY | Facility: HOSPITAL | Age: 49
End: 2019-11-25
Payer: COMMERCIAL

## 2019-11-25 VITALS
DIASTOLIC BLOOD PRESSURE: 84 MMHG | OXYGEN SATURATION: 100 % | TEMPERATURE: 98 F | WEIGHT: 154.1 LBS | SYSTOLIC BLOOD PRESSURE: 135 MMHG | HEIGHT: 61 IN | HEART RATE: 77 BPM | RESPIRATION RATE: 16 BRPM

## 2019-11-25 DIAGNOSIS — Z90.710 ACQUIRED ABSENCE OF BOTH CERVIX AND UTERUS: Chronic | ICD-10-CM

## 2019-11-25 DIAGNOSIS — Z98.890 OTHER SPECIFIED POSTPROCEDURAL STATES: Chronic | ICD-10-CM

## 2019-11-25 DIAGNOSIS — Z93.3 COLOSTOMY STATUS: Chronic | ICD-10-CM

## 2019-11-25 DIAGNOSIS — R92.8 OTHER ABNORMAL AND INCONCLUSIVE FINDINGS ON DIAGNOSTIC IMAGING OF BREAST: ICD-10-CM

## 2019-11-25 DIAGNOSIS — C50.911 MALIGNANT NEOPLASM OF UNSPECIFIED SITE OF RIGHT FEMALE BREAST: ICD-10-CM

## 2019-11-25 LAB — HCG UR QL: NEGATIVE — SIGNIFICANT CHANGE UP

## 2019-11-25 PROCEDURE — 99215 OFFICE O/P EST HI 40 MIN: CPT | Mod: 25

## 2019-11-25 PROCEDURE — 88307 TISSUE EXAM BY PATHOLOGIST: CPT

## 2019-11-25 PROCEDURE — A9520: CPT

## 2019-11-25 PROCEDURE — 88333 PATH CONSLTJ SURG CYTO XM 1: CPT

## 2019-11-25 PROCEDURE — 19303 MAST SIMPLE COMPLETE: CPT | Mod: 50

## 2019-11-25 PROCEDURE — C9290: CPT

## 2019-11-25 PROCEDURE — 88333 PATH CONSLTJ SURG CYTO XM 1: CPT | Mod: 26

## 2019-11-25 PROCEDURE — S2068: CPT | Mod: RT

## 2019-11-25 PROCEDURE — 81025 URINE PREGNANCY TEST: CPT

## 2019-11-25 PROCEDURE — 38792 RA TRACER ID OF SENTINL NODE: CPT | Mod: RT

## 2019-11-25 PROCEDURE — C1889: CPT

## 2019-11-25 PROCEDURE — S2068: CPT | Mod: LT

## 2019-11-25 PROCEDURE — 19303 MAST SIMPLE COMPLETE: CPT | Mod: AS,50

## 2019-11-25 PROCEDURE — 88305 TISSUE EXAM BY PATHOLOGIST: CPT

## 2019-11-25 PROCEDURE — 38525 BIOPSY/REMOVAL LYMPH NODES: CPT | Mod: RT

## 2019-11-25 PROCEDURE — 88305 TISSUE EXAM BY PATHOLOGIST: CPT | Mod: 26

## 2019-11-25 PROCEDURE — 74018 RADEX ABDOMEN 1 VIEW: CPT | Mod: 26

## 2019-11-25 PROCEDURE — 88307 TISSUE EXAM BY PATHOLOGIST: CPT | Mod: 26

## 2019-11-25 PROCEDURE — 74018 RADEX ABDOMEN 1 VIEW: CPT

## 2019-11-25 RX ORDER — FENTANYL CITRATE 50 UG/ML
50 INJECTION INTRAVENOUS
Refills: 0 | Status: DISCONTINUED | OUTPATIENT
Start: 2019-11-25 | End: 2019-11-26

## 2019-11-25 RX ORDER — OXYCODONE HYDROCHLORIDE 5 MG/1
10 TABLET ORAL ONCE
Refills: 0 | Status: DISCONTINUED | OUTPATIENT
Start: 2019-11-25 | End: 2019-11-26

## 2019-11-25 RX ORDER — OXYCODONE HYDROCHLORIDE 5 MG/1
10 TABLET ORAL EVERY 4 HOURS
Refills: 0 | Status: DISCONTINUED | OUTPATIENT
Start: 2019-11-25 | End: 2019-11-27

## 2019-11-25 RX ORDER — ONDANSETRON 8 MG/1
4 TABLET, FILM COATED ORAL ONCE
Refills: 0 | Status: DISCONTINUED | OUTPATIENT
Start: 2019-11-25 | End: 2019-11-26

## 2019-11-25 RX ORDER — ACETAMINOPHEN 500 MG
1000 TABLET ORAL EVERY 6 HOURS
Refills: 0 | Status: COMPLETED | OUTPATIENT
Start: 2019-11-25 | End: 2019-11-26

## 2019-11-25 RX ORDER — SODIUM CHLORIDE 9 MG/ML
1000 INJECTION, SOLUTION INTRAVENOUS
Refills: 0 | Status: DISCONTINUED | OUTPATIENT
Start: 2019-11-25 | End: 2019-11-27

## 2019-11-25 RX ORDER — SENNA PLUS 8.6 MG/1
2 TABLET ORAL AT BEDTIME
Refills: 0 | Status: DISCONTINUED | OUTPATIENT
Start: 2019-11-26 | End: 2019-11-27

## 2019-11-25 RX ORDER — ONDANSETRON 8 MG/1
4 TABLET, FILM COATED ORAL EVERY 6 HOURS
Refills: 0 | Status: DISCONTINUED | OUTPATIENT
Start: 2019-11-25 | End: 2019-11-27

## 2019-11-25 RX ORDER — KETOROLAC TROMETHAMINE 30 MG/ML
15 SYRINGE (ML) INJECTION EVERY 6 HOURS
Refills: 0 | Status: DISCONTINUED | OUTPATIENT
Start: 2019-11-25 | End: 2019-11-27

## 2019-11-25 RX ORDER — HYDROMORPHONE HYDROCHLORIDE 2 MG/ML
0.5 INJECTION INTRAMUSCULAR; INTRAVENOUS; SUBCUTANEOUS EVERY 4 HOURS
Refills: 0 | Status: DISCONTINUED | OUTPATIENT
Start: 2019-11-25 | End: 2019-11-27

## 2019-11-25 RX ORDER — SODIUM CHLORIDE 9 MG/ML
1000 INJECTION, SOLUTION INTRAVENOUS
Refills: 0 | Status: DISCONTINUED | OUTPATIENT
Start: 2019-11-25 | End: 2019-11-26

## 2019-11-25 RX ORDER — INFLUENZA VIRUS VACCINE 15; 15; 15; 15 UG/.5ML; UG/.5ML; UG/.5ML; UG/.5ML
0.5 SUSPENSION INTRAMUSCULAR ONCE
Refills: 0 | Status: DISCONTINUED | OUTPATIENT
Start: 2019-11-25 | End: 2019-11-27

## 2019-11-25 RX ADMIN — ONDANSETRON 4 MILLIGRAM(S): 8 TABLET, FILM COATED ORAL at 22:56

## 2019-11-25 RX ADMIN — SODIUM CHLORIDE 75 MILLILITER(S): 9 INJECTION, SOLUTION INTRAVENOUS at 22:57

## 2019-11-25 NOTE — BRIEF OPERATIVE NOTE - NSICDXBRIEFPROCEDURE_GEN_ALL_CORE_FT
PROCEDURES:  Mastectomy, bilateral, nipple sparing 25-Nov-2019 11:40:46 right sentinel node biopsies Kandy Cox

## 2019-11-25 NOTE — BRIEF OPERATIVE NOTE - SPECIMENS
bilateral breast tissue, right sentinel nodes x3 bilateral breast tissue, right sentinel nodes x3, bilateral retroareolar biopsies

## 2019-11-25 NOTE — BRIEF OPERATIVE NOTE - NSICDXBRIEFPROCEDURE_GEN_ALL_CORE_FT
PROCEDURES:  Breast reconstruction with KWAME free flap 25-Nov-2019 18:09:41  Krista Amos  Mastectomy, bilateral, nipple sparing 25-Nov-2019 11:40:46 right sentinel node biopsies Kandy Cox

## 2019-11-25 NOTE — PATIENT PROFILE ADULT - CENTRAL VENOUS CATHETER/PICC LINE
INR 1.4 in lab today. Pt's last INR on 12/20 was 4.4 when pt was advised to hold warfarin x 2 days then decrease TWD to 16 mg for the remainder of that week, as patient was prescribed doxycycline 12/19 x 7 days which per [employer] can increase risk of bleeding.    Spoke with pt's daughter Debbie on phone. Informed Pat to have pt resume usual TWD of 20 mg taking 4 mg Mon, Wed, Fri and 2 mg all other days of the week and to recheck INR in 1 week. Pat states she will try to arrange transportation to see JEB Barron in Coumadin clinic 01/03, I also informed Pat pt can have INR in lab again as she did today. Pat will let us know what she is able to do, no questions or concerns.    RAYMUNDO to JEB Barron and PCP.     no

## 2019-11-26 RX ORDER — DIAZEPAM 5 MG
5 TABLET ORAL EVERY 8 HOURS
Refills: 0 | Status: DISCONTINUED | OUTPATIENT
Start: 2019-11-26 | End: 2019-11-27

## 2019-11-26 RX ORDER — ACETAMINOPHEN 500 MG
1000 TABLET ORAL EVERY 8 HOURS
Refills: 0 | Status: DISCONTINUED | OUTPATIENT
Start: 2019-11-26 | End: 2019-11-27

## 2019-11-26 RX ORDER — HEPARIN SODIUM 5000 [USP'U]/ML
5000 INJECTION INTRAVENOUS; SUBCUTANEOUS EVERY 8 HOURS
Refills: 0 | Status: DISCONTINUED | OUTPATIENT
Start: 2019-11-26 | End: 2019-11-27

## 2019-11-26 RX ORDER — KETOROLAC TROMETHAMINE 30 MG/ML
15 SYRINGE (ML) INJECTION EVERY 6 HOURS
Refills: 0 | Status: DISCONTINUED | OUTPATIENT
Start: 2019-11-26 | End: 2019-11-27

## 2019-11-26 RX ORDER — OXYCODONE HYDROCHLORIDE 5 MG/1
10 TABLET ORAL EVERY 4 HOURS
Refills: 0 | Status: DISCONTINUED | OUTPATIENT
Start: 2019-11-26 | End: 2019-11-27

## 2019-11-26 RX ORDER — DIPHENHYDRAMINE HCL 50 MG
25 CAPSULE ORAL EVERY 4 HOURS
Refills: 0 | Status: DISCONTINUED | OUTPATIENT
Start: 2019-11-26 | End: 2019-11-27

## 2019-11-26 RX ORDER — CEFAZOLIN SODIUM 1 G
2000 VIAL (EA) INJECTION EVERY 8 HOURS
Refills: 0 | Status: DISCONTINUED | OUTPATIENT
Start: 2019-11-26 | End: 2019-11-27

## 2019-11-26 RX ORDER — CALCIUM CARBONATE 500(1250)
1 TABLET ORAL EVERY 4 HOURS
Refills: 0 | Status: DISCONTINUED | OUTPATIENT
Start: 2019-11-26 | End: 2019-11-27

## 2019-11-26 RX ORDER — SODIUM CHLORIDE 9 MG/ML
3 INJECTION INTRAMUSCULAR; INTRAVENOUS; SUBCUTANEOUS EVERY 8 HOURS
Refills: 0 | Status: DISCONTINUED | OUTPATIENT
Start: 2019-11-26 | End: 2019-11-27

## 2019-11-26 RX ADMIN — Medication 1000 MILLIGRAM(S): at 16:04

## 2019-11-26 RX ADMIN — Medication 1000 MILLIGRAM(S): at 01:00

## 2019-11-26 RX ADMIN — Medication 400 MILLIGRAM(S): at 17:18

## 2019-11-26 RX ADMIN — SODIUM CHLORIDE 75 MILLILITER(S): 9 INJECTION, SOLUTION INTRAVENOUS at 08:44

## 2019-11-26 RX ADMIN — Medication 400 MILLIGRAM(S): at 12:54

## 2019-11-26 RX ADMIN — Medication 1000 MILLIGRAM(S): at 22:24

## 2019-11-26 RX ADMIN — Medication 15 MILLIGRAM(S): at 12:53

## 2019-11-26 RX ADMIN — HEPARIN SODIUM 5000 UNIT(S): 5000 INJECTION INTRAVENOUS; SUBCUTANEOUS at 22:06

## 2019-11-26 RX ADMIN — Medication 400 MILLIGRAM(S): at 05:04

## 2019-11-26 RX ADMIN — Medication 15 MILLIGRAM(S): at 00:14

## 2019-11-26 RX ADMIN — Medication 15 MILLIGRAM(S): at 16:04

## 2019-11-26 RX ADMIN — Medication 1000 MILLIGRAM(S): at 05:30

## 2019-11-26 RX ADMIN — Medication 15 MILLIGRAM(S): at 05:05

## 2019-11-26 RX ADMIN — SENNA PLUS 2 TABLET(S): 8.6 TABLET ORAL at 22:06

## 2019-11-26 RX ADMIN — Medication 15 MILLIGRAM(S): at 05:30

## 2019-11-26 RX ADMIN — Medication 400 MILLIGRAM(S): at 22:07

## 2019-11-26 RX ADMIN — Medication 400 MILLIGRAM(S): at 00:14

## 2019-11-26 RX ADMIN — Medication 15 MILLIGRAM(S): at 17:18

## 2019-11-26 RX ADMIN — Medication 15 MILLIGRAM(S): at 01:00

## 2019-11-26 NOTE — PROGRESS NOTE ADULT - SUBJECTIVE AND OBJECTIVE BOX
Pateint is in the PACU resting comfortably, pain controlled.    AF VSS    Vioptix 50s/60s bilaterally    Bilateral reconstructed breasts soft, no sign of seroma/hematoma, scan serosanguinous drainage in drains.  Abdomen soft, no sign of seroma/hematoma, scan serosanguinous drainage in drains.    POD 1 s/p bilateral mastectomy and KWAME reconstruction, doing well.    -Transfer to [facility] and KWAME care per routine.

## 2019-11-27 ENCOUNTER — TRANSCRIPTION ENCOUNTER (OUTPATIENT)
Age: 49
End: 2019-11-27

## 2019-11-27 VITALS — HEART RATE: 105 BPM

## 2019-11-27 RX ORDER — OXYCODONE HYDROCHLORIDE 5 MG/1
1 TABLET ORAL
Qty: 20 | Refills: 0
Start: 2019-11-27 | End: 2019-12-01

## 2019-11-27 RX ADMIN — Medication 15 MILLIGRAM(S): at 00:12

## 2019-11-27 RX ADMIN — HEPARIN SODIUM 5000 UNIT(S): 5000 INJECTION INTRAVENOUS; SUBCUTANEOUS at 13:30

## 2019-11-27 RX ADMIN — HEPARIN SODIUM 5000 UNIT(S): 5000 INJECTION INTRAVENOUS; SUBCUTANEOUS at 05:27

## 2019-11-27 RX ADMIN — Medication 15 MILLIGRAM(S): at 05:42

## 2019-11-27 RX ADMIN — Medication 1 TABLET(S): at 13:30

## 2019-11-27 RX ADMIN — Medication 15 MILLIGRAM(S): at 05:27

## 2019-11-27 NOTE — DISCHARGE NOTE PROVIDER - PROVIDER TOKENS
PROVIDER:[TOKEN:[[id number]],FOLLOWUP:[2 weeks],ESTABLISHEDPATIENT:[T]],PROVIDER:[TOKEN:[[id number]],FOLLOWUP:[1 week],ESTABLISHEDPATIENT:[T]]

## 2019-11-27 NOTE — DISCHARGE NOTE NURSING/CASE MANAGEMENT/SOCIAL WORK - NSDCPNINST_GEN_ALL_CORE
Notify the doctor for signs and symptoms of infection on the surgical site: unrelieved pain, redness, foul odor discharge and fever

## 2019-11-27 NOTE — DISCHARGE NOTE PROVIDER - CARE PROVIDERS DIRECT ADDRESSES
[email or web address]

## 2019-11-27 NOTE — DISCHARGE NOTE PROVIDER - NSDCMRMEDTOKEN_GEN_ALL_CORE_FT
ferrous sulfate 325 mg (65 mg elemental iron) oral tablet: 1 tab(s) orally 2 times a day  Vitamin C 500 mg oral tablet: 1 tab(s) orally once a day ferrous sulfate 325 mg (65 mg elemental iron) oral tablet: 1 tab(s) orally 2 times a day  ibuprofen 100 mg oral tablet: 4 tab(s) orally every 6 hours  oxyCODONE 5 mg oral tablet: 1 tab(s) orally every 6 hours, As Needed -for severe pain MDD:4 tabs  Tylenol 325 mg oral tablet: 2 tab(s) orally every 4 hours, As Needed  Vitamin C 500 mg oral tablet: 1 tab(s) orally once a day

## 2019-11-27 NOTE — DISCHARGE NOTE PROVIDER - NSDCACTIVITY_GEN_ALL_CORE
Walking - Outdoors allowed/Do not drive or operate machinery/No heavy lifting/straining/Do not make important decisions/Stairs allowed/Walking - Indoors allowed/Showering allowed

## 2019-11-27 NOTE — DISCHARGE NOTE NURSING/CASE MANAGEMENT/SOCIAL WORK - PATIENT PORTAL LINK FT
You can access the FollowMyHealth Patient Portal offered by [facility] by registering at the following website: [email or web address]. By joining [employer]’s FollowMyHealth portal, you will also be able to view your health information using other applications (apps) compatible with our system.

## 2019-11-27 NOTE — DISCHARGE NOTE PROVIDER - NSDCFUSCHEDAPPT_GEN_ALL_CORE_FT
RUTH ANGELA ; 12/05/2019 ; NPP PED  [facility] RUTH Jones ; 12/27/2019 ; NPP [facility] RUTH ANGELA ; 12/05/2019 ; NPP PED  [facility] RUTH Jones ; 12/27/2019 ; NPP [facility] RUTH ANGELA ; 12/05/2019 ; NPP PED  [facility] RUTH Jones ; 12/27/2019 ; NPP [facility]

## 2019-11-27 NOTE — DISCHARGE NOTE PROVIDER - HOSPITAL COURSE
The patient underwent bilateral mastectomy, and KWAME breast reconstruction without incident, and had a routine hospital stay.

## 2019-11-27 NOTE — DISCHARGE NOTE PROVIDER - CARE PROVIDER_API CALL
Elizabeth Riley)  Surgery  [address] [place] [address]  Phone: [phone]  Fax: [phone]  Established Patient  Follow Up Time: 2 weeks    Harshal Licea)  Plastic Surgery; Surgery; Surgery of the Hand  [address], [address]  [place] [address]  Phone: [phone]  Fax: [phone]  Established Patient  Follow Up Time: 1 week

## 2019-11-27 NOTE — PROGRESS NOTE ADULT - SUBJECTIVE AND OBJECTIVE BOX
Patient comfortable. Ambulating, voiding, tolerating pos.  vss/a jps mod serosang  flaps viable   mastectomy flaps viable   abdomen cdi  umbilicus viable    doing well  reviewed activity restrictions, sleeping, bathing, drains, meds and f/u  patient may go home later today. She will call Dr. Licea for f/u appt for early next week.

## 2019-12-02 PROBLEM — E04.1 NONTOXIC SINGLE THYROID NODULE: Chronic | Status: ACTIVE | Noted: 2019-11-20

## 2019-12-02 PROBLEM — I38 ENDOCARDITIS, VALVE UNSPECIFIED: Chronic | Status: ACTIVE | Noted: 2019-11-20

## 2019-12-03 ENCOUNTER — APPOINTMENT (OUTPATIENT)
Dept: PLASTIC SURGERY | Facility: CLINIC | Age: 49
End: 2019-12-03
Payer: COMMERCIAL

## 2019-12-03 VITALS
BODY MASS INDEX: 29.64 KG/M2 | OXYGEN SATURATION: 100 % | SYSTOLIC BLOOD PRESSURE: 111 MMHG | RESPIRATION RATE: 14 BRPM | HEIGHT: 61 IN | WEIGHT: 157 LBS | TEMPERATURE: 97.9 F | HEART RATE: 105 BPM | DIASTOLIC BLOOD PRESSURE: 70 MMHG

## 2019-12-03 PROCEDURE — 99024 POSTOP FOLLOW-UP VISIT: CPT

## 2019-12-03 NOTE — ASSESSMENT
[FreeTextEntry1] : 48 yo female s/p b/l KWAME flap breast reconstruction 11/25/19\par pt doing well\par all 4 JULIAN drains removed today without difficulty \par monitor for fever, chills, LE pain/edema, increasing redness or pain

## 2019-12-03 NOTE — PHYSICAL EXAM
[de-identified] : b/l breast are soft and symmetrical \par healing ecchymosis b/l breast flaps \par skin islands viable\par b/l breasts flaps are soft and viable\par incisions c/d/i\par JULIAN drains functioning on suction  [de-identified] : abdomen soft and non tender \par incisions are c/d/i \par no palpable fluid collections or signs of infection \par JULIAN drains in place on suction  [NI] : Normal

## 2019-12-03 NOTE — HISTORY OF PRESENT ILLNESS
[FreeTextEntry1] : 50 yo female with history of invasive ductal cancer of the right breast, now s/p b/l breast reconstruction with KWAME flaps 11/25/19. pt doing well \par JULIAN drains\par 1- 20\par 2- 5\par 3- 5\par 4- 10

## 2019-12-05 ENCOUNTER — APPOINTMENT (OUTPATIENT)
Dept: PEDIATRIC MEDICAL GENETICS | Facility: CLINIC | Age: 49
End: 2019-12-05
Payer: COMMERCIAL

## 2019-12-05 PROCEDURE — 99242 OFF/OP CONSLTJ NEW/EST SF 20: CPT

## 2019-12-06 NOTE — CDI QUERY NOTE - NSCDIOTHERTXTBX_GEN_ALL_CORE_HH
Pathology results:    Right breast: infiltrating ductal carcinoma  Right axilla sentinel lymph node - Metastatic carcinoma to 2 of 3 lymph nodes    If you agree with above pathology reports please document in the EMR and if you disagree with pathology reports please document as well.

## 2019-12-10 ENCOUNTER — APPOINTMENT (OUTPATIENT)
Dept: SURGERY | Facility: CLINIC | Age: 49
End: 2019-12-10
Payer: COMMERCIAL

## 2019-12-10 VITALS
TEMPERATURE: 98 F | SYSTOLIC BLOOD PRESSURE: 120 MMHG | HEIGHT: 61 IN | BODY MASS INDEX: 30.02 KG/M2 | WEIGHT: 159 LBS | DIASTOLIC BLOOD PRESSURE: 70 MMHG

## 2019-12-10 DIAGNOSIS — Z00.00 ENCOUNTER FOR GENERAL ADULT MEDICAL EXAMINATION W/OUT ABNORMAL FINDINGS: ICD-10-CM

## 2019-12-10 PROCEDURE — 99024 POSTOP FOLLOW-UP VISIT: CPT

## 2019-12-10 NOTE — PHYSICAL EXAM
[Normocephalic] : normocephalic [Atraumatic] : atraumatic [EOMI] : extra ocular movement intact [PERRL] : pupils equal, round and reactive to light [Supple] : supple [Sclera nonicteric] : sclera nonicteric [No Supraclavicular Adenopathy] : no supraclavicular adenopathy [Examined in the supine and seated position] : examined in the supine and seated position [No dominant masses] : no dominant masses left breast [No dominant masses] : no dominant masses in right breast  [No Nipple Retraction] : no left nipple retraction [Breast Nipple Inversion] : nipples not inverted [No Nipple Discharge] : no left nipple discharge [Breast Nipple Retraction] : nipples not retracted [Breast Nipple Flattening] : nipples not flattened [Breast Nipple Fissures] : nipples not fissured [Breast Abnormal Lactation (Galactorrhea)] : no galactorrhea [Breast Abnormal Secretion Bloody Fluid] : no bloody discharge [Breast Abnormal Secretion Serous Fluid] : no serous discharge [Breast Abnormal Secretion Opalescent Fluid] : no milky discharge [No Axillary Lymphadenopathy] : no left axillary lymphadenopathy [No Edema] : no edema [No Rashes] : no rashes [No Ulceration] : no ulceration [de-identified] : No supraclavicular or axillary adenopathy. No dominant breast mass, no skin changes. Everted nipple without discharge.  [de-identified] : No supraclavicular or axillary adenopathy. Breast with thickened area in the upper outer quadrant. No skin changes. Everted nipple without discharge.

## 2019-12-10 NOTE — HISTORY OF PRESENT ILLNESS
[FreeTextEntry1] : I had the pleasure of seeing Sera Francisco in the office for a post operative visit s/p bilateral mastectomy and right sentinel lymph node biopsy for right breast cancer.\par \par Sera is a micheal 50 yo premenopausal female. She was diagnosed with right breast cancer in 10/2019. \par She underwent MRI of the breast 10/15/2019 which demonstrated the right breast cancer to measure 1.4cm without adenopathy. No MR evidence of multicentric or contralateral disease. 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 Further clarification on this variance will be obtained from the updated test as soon as it is available.\par \par 11/18/ 18 Breast Biopsy ( Right, 11:00 , 5 cm from the 0., Ultrasound guided needle cores)\par - Infiltrating ductal carcinoma, well differentiated\par - no definitive lymphovascular invasion\par -No IN SITU component identified\par -Largest contiguous focus 1.1 cm\par - adjacent region containing aggregates of ducts with columnar cell change an columnar cell hyperplasia\par  HER2/ELADIO by IHC 1+ / Negative\par ER : Positive 90-95%- strong\par MN -Positive ,15-20% strong \par \par Final pathology demonstrated 1.3cm IDC grade 1 with infiltrating carcinoma extending to the cauterized anterior margin. 2/3 sentinel lymph nodes with metastatic carcinoma with the largest measuring 3mm and extracapsular extension. The rest of the pathology was reviewed in detail demonstrating benign findings and a copy was provided to her. \par \par We discussed the need for additional surgery. At the time of surgery the cancer was suspicious at the skin due to changes noted and a clip was placed at the superior/anterior margin. We will see if an ultrasound demonstrates the clip for surgical planning, however she understands she will require an axillary dissection and we discussed the risk of lymphedema of 25-30%. We also discussed the lympha procedure which she is going to discuss further with Dr. Licea. \par \par She understands and will plan for right axillary dissection and possible skin excision.\par All questions were answered.

## 2019-12-10 NOTE — ASSESSMENT
[FreeTextEntry1] : 48 yo female s/p bilateral mastectomy with right SLNB for 1.3cm IDC with 2/3 positive nodes. She understands that anterior margin/super demonstrates carcinoma at the inked margin and we will attempt an ultrasound to see if there is a clip at the site. She will return to OR for right axillary dissection. All questions were answered.\par 1. Return to OR for right axillary dissection and possible skin excision.\par 2. Follow up with Dr. Licea for possible lymph procedure

## 2019-12-11 DIAGNOSIS — C50.911 MALIGNANT NEOPLASM OF UNSPECIFIED SITE OF RIGHT FEMALE BREAST: ICD-10-CM

## 2019-12-11 DIAGNOSIS — C77.3 SECONDARY AND UNSPECIFIED MALIGNANT NEOPLASM OF AXILLA AND UPPER LIMB LYMPH NODES: ICD-10-CM

## 2019-12-12 NOTE — HISTORY OF PRESENT ILLNESS
[FreeTextEntry1] : REASON FOR VISIT\par Sera Francisco (: 1970), is a 49 year old woman seen for consultation at the [facility] ([facility]) on 2019.  Ms. Francisco was referred by Dr. Macarena Yarbrough for hereditary cancer predisposition risk assessment and counseling, due to a personal history of breast and ovarian cancer. Her motivation for counseling and testing was to better understand the genetic cancer risks to herself and her family in light of her genetic test results.\par \par HISTORY OF PRESENT ILLNESS \par Ms. Francisco is a 49 year old female diagnosed with endometrioid adenocarcinoma of the ovary at age 48, after a hysterectomy and bilateral oophorectomy to treat endometriosis. She was diagnosed with invasive ductal carcinoma at age 49, and was treated with a double mastectomy. She is 2 months post surgery and will follow up with her doctor for a treatment/ monitoring plan. Ms. Francisco has had genetic testing, ordered by Dr. Elizabeth Riley, through [facility]. She had the [email or web address] Hereditary Cancer Panel. No clinically significant mutations were identified, but a variant of uncertain significance was found in MSH3.\par \par Environmental Exposures \par Environmental exposures to alcohol and tobacco products reported negative. Environmental exposures to drugs and second-hand smoke unknown.\par  \par Current cancer surveillance includes:\par •	Mammogram: Ms. Francisco has had at least one mammogram. She has had only 1 breast biopsy which diagnosed her with cancer.\par •	Colonoscopy: One colonoscopy at age 48, due to sepsis associated with surgical treatment of endometriosis. Had a colostomy. Told to come back in 5 years\par •	Ms. Francisco has not had a dermatologic exam or a pap smear\par \par Ht: 5ft 1in    Wt: 157lbs \par Obstetrical History:    \par Age at Menarche: 10 years\par Postmenopausal due to bilateral oopherectomy at age 48  \par Age at First Live Birth:  N/A\par Oral Contraceptive Use: None\par Hormone Replacement Therapy: None\par \par PAST MEDICAL HISTORY \par Endometriosis\par \par PAST SURGICAL HISTORY \par Hysterectomy and bilateral oophorectomy at age 48 \par Colostomy due to sepsis at age 48\par \par MEDICATIONS \par Unknown\par \par FAMILY HISTORY\par A four-generation family history was constructed and scanned into [employer].  Maternal ancestry was reported as [place] and [place], and paternal ancestry was reported as [place].  No Ashkenazi Yarsani ancestry.  Family history is significant for:\par •	Sister- living at age 47, breast cancer diagnosed at age 44, unconfirmed genetic test results\par •	Maternal aunt,  at age 70, breast cancer diagnosed in 60s, no genetic testing, smoker\par •	Maternal aunt,  at age 53, pancreatic cancer diagnosed at age 52, no genetic testing, smoker\par •	Female maternal first cousin- alive at age 73, diagnosed with colon cancer at age 72, no genetic testing done\par •	Male maternal first cousin-  in 50s, diagnosed with renal cell carcinoma in 40s, no genetic testing done\par •	Maternal grandfather-  at age 64, diagnosed with stomach cancer in 50s, no genetic testing done, smoker\par Ms. Francisco has no children and three additional unaffected siblings. Family history is negative for consanguinity.  \par \par RISK ASSESSMENT AND GENETIC COUNSELING\par \par At her session which Ms. Francisco attended with her sister (unaffected) and mother, the differences between hereditary and sporadic cancer were reviewed.  Approximately 70-80% of cancers are sporadic, or random.  Around 10-20% of cancers are found in family groups, likely caused by a combination of environmental and genetic factors.  Around 5-10% of cancers are caused by an inherited change in a cancer susceptibility, or predisposition gene.  Features suggestive of a hereditary cancer predisposition include early onset of cancer (diagnosed before age 50), multiple cancers in a single person, uncommon cancers, and same or related cancers across several generations on the same side of the family.\par \par Most breast cancer is sporadic, or occurring randomly.  Only 10% of cases of ovarian cancer, and 5-10% of cases of breast cancer are due to an inherited predisposition.  Of the hereditary cases, the majority are due to a mutation in either of two genes, known as BRCA1 and BRCA2.  A small percentage of cases are due mutations in other known oncogenes, mismatch repair genes, tumor suppressor genes, or in as yet unidentified genes.  \par \par Characteristics of hereditary breast and ovarian cancers include a family history of breast and/or ovarian cancer, early age of onset of breast cancer (under 50 years), bilateral breast cancer, breast and ovarian cancers in the same individual, a family history of male breast cancer, and Ashkenazi Yarsani ancestry  \par \par The vast majority of ovarian cancers are sporadic.  Only 10% of cases of ovarian cancer are caused by an inherited change in a cancer predisposition gene.  Features suggestive of a hereditary ovarian cancer predisposition syndrome, other than ovarian cancer itself, include early onset of other cancers (diagnosed before age 50), multiple cancers in a single person, and same or related cancers across several generations on the same side of the family.\par \par While the majority of hereditary ovarian cancers are caused by germline mutations in BRCA1/2 or the MMR genes, mutations in multiple other genes have been shown to increase the risk for ovarian cancer as well. Many of these genes increase the risk for other cancers in addition to ovarian cancer.\par \par Ms. Francisco had genetic testing in 2019 ordered by Dr. Elizabeth Riley at [facility].  NO pathogenic variants were identified in any of the genes tested.  A Variant of Uncertain Significance (VUS was detected in the MSH3 gene (c.2254-19T>G). At this time, there is not enough evidence to determine whether or not cancer risks are increased.  This result does not change medical management.  With more research, a VUS may be reclassified as either disease-causing or benign. Ms. Francisco was encouraged to contact the CPPW annually to inquire about any new information for this variant.  \par \par At this time, personal clinical and family history should be used to determine the risk for developing certain cancers and/or tumors and the most appropriate screening and risk reduction options. Ms. Francisco’s history does not indicate any need for additional cancer screening or surveillance beyond that for her on- and post-treatment protocol per her oncologist.\par The possibility of developing cancer by chance or due to an unknown hereditary cancer syndrome cannot be eliminated.  Integrating habits that can reduce non-genetic risk factors for cancer and promote a healthy lifestyle are also important.  These include always using sunscreen (SPF 15 or greater), avoiding the use of tobacco products, eating a balanced diet, exercising regularly, and maintaining a healthy weight.  \par \par Breast cancer screening for the patient should follow her on- and post-treatment protocol per her oncologist.  Given her family history of early-onset breast cancer, the patient may be at increased risk for a second primary breast cancer, and she may wish to consider, at the discretion of her oncologist, the addition of annual breast MRI.  \par \par No consensus exists for pancreatic cancer screening in patients with a family history of pancreatic cancer.  The most liberal suggestion put forth by the CAPS Consortium summit on familial pancreatic cancer management is to consider screening of individuals with at least two affected relatives, with at least one being a first-degree relative (FDR). No consensus was reached on whether to screen individuals without an affected FDR.  Options for screening modality includes the possibility of endoscopic ultrasonography (EUS) and MRI/magnetic resonance cholangiopancreatography (MRCP), managed by a multidisciplinary team with experience in the screening for pancreatic cancer, preferably within research protocols. Given that the patient has only one relative with pancreatic cancer, and that individuals is not a FDR, pancreatic cancer screening is likely not indicated.   \par \par SUMMARY AND PLAN  \par Ms. Francisco is a 49- year-old female with breast and ovarian cancer, and a family history of breast cancer who was found to carry a variant of unknown significance (c. 2254-19T>G) in MSH3\par \par She expressed understanding of the presented information and satisfaction with having her questions and concerns addressed. \par \par Plan:\par 1.	Follow up with your physician for cancer surveillance. The National Comprehensive Cancer Network and American Cancer Society are both resources for cancer screening guidelines.  \par 2.	A copy of genetic testing results was given to the patient at her appointment and will accompany the clinic note which will be sent to Dr. Yarbrough. \par 3.	We encourage sharing these results with family members. They have a risk to have inherited the same mutation.  Other family may benefit from genetic testing, and should contact a certified genetic counselor specializing in cancer.  Due to HIPAA and [place] laws, Genetics is unable to directly contact other family at risk.  Individuals under age 18 are not candidates for genetic testing for --- as there is no screening or other medical action recommended before then.  This preserves the individual’s right to choose, as an adult, when and whether to be tested.\par 4.	Contact CCPW yearly to check on any changes in interpretation of a VUS, if further genetic testing is desired as new cancer associated genes are uncovered, or if there are changes in the personal or family cancer history.  \par \par Any changes in cancer surveillance should be discussed with the patient’s physician.  We remain available should there be any new information for personal or family history.  If there are any additional questions, please feel free to call the [facility] at [phone].\par \par \par Patient seen by Lyubov Blanco, [facility], , and Geovany Wright DO, [facility], Medical Geneticist.\par \par \par cc: Sera Francisco\par Dr. Macarena Yarbrough\par

## 2019-12-12 NOTE — ADDENDUM
[FreeTextEntry1] : Initial visit with Clinical .  Reviewed case and records and provided counseling with genetic counselor, Lyubov Blanco MS, [facility].  Patient counseling note reviewed and amended.  The patient expressed good understanding of the information provided and we remain available if further questions or concerns arise, further testing is desired, or the family cancer history changes in the future.   The patient encounter lasted 30 minutes with >50% time counseling the patient face-to-face in office at this initial appointment with Clinical Genetics.\par \par Geovany Wright DO, [facility]\par Clinical Genetics\par

## 2019-12-17 ENCOUNTER — APPOINTMENT (OUTPATIENT)
Dept: PLASTIC SURGERY | Facility: CLINIC | Age: 49
End: 2019-12-17
Payer: COMMERCIAL

## 2019-12-17 VITALS
BODY MASS INDEX: 30.02 KG/M2 | HEIGHT: 61 IN | OXYGEN SATURATION: 100 % | DIASTOLIC BLOOD PRESSURE: 80 MMHG | WEIGHT: 159 LBS | TEMPERATURE: 97.8 F | HEART RATE: 85 BPM | SYSTOLIC BLOOD PRESSURE: 133 MMHG

## 2019-12-17 PROCEDURE — 99024 POSTOP FOLLOW-UP VISIT: CPT

## 2019-12-17 NOTE — ASSESSMENT
[FreeTextEntry1] : 48 yo female s/p b/l KWAME flaps 11/25/19. pt doing well \par discussed with pt to apply moist to dry dressing to dehiscence daily after showers \par monitor for increasing redness, fever, chills, drainage

## 2019-12-17 NOTE — HISTORY OF PRESENT ILLNESS
[FreeTextEntry1] : 50 yo female with history of invasive ductal cancer of the right breast, now s/p b/l breast reconstruction with KWAME flaps 11/25/19. pt doing well \par denies pain, fever

## 2019-12-17 NOTE — PHYSICAL EXAM
[NI] : Normal [de-identified] : b/l breast are soft and symmetrical \par healing ecchymosis b/l breast flaps \par skin islands viable\par b/l breasts flaps are soft and viable\par incisions c/d/i [de-identified] : abdomen soft and non tender \par incisions are c/d/i \par no palpable fluid collections or signs of infection \par there is a 2 cm x 2 cm area of superficial dehiscence at the T point of the abdominal incision with minimal fibrin at base\par no surrounding signs of infection

## 2019-12-23 ENCOUNTER — OUTPATIENT (OUTPATIENT)
Dept: OUTPATIENT SERVICES | Facility: HOSPITAL | Age: 49
LOS: 1 days | Discharge: HOME | End: 2019-12-23

## 2019-12-23 DIAGNOSIS — Z98.890 OTHER SPECIFIED POSTPROCEDURAL STATES: Chronic | ICD-10-CM

## 2019-12-23 DIAGNOSIS — Z93.3 COLOSTOMY STATUS: Chronic | ICD-10-CM

## 2019-12-23 DIAGNOSIS — Z90.710 ACQUIRED ABSENCE OF BOTH CERVIX AND UTERUS: Chronic | ICD-10-CM

## 2019-12-23 DIAGNOSIS — C54.1 MALIGNANT NEOPLASM OF ENDOMETRIUM: ICD-10-CM

## 2019-12-26 ENCOUNTER — OUTPATIENT (OUTPATIENT)
Dept: OUTPATIENT SERVICES | Facility: HOSPITAL | Age: 49
LOS: 1 days | End: 2019-12-26
Payer: COMMERCIAL

## 2019-12-26 VITALS
SYSTOLIC BLOOD PRESSURE: 133 MMHG | DIASTOLIC BLOOD PRESSURE: 71 MMHG | TEMPERATURE: 98 F | HEART RATE: 85 BPM | HEIGHT: 62.5 IN | OXYGEN SATURATION: 100 % | WEIGHT: 166.23 LBS | RESPIRATION RATE: 14 BRPM

## 2019-12-26 DIAGNOSIS — Z90.710 ACQUIRED ABSENCE OF BOTH CERVIX AND UTERUS: Chronic | ICD-10-CM

## 2019-12-26 DIAGNOSIS — Z90.13 ACQUIRED ABSENCE OF BILATERAL BREASTS AND NIPPLES: Chronic | ICD-10-CM

## 2019-12-26 DIAGNOSIS — Z93.3 COLOSTOMY STATUS: Chronic | ICD-10-CM

## 2019-12-26 DIAGNOSIS — Z98.890 OTHER SPECIFIED POSTPROCEDURAL STATES: Chronic | ICD-10-CM

## 2019-12-26 DIAGNOSIS — C50.911 MALIGNANT NEOPLASM OF UNSPECIFIED SITE OF RIGHT FEMALE BREAST: ICD-10-CM

## 2019-12-26 DIAGNOSIS — Z01.818 ENCOUNTER FOR OTHER PREPROCEDURAL EXAMINATION: ICD-10-CM

## 2019-12-26 LAB
APPEARANCE UR: CLEAR — SIGNIFICANT CHANGE UP
APTT BLD: 29.1 SEC — SIGNIFICANT CHANGE UP (ref 27.5–36.3)
BASOPHILS # BLD AUTO: 0.01 K/UL — SIGNIFICANT CHANGE UP (ref 0–0.2)
BASOPHILS NFR BLD AUTO: 0.2 % — SIGNIFICANT CHANGE UP (ref 0–2)
BILIRUB UR-MCNC: NEGATIVE — SIGNIFICANT CHANGE UP
COLOR SPEC: YELLOW — SIGNIFICANT CHANGE UP
DIFF PNL FLD: NEGATIVE — SIGNIFICANT CHANGE UP
EOSINOPHIL # BLD AUTO: 0.15 K/UL — SIGNIFICANT CHANGE UP (ref 0–0.5)
EOSINOPHIL NFR BLD AUTO: 2.4 % — SIGNIFICANT CHANGE UP (ref 0–6)
GLUCOSE UR QL: NEGATIVE MG/DL — SIGNIFICANT CHANGE UP
HCT VFR BLD CALC: 40.3 % — SIGNIFICANT CHANGE UP (ref 34.5–45)
HGB BLD-MCNC: 12.7 G/DL — SIGNIFICANT CHANGE UP (ref 11.5–15.5)
IMM GRANULOCYTES NFR BLD AUTO: 0.3 % — SIGNIFICANT CHANGE UP (ref 0–1.5)
INR BLD: 0.89 RATIO — SIGNIFICANT CHANGE UP (ref 0.88–1.16)
KETONES UR-MCNC: NEGATIVE — SIGNIFICANT CHANGE UP
LEUKOCYTE ESTERASE UR-ACNC: NEGATIVE — SIGNIFICANT CHANGE UP
LYMPHOCYTES # BLD AUTO: 1.89 K/UL — SIGNIFICANT CHANGE UP (ref 1–3.3)
LYMPHOCYTES # BLD AUTO: 30.1 % — SIGNIFICANT CHANGE UP (ref 13–44)
MCHC RBC-ENTMCNC: 30.1 PG — SIGNIFICANT CHANGE UP (ref 27–34)
MCHC RBC-ENTMCNC: 31.5 GM/DL — LOW (ref 32–36)
MCV RBC AUTO: 95.5 FL — SIGNIFICANT CHANGE UP (ref 80–100)
MONOCYTES # BLD AUTO: 0.43 K/UL — SIGNIFICANT CHANGE UP (ref 0–0.9)
MONOCYTES NFR BLD AUTO: 6.9 % — SIGNIFICANT CHANGE UP (ref 2–14)
NEUTROPHILS # BLD AUTO: 3.77 K/UL — SIGNIFICANT CHANGE UP (ref 1.8–7.4)
NEUTROPHILS NFR BLD AUTO: 60.1 % — SIGNIFICANT CHANGE UP (ref 43–77)
NITRITE UR-MCNC: NEGATIVE — SIGNIFICANT CHANGE UP
PH UR: 7 — SIGNIFICANT CHANGE UP (ref 5–8)
PLATELET # BLD AUTO: 219 K/UL — SIGNIFICANT CHANGE UP (ref 150–400)
PROT UR-MCNC: NEGATIVE MG/DL — SIGNIFICANT CHANGE UP
PROTHROM AB SERPL-ACNC: 9.9 SEC — LOW (ref 10–12.9)
RBC # BLD: 4.22 M/UL — SIGNIFICANT CHANGE UP (ref 3.8–5.2)
RBC # FLD: 13.6 % — SIGNIFICANT CHANGE UP (ref 10.3–14.5)
SP GR SPEC: 1 — LOW (ref 1.01–1.02)
UROBILINOGEN FLD QL: NEGATIVE MG/DL — SIGNIFICANT CHANGE UP
WBC # BLD: 6.27 K/UL — SIGNIFICANT CHANGE UP (ref 3.8–10.5)
WBC # FLD AUTO: 6.27 K/UL — SIGNIFICANT CHANGE UP (ref 3.8–10.5)

## 2019-12-26 PROCEDURE — 81003 URINALYSIS AUTO W/O SCOPE: CPT

## 2019-12-26 PROCEDURE — G0463: CPT | Mod: 25

## 2019-12-26 PROCEDURE — 85730 THROMBOPLASTIN TIME PARTIAL: CPT

## 2019-12-26 PROCEDURE — 85610 PROTHROMBIN TIME: CPT

## 2019-12-26 PROCEDURE — 85025 COMPLETE CBC W/AUTO DIFF WBC: CPT

## 2019-12-26 PROCEDURE — 36415 COLL VENOUS BLD VENIPUNCTURE: CPT

## 2019-12-26 RX ORDER — IBUPROFEN 200 MG
4 TABLET ORAL
Qty: 0 | Refills: 0 | DISCHARGE

## 2019-12-26 RX ORDER — ACETAMINOPHEN 500 MG
2 TABLET ORAL
Qty: 0 | Refills: 0 | DISCHARGE

## 2019-12-26 NOTE — H&P PST ADULT - NSICDXPASTMEDICALHX_GEN_ALL_CORE_FT
PAST MEDICAL HISTORY:  Anemia     Breast cancer, female right 2019    Endometrial adenocarcinoma s/p hysterectomy    Endometriosis     Leaky heart valve     S/P colostomy 5/15/19 sepsis after hysterectomy    Thyroid nodule

## 2019-12-26 NOTE — H&P PST ADULT - ATTENDING COMMENTS
Patient with + right axillary node, now undergoing completion right axillary dissection and possible skin excision. She understands technical aspects of procedure as well as risks v benefits. All questions were answered.

## 2019-12-26 NOTE — H&P PST ADULT - NSICDXPASTSURGICALHX_GEN_ALL_CORE_FT
PAST SURGICAL HISTORY:  H/O colonoscopy 8/2019    H/O mastectomy, bilateral with KWAME reconstruction 11/25/19    H/O removal of cyst     History of breast surgery right breast cyst removed 1991    History of colostomy reversal 8/2019    History of hysterectomy 5/7/19    S/P colostomy may15 2019

## 2019-12-26 NOTE — H&P PST ADULT - HISTORY OF PRESENT ILLNESS
49 year old female with right breast cancer found on routine mammogram. Reports having b/l mastectomy with KWAME reconstruction with Dr Riley & Dr Licea 11/2019. Reports "they have to take out six more" lymph nodes under right arm. Denies breast or abdominal pain. 49 year old female with right breast cancer found on routine mammogram 9/2019. s/p b/l mastectomy with KWAME reconstruction with Dr Riley & Dr Licea 11/2019. Reports "they have to take out six more" lymph nodes under right arm. Denies breast or abdominal pain.

## 2019-12-26 NOTE — H&P PST ADULT - ASSESSMENT
yo scheduled for   with      1. Labs as per surgeon  2. EKG  3. Medical evaluation with  4. discussed EZ sponges, NPO after MN & PST day of procedure instructions  5. Instructed to increase po fluids the day before surgery. Instructed to hold aspirin, NSAIDs, fish oil, vitamins & herbal supplements 7 days prior to surgery  6. CXR 50 yo female scheduled for right axillary lymph node excision on 1/3/2020  with Dr. Riley    1. CBC w diff, PTT, PT/INR, UA  2. EKG on chart from 11/2019  3. Medical evaluation with PCP Dr Max Da Silva to be arranged  4. discussed EZ sponges, NPO after MN & PST day of procedure instructions  5. Instructed to increase po fluids the day before surgery. Instructed to hold aspirin, NSAIDs, fish oil, vitamins & herbal supplements 7 days prior to surgery

## 2019-12-27 ENCOUNTER — APPOINTMENT (OUTPATIENT)
Dept: HEMATOLOGY ONCOLOGY | Facility: CLINIC | Age: 49
End: 2019-12-27
Payer: COMMERCIAL

## 2019-12-27 ENCOUNTER — RESULT REVIEW (OUTPATIENT)
Age: 49
End: 2019-12-27

## 2019-12-27 ENCOUNTER — MOBILE ON CALL (OUTPATIENT)
Age: 49
End: 2019-12-27

## 2019-12-27 VITALS
SYSTOLIC BLOOD PRESSURE: 138 MMHG | TEMPERATURE: 97.8 F | WEIGHT: 166 LBS | BODY MASS INDEX: 31.34 KG/M2 | HEART RATE: 93 BPM | OXYGEN SATURATION: 98 % | DIASTOLIC BLOOD PRESSURE: 80 MMHG | HEIGHT: 61 IN

## 2019-12-27 DIAGNOSIS — Z01.818 ENCOUNTER FOR OTHER PREPROCEDURAL EXAMINATION: ICD-10-CM

## 2019-12-27 DIAGNOSIS — C50.911 MALIGNANT NEOPLASM OF UNSPECIFIED SITE OF RIGHT FEMALE BREAST: ICD-10-CM

## 2019-12-27 PROBLEM — C50.919 MALIGNANT NEOPLASM OF UNSPECIFIED SITE OF UNSPECIFIED FEMALE BREAST: Chronic | Status: ACTIVE | Noted: 2019-12-26

## 2019-12-27 PROBLEM — C54.1 MALIGNANT NEOPLASM OF ENDOMETRIUM: Chronic | Status: ACTIVE | Noted: 2019-08-01

## 2019-12-27 LAB
BASOPHILS # BLD AUTO: 0 K/UL — SIGNIFICANT CHANGE UP (ref 0–0.2)
BASOPHILS NFR BLD AUTO: 0.6 % — SIGNIFICANT CHANGE UP (ref 0–2)
EOSINOPHIL # BLD AUTO: 0.1 K/UL — SIGNIFICANT CHANGE UP (ref 0–0.5)
EOSINOPHIL NFR BLD AUTO: 2.3 % — SIGNIFICANT CHANGE UP (ref 0–6)
HCT VFR BLD CALC: 41.1 % — SIGNIFICANT CHANGE UP (ref 34.5–45)
HGB BLD-MCNC: 13.4 G/DL — SIGNIFICANT CHANGE UP (ref 11.5–15.5)
LYMPHOCYTES # BLD AUTO: 2.1 K/UL — SIGNIFICANT CHANGE UP (ref 1–3.3)
LYMPHOCYTES # BLD AUTO: 33.8 % — SIGNIFICANT CHANGE UP (ref 13–44)
MCHC RBC-ENTMCNC: 31.1 PG — SIGNIFICANT CHANGE UP (ref 27–34)
MCHC RBC-ENTMCNC: 32.8 G/DL — SIGNIFICANT CHANGE UP (ref 32–36)
MCV RBC AUTO: 94.9 FL — SIGNIFICANT CHANGE UP (ref 80–100)
MONOCYTES # BLD AUTO: 0.4 K/UL — SIGNIFICANT CHANGE UP (ref 0–0.9)
MONOCYTES NFR BLD AUTO: 6.7 % — SIGNIFICANT CHANGE UP (ref 2–14)
NEUTROPHILS # BLD AUTO: 3.4 K/UL — SIGNIFICANT CHANGE UP (ref 1.8–7.4)
NEUTROPHILS NFR BLD AUTO: 56.6 % — SIGNIFICANT CHANGE UP (ref 43–77)
PLATELET # BLD AUTO: 222 K/UL — SIGNIFICANT CHANGE UP (ref 150–400)
RBC # BLD: 4.33 M/UL — SIGNIFICANT CHANGE UP (ref 3.8–5.2)
RBC # FLD: 13.6 % — SIGNIFICANT CHANGE UP (ref 10.3–14.5)
WBC # BLD: 6.1 K/UL — SIGNIFICANT CHANGE UP (ref 3.8–10.5)
WBC # FLD AUTO: 6.1 K/UL — SIGNIFICANT CHANGE UP (ref 3.8–10.5)

## 2019-12-27 PROCEDURE — 99215 OFFICE O/P EST HI 40 MIN: CPT

## 2019-12-27 NOTE — PHYSICAL EXAM
[Restricted in physically strenuous activity but ambulatory and able to carry out work of a light or sedentary nature] : Status 1- Restricted in physically strenuous activity but ambulatory and able to carry out work of a light or sedentary nature, e.g., light house work, office work [Normal] : normoactive bowel sounds, soft and nontender, no hepatosplenomegaly or masses appreciated [de-identified] : well-healed incision both  and vertical midline [de-identified] : s/p bilateral mastectomy with well-healed incisions [de-identified] : 1+ RUE edema

## 2019-12-27 NOTE — CONSULT LETTER
[Dear  ___] : Dear  [unfilled], [Consult Letter:] : I had the pleasure of evaluating your patient, [unfilled]. [Consult Closing:] : Thank you very much for allowing me to participate in the care of this patient.  If you have any questions, please do not hesitate to contact me. [Please see my note below.] : Please see my note below. [Sincerely,] : Sincerely, [DrFabiana  ___] : Dr. WESLEY [FreeTextEntry3] : Dr. Macarena Yarbrough

## 2019-12-27 NOTE — HISTORY OF PRESENT ILLNESS
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  She presented to [facility] ED on 4/23/19 s/p mechanical fall outside her place of work.  A CT A/P incidentally showed a complex cystic left adnexal mass measuring 17 cm. Massive cystic lesion occupying the lower abdomen measuring 33 cm. It was uncertain whether this represents a large peritoneal metastasis or a component of the above described cystic left adnexal mass, or possibly a right adnexal mass. There was suspicion for rupture of this mass with a large amount of abdominal ascites.  On 5/7/19 Dr. Manpreet Gonzalez performed a BASILIA, BSO, debulking and peritonectomy.  Pathology initially was benign.  An addendum was later issued which showed low grade endometrioid adenocarcinoma, FIGO grade 1, with focal sex cord like differentiation, arising in a background of endometriosis, and involving the uterine serosa.  She was discharged on 5/12/19 but returned to the ED on 5/15/19 c/o abdominal distention, N/V, dizziness, weakness, multiple falls and a fever of 104.  She was found to be hypotensive and tachycardic.  A CT A/P showed a large fluid collection which appears to be extraperitoneal layering anterior to the omentum measuring 24.7 x 9.4 x 20.9 cm. There is a component of the collection which extends posteriorly on the left into the retroperitoneum along the anterior aspect of the psoas extending to the posterior aspect of the splenic flexure (approximately 20 cm in craniocaudad dimension). On 5/17/19 Dr. Mague Durand performed an emergent exploratory laparotomy, sigmoidectomy and end colostomy.  Pathology was benign.   [de-identified] : FHx of DCIS (sister) [de-identified] : Patient presents for follow-up. She is s/p BASILIA BSO, debulking and peritonectomy on 5/7/19 due to extensive endometrioid adenocarcinoma, with a delayed leak from the sigmoid requiring bowel resection and end-colostomy by Dr. Durand. She is recovering well.  Denies any pain. No fevers/chills, healing well, normal formed stool in ostomy. Recently diagnosed with right breast cancer, ER+ [place]+ HER2-.

## 2019-12-27 NOTE — RESULTS/DATA
[FreeTextEntry1] : 10/15/19 MRI Breast:\par 1.4 cm abnormal enhancement/biopsy clip in the upper outer posterior right breast, corresponding to newly diagnosed malignancy. No MRI evidence of multicentric or contralateral disease. \par RECOMMENDATION: Surgical or oncologic management. \par BI-RADS 6- Known Biopsy-Proven Malignancy \par \par 9/23/19 Pathology: \par Right breast, "11:00, 5 cm from the nipple", ultrasound guided core needle biopsy:\par -Invasive ductal carcinoma, grade 1.\par -See note.\par Note: Immunohistochemical stains for p63 and calponin are performed on block 1-B at [facility] and interpreted at [place] as follows:\par Calponin, p63: negative\par Microscopic evaluation reveals an invasive ductal carcinoma ([place] score 1+2+1), grade 1. The carcinoma measures at least 1.1 cm in its greatest linear expansion in this biopsy. \par There are no calcifications nor necrosis present.\par *Addendum*\par IMMUNOSTAINS PERFORMED AND INTERPRETED ON BLOCK " B " BY [facility],\par HER2/ELADIO BY IHC: 1 + / Negative\par ER: Positive, 90 - 95 %, strong\par VT: Positive, 15 - 20 %, strong\par \par 9/23/19 Mammo: Status post core needle biopsy of the right breast with clip placement. Recommendations for further followup will be based on pathology results.\par \par 9/19/19 US Breast/Mammo: Ill-defined hypoechoic area in the right breast at the 11:00 axis, corresponding to mammographically seen distortion. Ultrasound-guided core biopsy is advised.\par \par 5/15/19 Pathology:\par Intra-abdominal foreign body, removal:  Blood clots\par Sigmoid, resection:  Mucosal necrosis with transmural acute and chronic inflammation.  The process extends to one margin of resection (slide 1A).  Three (3) lymph nodes, one with benign glandular inclusion.\par \par 5/15/19 CT A/P:\par Moderate amount retained fecal material in the colon, greatest in the right hemicolon. Mild wall thickening involving the distal transverse colon, descending colon, and proximal sigmoid colon, possibly reactive to the large collection.  Large fluid collection which appears to be extraperitoneal layering anterior to the omentum measuring 24.7 x 9.4 x \par 20.9 cm (craniocaudad x anteroposterior x transverse). There is a component of the collection which extends posteriorly on the left into the retroperitoneum along the anterior aspect of the psoas extending to the posterior aspect of the splenic flexure (approximately 20 cm in craniocaudad dimension). Skin staples are seen within the anterior abdominal wall. Air is seen within the anterior abdominal wall which may be postoperative in etiology.\par \par 5/8/19 Cytopathology:\par Abdominal fluid:  NEGATIVE FOR MALIGNANT CELLS.  Mostly fibrin and reactive mesothelial cells.\par \par 5/7/19 Pathology:\par Omental lymph node: Markedly reactive, hemorrhagic lymph node with hemosiderin laden macrophages.\par Cyst wall(1): Hemorrhagic inflamed cyst wall without epithelial lining.\par Right hepatic flexure lymph node: Markedly reactive, hemorrhagic lymph node with hemosiderin laden macrophages.\par Omentum: Omental fat with marked reactive fibrosis, hemorrhage and chronic inflammation; marked serosal adhesions.\par Right pelvic lymph node: Markedly reactive, hemorrhagic lymph node with hemosiderin laden macrophages.\par Left pelvic lymph node: Markedly reactive, hemorrhagic lymph node with hemosiderin laden macrophages.\par Cyst wall(2):  Cyst wall, markedly inflamed and hemorrhagic with cholesterol crystals and without epithelial lining.\par ***Uterus, cervix and adnexa:  Histologically unremarkable cervix. Weakly proliferative endometrium and endometrial polyp. Cysts located outside the uterus, involving serosa, with glandular stromal structures, consistent with endometriosis.\par ****Addendum****\par Case sent for outside consultation at [facility] with Dr. Tina Mckay.\par The atypical clusters of glands, in part 8. "Uterus, cervix and adnexa", have been diagnosed as Low grade endometrioid adenocarcinoma, FIGO grade 1,  with focal sex cord like differentiation, arising in a background of endometriosis, and involving the uterine serosa.\par \par 4/23/19 CT C/A/P:\par There is a large complex cystic mass occupying the lower abdomen, measuring 21.0 (AP) x 24.2 (CC) x 33.1 (TR) cm.  The exact origin of this mass is uncertain. The left posterior wall of this mass is discontinuous, suggestive of rupture. There is a large amount of abdominal ascites.  Additional complex left adnexal cystic mass with septations and apparent mural nodules measuring 9.4 (AP) x 15.5 (CC) x 16.6 (TR) cm.

## 2019-12-30 LAB
ALBUMIN SERPL ELPH-MCNC: 4 G/DL
ALP BLD-CCNC: 84 U/L
ALT SERPL-CCNC: 68 U/L
ANION GAP SERPL CALC-SCNC: 11 MMOL/L
AST SERPL-CCNC: 39 U/L
BILIRUB SERPL-MCNC: 0.2 MG/DL
BUN SERPL-MCNC: 10 MG/DL
CALCIUM SERPL-MCNC: 9.6 MG/DL
CHLORIDE SERPL-SCNC: 103 MMOL/L
CO2 SERPL-SCNC: 29 MMOL/L
CREAT SERPL-MCNC: 0.76 MG/DL
GLUCOSE SERPL-MCNC: 119 MG/DL
MAGNESIUM SERPL-MCNC: 1.9 MG/DL
POTASSIUM SERPL-SCNC: 3.8 MMOL/L
PROT SERPL-MCNC: 7.3 G/DL
SODIUM SERPL-SCNC: 143 MMOL/L

## 2020-01-02 ENCOUNTER — APPOINTMENT (OUTPATIENT)
Dept: ULTRASOUND IMAGING | Facility: CLINIC | Age: 50
End: 2020-01-02
Payer: COMMERCIAL

## 2020-01-02 ENCOUNTER — OUTPATIENT (OUTPATIENT)
Dept: OUTPATIENT SERVICES | Facility: HOSPITAL | Age: 50
LOS: 1 days | End: 2020-01-02
Payer: COMMERCIAL

## 2020-01-02 ENCOUNTER — FORM ENCOUNTER (OUTPATIENT)
Age: 50
End: 2020-01-02

## 2020-01-02 DIAGNOSIS — Z98.890 OTHER SPECIFIED POSTPROCEDURAL STATES: Chronic | ICD-10-CM

## 2020-01-02 DIAGNOSIS — Z90.13 ACQUIRED ABSENCE OF BILATERAL BREASTS AND NIPPLES: Chronic | ICD-10-CM

## 2020-01-02 DIAGNOSIS — Z93.3 COLOSTOMY STATUS: Chronic | ICD-10-CM

## 2020-01-02 DIAGNOSIS — Z90.710 ACQUIRED ABSENCE OF BOTH CERVIX AND UTERUS: Chronic | ICD-10-CM

## 2020-01-02 DIAGNOSIS — Z00.00 ENCOUNTER FOR GENERAL ADULT MEDICAL EXAMINATION WITHOUT ABNORMAL FINDINGS: ICD-10-CM

## 2020-01-02 PROCEDURE — 76641 ULTRASOUND BREAST COMPLETE: CPT

## 2020-01-02 PROCEDURE — 76641 ULTRASOUND BREAST COMPLETE: CPT | Mod: 26,RT

## 2020-01-02 PROCEDURE — G0279: CPT | Mod: 26

## 2020-01-02 PROCEDURE — 77065 DX MAMMO INCL CAD UNI: CPT | Mod: 26,RT

## 2020-01-02 PROCEDURE — 77065 DX MAMMO INCL CAD UNI: CPT

## 2020-01-02 PROCEDURE — G0279: CPT

## 2020-01-02 RX ORDER — OXYCODONE HYDROCHLORIDE 5 MG/1
10 TABLET ORAL ONCE
Refills: 0 | Status: DISCONTINUED | OUTPATIENT
Start: 2020-01-03 | End: 2020-01-03

## 2020-01-02 RX ORDER — ONDANSETRON 8 MG/1
4 TABLET, FILM COATED ORAL ONCE
Refills: 0 | Status: DISCONTINUED | OUTPATIENT
Start: 2020-01-03 | End: 2020-01-03

## 2020-01-02 RX ORDER — FENTANYL CITRATE 50 UG/ML
50 INJECTION INTRAVENOUS
Refills: 0 | Status: DISCONTINUED | OUTPATIENT
Start: 2020-01-03 | End: 2020-01-03

## 2020-01-02 RX ORDER — OXYCODONE HYDROCHLORIDE 5 MG/1
5 TABLET ORAL ONCE
Refills: 0 | Status: DISCONTINUED | OUTPATIENT
Start: 2020-01-03 | End: 2020-01-03

## 2020-01-02 RX ORDER — SODIUM CHLORIDE 9 MG/ML
1000 INJECTION, SOLUTION INTRAVENOUS
Refills: 0 | Status: DISCONTINUED | OUTPATIENT
Start: 2020-01-03 | End: 2020-01-03

## 2020-01-02 RX ORDER — MEPERIDINE HYDROCHLORIDE 50 MG/ML
12.5 INJECTION INTRAMUSCULAR; INTRAVENOUS; SUBCUTANEOUS
Refills: 0 | Status: DISCONTINUED | OUTPATIENT
Start: 2020-01-03 | End: 2020-01-03

## 2020-01-03 ENCOUNTER — OUTPATIENT (OUTPATIENT)
Dept: INPATIENT UNIT | Facility: HOSPITAL | Age: 50
LOS: 1 days | Discharge: HOME | End: 2020-01-03
Payer: COMMERCIAL

## 2020-01-03 ENCOUNTER — RESULT REVIEW (OUTPATIENT)
Age: 50
End: 2020-01-03

## 2020-01-03 ENCOUNTER — APPOINTMENT (OUTPATIENT)
Dept: SURGERY | Facility: HOSPITAL | Age: 50
End: 2020-01-03

## 2020-01-03 VITALS
RESPIRATION RATE: 14 BRPM | TEMPERATURE: 97 F | SYSTOLIC BLOOD PRESSURE: 124 MMHG | OXYGEN SATURATION: 99 % | HEART RATE: 63 BPM | DIASTOLIC BLOOD PRESSURE: 70 MMHG

## 2020-01-03 VITALS
TEMPERATURE: 98 F | DIASTOLIC BLOOD PRESSURE: 85 MMHG | WEIGHT: 165.35 LBS | OXYGEN SATURATION: 100 % | HEIGHT: 62 IN | HEART RATE: 83 BPM | SYSTOLIC BLOOD PRESSURE: 131 MMHG | RESPIRATION RATE: 16 BRPM

## 2020-01-03 DIAGNOSIS — Z85.42 PERSONAL HISTORY OF MALIGNANT NEOPLASM OF OTHER PARTS OF UTERUS: ICD-10-CM

## 2020-01-03 DIAGNOSIS — Z80.3 FAMILY HISTORY OF MALIGNANT NEOPLASM OF BREAST: ICD-10-CM

## 2020-01-03 DIAGNOSIS — D64.9 ANEMIA, UNSPECIFIED: ICD-10-CM

## 2020-01-03 DIAGNOSIS — Z98.890 OTHER SPECIFIED POSTPROCEDURAL STATES: Chronic | ICD-10-CM

## 2020-01-03 DIAGNOSIS — Z08 ENCOUNTER FOR FOLLOW-UP EXAMINATION AFTER COMPLETED TREATMENT FOR MALIGNANT NEOPLASM: ICD-10-CM

## 2020-01-03 DIAGNOSIS — Z90.710 ACQUIRED ABSENCE OF BOTH CERVIX AND UTERUS: Chronic | ICD-10-CM

## 2020-01-03 DIAGNOSIS — C50.911 MALIGNANT NEOPLASM OF UNSPECIFIED SITE OF RIGHT FEMALE BREAST: ICD-10-CM

## 2020-01-03 DIAGNOSIS — Z90.13 ACQUIRED ABSENCE OF BILATERAL BREASTS AND NIPPLES: Chronic | ICD-10-CM

## 2020-01-03 DIAGNOSIS — Z90.710 ACQUIRED ABSENCE OF BOTH CERVIX AND UTERUS: ICD-10-CM

## 2020-01-03 DIAGNOSIS — Z93.3 COLOSTOMY STATUS: Chronic | ICD-10-CM

## 2020-01-03 DIAGNOSIS — Z85.3 PERSONAL HISTORY OF MALIGNANT NEOPLASM OF BREAST: ICD-10-CM

## 2020-01-03 DIAGNOSIS — Z90.13 ACQUIRED ABSENCE OF BILATERAL BREASTS AND NIPPLES: ICD-10-CM

## 2020-01-03 PROCEDURE — 38745 REMOVE ARMPIT LYMPH NODES: CPT | Mod: AS,RT,58

## 2020-01-03 PROCEDURE — 88307 TISSUE EXAM BY PATHOLOGIST: CPT

## 2020-01-03 PROCEDURE — 76098 X-RAY EXAM SURGICAL SPECIMEN: CPT

## 2020-01-03 PROCEDURE — 38745 REMOVE ARMPIT LYMPH NODES: CPT | Mod: RT,58

## 2020-01-03 PROCEDURE — 88307 TISSUE EXAM BY PATHOLOGIST: CPT | Mod: 26

## 2020-01-03 RX ORDER — OXYCODONE AND ACETAMINOPHEN 5; 325 MG/1; MG/1
1 TABLET ORAL EVERY 4 HOURS
Refills: 0 | Status: DISCONTINUED | OUTPATIENT
Start: 2020-01-03 | End: 2020-01-03

## 2020-01-03 RX ORDER — OXYCODONE AND ACETAMINOPHEN 5; 325 MG/1; MG/1
2 TABLET ORAL EVERY 4 HOURS
Refills: 0 | Status: DISCONTINUED | OUTPATIENT
Start: 2020-01-03 | End: 2020-01-03

## 2020-01-03 RX ORDER — SODIUM CHLORIDE 9 MG/ML
1000 INJECTION, SOLUTION INTRAVENOUS
Refills: 0 | Status: DISCONTINUED | OUTPATIENT
Start: 2020-01-03 | End: 2020-01-03

## 2020-01-03 RX ORDER — FERROUS SULFATE 325(65) MG
1 TABLET ORAL
Qty: 0 | Refills: 0 | DISCHARGE

## 2020-01-03 RX ORDER — ASCORBIC ACID 60 MG
1 TABLET,CHEWABLE ORAL
Qty: 0 | Refills: 0 | DISCHARGE

## 2020-01-03 NOTE — ASU DISCHARGE PLAN (ADULT/PEDIATRIC) - ASU DC SPECIAL INSTRUCTIONSFT
please record output and color of the JULIAN drain twice a day. Follow up with Dr. Riley in 2 weeks, if output of JULIAN drain is minimal to none, you may call the office to schedule an appointment for sooner that two weeks. Remove dressing in 48 hours, you may shower but keep steri strips in place.

## 2020-01-03 NOTE — ASU DISCHARGE PLAN (ADULT/PEDIATRIC) - CARE PROVIDER_API CALL
Elizabeth Riley)  Surgery  [address] [place] [address]  Phone: [phone]  Fax: [phone]  Established Patient  Follow Up Time: 2 weeks

## 2020-01-03 NOTE — ASU PATIENT PROFILE, ADULT - PSH
H/O colonoscopy  8/2019  H/O mastectomy, bilateral  with KWAME reconstruction 11/25/19  H/O removal of cyst    History of breast surgery  right breast cyst removed 1991  History of colostomy reversal  8/2019  History of hysterectomy  5/7/19  S/P colostomy  may15 2019

## 2020-01-03 NOTE — ASU PATIENT PROFILE, ADULT - PMH
Anemia    Breast cancer, female  right 2019  Endometrial adenocarcinoma  s/p hysterectomy  Endometriosis    Leaky heart valve    S/P colostomy  5/15/19 sepsis after hysterectomy  Thyroid nodule

## 2020-01-03 NOTE — ASU DISCHARGE PLAN (ADULT/PEDIATRIC) - CALL YOUR DOCTOR IF YOU HAVE ANY OF THE FOLLOWING:
Wound/Surgical Site with redness, or foul smelling discharge or pus/Pain not relieved by Medications/Nausea and vomiting that does not stop/Unable to urinate/Numbness, tingling, color or temperature change to extremity/Bleeding that does not stop/Swelling that gets worse

## 2020-01-07 ENCOUNTER — APPOINTMENT (OUTPATIENT)
Dept: PLASTIC SURGERY | Facility: CLINIC | Age: 50
End: 2020-01-07
Payer: COMMERCIAL

## 2020-01-07 VITALS
HEIGHT: 61 IN | DIASTOLIC BLOOD PRESSURE: 76 MMHG | BODY MASS INDEX: 31.34 KG/M2 | WEIGHT: 166 LBS | RESPIRATION RATE: 16 BRPM | HEART RATE: 79 BPM | TEMPERATURE: 97.9 F | SYSTOLIC BLOOD PRESSURE: 115 MMHG | OXYGEN SATURATION: 100 %

## 2020-01-07 PROCEDURE — 99024 POSTOP FOLLOW-UP VISIT: CPT

## 2020-01-07 NOTE — PHYSICAL EXAM
[NI] : Normal [de-identified] : b/l breast are soft and symmetrical \par skin islands viable\par b/l breasts flaps are soft and viable\par incisions c/d/i [de-identified] : abdomen soft and non tender \par incisions are c/d/i \par no palpable fluid collections or signs of infection \par there is a 2 cm x 1 cm area of superficial dehiscence at the T point of the abdominal incision, \par no surrounding signs of infection

## 2020-01-07 NOTE — HISTORY OF PRESENT ILLNESS
[FreeTextEntry1] : 50 yo female with history of invasive ductal cancer of the right breast, now s/p b/l breast reconstruction with KWAME flaps 11/25/19. pt doing well \par denies pain, fever\par pt recently went back to the operating room and Dr. Riley removed more lymph nodes 1/3/20\par JULIAN drain \par <1 cc in 3 days

## 2020-01-07 NOTE — ASSESSMENT
[FreeTextEntry1] : 50 yo female s/p b/l KWAME flap breast reconstruction 11/25/19, pt doing well \par DIscussed with Dr. Riley who approved drain removal today \par Pt seen and examined with Dr. Licae present \par monitor for increasing redness, fever, chills, drainage\par apply a thin layer of bacitracin to abdominal wound and cover with a band aid

## 2020-01-21 ENCOUNTER — APPOINTMENT (OUTPATIENT)
Dept: SURGERY | Facility: CLINIC | Age: 50
End: 2020-01-21
Payer: COMMERCIAL

## 2020-01-21 VITALS
WEIGHT: 166 LBS | TEMPERATURE: 98 F | DIASTOLIC BLOOD PRESSURE: 76 MMHG | OXYGEN SATURATION: 100 % | BODY MASS INDEX: 31.34 KG/M2 | HEIGHT: 61 IN | SYSTOLIC BLOOD PRESSURE: 119 MMHG | HEART RATE: 96 BPM

## 2020-01-21 PROCEDURE — 99024 POSTOP FOLLOW-UP VISIT: CPT

## 2020-01-21 NOTE — HISTORY OF PRESENT ILLNESS
[FreeTextEntry1] : I had the pleasure of seeing Sera Francisco in the office for a post operative visit s/p right axilla dissection 1/3/2020.  She underwent bilateral mastectomy and right sentinel lymph node biopsy for right breast cancer on 12/5/2019.\par \par Sera is a micheal 50 yo premenopausal female. She was diagnosed with right breast cancer in 10/2019. \par She underwent MRI of the breast 10/15/2019 which demonstrated the right breast cancer to measure 1.4cm without adenopathy. No MR evidence of multicentric or contralateral disease. BIRADS 6\par \par She underwent bilateral mastectomy and right sentinel lymph node biopsy for right breast cancer on 12/5/2019.  Final pathology demonstrated 1.3cm IDC grade 1 with infiltrating carcinoma extending to the cauterized anterior margin. 2/3 sentinel lymph nodes with metastatic carcinoma with the largest measuring 3mm and extracapsular extension.  She then underwent right axilla dissection on 1/3/2019 and 0/9 lymph nodes were negative for carcinoma.\par \par Genetic testing: [employer] panel test was negative, however there was noted a VUS in the MSH3 gene. Further clarification on this variance will be obtained from the updated test as soon as it is available.\par \par 11/18/ 18 Breast Biopsy ( Right, 11:00 , 5 cm from the 0., Ultrasound guided needle cores)\par - Infiltrating ductal carcinoma, well differentiated\par - no definitive lymphovascular invasion\par -No IN SITU component identified\par -Largest contiguous focus 1.1 cm\par - adjacent region containing aggregates of ducts with columnar cell change an columnar cell hyperplasia\par  HER2/ELADIO by IHC 1+ / Negative\par ER : Positive 90-95%- strong\par WV -Positive ,15-20% strong \par \par Final pathology demonstrated 1.3cm IDC grade 1 with infiltrating carcinoma extending to the cauterized anterior margin. 2/3 sentinel lymph nodes with metastatic carcinoma with the largest measuring 3mm and extracapsular extension. The rest of the pathology was reviewed in detail demonstrating benign findings and a copy was provided to her. \par \par 1/3/2020 final pathology\par Right axillary dissection:  Nine lymph nodes, negative for metastatic carcinoma.\par \par We discussed final surgical pathology.  She understands final pathology demonstrated 0/9 lymph nodes negative for carcinoma.  She is healing well.  Recommendation for follow up with medical oncology to discuss hormonal therapy and radiation oncologist.  She is to follow up with me in 4 months.  She understands and agrees to plan.  All questions answered.\par All questions were answered.

## 2020-01-21 NOTE — ASSESSMENT
[FreeTextEntry1] : 50 yo female s/p bilateral mastectomy with right SLNB for 1.3cm IDC with 2/3 positive nodes. She underwent right axiliary lymph node dissection.  0/9 lymph nodes were negative for carcinoma.  Recommendation for follow up with medical oncology for hormonal therapy and radiation oncology consultation.  Follow up with me in 4 months.\par 1.  Follow up in 4 months\par 2.  Followup with medical oncology- hormonal therapy\par 3.  Consultation with radiation oncology\par 4. Follow up with Dr. Licea

## 2020-01-21 NOTE — PHYSICAL EXAM
[Normocephalic] : normocephalic [Atraumatic] : atraumatic [EOMI] : extra ocular movement intact [PERRL] : pupils equal, round and reactive to light [Sclera nonicteric] : sclera nonicteric [Supple] : supple [No Supraclavicular Adenopathy] : no supraclavicular adenopathy [Examined in the supine and seated position] : examined in the supine and seated position [No dominant masses] : no dominant masses in right breast  [No dominant masses] : no dominant masses left breast [No Nipple Retraction] : no left nipple retraction [No Nipple Discharge] : no left nipple discharge [Breast Nipple Inversion] : nipples not inverted [Breast Nipple Retraction] : nipples not retracted [Breast Nipple Flattening] : nipples not flattened [Breast Nipple Fissures] : nipples not fissured [Breast Abnormal Lactation (Galactorrhea)] : no galactorrhea [Breast Abnormal Secretion Bloody Fluid] : no bloody discharge [Breast Abnormal Secretion Serous Fluid] : no serous discharge [Breast Abnormal Secretion Opalescent Fluid] : no milky discharge [No Axillary Lymphadenopathy] : no left axillary lymphadenopathy [No Edema] : no edema [No Rashes] : no rashes [No Ulceration] : no ulceration [de-identified] : Mastectomy flap well healed. [de-identified] : Mastectomy flap well healed.

## 2020-01-23 ENCOUNTER — OUTPATIENT (OUTPATIENT)
Dept: OUTPATIENT SERVICES | Facility: HOSPITAL | Age: 50
LOS: 1 days | Discharge: HOME | End: 2020-01-23

## 2020-01-23 DIAGNOSIS — Z98.890 OTHER SPECIFIED POSTPROCEDURAL STATES: Chronic | ICD-10-CM

## 2020-01-23 DIAGNOSIS — Z90.13 ACQUIRED ABSENCE OF BILATERAL BREASTS AND NIPPLES: Chronic | ICD-10-CM

## 2020-01-23 DIAGNOSIS — C54.1 MALIGNANT NEOPLASM OF ENDOMETRIUM: ICD-10-CM

## 2020-01-23 DIAGNOSIS — Z90.710 ACQUIRED ABSENCE OF BOTH CERVIX AND UTERUS: Chronic | ICD-10-CM

## 2020-01-23 DIAGNOSIS — Z93.3 COLOSTOMY STATUS: Chronic | ICD-10-CM

## 2020-01-27 ENCOUNTER — APPOINTMENT (OUTPATIENT)
Dept: HEMATOLOGY ONCOLOGY | Facility: CLINIC | Age: 50
End: 2020-01-27

## 2020-01-27 DIAGNOSIS — C50.919 MALIGNANT NEOPLASM OF UNSPECIFIED SITE OF UNSPECIFIED FEMALE BREAST: ICD-10-CM

## 2020-01-28 ENCOUNTER — APPOINTMENT (OUTPATIENT)
Dept: RADIATION ONCOLOGY | Facility: CLINIC | Age: 50
End: 2020-01-28
Payer: COMMERCIAL

## 2020-01-28 ENCOUNTER — APPOINTMENT (OUTPATIENT)
Dept: HEMATOLOGY ONCOLOGY | Facility: CLINIC | Age: 50
End: 2020-01-28
Payer: COMMERCIAL

## 2020-01-28 ENCOUNTER — RESULT REVIEW (OUTPATIENT)
Age: 50
End: 2020-01-28

## 2020-01-28 ENCOUNTER — OUTPATIENT (OUTPATIENT)
Dept: OUTPATIENT SERVICES | Facility: HOSPITAL | Age: 50
LOS: 1 days | Discharge: HOME | End: 2020-01-28
Payer: COMMERCIAL

## 2020-01-28 VITALS
DIASTOLIC BLOOD PRESSURE: 81 MMHG | WEIGHT: 170 LBS | SYSTOLIC BLOOD PRESSURE: 145 MMHG | HEIGHT: 61 IN | BODY MASS INDEX: 32.1 KG/M2 | HEART RATE: 87 BPM | OXYGEN SATURATION: 100 % | TEMPERATURE: 97.6 F

## 2020-01-28 VITALS
HEIGHT: 61 IN | TEMPERATURE: 97.6 F | SYSTOLIC BLOOD PRESSURE: 145 MMHG | DIASTOLIC BLOOD PRESSURE: 81 MMHG | BODY MASS INDEX: 32.1 KG/M2 | WEIGHT: 170 LBS | HEART RATE: 87 BPM | OXYGEN SATURATION: 100 %

## 2020-01-28 DIAGNOSIS — Z90.13 ACQUIRED ABSENCE OF BILATERAL BREASTS AND NIPPLES: Chronic | ICD-10-CM

## 2020-01-28 DIAGNOSIS — Z98.890 OTHER SPECIFIED POSTPROCEDURAL STATES: Chronic | ICD-10-CM

## 2020-01-28 DIAGNOSIS — Z90.710 ACQUIRED ABSENCE OF BOTH CERVIX AND UTERUS: Chronic | ICD-10-CM

## 2020-01-28 DIAGNOSIS — Z93.3 COLOSTOMY STATUS: Chronic | ICD-10-CM

## 2020-01-28 LAB
BASOPHILS # BLD AUTO: 0 K/UL — SIGNIFICANT CHANGE UP (ref 0–0.2)
BASOPHILS NFR BLD AUTO: 0.4 % — SIGNIFICANT CHANGE UP (ref 0–2)
EOSINOPHIL # BLD AUTO: 0.1 K/UL — SIGNIFICANT CHANGE UP (ref 0–0.5)
EOSINOPHIL NFR BLD AUTO: 1.2 % — SIGNIFICANT CHANGE UP (ref 0–6)
HCT VFR BLD CALC: 43.6 % — SIGNIFICANT CHANGE UP (ref 34.5–45)
HGB BLD-MCNC: 13.9 G/DL — SIGNIFICANT CHANGE UP (ref 11.5–15.5)
LYMPHOCYTES # BLD AUTO: 2.2 K/UL — SIGNIFICANT CHANGE UP (ref 1–3.3)
LYMPHOCYTES # BLD AUTO: 23 % — SIGNIFICANT CHANGE UP (ref 13–44)
MCHC RBC-ENTMCNC: 29.9 PG — SIGNIFICANT CHANGE UP (ref 27–34)
MCHC RBC-ENTMCNC: 31.9 G/DL — LOW (ref 32–36)
MCV RBC AUTO: 93.7 FL — SIGNIFICANT CHANGE UP (ref 80–100)
MONOCYTES # BLD AUTO: 0.5 K/UL — SIGNIFICANT CHANGE UP (ref 0–0.9)
MONOCYTES NFR BLD AUTO: 5.8 % — SIGNIFICANT CHANGE UP (ref 2–14)
NEUTROPHILS # BLD AUTO: 6.5 K/UL — SIGNIFICANT CHANGE UP (ref 1.8–7.4)
NEUTROPHILS NFR BLD AUTO: 69.7 % — SIGNIFICANT CHANGE UP (ref 43–77)
PLATELET # BLD AUTO: 272 K/UL — SIGNIFICANT CHANGE UP (ref 150–400)
RBC # BLD: 4.65 M/UL — SIGNIFICANT CHANGE UP (ref 3.8–5.2)
RBC # FLD: 12.4 % — SIGNIFICANT CHANGE UP (ref 10.3–14.5)
WBC # BLD: 9.4 K/UL — SIGNIFICANT CHANGE UP (ref 3.8–10.5)
WBC # FLD AUTO: 9.4 K/UL — SIGNIFICANT CHANGE UP (ref 3.8–10.5)

## 2020-01-28 PROCEDURE — 99205 OFFICE O/P NEW HI 60 MIN: CPT | Mod: 25

## 2020-01-28 PROCEDURE — 99215 OFFICE O/P EST HI 40 MIN: CPT

## 2020-01-28 RX ORDER — METRONIDAZOLE 500 MG/1
500 TABLET ORAL
Qty: 3 | Refills: 0 | Status: DISCONTINUED | COMMUNITY
Start: 2019-08-08 | End: 2020-01-28

## 2020-01-28 NOTE — HISTORY OF PRESENT ILLNESS
[FreeTextEntry1] : \par 49 year old woman presents today for consultation regarding breast cancer.  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  No adjuvant therapy was recommended.\par \par She underwent bilateral screening mammogram on 9/7/19, and was noted to have an area of distortion in the right breast upper outer.  She underwent diagnostic mammogram and ultrasound on 9/19/19, which showed an ill-defined hypoechoic area at the right breast 11:00 5CM FN, corresponding to mammographically seen distortion.  BI-RADS 4C.\par \par She underwent ultrasound-guided core biopsy of the right breast on 9/23/19, with pathology showing invasive ductal carcinoma, grade 1.  ER 90-95%, ME 15-25%, HER2 negative.\par \par She underwent genetic testing with [employer] on 10/11/19, which was negative for clinically significant mutations, with a VUS of the MSH3 gene.\par \par Bilateral breast MRI on 10/15/19 showed a 1.4 cm area of abnormal enhancement in the upper outer posterior right breast, with no MR evidence of multicentric or contralateral disease.  There was no significant axillary or internal mammary lymphadenopathy.\par \par She underwent bilateral mastectomy and right sentinel lymph node biopsy with KWAME flap reconstruction on 11/25/19.  Pathology showed infiltrating ductal carcinoma, well-differentiated grade 1, measuring 1.3 cm, and extending to the inked cauterized anterior/superior specimen margin.  A total of 3 right sentinel lymph nodes were excised, 2 of which showed metastatic carcinoma measuring up to 0.3 cm, with extracapsular extension present.  The left breast was negative for malignancy.\par \par She underwent completion left axillary lymph node dissection on 1/3/20, with 9 lymph nodes negative for metastatic carcinoma.  She reports recovering well post-operatively.\par \par She is scheduled to undergo CT abdomen/pelvis on 2/4/20 and follow up with Kwame Gonzalez and Linnea.  She met with Dr. Lagos earlier today to discuss adjuvant systemic therapy options therapy.  Currently, she denies breast pain, edema, arm edema, vaginal itch/discharge, change in bowel or urination, fatigue or unintentional weight loss.

## 2020-01-28 NOTE — DISEASE MANAGEMENT
[Pathological] : TNM Stage: p [IA] : IA [FreeTextEntry4] : IDC right breast [TTNM] : 1c [NTNM] : 1a [MTNM] : 0

## 2020-01-28 NOTE — REVIEW OF SYSTEMS
[Recent Change In Weight] : ~T recent weight change [Negative] : Heme/Lymph [Diarrhea: Grade 0] : Diarrhea: Grade 0 [Constipation: Grade 0] : Constipation: Grade 0 [Localized Edema: Grade 0] : Localized Edema: Grade 0  [Edema Limbs: Grade 0] : Edema Limbs: Grade 0  [Fatigue: Grade 0] : Fatigue: Grade 0 [Neck Edema: Grade 0] : Neck Edema: Grade 0 [Urinary Incontinence: Grade 0] : Urinary Incontinence: Grade 0  [Breast Pain: Grade 0] : Breast Pain: Grade 0 [FreeTextEntry2] : 55lbs during GYN surgery but has put back on

## 2020-01-28 NOTE — LETTER CLOSING
[Consult Closing:] : Thank you for allowing me to participate in the care of this patient.  If you have any questions, please do not hesitate to contact me. [Sincerely yours,] : Sincerely yours, [FreeTextEntry3] : Toni Caldwell MD\par Attending Physician\par Department of Radiation Medicine\par [facility] [place], [facility]\par \par \par Kelly Mendoza \par [employer] [facility] [place]\par

## 2020-01-28 NOTE — REASON FOR VISIT
[Consideration of Curative Therapy] : consideration of curative therapy for [Breast Cancer] : breast cancer [Family Member] : family member

## 2020-01-28 NOTE — VITALS
[Maximal Pain Intensity: 0/10] : 0/10 [Least Pain Intensity: 0/10] : 0/10 [80: Normal activity with effort; some signs or symptoms of disease.] : 80: Normal activity with effort; some signs or symptoms of disease.  [Date: ____________] : Patient's last distress assessment performed on [unfilled]. [0 - No Distress] : Distress Level: 0

## 2020-01-28 NOTE — PHYSICAL EXAM
[Symmetric] : breasts are symmetric [Breast Palpation Mass] : no palpable masses [Breast Abnormal Lactation (Galactorrhea)] : no nipple discharge [No UE Edema] : there is no upper extremity edema [Cervical Lymph Nodes Enlarged Posterior Bilaterally] : posterior cervical [Supraclavicular Lymph Nodes Enlarged Bilaterally] : supraclavicular [Cervical Lymph Nodes Enlarged Anterior Bilaterally] : anterior cervical [Axillary Lymph Nodes Enlarged Bilaterally] : axillary [Normal] : no focal deficits [de-identified] : Examination performed in the presence of a female chaperone.

## 2020-01-28 NOTE — OB/GYN HISTORY
[Currently In Menopause] : currently in menopause [Menopause Age: ____] : patient was [unfilled] years old at menopause [___] : Living: [unfilled] [History of Birth Control Pills] : Patient has no history of taking birth control pills [History of Hormone Replacement Therapy] : no history of hormone replacement therapy [Experiencing Menopausal Sxs] : not experiencing menopausal symptoms

## 2020-01-28 NOTE — END OF VISIT
76 yo F with large liver lesion on CT scan with unknown primary.    Plan:  -pain control prn  -will need CT chest for staging  -NPO  -c/w IVF   -for biopsy of liver possibly today  -GI/Oncology follow up  -medical management as per primary team    Plan discussed with Dr. Baird [>50% of Time Spent on Counseling and Coordination of Care for  ___] : Greater than 50% of the encounter time was spent on counseling and coordination of care for [unfilled] [Time Spent: ___ minutes] : I have spent [unfilled] minutes of face to face time with the patient

## 2020-01-29 ENCOUNTER — OTHER (OUTPATIENT)
Age: 50
End: 2020-01-29

## 2020-01-29 NOTE — RESULTS/DATA
[FreeTextEntry1] : \par 11/18/ 18 Breast Biopsy ( Right, 11:00 , 5 cm from the 0., Ultrasound guided needle cores)\par - Infiltrating ductal carcinoma, well differentiated\par - no definitive lymphovascular invasion\par -No IN SITU component identified\par -Largest contiguous focus 1.1 cm\par - adjacent region containing aggregates of ducts with columnar cell change an columnar cell hyperplasia\par  HER2/ELADIO by IHC 1+ / Negative\par ER : Positive 90-95%- strong\par [place] -Positive ,15-20% strong \par \par Final pathology demonstrated 1.3cm IDC grade 1 with infiltrating carcinoma extending to the cauterized anterior margin. 2/3 sentinel lymph nodes with metastatic carcinoma with the largest measuring 3mm and extracapsular extension. The rest of the pathology was reviewed in detail demonstrating benign findings and a copy was provided to her. \par \par 1/3/2020 final pathology\par Right axillary dissection: Nine lymph nodes, negative for metastatic carcinoma.\par

## 2020-01-29 NOTE — HISTORY OF PRESENT ILLNESS
[de-identified] : Patient is here for a follow up to discuss adjuvant therapy for Invasive ductal carcinoma. \par Patient denies any major medical conditions- DM / HTN Patient is healing well post op. \par No h/o VTE, no H/o Arthritis/ No issues for fractures/ low bone density \par Mother with VTE in the setting of surgery. \par  [de-identified] : Right Breast:St IIA  IDC  pT1c pN1a ( 2/3 LN involved in Sentinal LN Biopsy) \par Subsequent Right axillary Dissection 0/9 LN Involved\par Er 90-95% Pr 15-20 % Her 2 negative\par GENETIC TESTING : Genetic testing: [facility] panel test was negative, VUS in the MSH3 gene. \par \par \par --Bilateral Mastectomy with RIght SLNB (12/5/19)  followed by right axilla dissection (1/3/2020). \par Surgeon: Elizabeth Riley\par \par THis is a patient with a h/o Endometroid Carcinoma (Low grade serous) s/p BASILIA in May 2019 diagnosed at 49 yo \par Patient was following up with Dr Yarbrough for Endometroid Carcinoma \par Patient had MRI of the breast 10/15/2019 which demonstrated the right breast cancer to measure 1.4cm without adenopathy. No MR evidence of multicentric or contralateral disease. BIRADS 6\par \par She underwent bilateral mastectomy and right sentinel lymph node biopsy for right breast cancer on 12/5/2019. \par Final pathology demonstrated 1.3cm IDC grade 1 with infiltrating carcinoma extending to the cauterized anterior margin. 2/3 sentinel lymph nodes with metastatic carcinoma with the largest measuring 3mm and extracapsular extension. She then underwent right axilla dissection on 1/3/2019 and 0/9 lymph nodes were negative for carcinoma.\par

## 2020-01-29 NOTE — ASSESSMENT
[FreeTextEntry1] : 50 yo female with a stage II A right Invasive ductal Carcinoma T1c N1a  ER 90-95% MS 15-20%, Her 2 negative s/p Bilateral Mastectomy and Right axillary dissection with 0/9LN involved. Initial [place] LN biopsy with 2/3 LN involved. \par Today we discussed pathology in detail. \par We also discussed the natural history of the disease, as well as management options. \par Patient understand that the treatment of breast cancer is 2 fold to prevent recurrence in the breast as well as distant spread. \par - With the b/l  mastectomy, there is < 3% chance of recurrence in the breast ( theoretical risk of minimal  breast tissue left behind) \par - Further treatment is to prevent distant recurrence. Discussed potential of chemotherapy and endocrine therapy in management of early stage breast cancer.\par Discussed role of Oncotype Dx in guiding decisions about hormonal therapy vs chemotherapy. \par She understands that definitive treatment recommendations will be based on the results of Oncotype. \par Patient to RTC In 3 weeks to discuss further treatment options post oncotype results. \par - She is seeing Rad onc today as well to discuss role of RT

## 2020-01-30 ENCOUNTER — RESULT REVIEW (OUTPATIENT)
Age: 50
End: 2020-01-30

## 2020-02-03 ENCOUNTER — FORM ENCOUNTER (OUTPATIENT)
Age: 50
End: 2020-02-03

## 2020-02-04 ENCOUNTER — APPOINTMENT (OUTPATIENT)
Dept: PLASTIC SURGERY | Facility: CLINIC | Age: 50
End: 2020-02-04
Payer: COMMERCIAL

## 2020-02-04 ENCOUNTER — OUTPATIENT (OUTPATIENT)
Dept: OUTPATIENT SERVICES | Facility: HOSPITAL | Age: 50
LOS: 1 days | End: 2020-02-04
Payer: COMMERCIAL

## 2020-02-04 ENCOUNTER — APPOINTMENT (OUTPATIENT)
Dept: CT IMAGING | Facility: CLINIC | Age: 50
End: 2020-02-04
Payer: COMMERCIAL

## 2020-02-04 VITALS
TEMPERATURE: 97.4 F | WEIGHT: 170 LBS | HEART RATE: 84 BPM | HEIGHT: 61 IN | SYSTOLIC BLOOD PRESSURE: 115 MMHG | BODY MASS INDEX: 32.1 KG/M2 | OXYGEN SATURATION: 99 % | DIASTOLIC BLOOD PRESSURE: 72 MMHG

## 2020-02-04 DIAGNOSIS — C56.9 MALIGNANT NEOPLASM OF UNSPECIFIED OVARY: ICD-10-CM

## 2020-02-04 DIAGNOSIS — Z98.890 OTHER SPECIFIED POSTPROCEDURAL STATES: Chronic | ICD-10-CM

## 2020-02-04 DIAGNOSIS — Z93.3 COLOSTOMY STATUS: Chronic | ICD-10-CM

## 2020-02-04 DIAGNOSIS — Z90.13 ACQUIRED ABSENCE OF BILATERAL BREASTS AND NIPPLES: Chronic | ICD-10-CM

## 2020-02-04 DIAGNOSIS — Z90.710 ACQUIRED ABSENCE OF BOTH CERVIX AND UTERUS: Chronic | ICD-10-CM

## 2020-02-04 PROCEDURE — 99024 POSTOP FOLLOW-UP VISIT: CPT

## 2020-02-04 PROCEDURE — 74177 CT ABD & PELVIS W/CONTRAST: CPT | Mod: 26

## 2020-02-04 PROCEDURE — 74177 CT ABD & PELVIS W/CONTRAST: CPT

## 2020-02-04 NOTE — PHYSICAL EXAM
[NI] : Normal [de-identified] : b/l breast are soft and symmetrical \par skin islands viable\par b/l breasts flaps are soft and viable\par incisions well healed [de-identified] : abdomen soft and non tender \par incisions are well healed\par no palpable fluid collections or signs of infection

## 2020-02-04 NOTE — HISTORY OF PRESENT ILLNESS
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 pt doing well \par denies pain, fever\par pt recently went back to the operating room and Dr. Riley removed more lymph nodes 1/3/20\par pt to see Dr Riley at the end of the month, oncotype pending, unsure if she will need chemo or radiation\par abdominal wound healed, no issues

## 2020-02-04 NOTE — ASSESSMENT
[FreeTextEntry1] : 48 yo female s/p b/l KWAME flap breast reconstruction 11/25/19\par doing well\par oncotype pending \par pt seen by Dr. Licea\par

## 2020-02-11 ENCOUNTER — APPOINTMENT (OUTPATIENT)
Dept: GYNECOLOGIC ONCOLOGY | Facility: CLINIC | Age: 50
End: 2020-02-11
Payer: COMMERCIAL

## 2020-02-11 VITALS
RESPIRATION RATE: 16 BRPM | HEART RATE: 86 BPM | HEIGHT: 61 IN | SYSTOLIC BLOOD PRESSURE: 136 MMHG | BODY MASS INDEX: 32.1 KG/M2 | OXYGEN SATURATION: 99 % | DIASTOLIC BLOOD PRESSURE: 88 MMHG | WEIGHT: 170 LBS

## 2020-02-11 PROCEDURE — 99214 OFFICE O/P EST MOD 30 MIN: CPT

## 2020-02-11 NOTE — ASSESSMENT
[FreeTextEntry1] : Pt is a 50 yo with Grade 1 endometroid adenocarcinoma arising in the background of endometriosis involving the uterine serosa, likely primary peritoneal. Newly diagnosed right breast cancer. She has no evidence of abdominal disease on exam or on CT abdomen/pelvis 2/4/2020.

## 2020-02-11 NOTE — END OF VISIT
[FreeTextEntry3] : Continue surveillance every 6 months (3 month with Dr. Yarbrough)\par Follow up with Dr. Witt to discuss Breast cancer adjuvant treatment

## 2020-02-11 NOTE — PHYSICAL EXAM
[Absent] : Adnexa(ae): Absent [Normal] : Anus and perineum: Normal sphincter tone, no masses, no prolapse. [FreeTextEntry1] : smlling and happy  [de-identified] : well healed abdominal incisions

## 2020-02-12 ENCOUNTER — APPOINTMENT (OUTPATIENT)
Dept: HEMATOLOGY ONCOLOGY | Facility: CLINIC | Age: 50
End: 2020-02-12

## 2020-02-14 ENCOUNTER — APPOINTMENT (OUTPATIENT)
Dept: HEMATOLOGY ONCOLOGY | Facility: CLINIC | Age: 50
End: 2020-02-14

## 2020-02-14 ENCOUNTER — APPOINTMENT (OUTPATIENT)
Dept: HEMATOLOGY ONCOLOGY | Facility: CLINIC | Age: 50
End: 2020-02-14
Payer: COMMERCIAL

## 2020-02-14 VITALS
BODY MASS INDEX: 32.1 KG/M2 | SYSTOLIC BLOOD PRESSURE: 131 MMHG | WEIGHT: 170 LBS | OXYGEN SATURATION: 99 % | TEMPERATURE: 98.1 F | HEART RATE: 77 BPM | HEIGHT: 61 IN | DIASTOLIC BLOOD PRESSURE: 77 MMHG

## 2020-02-14 PROCEDURE — 99213 OFFICE O/P EST LOW 20 MIN: CPT

## 2020-02-14 NOTE — HISTORY OF PRESENT ILLNESS
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  She presented to [facility] ED on 4/23/19 s/p mechanical fall outside her place of work.  A CT A/P incidentally showed a complex cystic left adnexal mass measuring 17 cm. Massive cystic lesion occupying the lower abdomen measuring 33 cm. It was uncertain whether this represents a large peritoneal metastasis or a component of the above described cystic left adnexal mass, or possibly a right adnexal mass. There was suspicion for rupture of this mass with a large amount of abdominal ascites.  On 5/7/19 Dr. Manpreet oGnzalez performed a BASILIA, BSO, debulking and peritonectomy.  Pathology initially was benign.  An addendum was later issued which showed low grade endometrioid adenocarcinoma, FIGO grade 1, with focal sex cord like differentiation, arising in a background of endometriosis, and involving the uterine serosa.  She was discharged on 5/12/19 but returned to the ED on 5/15/19 c/o abdominal distention, N/V, dizziness, weakness, multiple falls and a fever of 104.  She was found to be hypotensive and tachycardic.  A CT A/P showed a large fluid collection which appears to be extraperitoneal layering anterior to the omentum measuring 24.7 x 9.4 x 20.9 cm. There is a component of the collection which extends posteriorly on the left into the retroperitoneum along the anterior aspect of the psoas extending to the posterior aspect of the splenic flexure (approximately 20 cm in craniocaudad dimension). On 5/17/19 Dr. Mague Durand performed an emergent exploratory laparotomy, sigmoidectomy and end colostomy.  Pathology was benign.   [de-identified] : FHx of DCIS (sister) [de-identified] : Patient presents for follow-up. She is s/p BASILIA BSO, debulking and peritonectomy on 5/7/19 due to extensive endometrioid adenocarcinoma, with a delayed leak from the sigmoid requiring bowel resection and end-colostomy by Dr. Durand.  No adjuvant treatment was recommended and she is s/p colostomy reversal.  Denies any pain, abdominal or vaginal.  No vaginal bleeding. No fevers/chills. No D/C.  No N/V.  Appetite is normal.\par \par Recently diagnosed with right breast cancer, ER+ NC+ HER2-.  Followed by Dr. Sellers.

## 2020-02-14 NOTE — PHYSICAL EXAM
[de-identified] : well-healed incision both  and vertical midline [de-identified] : s/p bilateral mastectomy with well-healed incisions [de-identified] : 1+ RUE edema

## 2020-02-18 ENCOUNTER — APPOINTMENT (OUTPATIENT)
Dept: HEMATOLOGY ONCOLOGY | Facility: CLINIC | Age: 50
End: 2020-02-18
Payer: COMMERCIAL

## 2020-02-18 VITALS
TEMPERATURE: 98.4 F | HEIGHT: 61 IN | DIASTOLIC BLOOD PRESSURE: 79 MMHG | SYSTOLIC BLOOD PRESSURE: 122 MMHG | OXYGEN SATURATION: 98 % | WEIGHT: 172.05 LBS | BODY MASS INDEX: 32.48 KG/M2 | HEART RATE: 88 BPM

## 2020-02-18 PROCEDURE — 99214 OFFICE O/P EST MOD 30 MIN: CPT

## 2020-02-24 ENCOUNTER — APPOINTMENT (OUTPATIENT)
Dept: SURGERY | Facility: CLINIC | Age: 50
End: 2020-02-24
Payer: COMMERCIAL

## 2020-02-24 VITALS
OXYGEN SATURATION: 100 % | BODY MASS INDEX: 32.47 KG/M2 | HEIGHT: 61 IN | DIASTOLIC BLOOD PRESSURE: 77 MMHG | TEMPERATURE: 98 F | HEART RATE: 85 BPM | SYSTOLIC BLOOD PRESSURE: 128 MMHG | WEIGHT: 172.01 LBS

## 2020-02-24 PROCEDURE — 99024 POSTOP FOLLOW-UP VISIT: CPT

## 2020-02-26 ENCOUNTER — APPOINTMENT (OUTPATIENT)
Dept: RADIATION ONCOLOGY | Facility: CLINIC | Age: 50
End: 2020-02-26
Payer: COMMERCIAL

## 2020-02-26 VITALS
HEART RATE: 86 BPM | SYSTOLIC BLOOD PRESSURE: 131 MMHG | WEIGHT: 172 LBS | TEMPERATURE: 98 F | HEIGHT: 61 IN | OXYGEN SATURATION: 98 % | RESPIRATION RATE: 16 BRPM | BODY MASS INDEX: 32.47 KG/M2 | DIASTOLIC BLOOD PRESSURE: 82 MMHG

## 2020-02-26 PROCEDURE — 99214 OFFICE O/P EST MOD 30 MIN: CPT

## 2020-02-26 NOTE — DISEASE MANAGEMENT
[Pathological] : TNM Stage: p [IA] : IA [NTNM] : 1a [FreeTextEntry4] : IDC right breast [MTNM] : 0 [TTNM] : 1c

## 2020-02-26 NOTE — HISTORY OF PRESENT ILLNESS
[FreeTextEntry1] : 49 year old woman with right breast cancer and endometrioid adenocarcinoma arising from background of endometriosis.\par \par Interval history:\par \par CT abdomen/pelvis 2/4/2020:\par Sigmoid resection with anastomosis without evidence of obstruction. Small pericapsular splenic fluid collection which is decreased from prior study.  No evidence of metastatic disease. \par \par Saw Dr. Gonzalez 2/11/20; recommended surveillance every 6 months.\par \par Oncotype DX returned with recurrence score of 17, suggesting no apparent benefit of chemotherapy.  Saw Dr. Sellers 2/18/20, who recommended adjuvant Anastrozole.

## 2020-02-26 NOTE — REVIEW OF SYSTEMS
[Negative] : Heme/Lymph [Constipation: Grade 0] : Constipation: Grade 0 [Diarrhea: Grade 0] : Diarrhea: Grade 0 [Edema Limbs: Grade 0] : Edema Limbs: Grade 0  [Localized Edema: Grade 0] : Localized Edema: Grade 0  [Fatigue: Grade 0] : Fatigue: Grade 0 [Neck Edema: Grade 0] : Neck Edema: Grade 0 [Urinary Incontinence: Grade 0] : Urinary Incontinence: Grade 0  [Breast Pain: Grade 0] : Breast Pain: Grade 0

## 2020-02-26 NOTE — PHYSICAL EXAM
[Breast Palpation Mass] : no palpable masses [No UE Edema] : there is no upper extremity edema [Breast Abnormal Lactation (Galactorrhea)] : no nipple discharge [Normal] : no focal deficits [de-identified] : reconstruction incisions well healed.

## 2020-03-02 PROBLEM — R92.8 ABNORMAL FINDING ON BREAST IMAGING: Status: ACTIVE | Noted: 2019-11-03

## 2020-03-02 PROCEDURE — 77263 THER RADIOLOGY TX PLNG CPLX: CPT

## 2020-03-02 NOTE — HISTORY OF PRESENT ILLNESS
[FreeTextEntry1] : I  had the pleasure of seeing Sera Francisco in the office for a follow up clinical breast evaluation secondary to newly diagnosed Stage 1 right breast cancer (IDC grade 1 ER 90-95% PR15-20% Jeg4csh negative)\par \par She underwent MRI of the breast 10/15/2019 which demonstrated the right breast cancer to measure 1.4cm without adenopathy. No MR evidence of multicentric or contralateral disease. 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 Further clarification on this variance will be obtained from the updated test as soon as it is available.\par \par 11/18/ 18 Breast Biopsy ( Right, 11:00 , 5 cm from the 0., Ultrasound guided  needle cores)\par - Infiltrating ductal carcinoma, well differentiated\par - no definitive lymphovascular invasion\par -No IN SITU component identified\par -Largest contiguous focus 1.1 cm\par - adjacent region containing aggregates of ducts with columnar cell change an columnar cell hyperplasia\par  HER2/ELADIO by IHC 1+ / Negative\par ER : Positive 90-95%- strong\par [place] -Positive ,15-20% strong \par \par \par Surgical decision has been made by Sera for bilateral mastectomy with right sentinel lymph node biopsy possible axillary dissection.\par \par We discussed risks v benefits including technical aspects of the procedure. She understands and all questions were answered. \par

## 2020-03-02 NOTE — ASSESSMENT
[FreeTextEntry1] : \par \par 50 yo perimenopausal female presents with newly diagnosed right breast cancer (Stage 1, (1.4cm IDC grade 2 ER+ UT+ Noa1qof negative). She has chosen to undergo bilateral mastectomy with right sentinel lymph node biopsy possible axillary dissection and understands the technical aspects of surgery, including risks v benefits and risk of lymphedema.\par 1. Medical clearance\par 2. Plan for above procedure with immediate reconstruction with Dr. Licea. \par \par

## 2020-03-02 NOTE — PHYSICAL EXAM
[Normocephalic] : normocephalic [Atraumatic] : atraumatic [EOMI] : extra ocular movement intact [PERRL] : pupils equal, round and reactive to light [Sclera nonicteric] : sclera nonicteric [Supple] : supple [Examined in the supine and seated position] : examined in the supine and seated position [No Supraclavicular Adenopathy] : no supraclavicular adenopathy [No dominant masses] : no dominant masses left breast [No dominant masses] : no dominant masses in right breast  [No Nipple Retraction] : no right nipple retraction [No Nipple Discharge] : no left nipple discharge [No Axillary Lymphadenopathy] : no left axillary lymphadenopathy [No Edema] : no edema [No Rashes] : no rashes [No Ulceration] : no ulceration [Breast Nipple Inversion] : nipples not inverted [Breast Nipple Retraction] : nipples not retracted [Breast Nipple Flattening] : nipples not flattened [Breast Nipple Fissures] : nipples not fissured [Breast Abnormal Lactation (Galactorrhea)] : no galactorrhea [Breast Abnormal Secretion Bloody Fluid] : no bloody discharge [Breast Abnormal Secretion Serous Fluid] : no serous discharge [Breast Abnormal Secretion Opalescent Fluid] : no milky discharge [de-identified] : No supraclavicular or axillary adenopathy. Breast with thickened area in the upper outer quadrant. No skin changes. Everted nipple without discharge.  [de-identified] : No supraclavicular or axillary adenopathy. No dominant breast mass, no skin changes. Everted nipple without discharge.

## 2020-03-04 ENCOUNTER — APPOINTMENT (OUTPATIENT)
Dept: NUCLEAR MEDICINE | Facility: CLINIC | Age: 50
End: 2020-03-04
Payer: COMMERCIAL

## 2020-03-04 ENCOUNTER — OUTPATIENT (OUTPATIENT)
Dept: OUTPATIENT SERVICES | Facility: HOSPITAL | Age: 50
LOS: 1 days | End: 2020-03-04

## 2020-03-04 DIAGNOSIS — Z93.3 COLOSTOMY STATUS: Chronic | ICD-10-CM

## 2020-03-04 DIAGNOSIS — Z98.890 OTHER SPECIFIED POSTPROCEDURAL STATES: Chronic | ICD-10-CM

## 2020-03-04 DIAGNOSIS — Z90.13 ACQUIRED ABSENCE OF BILATERAL BREASTS AND NIPPLES: Chronic | ICD-10-CM

## 2020-03-04 DIAGNOSIS — Z90.710 ACQUIRED ABSENCE OF BOTH CERVIX AND UTERUS: Chronic | ICD-10-CM

## 2020-03-04 DIAGNOSIS — C50.911 MALIGNANT NEOPLASM OF UNSPECIFIED SITE OF RIGHT FEMALE BREAST: ICD-10-CM

## 2020-03-04 PROCEDURE — 78815 PET IMAGE W/CT SKULL-THIGH: CPT | Mod: 26,PI

## 2020-03-04 NOTE — HISTORY OF PRESENT ILLNESS
[de-identified] : Right Breast:St IIA  IDC  pT1c pN1a ( 2/3 LN involved in Sentinal LN Biopsy) \par Subsequent Right axillary Dissection 0/9 LN Involved\par Er 90-95% Pr 15-20 % Her 2 negative\par GENETIC TESTING : Genetic testing: MYRISK panel test was negative, VUS in the MSH3 gene. \par ONcotype RR 17\par \par --Bilateral Mastectomy with RIght SLNB (12/5/19)  followed by right axilla dissection (1/3/2020). \par Surgeon: Elizabeth Riley\par \par THis is a patient with a h/o Endometroid Carcinoma (Low grade serous) s/p BASILIA in May 2019 diagnosed at 49 yo \par Patient was following up with Dr Yarbrough for Endometroid Carcinoma \par Patient had MRI of the breast 10/15/2019 which demonstrated the right breast cancer to measure 1.4cm without adenopathy. No MR evidence of multicentric or contralateral disease. BIRADS 6\par \par She underwent bilateral mastectomy and right sentinel lymph node biopsy for right breast cancer on 12/5/2019. \par Final pathology demonstrated 1.3cm IDC grade 1 with infiltrating carcinoma extending to the cauterized anterior margin. 2/3 sentinel lymph nodes with metastatic carcinoma with the largest measuring 3mm and extracapsular extension. She then underwent right axilla dissection on 1/3/2019 and 0/9 lymph nodes were negative for carcinoma.\par \par Oncotype testing was done with a score of 17 with a 9 yr risk of recurrence of 15% with /  vines alone, no apparent benefit with chemo [de-identified] : Patient is here for a follow up to discuss adjuvant therapy for Invasive ductal carcinoma. Oncotype testing done with a score of 17 with a 9 yr risk of recurrence of 15% with /  vines alone, no apparent benefit with chemo\par No h/o VTE, no H/o Arthritis/ No issues for fractures/ low bone density \par Mother with VTE in the setting of surgery. \par Never had a bone density scan  no h/o fractures / osteoporosis \par \par Patient to see Dr Caldwell next week \par \par Discussed Adjuvant therapy with AI ( patient is s/p total hysterectomy) \par

## 2020-03-04 NOTE — RESULTS/DATA
[FreeTextEntry1] : \par 11/18/ 18 Breast Biopsy ( Right, 11:00 , 5 cm from the 0., Ultrasound guided needle cores)\par - Infiltrating ductal carcinoma, well differentiated\par - no definitive lymphovascular invasion\par -No IN SITU component identified\par -Largest contiguous focus 1.1 cm\par - adjacent region containing aggregates of ducts with columnar cell change an columnar cell hyperplasia\par  HER2/ELADIO by IHC 1+ / Negative\par ER : Positive 90-95%- strong\par RI -Positive ,15-20% strong \par \par Final pathology demonstrated 1.3cm IDC grade 1 with infiltrating carcinoma extending to the cauterized anterior margin. 2/3 sentinel lymph nodes with metastatic carcinoma with the largest measuring 3mm and extracapsular extension. The rest of the pathology was reviewed in detail demonstrating benign findings and a copy was provided to her. \par \par 1/3/2020 final pathology\par Right axillary dissection: Nine lymph nodes, negative for metastatic carcinoma.\par

## 2020-03-04 NOTE — ASSESSMENT
[FreeTextEntry1] : 50 yo female with a stage II A right Invasive ductal Carcinoma T1c N1a  ER 90-95% MT 15-20%, Her 2 negative s/p Bilateral Mastectomy and Right axillary dissection with 0/9LN involved. Initial Onset LN biopsy with 2/3 LN involved.  \par Oncotype testing done with a score of 17 with a 9 yr risk of recurrence of 15% with /  vines alone, no apparent benefit with chemotherapy. Discussed pathology/ oncotype in detail.\par No h/o VTE, no H/o Arthritis/ No issues for fractures/ low bone density \par Mother with VTE in the setting of surgery. \par Never had a bone density scan  no h/o fractures / osteoporosis \par \par Patient understands natural history of the disease, as well as management options. \par Patient understand that the treatment of breast cancer is 2 fold to prevent recurrence in the breast as well as distant spread. \par - With the b/l  mastectomy, there is < 3% chance of recurrence in the breast ( theoretical risk of minimal  breast tissue left behind) \par - Further treatment is to prevent distant recurrence. \par - Discussed Adjuvant therapy with AI anastrozole ( patient is s/p total hysterectomy) Potential side effects including but not limited to to hot flashes, arthralgias, hepatotoxicity, hypercholesterolemia and decreased bone mineral density were described. \par - Will order baseline DEXA scan\par - counseled patient on Lifestyle modification weight loss, alcohol soy supplementation \par - s/p B/l Mastectomy, no role for mammograms\par - She is seeing Rad onc today as well to discuss role of RT\par - If RT is planned will start Anastrozole after RT is completed

## 2020-03-05 LAB
ALBUMIN SERPL ELPH-MCNC: 4.4 G/DL
ALP BLD-CCNC: 85 U/L
ALT SERPL-CCNC: 24 U/L
ANION GAP SERPL CALC-SCNC: 15 MMOL/L
AST SERPL-CCNC: 21 U/L
BILIRUB SERPL-MCNC: <0.2 MG/DL
BUN SERPL-MCNC: 21 MG/DL
CALCIUM SERPL-MCNC: 9.4 MG/DL
CHLORIDE SERPL-SCNC: 103 MMOL/L
CO2 SERPL-SCNC: 24 MMOL/L
CREAT SERPL-MCNC: 0.73 MG/DL
ESTRADIOL SERPL-MCNC: 7 PG/ML
FSH SERPL-MCNC: 120 IU/L
GLUCOSE SERPL-MCNC: 110 MG/DL
LH SERPL-ACNC: 49.9 IU/L
POTASSIUM SERPL-SCNC: 3.8 MMOL/L
PROT SERPL-MCNC: 7.6 G/DL
SODIUM SERPL-SCNC: 142 MMOL/L

## 2020-03-06 PROCEDURE — 77334 RADIATION TREATMENT AID(S): CPT | Mod: 26

## 2020-03-06 PROCEDURE — 77290 THER RAD SIMULAJ FIELD CPLX: CPT | Mod: 26

## 2020-03-17 ENCOUNTER — APPOINTMENT (OUTPATIENT)
Dept: PLASTIC SURGERY | Facility: CLINIC | Age: 50
End: 2020-03-17

## 2020-03-17 PROCEDURE — 77300 RADIATION THERAPY DOSE PLAN: CPT | Mod: 26

## 2020-03-17 PROCEDURE — 77295 3-D RADIOTHERAPY PLAN: CPT | Mod: 26

## 2020-03-17 PROCEDURE — 77334 RADIATION TREATMENT AID(S): CPT | Mod: 26

## 2020-04-13 PROCEDURE — 77280 THER RAD SIMULAJ FIELD SMPL: CPT | Mod: 26

## 2020-04-14 ENCOUNTER — TRANSCRIPTION ENCOUNTER (OUTPATIENT)
Age: 50
End: 2020-04-14

## 2020-04-16 ENCOUNTER — APPOINTMENT (OUTPATIENT)
Dept: PLASTIC SURGERY | Facility: CLINIC | Age: 50
End: 2020-04-16
Payer: COMMERCIAL

## 2020-04-16 VITALS
SYSTOLIC BLOOD PRESSURE: 133 MMHG | DIASTOLIC BLOOD PRESSURE: 87 MMHG | HEART RATE: 80 BPM | OXYGEN SATURATION: 98 % | HEIGHT: 61 IN | BODY MASS INDEX: 32.85 KG/M2 | TEMPERATURE: 97.6 F | WEIGHT: 174 LBS

## 2020-04-16 PROCEDURE — 99213 OFFICE O/P EST LOW 20 MIN: CPT

## 2020-04-16 NOTE — DISEASE MANAGEMENT
[Pathological] : TNM Stage: p [FreeTextEntry4] : IDC right breast [TTNM] : 1c [NTNM] : 1a [MTNM] : 0 [IA] : IA [de-identified] : [id number] [de-identified] : 4085 [de-identified] : right chest wall

## 2020-04-16 NOTE — REASON FOR VISIT
[Follow-Up: _____] : a [unfilled] follow-up visit Patient with RLE pain, swelling and redness, subjective fever and chills  although no recorded fever or leucocytosis in CBC  s/p vanc and zosyn in the ED, will manage with Unasyn and c/w maintenance IVF  Blood cultures are testing  Will get RLE venous doppler to r/o DVT, Wells score for DVT is 3, however patient is already therapeutically anticoagulated, INR is 2.13 Patient with RLE pain, swelling and redness, subjective fever and chills  although no recorded fever or leucocytosis in CBC  s/p vanc and zosyn in the ED, will manage with Unasyn+clinda and c/w maintenance IVF  Blood cultures are testing  Will get RLE venous doppler to r/o DVT, Wells score for DVT is 3, however patient is already therapeutically anticoagulated, INR is 2.13  ID Dr hussein

## 2020-04-16 NOTE — ASSESSMENT
[FreeTextEntry1] : 50 yo female s/p b/l KWAME flap breast reconstruction 11/25/19\par doing well\par to start radiation with subsequent anastrazole as per Dr Riley \par revision surgery to include skin paddle removal as well as abdominal dog ear revision\par pt seen and examined with Dr Licea, all questions answered

## 2020-04-16 NOTE — PHYSICAL EXAM
[NI] : Normal [de-identified] : b/l breast are soft and symmetrical \par skin islands viable\par b/l breasts flaps are soft and viable\par incisions well healed [de-identified] : abdomen soft and non tender \par incisions are well healed\par no palpable fluid collections or signs of infection \par small dog ear on the lateral aspect of the left incision\par hypertrophy of the vertical abdominal scar from the umbilicus

## 2020-04-16 NOTE — REVIEW OF SYSTEMS
[Edema Limbs: Grade 0] : Edema Limbs: Grade 0  [Fatigue: Grade 0] : Fatigue: Grade 0 [Localized Edema: Grade 0] : Localized Edema: Grade 0  [Neck Edema: Grade 0] : Neck Edema: Grade 0 [Breast Pain: Grade 0] : Breast Pain: Grade 0 [Skin Hyperpigmentation: Grade 0] : Skin Hyperpigmentation: Grade 0

## 2020-04-20 PROCEDURE — 77427 RADIATION TX MANAGEMENT X5: CPT

## 2020-04-23 NOTE — REVIEW OF SYSTEMS
[Fatigue: Grade 0] : Fatigue: Grade 0 [Edema Limbs: Grade 0] : Edema Limbs: Grade 0  [Localized Edema: Grade 0] : Localized Edema: Grade 0  [Breast Pain: Grade 0] : Breast Pain: Grade 0 [Neck Edema: Grade 0] : Neck Edema: Grade 0 [Skin Hyperpigmentation: Grade 0] : Skin Hyperpigmentation: Grade 0 [Pruritus: Grade 0] : Pruritus: Grade 0 [Skin Induration: Grade 0] : Skin Induration: Grade 0 [Dermatitis Radiation: Grade 1 - Faint erythema or dry desquamation] : Dermatitis Radiation: Grade 1 - Faint erythema or dry desquamation [Skin Atrophy: Grade 0] : Skin Atrophy: Grade 0

## 2020-04-23 NOTE — DISEASE MANAGEMENT
[Pathological] : TNM Stage: p [IA] : IA [FreeTextEntry4] : IDC right breast [TTNM] : 1c [NTNM] : 1a [MTNM] : 0 [de-identified] : 9033 [de-identified] : 6160 [de-identified] : right chest wall

## 2020-04-23 NOTE — PHYSICAL EXAM
[Breast Appearance] : normal in appearance [Breast Abnormal Lactation (Galactorrhea)] : no nipple discharge [No UE Edema] : there is no upper extremity edema [Normal] : no palpable adenopathy [de-identified] : early folliculitis right UIQ

## 2020-04-27 PROCEDURE — 77427 RADIATION TX MANAGEMENT X5: CPT

## 2020-04-30 VITALS
WEIGHT: 174 LBS | TEMPERATURE: 98 F | BODY MASS INDEX: 32.85 KG/M2 | RESPIRATION RATE: 16 BRPM | SYSTOLIC BLOOD PRESSURE: 121 MMHG | HEART RATE: 95 BPM | OXYGEN SATURATION: 95 % | HEIGHT: 61 IN | DIASTOLIC BLOOD PRESSURE: 84 MMHG

## 2020-04-30 NOTE — REVIEW OF SYSTEMS
[Fatigue: Grade 0] : Fatigue: Grade 0 [Edema Limbs: Grade 0] : Edema Limbs: Grade 0  [Localized Edema: Grade 0] : Localized Edema: Grade 0  [Neck Edema: Grade 0] : Neck Edema: Grade 0 [Breast Pain: Grade 0] : Breast Pain: Grade 0 [Pruritus: Grade 0] : Pruritus: Grade 0 [Skin Atrophy: Grade 0] : Skin Atrophy: Grade 0 [Skin Induration: Grade 0] : Skin Induration: Grade 0 [Dermatitis Radiation: Grade 1 - Faint erythema or dry desquamation] : Dermatitis Radiation: Grade 1 - Faint erythema or dry desquamation [Skin Hyperpigmentation: Grade 1 - Hyperpigmentation covering <10% BSA; no psychosocial impact] : Skin Hyperpigmentation: Grade 1 - Hyperpigmentation covering <10% BSA; no psychosocial impact

## 2020-04-30 NOTE — DISEASE MANAGEMENT
[Pathological] : TNM Stage: p [IA] : IA [NTNM] : 1a [FreeTextEntry4] : IDC right breast [TTNM] : 1c [de-identified] : 6746 [MTNM] : 0 [de-identified] : 3839 [de-identified] : right chest wall

## 2020-04-30 NOTE — PHYSICAL EXAM
[Breast Appearance] : normal in appearance [Breast Abnormal Lactation (Galactorrhea)] : no nipple discharge [No UE Edema] : there is no upper extremity edema [Normal] : oriented to person, place and time, the affect was normal, the mood was normal and not anxious [de-identified] : mild erythema of the right chest wall

## 2020-05-04 ENCOUNTER — OUTPATIENT (OUTPATIENT)
Dept: OUTPATIENT SERVICES | Facility: HOSPITAL | Age: 50
LOS: 1 days | End: 2020-05-04
Payer: COMMERCIAL

## 2020-05-04 ENCOUNTER — APPOINTMENT (OUTPATIENT)
Dept: CT IMAGING | Facility: CLINIC | Age: 50
End: 2020-05-04
Payer: COMMERCIAL

## 2020-05-04 ENCOUNTER — RESULT REVIEW (OUTPATIENT)
Age: 50
End: 2020-05-04

## 2020-05-04 DIAGNOSIS — Z93.3 COLOSTOMY STATUS: Chronic | ICD-10-CM

## 2020-05-04 DIAGNOSIS — Z98.890 OTHER SPECIFIED POSTPROCEDURAL STATES: Chronic | ICD-10-CM

## 2020-05-04 DIAGNOSIS — Z90.710 ACQUIRED ABSENCE OF BOTH CERVIX AND UTERUS: Chronic | ICD-10-CM

## 2020-05-04 DIAGNOSIS — Z90.13 ACQUIRED ABSENCE OF BILATERAL BREASTS AND NIPPLES: Chronic | ICD-10-CM

## 2020-05-04 DIAGNOSIS — C56.9 MALIGNANT NEOPLASM OF UNSPECIFIED OVARY: ICD-10-CM

## 2020-05-04 DIAGNOSIS — Z00.00 ENCOUNTER FOR GENERAL ADULT MEDICAL EXAMINATION WITHOUT ABNORMAL FINDINGS: ICD-10-CM

## 2020-05-04 PROCEDURE — 77427 RADIATION TX MANAGEMENT X5: CPT

## 2020-05-04 PROCEDURE — 74177 CT ABD & PELVIS W/CONTRAST: CPT

## 2020-05-04 PROCEDURE — 74177 CT ABD & PELVIS W/CONTRAST: CPT | Mod: 26

## 2020-05-07 VITALS
HEIGHT: 61 IN | WEIGHT: 173 LBS | SYSTOLIC BLOOD PRESSURE: 109 MMHG | HEART RATE: 92 BPM | OXYGEN SATURATION: 98 % | DIASTOLIC BLOOD PRESSURE: 74 MMHG | BODY MASS INDEX: 32.66 KG/M2 | TEMPERATURE: 98 F | RESPIRATION RATE: 16 BRPM

## 2020-05-07 NOTE — DISEASE MANAGEMENT
[Pathological] : TNM Stage: p [IA] : IA [FreeTextEntry4] : IDC right breast [TTNM] : 1c [NTNM] : 1a [MTNM] : 0 [de-identified] : 8604 [de-identified] : 8727 [de-identified] : right chest wall

## 2020-05-07 NOTE — PHYSICAL EXAM
[Breast Appearance] : normal in appearance [Breast Abnormal Lactation (Galactorrhea)] : no nipple discharge [No UE Edema] : there is no upper extremity edema [Normal] : oriented to person, place and time, the affect was normal, the mood was normal and not anxious [de-identified] : mild erythema of the right chest wall; no desquamation

## 2020-05-07 NOTE — REVIEW OF SYSTEMS
[Edema Limbs: Grade 0] : Edema Limbs: Grade 0  [Localized Edema: Grade 0] : Localized Edema: Grade 0  [Neck Edema: Grade 0] : Neck Edema: Grade 0 [Breast Pain: Grade 0] : Breast Pain: Grade 0 [Pruritus: Grade 0] : Pruritus: Grade 0 [Skin Atrophy: Grade 0] : Skin Atrophy: Grade 0 [Skin Hyperpigmentation: Grade 1 - Hyperpigmentation covering <10% BSA; no psychosocial impact] : Skin Hyperpigmentation: Grade 1 - Hyperpigmentation covering <10% BSA; no psychosocial impact [Skin Induration: Grade 0] : Skin Induration: Grade 0 [Dermatitis Radiation: Grade 1 - Faint erythema or dry desquamation] : Dermatitis Radiation: Grade 1 - Faint erythema or dry desquamation [Fatigue: Grade 1 - Fatigue relieved by rest] : Fatigue: Grade 1 - Fatigue relieved by rest

## 2020-05-11 PROCEDURE — 77427 RADIATION TX MANAGEMENT X5: CPT

## 2020-05-13 ENCOUNTER — TRANSCRIPTION ENCOUNTER (OUTPATIENT)
Age: 50
End: 2020-05-13

## 2020-05-14 ENCOUNTER — OUTPATIENT (OUTPATIENT)
Dept: OUTPATIENT SERVICES | Facility: HOSPITAL | Age: 50
LOS: 1 days | Discharge: HOME | End: 2020-05-14

## 2020-05-14 ENCOUNTER — TRANSCRIPTION ENCOUNTER (OUTPATIENT)
Age: 50
End: 2020-05-14

## 2020-05-14 VITALS
BODY MASS INDEX: 32.85 KG/M2 | RESPIRATION RATE: 16 BRPM | SYSTOLIC BLOOD PRESSURE: 121 MMHG | TEMPERATURE: 98 F | OXYGEN SATURATION: 95 % | HEIGHT: 61 IN | DIASTOLIC BLOOD PRESSURE: 76 MMHG | HEART RATE: 87 BPM | WEIGHT: 174 LBS

## 2020-05-14 DIAGNOSIS — Z90.13 ACQUIRED ABSENCE OF BILATERAL BREASTS AND NIPPLES: Chronic | ICD-10-CM

## 2020-05-14 DIAGNOSIS — C50.919 MALIGNANT NEOPLASM OF UNSPECIFIED SITE OF UNSPECIFIED FEMALE BREAST: ICD-10-CM

## 2020-05-14 DIAGNOSIS — Z98.890 OTHER SPECIFIED POSTPROCEDURAL STATES: Chronic | ICD-10-CM

## 2020-05-14 DIAGNOSIS — Z93.3 COLOSTOMY STATUS: Chronic | ICD-10-CM

## 2020-05-14 DIAGNOSIS — Z90.710 ACQUIRED ABSENCE OF BOTH CERVIX AND UTERUS: Chronic | ICD-10-CM

## 2020-05-14 NOTE — REVIEW OF SYSTEMS
[Edema Limbs: Grade 0] : Edema Limbs: Grade 0  [Fatigue: Grade 1 - Fatigue relieved by rest] : Fatigue: Grade 1 - Fatigue relieved by rest [Localized Edema: Grade 0] : Localized Edema: Grade 0  [Neck Edema: Grade 0] : Neck Edema: Grade 0 [Breast Pain: Grade 0] : Breast Pain: Grade 0 [Pruritus: Grade 0] : Pruritus: Grade 0 [Skin Atrophy: Grade 0] : Skin Atrophy: Grade 0 [Skin Hyperpigmentation: Grade 1 - Hyperpigmentation covering <10% BSA; no psychosocial impact] : Skin Hyperpigmentation: Grade 1 - Hyperpigmentation covering <10% BSA; no psychosocial impact [Skin Induration: Grade 0] : Skin Induration: Grade 0 [Dermatitis Radiation: Grade 1 - Faint erythema or dry desquamation] : Dermatitis Radiation: Grade 1 - Faint erythema or dry desquamation

## 2020-05-14 NOTE — PHYSICAL EXAM
[Breast Appearance] : normal in appearance [Breast Abnormal Lactation (Galactorrhea)] : no nipple discharge [No UE Edema] : there is no upper extremity edema [Normal] : oriented to person, place and time, the affect was normal, the mood was normal and not anxious [de-identified] : mild erythema of the right chest wall; no desquamation

## 2020-05-14 NOTE — DISEASE MANAGEMENT
[Pathological] : TNM Stage: p [IA] : IA [TTNM] : 1c [FreeTextEntry4] : IDC right breast [NTNM] : 1a [MTNM] : 0 [de-identified] : right chest wall [de-identified] : 1740 [de-identified] : [id number]

## 2020-05-15 ENCOUNTER — TRANSCRIPTION ENCOUNTER (OUTPATIENT)
Age: 50
End: 2020-05-15

## 2020-05-18 PROCEDURE — 77427 RADIATION TX MANAGEMENT X5: CPT

## 2020-05-19 ENCOUNTER — APPOINTMENT (OUTPATIENT)
Dept: SURGERY | Facility: CLINIC | Age: 50
End: 2020-05-19

## 2020-05-19 ENCOUNTER — APPOINTMENT (OUTPATIENT)
Dept: HEMATOLOGY ONCOLOGY | Facility: CLINIC | Age: 50
End: 2020-05-19
Payer: COMMERCIAL

## 2020-05-19 PROCEDURE — 99213 OFFICE O/P EST LOW 20 MIN: CPT | Mod: 95

## 2020-05-22 ENCOUNTER — APPOINTMENT (OUTPATIENT)
Dept: HEMATOLOGY ONCOLOGY | Facility: CLINIC | Age: 50
End: 2020-05-22
Payer: COMMERCIAL

## 2020-05-22 DIAGNOSIS — Z12.11 ENCOUNTER FOR SCREENING FOR MALIGNANT NEOPLASM OF COLON: ICD-10-CM

## 2020-05-22 PROCEDURE — 99443: CPT

## 2020-05-22 NOTE — PHYSICAL EXAM
[Restricted in physically strenuous activity but ambulatory and able to carry out work of a light or sedentary nature] : Status 1- Restricted in physically strenuous activity but ambulatory and able to carry out work of a light or sedentary nature, e.g., light house work, office work [Normal] : affect appropriate [de-identified] : s/p bilateral mastectomy with well-healed incisions [de-identified] : well-healed incision both  and vertical midline [de-identified] : 1+ RUE edema

## 2020-05-22 NOTE — RESULTS/DATA
[FreeTextEntry1] : 5/5/20 CT: MALDONADO\par \par 2/4/2020 CT A/P: Sigmoid resection with anastomosis without evidence of obstruction. Small pericapsular splenic fluid collection which is decreased from prior study. \par No evidence of metastatic disease.\par \par 10/15/19 MRI Breast:\par 1.4 cm abnormal enhancement/biopsy clip in the upper outer posterior right breast, corresponding to newly diagnosed malignancy. No MRI evidence of multicentric or contralateral disease. \par RECOMMENDATION: Surgical or oncologic management. \par BI-RADS 6- Known Biopsy-Proven Malignancy \par \par 9/23/19 Pathology: \par Right breast, "11:00, 5 cm from the nipple", ultrasound guided core needle biopsy:\par -Invasive ductal carcinoma, grade 1.\par -See note.\par Note: Immunohistochemical stains for p63 and calponin are performed on block 1-B at [facility] and interpreted at [facility] as follows:\par Calponin, p63: negative\par Microscopic evaluation reveals an invasive ductal carcinoma (Cropwell score 1+2+1), grade 1. The carcinoma measures at least 1.1 cm in its greatest linear expansion in this biopsy. \par There are no calcifications nor necrosis present.\par *Addendum*\par IMMUNOSTAINS PERFORMED AND INTERPRETED ON BLOCK " B " BY [facility],\par HER2/ELADIO BY IHC: 1 + / Negative\par ER: Positive, 90 - 95 %, strong\par OR: Positive, 15 - 20 %, strong\par \par 9/23/19 Mammo: Status post core needle biopsy of the right breast with clip placement. Recommendations for further followup will be based on pathology results.\par \par 9/19/19 US Breast/Mammo: Ill-defined hypoechoic area in the right breast at the 11:00 axis, corresponding to mammographically seen distortion. Ultrasound-guided core biopsy is advised.\par \par 5/15/19 Pathology:\par Intra-abdominal foreign body, removal:  Blood clots\par Sigmoid, resection:  Mucosal necrosis with transmural acute and chronic inflammation.  The process extends to one margin of resection (slide 1A).  Three (3) lymph nodes, one with benign glandular inclusion.\par \par 5/15/19 CT A/P:\par Moderate amount retained fecal material in the colon, greatest in the right hemicolon. Mild wall thickening involving the distal transverse colon, descending colon, and proximal sigmoid colon, possibly reactive to the large collection.  Large fluid collection which appears to be extraperitoneal layering anterior to the omentum measuring 24.7 x 9.4 x \par 20.9 cm (craniocaudad x anteroposterior x transverse). There is a component of the collection which extends posteriorly on the left into the retroperitoneum along the anterior aspect of the psoas extending to the posterior aspect of the splenic flexure (approximately 20 cm in craniocaudad dimension). Skin staples are seen within the anterior abdominal wall. Air is seen within the anterior abdominal wall which may be postoperative in etiology.\par \par 5/8/19 Cytopathology:\par Abdominal fluid:  NEGATIVE FOR MALIGNANT CELLS.  Mostly fibrin and reactive mesothelial cells.\par \par 5/7/19 Pathology:\par Omental lymph node: Markedly reactive, hemorrhagic lymph node with hemosiderin laden macrophages.\par Cyst wall(1): Hemorrhagic inflamed cyst wall without epithelial lining.\par Right hepatic flexure lymph node: Markedly reactive, hemorrhagic lymph node with hemosiderin laden macrophages.\par Omentum: Omental fat with marked reactive fibrosis, hemorrhage and chronic inflammation; marked serosal adhesions.\par Right pelvic lymph node: Markedly reactive, hemorrhagic lymph node with hemosiderin laden macrophages.\par Left pelvic lymph node: Markedly reactive, hemorrhagic lymph node with hemosiderin laden macrophages.\par Cyst wall(2):  Cyst wall, markedly inflamed and hemorrhagic with cholesterol crystals and without epithelial lining.\par ***Uterus, cervix and adnexa:  Histologically unremarkable cervix. Weakly proliferative endometrium and endometrial polyp. Cysts located outside the uterus, involving serosa, with glandular stromal structures, consistent with endometriosis.\par ****Addendum****\par Case sent for outside consultation at [facility] with Dr. Tina Mckay.\par The atypical clusters of glands, in part 8. "Uterus, cervix and adnexa", have been diagnosed as Low grade endometrioid adenocarcinoma, FIGO grade 1,  with focal sex cord like differentiation, arising in a background of endometriosis, and involving the uterine serosa.\par \par 4/23/19 CT C/A/P:\par There is a large complex cystic mass occupying the lower abdomen, measuring 21.0 (AP) x 24.2 (CC) x 33.1 (TR) cm.  The exact origin of this mass is uncertain. The left posterior wall of this mass is discontinuous, suggestive of rupture. There is a large amount of abdominal ascites.  Additional complex left adnexal cystic mass with septations and apparent mural nodules measuring 9.4 (AP) x 15.5 (CC) x 16.6 (TR) cm.

## 2020-05-22 NOTE — HISTORY OF PRESENT ILLNESS
[Home] : at home, [unfilled] , at the time of the visit. [Medical Office: ([facility])___] : at the medical office located in  [Verbal consent obtained from patient] : the patient, [unfilled] [de-identified] : The patient was diagnosed with endometrioid adenocarcinoma in May 2019 at the age of 48.  She presented to [facility] ED on 4/23/19 s/p mechanical fall outside her place of work.  A CT A/P incidentally showed a complex cystic left adnexal mass measuring 17 cm. Massive cystic lesion occupying the lower abdomen measuring 33 cm. It was uncertain whether this represents a large peritoneal metastasis or a component of the above described cystic left adnexal mass, or possibly a right adnexal mass. There was suspicion for rupture of this mass with a large amount of abdominal ascites.  On 5/7/19 Dr. Manpreet Gonzalez performed a BASILIA, BSO, debulking and peritonectomy.  Pathology initially was benign.  An addendum was later issued which showed low grade endometrioid adenocarcinoma, FIGO grade 1, with focal sex cord like differentiation, arising in a background of endometriosis, and involving the uterine serosa.  She was discharged on 5/12/19 but returned to the ED on 5/15/19 c/o abdominal distention, N/V, dizziness, weakness, multiple falls and a fever of 104.  She was found to be hypotensive and tachycardic.  A CT A/P showed a large fluid collection which appears to be extraperitoneal layering anterior to the omentum measuring 24.7 x 9.4 x 20.9 cm. There is a component of the collection which extends posteriorly on the left into the retroperitoneum along the anterior aspect of the psoas extending to the posterior aspect of the splenic flexure (approximately 20 cm in craniocaudad dimension). On 5/17/19 Dr. Mague Durand performed an emergent exploratory laparotomy, sigmoidectomy and end colostomy.  Pathology was benign.   [de-identified] : FHx of DCIS (sister) [de-identified] : Patient presents for follow-up. She is s/p BASILIA BSO, debulking and peritonectomy on 5/7/19 due to extensive endometrioid adenocarcinoma, with a delayed leak from the sigmoid requiring bowel resection and end-colostomy by Dr. Durand.  No adjuvant treatment was recommended and she is s/p colostomy reversal.  Denies any pain, abdominal or vaginal.  No vaginal bleeding. No fevers/chills. No D/C.  No N/V.  Appetite is normal.\par \par Recently diagnosed with right breast cancer, ER+ CA+ HER2-.  Followed by Dr. Sellers.

## 2020-06-05 ENCOUNTER — TRANSCRIPTION ENCOUNTER (OUTPATIENT)
Age: 50
End: 2020-06-05

## 2020-06-05 NOTE — HISTORY OF PRESENT ILLNESS
[de-identified] : Right Breast:St IIA  IDC  pT1c pN1a ( 2/3 LN involved in Sentinal LN Biopsy) \par Subsequent Right axillary Dissection 0/9 LN Involved\par Er 90-95% Pr 15-20 % Her 2 negative\par GENETIC TESTING : Genetic testing: MYRISK panel test was negative, VUS in the MSH3 gene. \par ONcotype SCORE:  17 No benefit of \par Mammaprint: Low risk \par \par --Bilateral Mastectomy with RIght SLNB (12/5/19)  followed by right axilla dissection (1/3/2020). \par Surgeon: Elizabeth Riley\par \par THis is a patient with a h/o Endometroid Carcinoma (Low grade serous) s/p BASILIA in May 2019 diagnosed at 47 yo \par Patient was following up with Dr Yarbrough for Endometroid Carcinoma \par Patient had MRI of the breast 10/15/2019 which demonstrated the right breast cancer to measure 1.4cm without adenopathy. No MR evidence of multicentric or contralateral disease. BIRADS 6\par \par She underwent bilateral mastectomy and right sentinel lymph node biopsy for right breast cancer on 12/5/2019. \par Final pathology demonstrated 1.3cm IDC grade 1 with infiltrating carcinoma extending to the cauterized anterior margin. 2/3 sentinel lymph nodes with metastatic carcinoma with the largest measuring 3mm and extracapsular extension. She then underwent right axilla dissection on 1/3/2019 and 0/9 lymph nodes were negative for carcinoma.\par \par Oncotype testing was done with a score of 17 with a 9 yr risk of recurrence of 15% with /  vines alone, no apparent benefit with chemo [de-identified] : In light of the COVid 19 crisis, this encounter was done via Telehealth at home \par This visit was provided via telehealth using real-time 2-way audio visual technology. \par The patient RUTH ANGELA was located at home at the time of the visit. \par Verbal consent for teleservices given on May 19, 2020  by  RUTH COREEN\par \par \par Patient is here for a follow up to discuss adjuvant therapy for Invasive ductal carcinoma. \par Oncotype testing done with a score of 17 with a 9 yr risk of recurrence of 15% with /  vines alone, no apparent benefit with chemo\par Mamaprint was also sent which was a Low risk of Recurrent \par \par S/p RT with Dr Caldwell completed on 5/18/20 \par No h/o VTE, no H/o Arthritis/ No issues for fractures/ low bone density \par Mother with VTE in the setting of surgery. \par Never had a bone density scan  no h/o fractures / osteoporosis \par Patient will require a Dexa scan will order \par \par Patient on anastrozole, since march21st 2020. Some hot flashes at night, No joint pain \par No vaginal dryness \par \par

## 2020-06-05 NOTE — ASSESSMENT
[FreeTextEntry1] : 50 yo female with a stage II A right Invasive ductal Carcinoma T1c N1a  ER 90-95% MO 15-20%, Her 2 negative s/p Bilateral Mastectomy and Right axillary dissection with 0/9LN involved. Initial [place] [facility] biopsy with 2/3 LN involved.  \par Oncotype testing done with a score of 17 with a 9 yr risk of recurrence of 15% with /  vines alone, no apparent benefit with chemotherapy. Discussed pathology/ oncotype in detail.\par Mammaprint with low Risk \par \par Patient understands natural history of the disease, as well as management options. \par Patient understand that the treatment of breast cancer is 2 fold to prevent recurrence in the breast as well as distant spread. \par - With the b/l  mastectomy, there is < 3% chance of recurrence in the breast ( theoretical risk of minimal  breast tissue left behind) \par - Further treatment is to prevent distant recurrence. \par - Oncotype at 17, mamprint done which was low risk of recurrence\par - Patient started Anastrozole in March 2020 \par - Completed RT to chest on 5/18/20\par - counseled patient again on Lifestyle modification weight loss, alcohol,  soy supplementation \par - s/p B/l Mastectomy, no role for mammograms\par \par Continue Anastrozole , no severe AE, Will order labs to monitor LFTs next month \par - F/ u with our office in OCt 2020 \par - Will order DEXa scan at next visit

## 2020-06-05 NOTE — RESULTS/DATA
[FreeTextEntry1] : \par 11/18/ 18 Breast Biopsy ( Right, 11:00 , 5 cm from the 0., Ultrasound guided needle cores)\par - Infiltrating ductal carcinoma, well differentiated\par - no definitive lymphovascular invasion\par -No IN SITU component identified\par -Largest contiguous focus 1.1 cm\par - adjacent region containing aggregates of ducts with columnar cell change an columnar cell hyperplasia\par  HER2/ELADIO by IHC 1+ / Negative\par ER : Positive 90-95%- strong\par MI -Positive ,15-20% strong \par \par Final pathology demonstrated 1.3cm IDC grade 1 with infiltrating carcinoma extending to the cauterized anterior margin. 2/3 sentinel lymph nodes with metastatic carcinoma with the largest measuring 3mm and extracapsular extension. The rest of the pathology was reviewed in detail demonstrating benign findings and a copy was provided to her. \par \par 1/3/2020 final pathology\par Right axillary dissection: Nine lymph nodes, negative for metastatic carcinoma.\par

## 2020-06-16 ENCOUNTER — APPOINTMENT (OUTPATIENT)
Dept: RADIATION ONCOLOGY | Facility: CLINIC | Age: 50
End: 2020-06-16
Payer: COMMERCIAL

## 2020-06-16 PROCEDURE — 99024 POSTOP FOLLOW-UP VISIT: CPT

## 2020-06-16 NOTE — REVIEW OF SYSTEMS
[Negative] : Allergic/Immunologic [Skin Hyperpigmentation: Grade 1 - Hyperpigmentation covering <10% BSA; no psychosocial impact] : Skin Hyperpigmentation: Grade 1 - Hyperpigmentation covering <10% BSA; no psychosocial impact [Dermatitis Radiation: Grade 0] : Dermatitis Radiation: Grade 0

## 2020-06-16 NOTE — DISCUSSION/SUMMARY
[Cancer Type / Location / Histology Subtype: ________] : Cancer Type / Location / Histology Subtype: [unfilled] [Diagnosis Date (year): ____] : Diagnosis Date (year): [unfilled] [Surgery Date(s) (year): ____] : Surgery Date(s) (year): [unfilled] [Surgery] : Surgery: Yes [Surgical Procedure / Location / Findings: _________] : Surgical Procedure / Location / Findings: [unfilled] [Radiation] : Radiation: Yes [Body Area Treated: _________] : Body Area Treated: [unfilled] [End Date (year): ____] : End Date (year): [unfilled] [Follow up with Radiation MD in _____] : Follow up with Radiation MD in [unfilled] [I] : I [Systemic Therapy (chemotherapy, hormonal therapy, other)] : Systemic Therapy (chemotherapy, hormonal therapy, other): Yes [Need for onging (adjuvant) treatment for cancer?] : Need for onging (adjuvant) treatment for cancer? Yes [Primary care physician] : primary care physician [Follow up with Surgeon in _____] : Follow up with Surgeon in [unfilled] [Fatigue] : fatigue [Follow up with Oncologist in _____] : Follow up with Oncologist in [unfilled] [FreeTextEntry8] : Siomara Betancourt

## 2020-06-16 NOTE — HISTORY OF PRESENT ILLNESS
[Home] : at home, [unfilled] , at the time of the visit. [Verbal consent obtained from patient] : the patient, [unfilled] [Medical Office: ([facility])___] : at the medical office located in  [FreeTextEntry4] : Siomara Betancourt [FreeTextEntry1] : 50 year old woman with history of endometrial adenocarcinoma arising from background of endometriosis s/p surgery 5/2019, and right breast cancer s/p bilateral mastectomy ([id number]) and post-mastectomy radiation completed 5/18/2020.  She is currently on anastrozole.\par \par She reports improving dermatitis, with some residual hyperpigmentation of the right chest wall.  She continues to apply moisturizing lotions.  Denies chest wall pain, lymphedema, or unintentional weight loss.\par \par 5/4/2020 CT abdomen & pelvis:\par No evidence of metastatic disease.\par \par Dr. Lagos, Dr. Yarbrough, Dr. Riley, Dr. Gonzalez, Dr. Licea

## 2020-06-16 NOTE — REASON FOR VISIT
[Post-Treatment Evaluation] : post-treatment evaluation for [Breast Cancer] : breast cancer [Endometrial Cancer] : endometrial cancer

## 2020-06-16 NOTE — PHYSICAL EXAM
[Extraocular Movements] : extraocular movements were intact [Normal] : well developed, well nourished, in no acute distress

## 2020-06-17 LAB
BASOPHILS # BLD AUTO: 0.01 K/UL
BASOPHILS NFR BLD AUTO: 0.3 %
EOSINOPHIL # BLD AUTO: 0.09 K/UL
EOSINOPHIL NFR BLD AUTO: 2.3 %
HCT VFR BLD CALC: 41.7 %
HGB BLD-MCNC: 13.4 G/DL
IMM GRANULOCYTES NFR BLD AUTO: 0.3 %
LYMPHOCYTES # BLD AUTO: 0.99 K/UL
LYMPHOCYTES NFR BLD AUTO: 25.6 %
MAN DIFF?: NORMAL
MCHC RBC-ENTMCNC: 30.3 PG
MCHC RBC-ENTMCNC: 32.1 GM/DL
MCV RBC AUTO: 94.3 FL
MONOCYTES # BLD AUTO: 0.37 K/UL
MONOCYTES NFR BLD AUTO: 9.6 %
NEUTROPHILS # BLD AUTO: 2.4 K/UL
NEUTROPHILS NFR BLD AUTO: 61.9 %
PLATELET # BLD AUTO: 232 K/UL
RBC # BLD: 4.42 M/UL
RBC # FLD: 13.4 %
WBC # FLD AUTO: 3.87 K/UL

## 2020-06-18 LAB
25(OH)D3 SERPL-MCNC: 20.5 NG/ML
ALBUMIN SERPL ELPH-MCNC: 4.3 G/DL
ALP BLD-CCNC: 97 U/L
ALT SERPL-CCNC: 32 U/L
ANION GAP SERPL CALC-SCNC: 13 MMOL/L
AST SERPL-CCNC: 29 U/L
BILIRUB SERPL-MCNC: 0.4 MG/DL
BUN SERPL-MCNC: 14 MG/DL
CALCIUM SERPL-MCNC: 9.9 MG/DL
CANCER AG125 SERPL-ACNC: 8 U/ML
CHLORIDE SERPL-SCNC: 102 MMOL/L
CO2 SERPL-SCNC: 27 MMOL/L
CREAT SERPL-MCNC: 0.86 MG/DL
GLUCOSE SERPL-MCNC: 98 MG/DL
POTASSIUM SERPL-SCNC: 4.3 MMOL/L
PROT SERPL-MCNC: 7.3 G/DL
SODIUM SERPL-SCNC: 143 MMOL/L

## 2020-07-01 ENCOUNTER — TRANSCRIPTION ENCOUNTER (OUTPATIENT)
Age: 50
End: 2020-07-01

## 2020-07-14 ENCOUNTER — APPOINTMENT (OUTPATIENT)
Dept: GYNECOLOGIC ONCOLOGY | Facility: CLINIC | Age: 50
End: 2020-07-14
Payer: COMMERCIAL

## 2020-07-14 VITALS
SYSTOLIC BLOOD PRESSURE: 127 MMHG | WEIGHT: 170 LBS | DIASTOLIC BLOOD PRESSURE: 81 MMHG | HEIGHT: 61 IN | BODY MASS INDEX: 32.1 KG/M2 | OXYGEN SATURATION: 99 % | RESPIRATION RATE: 16 BRPM | HEART RATE: 81 BPM

## 2020-07-14 PROCEDURE — 99214 OFFICE O/P EST MOD 30 MIN: CPT

## 2020-07-27 NOTE — ASSESSMENT
[FreeTextEntry1] : Pt is a 51 yo with Grade 1 endometroid adenocarcinoma arising in the background of endometriosis involving the uterine serosa, likely primary peritoneal. Newly diagnosed right breast cancer s/p right breast surgery and radiation treatment. She has no evidence of abdominal disease on exam or on CT abdomen/pelvis 5/2020. She reports no new concerns. No evidence of disease.

## 2020-07-27 NOTE — END OF VISIT
[FreeTextEntry3] : Continue surveillance every 6 months (3 month with Dr. Yarbrough)\par Consent for Case report signed

## 2020-07-27 NOTE — PHYSICAL EXAM
[Absent] : Adnexa(ae): Absent [Normal] : Anus and perineum: Normal sphincter tone, no masses, no prolapse. [Restricted in physically strenuous activity but ambulatory and able to carry out work of a light or sedentary nature] : Status 1- Restricted in physically strenuous activity but ambulatory and able to carry out work of a light or sedentary nature, e.g., light house work, office work [FreeTextEntry1] : smlling and happy  [de-identified] : well healed abdominal incisions

## 2020-07-27 NOTE — HISTORY OF PRESENT ILLNESS
[FreeTextEntry1] : Pt is a 49 yo s/p BASILIA, BSO, tumor debulking, peritonectomy due to extensive endometriosis of entire abdominal cavity. S/p end colostomy and colon resection. Pathology with focus of grade 1 endometrioid adenocarcinoma arising from background of endometriosis on the uterine serosa. She was recently diagnosed with right breast cancer s/p bilateral mastectomy. She has a positive attitude and still hasn’t found out if she will need adjuvant treatment for breast cancer. She has follow up with Dr. Yarbrough. She also had colostomy reversal and abdominoplasty in the interim. She was diagnosed with Stage I right breast IDC grade 1 and had a bilateral mastectomy right sentinel lymph node dissection on 1/3/2020 by Dr Riley. She had post-mastectomy radiation by Dr. Caldwell complete 5/18/20. \par \par No vaginal bleeding, no discharge. She is not sexually active. She has had a better appetite and gained weight.

## 2020-08-13 ENCOUNTER — TRANSCRIPTION ENCOUNTER (OUTPATIENT)
Age: 50
End: 2020-08-13

## 2020-08-14 ENCOUNTER — TRANSCRIPTION ENCOUNTER (OUTPATIENT)
Age: 50
End: 2020-08-14

## 2020-08-18 ENCOUNTER — APPOINTMENT (OUTPATIENT)
Dept: PLASTIC SURGERY | Facility: CLINIC | Age: 50
End: 2020-08-18
Payer: COMMERCIAL

## 2020-08-18 VITALS
OXYGEN SATURATION: 98 % | HEIGHT: 61 IN | DIASTOLIC BLOOD PRESSURE: 78 MMHG | WEIGHT: 170 LBS | BODY MASS INDEX: 32.1 KG/M2 | HEART RATE: 82 BPM | TEMPERATURE: 98.2 F | SYSTOLIC BLOOD PRESSURE: 131 MMHG

## 2020-08-18 PROCEDURE — 99214 OFFICE O/P EST MOD 30 MIN: CPT

## 2020-08-20 NOTE — ASSESSMENT
[FreeTextEntry1] : 48 yo female s/p b/l KWAME flap breast reconstruction 11/25/19\par doing well\par discussed with pt that the revision can be performed 6 months after her last round of radiaion\par revision surgery to include skin paddle removal as well as abdominal dog ear revision\par  all questions answered

## 2020-08-20 NOTE — PHYSICAL EXAM
[NI] : Normal [de-identified] : abdomen soft and non tender \par incisions are well healed\par no palpable fluid collections or signs of infection \par small dog ear on the lateral aspect of the left incision\par hypertrophy of the vertical abdominal scar from the umbilicus  [de-identified] : b/l breast are soft and symmetrical \par skin islands viable\par b/l breasts flaps are soft and viable\par incisions well healed

## 2020-08-20 NOTE — HISTORY OF PRESENT ILLNESS
[FreeTextEntry1] : 50 yo female with history of invasive ductal cancer of the right breast, now s/p b/l breast reconstruction with KWAME flaps 11/25/19. pt doing well \par denies pain, fever\par pt went back to the operating room and Dr. Riley removed more lymph nodes 1/3/20\par pt to start Anastrazole after radiation as per Dr Riley\par pt completed radiation in 5/2020\par here to discuss revision options\par

## 2020-09-01 NOTE — PROGRESS NOTE ADULT - ASSESSMENT
Pharmacy Clarification of Prior to Admission Medication Regimen     The patient was interviewed regarding clarification of the prior to admission medication regimen. Patient present in room and obtained permission from patient to discuss drug regimen with visitor(s) present. Patient was questioned regarding use of any other inhalers, topical products, over the counter medications, herbal medications, vitamin products or ophthalmic/nasal/otic medication use. Information Obtained From: Patient Oksana Patel Query    Pertinent Pharmacy Findings:   Updated patients preferred outpatient [facility] to: [facility] through patients medication list with patient, patient cannot remember if she takes Amlodipine 10 mg.    Stated she thinks she took her medications yesterday but cannot remember. PTA medication list was corrected to the following:     Prior to Admission Medications   Prescriptions Last Dose Informant Taking? Blood-Glucose Meter (ONETOUCH ULTRA2 METER) misc  Self No   Sig: USE TO CHECK BLOOD SUGARS DAILY. Insulin Needles, Disposable, (BD Ultra-Fine Short Pen Needle) 31 gauge x 5/16\" ndle  Self No   Sig: USE AS DIRECTED TWICE A DAY. L. acidoph & paracasei- S therm- Bifido (GALILEA-Q/RISAQUAD) 8 billion cell cap cap 8/31/2020 at Unknown time Self Yes   Sig: Take 1 Cap by mouth daily. brimonidine (ALPHAGAN) 0.15 % ophthalmic solution  Self Yes   Sig: Administer 1 Drop to both eyes two (2) times a day.    clopidogreL (PLAVIX) 75 mg tab 8/31/2020 at Unknown time Self Yes   Sig: TAKE 1 TABLET BY MOUTH EVERY DAY   flash glucose scanning reader (FREESTYLE KEVON 14 DAY READER) [facility]  Self No   Sig: As directed1   flash glucose sensor (FREESTYLE KEVON 14 DAY SENSOR) kit  Self No   Sig: Continuous monitoring   furosemide (LASIX) 20 mg tablet 8/31/2020 at Unknown time Self Yes   Sig: One tablet daily   glucose blood VI test strips (ONETOUCH ULTRA BLUE TEST STRIP) strip  Self No   Sig: USE TO CHECK BLOOD SUGARS DAILY. Dx: E11.649   insulin degludec Delora Pancake FlexTouch U-100) 100 unit/mL (3 mL) inpn 2020 at Unknown time Self Yes   Sig: 15 Units by SubCUTAneous route daily. insulin lispro (HUMALOG) 100 unit/mL kwikpen 2020 at Unknown time Self Yes   Si units before breakfast and lunch, 3 units before dinner   levothyroxine (SYNTHROID) 175 mcg tablet 2020 at Unknown time Self Yes   Sig: TAKE 1 TABLET BY MOUTH EVERY DAY   losartan-hydroCHLOROthiazide (HYZAAR) 100-25 mg per tablet 2020 at Unknown time Self Yes   Sig: Take 1 Tab by mouth daily. metoprolol succinate (TOPROL-XL) 25 mg XL tablet 2020 at Unknown time Self Yes   Sig: TAKE 1 TABLET BY MOUTH EVERY DAY   potassium chloride (KLOR-CON) 10 mEq tablet 2020 at Unknown time Self Yes   Sig: Take 1 Tab by mouth daily.    pravastatin (PRAVACHOL) 80 mg tablet 2020 at Unknown time Self Yes   Sig: TAKE 1 TABLET BY MOUTH at bedtime    Amlodipine 10 mg daily - unknown      Facility-Administered Medications: None          Thank you,  Mily GRANGER Do, hT  Medication History Pharmacy Technician 47yo F POD 4 from a sigmoidectomy with end colostomy.  -OOB and ambulate  -plan w attending on date to D/C Grey catheter  -Poss D/C kevon drains tomorrow  -Poss wound vac change tomorrow.  -Monitor colostomy output  -Possibly ADAT

## 2020-09-11 ENCOUNTER — TRANSCRIPTION ENCOUNTER (OUTPATIENT)
Age: 50
End: 2020-09-11

## 2020-09-15 ENCOUNTER — APPOINTMENT (OUTPATIENT)
Dept: CT IMAGING | Facility: CLINIC | Age: 50
End: 2020-09-15
Payer: COMMERCIAL

## 2020-09-15 ENCOUNTER — RESULT REVIEW (OUTPATIENT)
Age: 50
End: 2020-09-15

## 2020-09-15 ENCOUNTER — OUTPATIENT (OUTPATIENT)
Dept: OUTPATIENT SERVICES | Facility: HOSPITAL | Age: 50
LOS: 1 days | End: 2020-09-15
Payer: COMMERCIAL

## 2020-09-15 DIAGNOSIS — Z98.890 OTHER SPECIFIED POSTPROCEDURAL STATES: Chronic | ICD-10-CM

## 2020-09-15 DIAGNOSIS — Z90.13 ACQUIRED ABSENCE OF BILATERAL BREASTS AND NIPPLES: Chronic | ICD-10-CM

## 2020-09-15 DIAGNOSIS — Z93.3 COLOSTOMY STATUS: Chronic | ICD-10-CM

## 2020-09-15 DIAGNOSIS — C56.9 MALIGNANT NEOPLASM OF UNSPECIFIED OVARY: ICD-10-CM

## 2020-09-15 DIAGNOSIS — Z90.710 ACQUIRED ABSENCE OF BOTH CERVIX AND UTERUS: Chronic | ICD-10-CM

## 2020-09-15 DIAGNOSIS — Z00.00 ENCOUNTER FOR GENERAL ADULT MEDICAL EXAMINATION WITHOUT ABNORMAL FINDINGS: ICD-10-CM

## 2020-09-15 PROCEDURE — 71260 CT THORAX DX C+: CPT | Mod: 26

## 2020-09-15 PROCEDURE — 74177 CT ABD & PELVIS W/CONTRAST: CPT | Mod: 26

## 2020-09-15 PROCEDURE — 71260 CT THORAX DX C+: CPT

## 2020-09-15 PROCEDURE — 74177 CT ABD & PELVIS W/CONTRAST: CPT

## 2020-09-19 ENCOUNTER — OUTPATIENT (OUTPATIENT)
Dept: OUTPATIENT SERVICES | Facility: HOSPITAL | Age: 50
LOS: 1 days | Discharge: HOME | End: 2020-09-19

## 2020-09-19 DIAGNOSIS — Z93.3 COLOSTOMY STATUS: Chronic | ICD-10-CM

## 2020-09-19 DIAGNOSIS — Z98.890 OTHER SPECIFIED POSTPROCEDURAL STATES: Chronic | ICD-10-CM

## 2020-09-19 DIAGNOSIS — Z90.13 ACQUIRED ABSENCE OF BILATERAL BREASTS AND NIPPLES: Chronic | ICD-10-CM

## 2020-09-19 DIAGNOSIS — Z90.710 ACQUIRED ABSENCE OF BOTH CERVIX AND UTERUS: Chronic | ICD-10-CM

## 2020-09-19 DIAGNOSIS — C50.919 MALIGNANT NEOPLASM OF UNSPECIFIED SITE OF UNSPECIFIED FEMALE BREAST: ICD-10-CM

## 2020-09-22 ENCOUNTER — APPOINTMENT (OUTPATIENT)
Dept: RADIATION ONCOLOGY | Facility: CLINIC | Age: 50
End: 2020-09-22
Payer: COMMERCIAL

## 2020-09-22 ENCOUNTER — APPOINTMENT (OUTPATIENT)
Dept: SURGERY | Facility: CLINIC | Age: 50
End: 2020-09-22
Payer: COMMERCIAL

## 2020-09-22 VITALS
HEART RATE: 95 BPM | WEIGHT: 173 LBS | HEIGHT: 61 IN | OXYGEN SATURATION: 96 % | DIASTOLIC BLOOD PRESSURE: 78 MMHG | SYSTOLIC BLOOD PRESSURE: 145 MMHG | TEMPERATURE: 98 F | BODY MASS INDEX: 32.66 KG/M2

## 2020-09-22 VITALS
BODY MASS INDEX: 32.1 KG/M2 | DIASTOLIC BLOOD PRESSURE: 87 MMHG | OXYGEN SATURATION: 97 % | SYSTOLIC BLOOD PRESSURE: 132 MMHG | HEIGHT: 61 IN | HEART RATE: 82 BPM | WEIGHT: 170 LBS

## 2020-09-22 PROCEDURE — 99214 OFFICE O/P EST MOD 30 MIN: CPT

## 2020-09-22 RX ORDER — CHLORHEXIDINE GLUCONATE 4 %
325 (65 FE) LIQUID (ML) TOPICAL
Refills: 0 | Status: DISCONTINUED | COMMUNITY
End: 2020-09-22

## 2020-09-22 RX ORDER — NEOMYCIN SULFATE 500 MG/1
500 TABLET ORAL
Qty: 6 | Refills: 0 | Status: DISCONTINUED | COMMUNITY
Start: 2019-08-08 | End: 2020-09-22

## 2020-09-22 NOTE — HISTORY OF PRESENT ILLNESS
[FreeTextEntry1] : 50 year old with invasive right breast cancer ([id number]) s/p bilateral mastectomy, post-mastectomy radiation completed 5/18/2020. 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  Underwent CT scan on 9/15/20, and developed bright erythema of the right chest wall/reconstruction.  She took benadryl and applied Aquaphor to the skin, and the erythema/pruritus is improving.  Prior to CT scan, denied any chest wall bother.\par Also noted to have a thyroid nodule, which is scheduled to be biopsied 10/4/2020 by Dr Davila.\par Manageable hot flashes, some upper extremity joint aches.\par \par CT chest abdomen & pelvis 9/15/2020:\par 1.6 x 0.7 cm perisplenic soft tissue and 5 x 2 mm region of increased enhancement either in the periphery of segment IVb of the liver or perihepatic which are unchanged when compared with 5/4/2020. The perisplenic region is also stable when compared with 3/4/2020 and no increased FDG uptake was noted at that time. Small stable residual implants cannot be excluded. No new disease or interval growth is seen. Continued follow-up is recommended. \par Status post bilateral mastectomies with reconstructive surgery. Region of mild nodularity in the right reconstructed breast which is more prominent when compared with 3/4/2020. This may represent granulomatous or fibrotic tissue although clinical correlation is suggested.\par \par Dr Yarbrough 9/23/2020\par Dr Riley 9/22/2020\par Dr Gonzalez 11/3/2020\par Dr Licea 11/24/2020\par

## 2020-09-22 NOTE — ASSESSMENT
[FreeTextEntry1] : 51 yo female s/p bilateral mastectomy with right SLNB for 1.3cm IDC with 2/3 positive nodes. She underwent right axiliary lymph node dissection.  0/9 lymph nodes were negative for carcinoma.  Oncotype score of 17.  Will begin Anastrazole after post mastectomy radiation.  Recommendation for follow up exam in 6 months.\par 1.  Follow up in 6 months\par 2.  Followup with medical oncology- hormonal therapy\par

## 2020-09-22 NOTE — PHYSICAL EXAM
[Normocephalic] : normocephalic [Atraumatic] : atraumatic [EOMI] : extra ocular movement intact [PERRL] : pupils equal, round and reactive to light [Sclera nonicteric] : sclera nonicteric [Supple] : supple [No Supraclavicular Adenopathy] : no supraclavicular adenopathy [Examined in the supine and seated position] : examined in the supine and seated position [No dominant masses] : no dominant masses in right breast  [No dominant masses] : no dominant masses left breast [No Nipple Retraction] : no left nipple retraction [No Nipple Discharge] : no left nipple discharge [No Axillary Lymphadenopathy] : no left axillary lymphadenopathy [No Edema] : no edema [No Rashes] : no rashes [No Ulceration] : no ulceration [Breast Nipple Inversion] : nipples not inverted [Breast Nipple Retraction] : nipples not retracted [Breast Nipple Flattening] : nipples not flattened [Breast Nipple Fissures] : nipples not fissured [Breast Abnormal Lactation (Galactorrhea)] : no galactorrhea [Breast Abnormal Secretion Bloody Fluid] : no bloody discharge [Breast Abnormal Secretion Serous Fluid] : no serous discharge [Breast Abnormal Secretion Opalescent Fluid] : no milky discharge [de-identified] : Mastectomy flap well healed. [de-identified] : Mastectomy flap well healed.

## 2020-09-22 NOTE — ASSESSMENT
[FreeTextEntry1] : 48 yo female s/p bilateral mastectomy with right SLNB for 1.3cm IDC with 2/3 positive nodes. She underwent right axiliary lymph node dissection.  0/9 lymph nodes were negative for carcinoma.  Oncotype score of 17.  Will begin Anastrazole after post mastectomy radiation.  Recommendation for mammaprint and PETSCAN.\par 1.  Follow up in 4 months\par 2.  Followup with medical oncology- hormonal therapy\par 3.  Follow up with radiation oncologist\par 4. Follow up with Dr. Licea \par 5.  Request mamma print\par 6.  PET scan

## 2020-09-22 NOTE — PHYSICAL EXAM
[Extraocular Movements] : extraocular movements were intact [Outer Ear] : the ears and nose were normal in appearance [Breast Palpation Mass] : no palpable masses [No UE Edema] : there is no upper extremity edema [Cervical Lymph Nodes Enlarged Anterior Bilaterally] : anterior cervical [Supraclavicular Lymph Nodes Enlarged Bilaterally] : supraclavicular [Axillary Lymph Nodes Enlarged Bilaterally] : axillary [Normal] : no joint swelling, no clubbing or cyanosis of the fingernails and muscle strength and tone were normal [de-identified] : mild erythema right chest wall

## 2020-09-22 NOTE — HISTORY OF PRESENT ILLNESS
[FreeTextEntry1] : I had the pleasure of seeing Sera Francisco in the office for a followup exam for her diagnosed and treated right breast cancer.\par \par Sera is a micheal 49 yo premenopausal female. She was diagnosed with right breast cancer in 10/2019. \par She underwent MRI of the breast 10/15/2019 which demonstrated the right breast cancer to measure 1.4 cm without adenopathy. No MR evidence of multicentric or contralateral disease. BIRADS 6\par \par Oncotype score: 17\par Hormonal therapy:  Anastrozole 3/2020\par PMRT completed \par \par She underwent bilateral mastectomy and right sentinel lymph node biopsy for right breast cancer on 11/25/2019.  Final pathology demonstrated 1.3cm IDC grade 1 with infiltrating carcinoma extending to the cauterized anterior margin. 2/3 sentinel lymph nodes with metastatic carcinoma with the largest measuring 3mm and extracapsular extension.  She then underwent right axilla dissection on 1/3/2020 and 0/9 lymph nodes were negative for carcinoma.\par \par Genetic testing: [employer] panel test was negative, however there was noted a VUS in the MSH3 gene. Further clarification on this variance will be obtained from the updated test as soon as it is available.\par \par 9/24/2019 Breast Biopsy ( Right, 11:00 , 5 cm from the 0., Ultrasound guided needle cores)\par - Infiltrating ductal carcinoma, well differentiated\par - no definitive lymphovascular invasion\par -No IN SITU component identified\par -Largest contiguous focus 1.1 cm\par - adjacent region containing aggregates of ducts with columnar cell change an columnar cell hyperplasia\par  HER2/ELADIO by IHC 1+ / Negative\par ER : Positive 90-95%- strong\par [place] -Positive ,15-20% strong \par \par 1/3/2020 final pathology\par Right axillary dissection:  Nine lymph nodes, negative for metastatic carcinoma.\par \par 9/15/2020  CT Chest IC/ CT abdomen and pelvis OC IC\par IMPRESSION: 1.6 x 0.7 cm perisplenic soft tissue and 5 x 2 mm region of increased enhancement either in the periphery of segment IVb of the liver or perihepatic which are unchanged when compared with 5/4/2020. The perisplenic region is also stable when compared with 3/4/2020 and no increased FDG uptake was noted at that time. Small stable residual implants cannot be excluded. No new disease or interval growth is seen. Continued follow-up is recommended. \par Status post bilateral mastectomies with reconstructive surgery. Region of mild nodularity in the right reconstructed breast which is more prominent when compared with 3/4/2020. This may represent granulomatous or fibrotic tissue although clinical correlation is suggested.\par \par We reviewed CT results and she understands there is no new disease or interval growth seen on CT.  Her clinical breast exam is benign today.  Recommendation for follow up in 6 months for clinical exam and continue taking hormonal therapy.  All questions answered.\par \par \par All questions were answered.

## 2020-09-22 NOTE — PHYSICAL EXAM
[Normocephalic] : normocephalic [Atraumatic] : atraumatic [EOMI] : extra ocular movement intact [PERRL] : pupils equal, round and reactive to light [Sclera nonicteric] : sclera nonicteric [Supple] : supple [No Supraclavicular Adenopathy] : no supraclavicular adenopathy [Examined in the supine and seated position] : examined in the supine and seated position [No dominant masses] : no dominant masses left breast [No dominant masses] : no dominant masses in right breast  [No Nipple Retraction] : no left nipple retraction [No Nipple Discharge] : no left nipple discharge [No Edema] : no edema [No Axillary Lymphadenopathy] : no left axillary lymphadenopathy [No Rashes] : no rashes [No Ulceration] : no ulceration [Breast Nipple Inversion] : nipples not inverted [Breast Nipple Retraction] : nipples not retracted [Breast Nipple Flattening] : nipples not flattened [Breast Nipple Fissures] : nipples not fissured [Breast Abnormal Lactation (Galactorrhea)] : no galactorrhea [Breast Abnormal Secretion Bloody Fluid] : no bloody discharge [Breast Abnormal Secretion Serous Fluid] : no serous discharge [Breast Abnormal Secretion Opalescent Fluid] : no milky discharge [de-identified] : Mastectomy flap well healed. [de-identified] : Mastectomy flap well healed.

## 2020-09-22 NOTE — HISTORY OF PRESENT ILLNESS
[FreeTextEntry1] : I had the pleasure of seeing Sera Francisco in the office for a post operative visit s/p right axilla dissection 1/3/2020.  She underwent bilateral mastectomy and right sentinel lymph node biopsy for right breast cancer on 12/5/2019.\par \par Sera is a micheal 48 yo premenopausal female. She was diagnosed with right breast cancer in 10/2019. \par She underwent MRI of the breast 10/15/2019 which demonstrated the right breast cancer to measure 1.4cm without adenopathy. No MR evidence of multicentric or contralateral disease. BIRADS 6\par \par She underwent bilateral mastectomy and right sentinel lymph node biopsy for right breast cancer on 11/25/2019.  Final pathology demonstrated 1.3cm IDC grade 1 with infiltrating carcinoma extending to the cauterized anterior margin. 2/3 sentinel lymph nodes with metastatic carcinoma with the largest measuring 3mm and extracapsular extension.  She then underwent right axilla dissection on 1/3/2020 and 0/9 lymph nodes were negative for carcinoma.\par \par Genetic testing: [employer] panel test was negative, however there was noted a VUS in the MSH3 gene. Further clarification on this variance will be obtained from the updated test as soon as it is available.\par \par 9/24/2019 Breast Biopsy ( Right, 11:00 , 5 cm from the 0., Ultrasound guided needle cores)\par - Infiltrating ductal carcinoma, well differentiated\par - no definitive lymphovascular invasion\par -No IN SITU component identified\par -Largest contiguous focus 1.1 cm\par - adjacent region containing aggregates of ducts with columnar cell change an columnar cell hyperplasia\par  HER2/ELADIO by IHC 1+ / Negative\par ER : Positive 90-95%- strong\par [place] -Positive ,15-20% strong \par \par Final pathology demonstrated 1.3cm IDC grade 1 with infiltrating carcinoma extending to the cauterized anterior margin. 2/3 sentinel lymph nodes with metastatic carcinoma with the largest measuring 3mm and extracapsular extension. The rest of the pathology was reviewed in detail demonstrating benign findings and a copy was provided to her. \par \par 1/3/2020 final pathology\par Right axillary dissection:  Nine lymph nodes, negative for metastatic carcinoma.\par \par We discussed final surgical pathology.  She understands final pathology demonstrated 0/9 lymph nodes negative for carcinoma.  She is healing well.  \par She met with Dr. Lagos, who did not recommend adjuvant chemotherapy based on Oncotype score of 17 but will be started on Anastrazole .  She met with Dr. Caldwell who will treating her with post mastectomy radiation.  Recommendation for PET scan and mamma print and follow up in 4 months.\par All questions were answered.

## 2020-09-22 NOTE — REVIEW OF SYSTEMS
[Negative] : Respiratory [Edema Limbs: Grade 0] : Edema Limbs: Grade 0  [Fatigue: Grade 0] : Fatigue: Grade 0 [Localized Edema: Grade 0] : Localized Edema: Grade 0  [Neck Edema: Grade 0] : Neck Edema: Grade 0 [Breast Pain: Grade 0] : Breast Pain: Grade 0 [Skin Hyperpigmentation: Grade 1 - Hyperpigmentation covering <10% BSA; no psychosocial impact] : Skin Hyperpigmentation: Grade 1 - Hyperpigmentation covering <10% BSA; no psychosocial impact [Dermatitis Radiation: Grade 1 - Faint erythema or dry desquamation] : Dermatitis Radiation: Grade 1 - Faint erythema or dry desquamation

## 2020-09-23 ENCOUNTER — APPOINTMENT (OUTPATIENT)
Dept: HEMATOLOGY ONCOLOGY | Facility: CLINIC | Age: 50
End: 2020-09-23
Payer: COMMERCIAL

## 2020-09-23 ENCOUNTER — RESULT REVIEW (OUTPATIENT)
Age: 50
End: 2020-09-23

## 2020-09-23 ENCOUNTER — LABORATORY RESULT (OUTPATIENT)
Age: 50
End: 2020-09-23

## 2020-09-23 VITALS
TEMPERATURE: 97.8 F | HEIGHT: 61 IN | HEART RATE: 73 BPM | BODY MASS INDEX: 32.48 KG/M2 | WEIGHT: 172.05 LBS | SYSTOLIC BLOOD PRESSURE: 124 MMHG | DIASTOLIC BLOOD PRESSURE: 81 MMHG | OXYGEN SATURATION: 99 %

## 2020-09-23 LAB
BASOPHILS # BLD AUTO: 0 K/UL — SIGNIFICANT CHANGE UP (ref 0–0.2)
BASOPHILS NFR BLD AUTO: 0.4 % — SIGNIFICANT CHANGE UP (ref 0–2)
EOSINOPHIL # BLD AUTO: 0.1 K/UL — SIGNIFICANT CHANGE UP (ref 0–0.5)
EOSINOPHIL NFR BLD AUTO: 2.2 % — SIGNIFICANT CHANGE UP (ref 0–6)
HCT VFR BLD CALC: 42.6 % — SIGNIFICANT CHANGE UP (ref 34.5–45)
HGB BLD-MCNC: 13.8 G/DL — SIGNIFICANT CHANGE UP (ref 11.5–15.5)
LYMPHOCYTES # BLD AUTO: 1.5 K/UL — SIGNIFICANT CHANGE UP (ref 1–3.3)
LYMPHOCYTES # BLD AUTO: 30 % — SIGNIFICANT CHANGE UP (ref 13–44)
MCHC RBC-ENTMCNC: 30.2 PG — SIGNIFICANT CHANGE UP (ref 27–34)
MCHC RBC-ENTMCNC: 32.4 G/DL — SIGNIFICANT CHANGE UP (ref 32–36)
MCV RBC AUTO: 93.1 FL — SIGNIFICANT CHANGE UP (ref 80–100)
MONOCYTES # BLD AUTO: 0.4 K/UL — SIGNIFICANT CHANGE UP (ref 0–0.9)
MONOCYTES NFR BLD AUTO: 7.5 % — SIGNIFICANT CHANGE UP (ref 2–14)
NEUTROPHILS # BLD AUTO: 3 K/UL — SIGNIFICANT CHANGE UP (ref 1.8–7.4)
NEUTROPHILS NFR BLD AUTO: 59.9 % — SIGNIFICANT CHANGE UP (ref 43–77)
PLATELET # BLD AUTO: 231 K/UL — SIGNIFICANT CHANGE UP (ref 150–400)
RBC # BLD: 4.58 M/UL — SIGNIFICANT CHANGE UP (ref 3.8–5.2)
RBC # FLD: 12.9 % — SIGNIFICANT CHANGE UP (ref 10.3–14.5)
WBC # BLD: 5.1 K/UL — SIGNIFICANT CHANGE UP (ref 3.8–10.5)
WBC # FLD AUTO: 5.1 K/UL — SIGNIFICANT CHANGE UP (ref 3.8–10.5)

## 2020-09-23 PROCEDURE — 99215 OFFICE O/P EST HI 40 MIN: CPT

## 2020-09-23 RX ORDER — ERGOCALCIFEROL 1.25 MG/1
1.25 MG CAPSULE, LIQUID FILLED ORAL
Qty: 8 | Refills: 1 | Status: ACTIVE | COMMUNITY
Start: 2020-09-23 | End: 1900-01-01

## 2020-09-23 NOTE — HISTORY OF PRESENT ILLNESS
[Home] : at home, [unfilled] , at the time of the visit. [Medical Office: ([facility])___] : at the medical office located in  [Verbal consent obtained from patient] : the patient, [unfilled] [de-identified] : The patient was diagnosed with endometrioid adenocarcinoma in May 2019 at the age of 48.  She presented to [facility] ED on 4/23/19 s/p mechanical fall outside her place of work.  A CT A/P incidentally showed a complex cystic left adnexal mass measuring 17 cm. Massive cystic lesion occupying the lower abdomen measuring 33 cm. It was uncertain whether this represents a large peritoneal metastasis or a component of the above described cystic left adnexal mass, or possibly a right adnexal mass. There was suspicion for rupture of this mass with a large amount of abdominal ascites.  On 5/7/19 Dr. Manpreet Gonzalez performed a BASILIA, BSO, debulking and peritonectomy.  Pathology initially was benign.  An addendum was later issued which showed low grade endometrioid adenocarcinoma, FIGO grade 1, with focal sex cord like differentiation, arising in a background of endometriosis, and involving the uterine serosa.  She was discharged on 5/12/19 but returned to the ED on 5/15/19 c/o abdominal distention, N/V, dizziness, weakness, multiple falls and a fever of 104.  She was found to be hypotensive and tachycardic.  A CT A/P showed a large fluid collection which appears to be extraperitoneal layering anterior to the omentum measuring 24.7 x 9.4 x 20.9 cm. There is a component of the collection which extends posteriorly on the left into the retroperitoneum along the anterior aspect of the psoas extending to the posterior aspect of the splenic flexure (approximately 20 cm in craniocaudad dimension). On 5/17/19 Dr. Mague Durand performed an emergent exploratory laparotomy, sigmoidectomy and end colostomy.  Pathology was benign.   [de-identified] : FHx of DCIS (sister) [de-identified] : Patient presents for follow-up. She is s/p BASILIA BSO, debulking and peritonectomy on 5/7/19 due to extensive endometrioid adenocarcinoma, with a delayed leak from the sigmoid requiring bowel resection and end-colostomy by Dr. Durand.  No adjuvant treatment was recommended and she is s/p colostomy reversal.  Denies any pain, abdominal or vaginal.  No vaginal bleeding. No fevers/chills. No D/C.  No N/V.  Appetite is normal.\par \par Recently diagnosed with right breast cancer, ER+ [place]+ HER2-.  Followed by Dr. Sellers.

## 2020-09-23 NOTE — RESULTS/DATA
[FreeTextEntry1] : 9/16/20 CT:  1.6 x 0.7 cm perisplenic soft tissue and 5 x 2 mm region of increased enhancement either in the periphery of segment IVb of the liver or perihepatic which are unchanged when compared with 5/4/2020. The perisplenic region is also stable when compared with 3/4/2020 and no increased FDG uptake was noted at that time. Small stable residual implants cannot be excluded. No new disease or interval growth is seen. Status post bilateral mastectomies with reconstructive surgery. Region of mild nodularity in the right reconstructed breast which is more prominent when compared with 3/4/2020. This may represent granulomatous or fibrotic tissue.\par \par 5/5/20 CT: MALDONADO\par \par 2/4/2020 CT A/P: Sigmoid resection with anastomosis without evidence of obstruction. Small pericapsular splenic fluid collection which is decreased from prior study. \par No evidence of metastatic disease.\par \par 10/15/19 MRI Breast:\par 1.4 cm abnormal enhancement/biopsy clip in the upper outer posterior right breast, corresponding to newly diagnosed malignancy. No MRI evidence of multicentric or contralateral disease. \par RECOMMENDATION: Surgical or oncologic management. \par BI-RADS 6- Known Biopsy-Proven Malignancy \par \par 9/23/19 Pathology: \par Right breast, "11:00, 5 cm from the nipple", ultrasound guided core needle biopsy:\par -Invasive ductal carcinoma, grade 1.\par -See note.\par Note: Immunohistochemical stains for p63 and calponin are performed on block 1-B at [facility] and interpreted at [place] as follows:\par Calponin, p63: negative\par Microscopic evaluation reveals an invasive ductal carcinoma ([place] score 1+2+1), grade 1. The carcinoma measures at least 1.1 cm in its greatest linear expansion in this biopsy. \par There are no calcifications nor necrosis present.\par *Addendum*\par IMMUNOSTAINS PERFORMED AND INTERPRETED ON BLOCK " B " BY [facility],\par HER2/ELADIO BY IHC: 1 + / Negative\par ER: Positive, 90 - 95 %, strong\par ME: Positive, 15 - 20 %, strong\par \par 9/23/19 Mammo: Status post core needle biopsy of the right breast with clip placement. Recommendations for further followup will be based on pathology results.\par \par 9/19/19 US Breast/Mammo: Ill-defined hypoechoic area in the right breast at the 11:00 axis, corresponding to mammographically seen distortion. Ultrasound-guided core biopsy is advised.\par \par 5/15/19 Pathology:\par Intra-abdominal foreign body, removal:  Blood clots\par Sigmoid, resection:  Mucosal necrosis with transmural acute and chronic inflammation.  The process extends to one margin of resection (slide 1A).  Three (3) lymph nodes, one with benign glandular inclusion.\par \par 5/15/19 CT A/P:\par Moderate amount retained fecal material in the colon, greatest in the right hemicolon. Mild wall thickening involving the distal transverse colon, descending colon, and proximal sigmoid colon, possibly reactive to the large collection.  Large fluid collection which appears to be extraperitoneal layering anterior to the omentum measuring 24.7 x 9.4 x \par 20.9 cm (craniocaudad x anteroposterior x transverse). There is a component of the collection which extends posteriorly on the left into the retroperitoneum along the anterior aspect of the psoas extending to the posterior aspect of the splenic flexure (approximately 20 cm in craniocaudad dimension). Skin staples are seen within the anterior abdominal wall. Air is seen within the anterior abdominal wall which may be postoperative in etiology.\par \par 5/8/19 Cytopathology:\par Abdominal fluid:  NEGATIVE FOR MALIGNANT CELLS.  Mostly fibrin and reactive mesothelial cells.\par \par 5/7/19 Pathology:\par Omental lymph node: Markedly reactive, hemorrhagic lymph node with hemosiderin laden macrophages.\par Cyst wall(1): Hemorrhagic inflamed cyst wall without epithelial lining.\par Right hepatic flexure lymph node: Markedly reactive, hemorrhagic lymph node with hemosiderin laden macrophages.\par Omentum: Omental fat with marked reactive fibrosis, hemorrhage and chronic inflammation; marked serosal adhesions.\par Right pelvic lymph node: Markedly reactive, hemorrhagic lymph node with hemosiderin laden macrophages.\par Left pelvic lymph node: Markedly reactive, hemorrhagic lymph node with hemosiderin laden macrophages.\par Cyst wall(2):  Cyst wall, markedly inflamed and hemorrhagic with cholesterol crystals and without epithelial lining.\par ***Uterus, cervix and adnexa:  Histologically unremarkable cervix. Weakly proliferative endometrium and endometrial polyp. Cysts located outside the uterus, involving serosa, with glandular stromal structures, consistent with endometriosis.\par ****Addendum****\par Case sent for outside consultation at [facility] [place] with Dr. Tina Mckay.\par The atypical clusters of glands, in part 8. "Uterus, cervix and adnexa", have been diagnosed as Low grade endometrioid adenocarcinoma, FIGO grade 1,  with focal sex cord like differentiation, arising in a background of endometriosis, and involving the uterine serosa.\par \par 4/23/19 CT C/A/P:\par There is a large complex cystic mass occupying the lower abdomen, measuring 21.0 (AP) x 24.2 (CC) x 33.1 (TR) cm.  The exact origin of this mass is uncertain. The left posterior wall of this mass is discontinuous, suggestive of rupture. There is a large amount of abdominal ascites.  Additional complex left adnexal cystic mass with septations and apparent mural nodules measuring 9.4 (AP) x 15.5 (CC) x 16.6 (TR) cm.

## 2020-09-23 NOTE — PHYSICAL EXAM
[Restricted in physically strenuous activity but ambulatory and able to carry out work of a light or sedentary nature] : Status 1- Restricted in physically strenuous activity but ambulatory and able to carry out work of a light or sedentary nature, e.g., light house work, office work [Normal] : affect appropriate [de-identified] : s/p bilateral mastectomy with well-healed incisions [de-identified] : well-healed incision both  and vertical midline [de-identified] : 1+ RUE edema

## 2020-09-24 LAB
ALBUMIN SERPL ELPH-MCNC: 4.5 G/DL
ALP BLD-CCNC: 94 U/L
ALT SERPL-CCNC: 21 U/L
ANION GAP SERPL CALC-SCNC: 18 MMOL/L
AST SERPL-CCNC: 22 U/L
BILIRUB SERPL-MCNC: 0.3 MG/DL
BUN SERPL-MCNC: 13 MG/DL
CALCIUM SERPL-MCNC: 10 MG/DL
CHLORIDE SERPL-SCNC: 104 MMOL/L
CO2 SERPL-SCNC: 23 MMOL/L
CREAT SERPL-MCNC: 0.9 MG/DL
GLUCOSE SERPL-MCNC: 97 MG/DL
MAGNESIUM SERPL-MCNC: 2.2 MG/DL
POTASSIUM SERPL-SCNC: 4.4 MMOL/L
PROT SERPL-MCNC: 7.6 G/DL
SODIUM SERPL-SCNC: 144 MMOL/L

## 2020-10-05 ENCOUNTER — TRANSCRIPTION ENCOUNTER (OUTPATIENT)
Age: 50
End: 2020-10-05

## 2020-10-07 ENCOUNTER — TRANSCRIPTION ENCOUNTER (OUTPATIENT)
Age: 50
End: 2020-10-07

## 2020-10-07 ENCOUNTER — APPOINTMENT (OUTPATIENT)
Dept: HEMATOLOGY ONCOLOGY | Facility: CLINIC | Age: 50
End: 2020-10-07

## 2020-10-08 ENCOUNTER — TRANSCRIPTION ENCOUNTER (OUTPATIENT)
Age: 50
End: 2020-10-08

## 2020-10-20 NOTE — H&P PST ADULT - BREASTS DETAILS
Spoke to patient needs to get FMLA paperwork filled out by Dr. Fuller in  who is fitting him for his CTL. Patient understood and stated he would call their office.    normal shape

## 2020-11-03 ENCOUNTER — APPOINTMENT (OUTPATIENT)
Dept: GYNECOLOGIC ONCOLOGY | Facility: CLINIC | Age: 50
End: 2020-11-03
Payer: COMMERCIAL

## 2020-11-03 VITALS — BODY MASS INDEX: 32.1 KG/M2 | HEIGHT: 61 IN | WEIGHT: 170 LBS

## 2020-11-03 PROCEDURE — 99072 ADDL SUPL MATRL&STAF TM PHE: CPT

## 2020-11-03 PROCEDURE — 99214 OFFICE O/P EST MOD 30 MIN: CPT

## 2020-11-03 NOTE — DISCUSSION/SUMMARY
[FreeTextEntry1] : No foul smell with vaginal discharge, no gas per vagina, possibility of small fistula to vaginal cuff, but patient is asymptomatic. Will continue to monitor. 
Other

## 2020-11-03 NOTE — ASSESSMENT
[FreeTextEntry1] : Pt is a 51 yo with Grade 1 endometroid adenocarcinoma arising in the background of endometriosis involving the uterine serosa, likely primary peritoneal. Pending thyroid nodule biopsy. She has no evidence of abdominal disease on exam or on PET CT abdomen/pelvis 9/23/2020. She reports no new concerns. No evidence of disease.

## 2020-11-03 NOTE — PHYSICAL EXAM
[Absent] : Adnexa(ae): Absent [Normal] : Anus and perineum: Normal sphincter tone, no masses, no prolapse. [FreeTextEntry1] : smlling and happy  [de-identified] : well healed abdominal incisions [de-identified] : + yellow discharge in upper vagina [Restricted in physically strenuous activity but ambulatory and able to carry out work of a light or sedentary nature] : Status 1- Restricted in physically strenuous activity but ambulatory and able to carry out work of a light or sedentary nature, e.g., light house work, office work

## 2020-11-03 NOTE — HISTORY OF PRESENT ILLNESS
[FreeTextEntry1] : Pt is a 51 yo s/p BASILIA, BSO, tumor debulking, peritonectomy on 5/7/19 due to extensive endometriosis of entire abdominal cavity. S/p end colostomy and colon resection and ultimately takedown on 8/8/19. Pathology with focus of grade 1 endometrioid adenocarcinoma arising from background of endometriosis on the uterine serosa. She was diagnosed with right breast cancer s/p bilateral mastectomy and KWAME flap by Dr. Licea on 11/25/19.  She has follow up with Dr. Yarbrough. She also had colostomy reversal and abdominoplasty in the interim. She had a completion right axillary dissection on 1/3/20. She had post-mastectomy radiation by Dr. Caldwell complete 5/18/20. \par \par Interval history: Patient reports having a thyroid biopsy and pending results. No vaginal bleeding, no discharge. No gas per vagina. She is not sexually active. She has had a better appetite and gained weight.

## 2020-11-19 ENCOUNTER — OUTPATIENT (OUTPATIENT)
Dept: OUTPATIENT SERVICES | Facility: HOSPITAL | Age: 50
LOS: 1 days | Discharge: HOME | End: 2020-11-19

## 2020-11-19 DIAGNOSIS — Z90.710 ACQUIRED ABSENCE OF BOTH CERVIX AND UTERUS: Chronic | ICD-10-CM

## 2020-11-19 DIAGNOSIS — Z98.890 OTHER SPECIFIED POSTPROCEDURAL STATES: Chronic | ICD-10-CM

## 2020-11-19 DIAGNOSIS — Z90.13 ACQUIRED ABSENCE OF BILATERAL BREASTS AND NIPPLES: Chronic | ICD-10-CM

## 2020-11-19 DIAGNOSIS — C50.919 MALIGNANT NEOPLASM OF UNSPECIFIED SITE OF UNSPECIFIED FEMALE BREAST: ICD-10-CM

## 2020-11-19 DIAGNOSIS — Z93.3 COLOSTOMY STATUS: Chronic | ICD-10-CM

## 2020-11-24 ENCOUNTER — APPOINTMENT (OUTPATIENT)
Dept: HEMATOLOGY ONCOLOGY | Facility: CLINIC | Age: 50
End: 2020-11-24
Payer: COMMERCIAL

## 2020-11-24 ENCOUNTER — APPOINTMENT (OUTPATIENT)
Dept: PLASTIC SURGERY | Facility: CLINIC | Age: 50
End: 2020-11-24
Payer: COMMERCIAL

## 2020-11-24 VITALS — SYSTOLIC BLOOD PRESSURE: 133 MMHG | OXYGEN SATURATION: 98 % | HEART RATE: 82 BPM | DIASTOLIC BLOOD PRESSURE: 93 MMHG

## 2020-11-24 VITALS
WEIGHT: 176.31 LBS | OXYGEN SATURATION: 100 % | SYSTOLIC BLOOD PRESSURE: 150 MMHG | HEIGHT: 61 IN | DIASTOLIC BLOOD PRESSURE: 80 MMHG | HEART RATE: 71 BPM | BODY MASS INDEX: 33.29 KG/M2

## 2020-11-24 VITALS — TEMPERATURE: 98 F

## 2020-11-24 PROCEDURE — 99214 OFFICE O/P EST MOD 30 MIN: CPT

## 2020-11-24 NOTE — HISTORY OF PRESENT ILLNESS
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 pt doing well \par denies pain, fever\par pt went back to the operating room and Dr. Riley removed more lymph nodes 1/3/20\par pt to start Anastrazole after radiation as per Dr Riley\par pt completed radiation in 5/2020\par it has been 6 months since the pt completed radiation\par here to discuss revision options\par

## 2020-11-24 NOTE — ASSESSMENT
[FreeTextEntry1] : 50 yo female with a stage II A right Invasive ductal Carcinoma T1c N1a  ER 90-95% SD 15-20%, Her 2 negative s/p Bilateral Mastectomy and Right axillary dissection with 0/9LN involved. Initial [place] LN biopsy with 2/3 LN involved.  \par Oncotype testing done with a score of 17 with a 9 yr risk of recurrence of 15% with /  vines alone, no apparent benefit with chemotherapy.\par Mammaprint with low Risk \par \par Patient understands natural history of the disease, as well as management options. \par Patient understand that the treatment of breast cancer is 2 fold to prevent recurrence in the breast as well as distant spread. \par - With the b/l  mastectomy, there is < 3% chance of recurrence in the breast ( theoretical risk of minimal  breast tissue left behind) \par - Further treatment is to prevent distant recurrence. \par - Oncotype at 17, mamprint done which was low risk of recurrence\par - Patient on  Anastrozole since March 2020 \par - Completed RT to chest on 5/18/20\par -  Lifestyle modification weight loss, alcohol,  soy supplementation \par - s/p B/l Mastectomy, no role for mammograms\par \par Continue Anastrozole , no severe AE, Will order labs to monitor LFTs next month \par -DEXA SCAN ordered\par - Labs  CBC/ CMP/ VIt D done in Sept 2020\par RTC in 3-4 months

## 2020-11-24 NOTE — RESULTS/DATA
[FreeTextEntry1] : \par 11/18/ 18 Breast Biopsy ( Right, 11:00 , 5 cm from the 0., Ultrasound guided needle cores)\par - Infiltrating ductal carcinoma, well differentiated\par - no definitive lymphovascular invasion\par -No IN SITU component identified\par -Largest contiguous focus 1.1 cm\par - adjacent region containing aggregates of ducts with columnar cell change an columnar cell hyperplasia\par  HER2/ELADIO by IHC 1+ / Negative\par ER : Positive 90-95%- strong\par MD -Positive ,15-20% strong \par \par Final pathology demonstrated 1.3cm IDC grade 1 with infiltrating carcinoma extending to the cauterized anterior margin. 2/3 sentinel lymph nodes with metastatic carcinoma with the largest measuring 3mm and extracapsular extension. The rest of the pathology was reviewed in detail demonstrating benign findings and a copy was provided to her. \par \par 1/3/2020 final pathology\par Right axillary dissection: Nine lymph nodes, negative for metastatic carcinoma.\par

## 2020-11-24 NOTE — ASSESSMENT
[FreeTextEntry1] : 48 yo female s/p b/l KWAME flap breast reconstruction 11/25/19\par doing well\par revision surgery to include skin paddle removal as well as abdominal dog ear revision and umbilicus revision\par pt seen and examined with Dr Licea, all questions answered \par \par I discussed the risks, benefits, and alternatives including the risks of infection, bleeding, seroma, hematoma, asymmetry, nipple necrosis, change/loss of nipple sensation, contour irregularity, breast skin necrosis, delayed wound healing, need for more surgery.  The patient understands these risks, all questions were answered and the pt wishes to proceed with surgery.\par

## 2020-11-24 NOTE — HISTORY OF PRESENT ILLNESS
[de-identified] : Right Breast:St IIA  IDC  pT1c pN1a ( 2/3 LN involved in Sentinal LN Biopsy) \par Subsequent Right axillary Dissection 0/9 LN Involved\par S/p RT with Dr Caldwell completed on 5/18/20 \par Er 90-95% Pr 15-20 % Her 2 negative\par GENETIC TESTING : Genetic testing: MYRISK panel test was negative, VUS in the MSH3 gene. \par ONcotype SCORE:  17 No benefit of chemotherapy\par Mammaprint: Low risk \par March 2020- current: Anastrozole\par --Bilateral Mastectomy with RIght SLNB (12/5/19)  followed by right axilla dissection (1/3/2020). \par Surgeon: Elizabeth Riley\par \par THis is a patient with a h/o Endometroid Carcinoma (Low grade serous) s/p BASILIA in May 2019 diagnosed at 49 yo \par Patient was following up with Dr Yarbrough for Endometroid Carcinoma \par Patient had MRI of the breast 10/15/2019 which demonstrated the right breast cancer to measure 1.4cm without adenopathy. No MR evidence of multicentric or contralateral disease. BIRADS 6\par \par She underwent bilateral mastectomy and right sentinel lymph node biopsy for right breast cancer on 12/5/2019. \par Final pathology demonstrated 1.3cm IDC grade 1 with infiltrating carcinoma extending to the cauterized anterior margin. 2/3 sentinel lymph nodes with metastatic carcinoma with the largest measuring 3mm and extracapsular extension. She then underwent right axilla dissection on 1/3/2019 and 0/9 lymph nodes were negative for carcinoma.\par \par Oncotype testing was done with a score of 17 with a 9 yr risk of recurrence of 15% with /  vines alone, no apparent benefit with chemo [de-identified] : \par \par Patient is here for a follow up She is currenty on Anastrozole daily since March 2020 \par Tolerating well NO hot flashes/ No vaginal dryness/ no arthritis\par Feels well. \par She works, staying safe through the pandemic\par \par S/p RT with Dr Caldwell completed on 5/18/20 \par Had a CT scan in Sept 2020 Had rt recall post IV dye with CT chest\par  \par Patient has not had Dexa scan, ordered\par \par Patient on anastrozole, since march21st 2020. Some hot flashes at night, No joint pain \par No vaginal dryness \par \par

## 2020-11-24 NOTE — PHYSICAL EXAM
[NI] : Normal [de-identified] : b/l breast are soft and symmetrical \par skin islands viable\par b/l breasts flaps are soft and viable\par incisions well healed [de-identified] : abdomen soft and non tender \par incisions are well healed\par no palpable fluid collections or signs of infection \par small dog ear on the lateral aspect of the left incision\par hypertrophy of the vertical abdominal scar from the umbilicus

## 2020-12-15 ENCOUNTER — TRANSCRIPTION ENCOUNTER (OUTPATIENT)
Age: 50
End: 2020-12-15

## 2021-01-07 ENCOUNTER — TRANSCRIPTION ENCOUNTER (OUTPATIENT)
Age: 51
End: 2021-01-07

## 2021-01-22 ENCOUNTER — APPOINTMENT (OUTPATIENT)
Dept: HEMATOLOGY ONCOLOGY | Facility: CLINIC | Age: 51
End: 2021-01-22

## 2021-01-23 ENCOUNTER — OUTPATIENT (OUTPATIENT)
Dept: OUTPATIENT SERVICES | Facility: HOSPITAL | Age: 51
LOS: 1 days | Discharge: HOME | End: 2021-01-23

## 2021-01-23 DIAGNOSIS — Z98.890 OTHER SPECIFIED POSTPROCEDURAL STATES: Chronic | ICD-10-CM

## 2021-01-23 DIAGNOSIS — Z93.3 COLOSTOMY STATUS: Chronic | ICD-10-CM

## 2021-01-23 DIAGNOSIS — Z90.13 ACQUIRED ABSENCE OF BILATERAL BREASTS AND NIPPLES: Chronic | ICD-10-CM

## 2021-01-23 DIAGNOSIS — Z90.710 ACQUIRED ABSENCE OF BOTH CERVIX AND UTERUS: Chronic | ICD-10-CM

## 2021-01-23 DIAGNOSIS — C50.919 MALIGNANT NEOPLASM OF UNSPECIFIED SITE OF UNSPECIFIED FEMALE BREAST: ICD-10-CM

## 2021-01-28 ENCOUNTER — OUTPATIENT (OUTPATIENT)
Dept: OUTPATIENT SERVICES | Facility: HOSPITAL | Age: 51
LOS: 1 days | End: 2021-01-28

## 2021-01-28 ENCOUNTER — APPOINTMENT (OUTPATIENT)
Dept: NUCLEAR MEDICINE | Facility: CLINIC | Age: 51
End: 2021-01-28
Payer: COMMERCIAL

## 2021-01-28 DIAGNOSIS — Z90.710 ACQUIRED ABSENCE OF BOTH CERVIX AND UTERUS: Chronic | ICD-10-CM

## 2021-01-28 DIAGNOSIS — Z98.890 OTHER SPECIFIED POSTPROCEDURAL STATES: Chronic | ICD-10-CM

## 2021-01-28 DIAGNOSIS — Z93.3 COLOSTOMY STATUS: Chronic | ICD-10-CM

## 2021-01-28 DIAGNOSIS — C56.9 MALIGNANT NEOPLASM OF UNSPECIFIED OVARY: ICD-10-CM

## 2021-01-28 DIAGNOSIS — Z90.13 ACQUIRED ABSENCE OF BILATERAL BREASTS AND NIPPLES: Chronic | ICD-10-CM

## 2021-01-28 PROCEDURE — 78815 PET IMAGE W/CT SKULL-THIGH: CPT | Mod: 26,PS

## 2021-02-01 ENCOUNTER — APPOINTMENT (OUTPATIENT)
Dept: HEMATOLOGY ONCOLOGY | Facility: CLINIC | Age: 51
End: 2021-02-01
Payer: COMMERCIAL

## 2021-02-01 ENCOUNTER — APPOINTMENT (OUTPATIENT)
Dept: HEMATOLOGY ONCOLOGY | Facility: CLINIC | Age: 51
End: 2021-02-01

## 2021-02-01 PROCEDURE — 99441: CPT

## 2021-02-10 NOTE — PHYSICAL EXAM
[Restricted in physically strenuous activity but ambulatory and able to carry out work of a light or sedentary nature] : Status 1- Restricted in physically strenuous activity but ambulatory and able to carry out work of a light or sedentary nature, e.g., light house work, office work [Normal] : affect appropriate [de-identified] : s/p bilateral mastectomy with well-healed incisions [de-identified] : well-healed incision both  and vertical midline [de-identified] : 1+ RUE edema Statement Selected

## 2021-02-10 NOTE — HISTORY OF PRESENT ILLNESS
[Home] : at home, [unfilled] , at the time of the visit. [Other Location: e.g. Home (Enter Location, City,State)___] : at [unfilled] [Verbal consent obtained from patient] : the patient, [unfilled] [de-identified] : FHx of DCIS (sister) [de-identified] : The patient was diagnosed with endometrioid adenocarcinoma in May 2019 at the age of 48.  She presented to [facility] ED on 4/23/19 s/p mechanical fall outside her place of work.  A CT A/P incidentally showed a complex cystic left adnexal mass measuring 17 cm. Massive cystic lesion occupying the lower abdomen measuring 33 cm. It was uncertain whether this represents a large peritoneal metastasis or a component of the above described cystic left adnexal mass, or possibly a right adnexal mass. There was suspicion for rupture of this mass with a large amount of abdominal ascites.  On 5/7/19 Dr. Manpreet Gonzalez performed a BASILIA, BSO, debulking and peritonectomy.  Pathology initially was benign.  An addendum was later issued which showed low grade endometrioid adenocarcinoma, FIGO grade 1, with focal sex cord like differentiation, arising in a background of endometriosis, and involving the uterine serosa.  She was discharged on 5/12/19 but returned to the ED on 5/15/19 c/o abdominal distention, N/V, dizziness, weakness, multiple falls and a fever of 104.  She was found to be hypotensive and tachycardic.  A CT A/P showed a large fluid collection which appears to be extraperitoneal layering anterior to the omentum measuring 24.7 x 9.4 x 20.9 cm. There is a component of the collection which extends posteriorly on the left into the retroperitoneum along the anterior aspect of the psoas extending to the posterior aspect of the splenic flexure (approximately 20 cm in craniocaudad dimension). On 5/17/19 Dr. Mague Durand performed an emergent exploratory laparotomy, sigmoidectomy and end colostomy.  Pathology was benign.   [de-identified] : Patient called for follow-up visit via telephone due to snowstorm. She is s/p BASILIA BSO, debulking and peritonectomy on 5/7/19 with Dr. Manpreet Gonzalez due to extensive endometrioid adenocarcinoma, with a delayed leak from the sigmoid requiring bowel resection and end-colostomy by Dr. Durand.  No adjuvant treatment was recommended and she is s/p colostomy reversal. Patient currently without complaints. Denies any pain, abdominal or vaginal.  No vaginal bleeding.  No N/VD/C.  Appetite is normal. No fever, no cough, no SOB.\par \par Diagnosed with right breast cancer in September 2019, ER+ [place]+ HER2-.  Followed by Dr. Sellers.

## 2021-02-10 NOTE — RESULTS/DATA
[FreeTextEntry1] : 1/28/21 PET:  Bilateral mastectomy with reconstruction. Unchanged nonspecific asymmetric, minimal diffuse FDG avidity with skin thickening in the reconstructed right breast as compared to PET/CT from 3/4/2020. Please correlate clinically. Interval resolution of FDG avid nodular focus in the left anterior abdominal wall, likely secondary to postsurgical changes. Unchanged nonspecific approximately symmetric bilateral tonsillar hypermetabolism.\par \par 9/16/20 CT:  1.6 x 0.7 cm perisplenic soft tissue and 5 x 2 mm region of increased enhancement either in the periphery of segment IVb of the liver or perihepatic which are unchanged when compared with 5/4/2020. The perisplenic region is also stable when compared with 3/4/2020 and no increased FDG uptake was noted at that time. Small stable residual implants cannot be excluded. No new disease or interval growth is seen. Status post bilateral mastectomies with reconstructive surgery. Region of mild nodularity in the right reconstructed breast which is more prominent when compared with 3/4/2020. This may represent granulomatous or fibrotic tissue.\par \par 5/5/20 CT: MALDONADO\par \par 2/4/2020 CT A/P: Sigmoid resection with anastomosis without evidence of obstruction. Small pericapsular splenic fluid collection which is decreased from prior study. \par No evidence of metastatic disease.\par \par 10/15/19 MRI Breast:\par 1.4 cm abnormal enhancement/biopsy clip in the upper outer posterior right breast, corresponding to newly diagnosed malignancy. No MRI evidence of multicentric or contralateral disease. \par RECOMMENDATION: Surgical or oncologic management. \par BI-RADS 6- Known Biopsy-Proven Malignancy \par \par 9/23/19 Pathology: \par Right breast, "11:00, 5 cm from the nipple", ultrasound guided core needle biopsy:\par -Invasive ductal carcinoma, grade 1.\par -See note.\par Note: Immunohistochemical stains for p63 and calponin are performed on block 1-B at [facility] and interpreted at [place] as follows:\par Calponin, p63: negative\par Microscopic evaluation reveals an invasive ductal carcinoma ([place] score 1+2+1), grade 1. The carcinoma measures at least 1.1 cm in its greatest linear expansion in this biopsy. \par There are no calcifications nor necrosis present.\par *Addendum*\par IMMUNOSTAINS PERFORMED AND INTERPRETED ON BLOCK " B " BY [facility],\par HER2/ELADIO BY IHC: 1 + / Negative\par ER: Positive, 90 - 95 %, strong\par ME: Positive, 15 - 20 %, strong\par \par 9/23/19 Mammo: Status post core needle biopsy of the right breast with clip placement. Recommendations for further followup will be based on pathology results.\par \par 9/19/19 US Breast/Mammo: Ill-defined hypoechoic area in the right breast at the 11:00 axis, corresponding to mammographically seen distortion. Ultrasound-guided core biopsy is advised.\par \par 5/15/19 Pathology:\par Intra-abdominal foreign body, removal:  Blood clots\par Sigmoid, resection:  Mucosal necrosis with transmural acute and chronic inflammation.  The process extends to one margin of resection (slide 1A).  Three (3) lymph nodes, one with benign glandular inclusion.\par \par 5/15/19 CT A/P:\par Moderate amount retained fecal material in the colon, greatest in the right hemicolon. Mild wall thickening involving the distal transverse colon, descending colon, and proximal sigmoid colon, possibly reactive to the large collection.  Large fluid collection which appears to be extraperitoneal layering anterior to the omentum measuring 24.7 x 9.4 x \par 20.9 cm (craniocaudad x anteroposterior x transverse). There is a component of the collection which extends posteriorly on the left into the retroperitoneum along the anterior aspect of the psoas extending to the posterior aspect of the splenic flexure (approximately 20 cm in craniocaudad dimension). Skin staples are seen within the anterior abdominal wall. Air is seen within the anterior abdominal wall which may be postoperative in etiology.\par \par 5/8/19 Cytopathology:\par Abdominal fluid:  NEGATIVE FOR MALIGNANT CELLS.  Mostly fibrin and reactive mesothelial cells.\par \par 5/7/19 Pathology:\par Omental lymph node: Markedly reactive, hemorrhagic lymph node with hemosiderin laden macrophages.\par Cyst wall(1): Hemorrhagic inflamed cyst wall without epithelial lining.\par Right hepatic flexure lymph node: Markedly reactive, hemorrhagic lymph node with hemosiderin laden macrophages.\par Omentum: Omental fat with marked reactive fibrosis, hemorrhage and chronic inflammation; marked serosal adhesions.\par Right pelvic lymph node: Markedly reactive, hemorrhagic lymph node with hemosiderin laden macrophages.\par Left pelvic lymph node: Markedly reactive, hemorrhagic lymph node with hemosiderin laden macrophages.\par Cyst wall(2):  Cyst wall, markedly inflamed and hemorrhagic with cholesterol crystals and without epithelial lining.\par ***Uterus, cervix and adnexa:  Histologically unremarkable cervix. Weakly proliferative endometrium and endometrial polyp. Cysts located outside the uterus, involving serosa, with glandular stromal structures, consistent with endometriosis.\par ****Addendum****\par Case sent for outside consultation at [facility] [place] with Dr. Tina Mckay.\par The atypical clusters of glands, in part 8. "Uterus, cervix and adnexa", have been diagnosed as Low grade endometrioid adenocarcinoma, FIGO grade 1,  with focal sex cord like differentiation, arising in a background of endometriosis, and involving the uterine serosa.\par \par 4/23/19 CT C/A/P:\par There is a large complex cystic mass occupying the lower abdomen, measuring 21.0 (AP) x 24.2 (CC) x 33.1 (TR) cm.  The exact origin of this mass is uncertain. The left posterior wall of this mass is discontinuous, suggestive of rupture. There is a large amount of abdominal ascites.  Additional complex left adnexal cystic mass with septations and apparent mural nodules measuring 9.4 (AP) x 15.5 (CC) x 16.6 (TR) cm.

## 2021-02-11 ENCOUNTER — NON-APPOINTMENT (OUTPATIENT)
Age: 51
End: 2021-02-11

## 2021-02-18 ENCOUNTER — TRANSCRIPTION ENCOUNTER (OUTPATIENT)
Age: 51
End: 2021-02-18

## 2021-02-26 ENCOUNTER — OUTPATIENT (OUTPATIENT)
Dept: OUTPATIENT SERVICES | Facility: HOSPITAL | Age: 51
LOS: 1 days | Discharge: HOME | End: 2021-02-26

## 2021-02-26 DIAGNOSIS — Z98.890 OTHER SPECIFIED POSTPROCEDURAL STATES: Chronic | ICD-10-CM

## 2021-02-26 DIAGNOSIS — Z90.710 ACQUIRED ABSENCE OF BOTH CERVIX AND UTERUS: Chronic | ICD-10-CM

## 2021-02-26 DIAGNOSIS — Z90.13 ACQUIRED ABSENCE OF BILATERAL BREASTS AND NIPPLES: Chronic | ICD-10-CM

## 2021-02-26 DIAGNOSIS — Z93.3 COLOSTOMY STATUS: Chronic | ICD-10-CM

## 2021-02-26 DIAGNOSIS — C50.919 MALIGNANT NEOPLASM OF UNSPECIFIED SITE OF UNSPECIFIED FEMALE BREAST: ICD-10-CM

## 2021-03-01 ENCOUNTER — APPOINTMENT (OUTPATIENT)
Dept: HEMATOLOGY ONCOLOGY | Facility: CLINIC | Age: 51
End: 2021-03-01
Payer: COMMERCIAL

## 2021-03-01 ENCOUNTER — TRANSCRIPTION ENCOUNTER (OUTPATIENT)
Age: 51
End: 2021-03-01

## 2021-03-01 ENCOUNTER — RESULT REVIEW (OUTPATIENT)
Age: 51
End: 2021-03-01

## 2021-03-01 VITALS
HEIGHT: 61 IN | WEIGHT: 177.31 LBS | SYSTOLIC BLOOD PRESSURE: 165 MMHG | HEART RATE: 78 BPM | OXYGEN SATURATION: 97 % | DIASTOLIC BLOOD PRESSURE: 83 MMHG | BODY MASS INDEX: 33.48 KG/M2

## 2021-03-01 LAB
BASOPHILS # BLD AUTO: 0 K/UL — SIGNIFICANT CHANGE UP (ref 0–0.2)
BASOPHILS NFR BLD AUTO: 0.4 % — SIGNIFICANT CHANGE UP (ref 0–2)
EOSINOPHIL # BLD AUTO: 0.1 K/UL — SIGNIFICANT CHANGE UP (ref 0–0.5)
EOSINOPHIL NFR BLD AUTO: 1.6 % — SIGNIFICANT CHANGE UP (ref 0–6)
HCT VFR BLD CALC: 43.1 % — SIGNIFICANT CHANGE UP (ref 34.5–45)
HGB BLD-MCNC: 13.7 G/DL — SIGNIFICANT CHANGE UP (ref 11.5–15.5)
LYMPHOCYTES # BLD AUTO: 1.4 K/UL — SIGNIFICANT CHANGE UP (ref 1–3.3)
LYMPHOCYTES # BLD AUTO: 21.3 % — SIGNIFICANT CHANGE UP (ref 13–44)
MCHC RBC-ENTMCNC: 30.5 PG — SIGNIFICANT CHANGE UP (ref 27–34)
MCHC RBC-ENTMCNC: 31.8 G/DL — LOW (ref 32–36)
MCV RBC AUTO: 95.7 FL — SIGNIFICANT CHANGE UP (ref 80–100)
MONOCYTES # BLD AUTO: 0.4 K/UL — SIGNIFICANT CHANGE UP (ref 0–0.9)
MONOCYTES NFR BLD AUTO: 6.6 % — SIGNIFICANT CHANGE UP (ref 2–14)
NEUTROPHILS # BLD AUTO: 4.5 K/UL — SIGNIFICANT CHANGE UP (ref 1.8–7.4)
NEUTROPHILS NFR BLD AUTO: 70.1 % — SIGNIFICANT CHANGE UP (ref 43–77)
PLATELET # BLD AUTO: 222 K/UL — SIGNIFICANT CHANGE UP (ref 150–400)
RBC # BLD: 4.5 M/UL — SIGNIFICANT CHANGE UP (ref 3.8–5.2)
RBC # FLD: 12.4 % — SIGNIFICANT CHANGE UP (ref 10.3–14.5)
WBC # BLD: 6.4 K/UL — SIGNIFICANT CHANGE UP (ref 3.8–10.5)
WBC # FLD AUTO: 6.4 K/UL — SIGNIFICANT CHANGE UP (ref 3.8–10.5)

## 2021-03-01 PROCEDURE — 99214 OFFICE O/P EST MOD 30 MIN: CPT

## 2021-03-01 PROCEDURE — 99072 ADDL SUPL MATRL&STAF TM PHE: CPT

## 2021-03-01 NOTE — HISTORY OF PRESENT ILLNESS
[de-identified] : Right Breast:St IIA  IDC  pT1c pN1a ( 2/3 LN involved in Sentinal LN Biopsy) \par Subsequent Right axillary Dissection 0/9 LN Involved\par S/p RT with Dr Caldwell completed on 5/18/20 \par Er 90-95% Pr 15-20 % Her 2 negative\par GENETIC TESTING : Genetic testing: MY[employer]IS[employer] panel test was negative, VUS in the MSH3 gene. \par ONcotype SCORE:  17 No benefit of chemotherapy\par Mammaprint: Low risk \par March 2020- current: Anastrozole\par --Bilateral Mastectomy with RIght SLNB (12/5/19)  followed by right axilla dissection (1/3/2020). \par Surgeon: Elizabeth Riley\par \par THis is a patient with a h/o Endometroid Carcinoma (Low grade serous) s/p BASILIA in May 2019 diagnosed at 49 yo \par Patient was following up with Dr Yarbrough for Endometroid Carcinoma \par Patient had MRI of the breast 10/15/2019 which demonstrated the right breast cancer to measure 1.4cm without adenopathy. No MR evidence of multicentric or contralateral disease. BIRADS 6\par \par She underwent bilateral mastectomy and right sentinel lymph node biopsy for right breast cancer on 12/5/2019. \par Final pathology demonstrated 1.3cm IDC grade 1 with infiltrating carcinoma extending to the cauterized anterior margin. 2/3 sentinel lymph nodes with metastatic carcinoma with the largest measuring 3mm and extracapsular extension. She then underwent right axilla dissection on 1/3/2019 and 0/9 lymph nodes were negative for carcinoma.\par \par Oncotype testing was done with a score of 17 with a 9 yr risk of recurrence of 15% with /  vines alone, no apparent benefit with chemo\par \par S/p RT with Dr Caldwell completed on 5/18/20 \par Had a CT scan in Sept 2020 Had rt recall post IV dye with CT chest\par   [de-identified] : \par \par Patient is here for a follow up- on Anastrozole daily since March 2020 \par Tolerating well NO hot flashes/ No vaginal dryness/ no arthritis\par Feels well. \par Still not had Dexa scan \par \par 1/28/21 PET: Bilateral mastectomy with reconstruction. Unchanged nonspecific asymmetric, minimal diffuse FDG avidity with skin thickening in the\par  reconstructed right breast as compared to PET/CT from 3/4/2020. Interval resolution of FDG avid nodular focus in the left anterior abdominal wall, likely secondary\par   to postsurgical changes. Unchanged nonspecific approximately symmetric bilateral    onsillar hypermetabolism.\par     -next restaging scan in 6 months, will d/w Dr Yarbrough, CT\par \par She is having revision surgery on 3/15/21, \par \par

## 2021-03-01 NOTE — RESULTS/DATA
[FreeTextEntry1] : \par 11/18/ 18 Breast Biopsy ( Right, 11:00 , 5 cm from the 0., Ultrasound guided needle cores)\par - Infiltrating ductal carcinoma, well differentiated\par - no definitive lymphovascular invasion\par -No IN SITU component identified\par -Largest contiguous focus 1.1 cm\par - adjacent region containing aggregates of ducts with columnar cell change an columnar cell hyperplasia\par  HER2/ELADIO by IHC 1+ / Negative\par ER : Positive 90-95%- strong\par OH -Positive ,15-20% strong \par \par Final pathology demonstrated 1.3cm IDC grade 1 with infiltrating carcinoma extending to the cauterized anterior margin. 2/3 sentinel lymph nodes with metastatic carcinoma with the largest measuring 3mm and extracapsular extension. The rest of the pathology was reviewed in detail demonstrating benign findings and a copy was provided to her. \par \par 1/3/2020 final pathology\par Right axillary dissection: Nine lymph nodes, negative for metastatic carcinoma.\par

## 2021-03-01 NOTE — ASSESSMENT
[FreeTextEntry1] : 48 yo female with a stage II A right Invasive ductal Carcinoma T1c N1a  ER 90-95% [place] 15-20%, Her 2 negative s/p Bilateral Mastectomy and Right axillary dissection with 0/9LN involved. Initial [place] LN biopsy with 2/3 LN involved.  \par Oncotype testing done with a score of 17 with a 9 yr risk of recurrence of 15% with /  vines alone, no apparent benefit with chemotherapy.\par Mammaprint with low Risk \par \par Patient understands natural history of the disease, as well as management options. \par Patient understand that the treatment of breast cancer is 2 fold to prevent recurrence in the breast as well as distant spread. \par - With the b/l  mastectomy, there is < 3% chance of recurrence in the breast ( theoretical risk of minimal  breast tissue left behind) \par - Further treatment is to prevent distant recurrence. \par - Oncotype at 17, mamprint done which was low risk of recurrence\par - Patient on  Anastrozole since March 2020 \par - Completed RT to chest on 5/18/20\par -  Lifestyle modification weight loss, alcohol,  soy supplementation \par - s/p B/l Mastectomy, no role for mammograms\par \par Continue Anastrozole , no severe AE, Will order labs to monitor LFTs \par -DEXA SCAN ordered however still not done it., she is having a revision surgery on 3/15/21, advised to do dexa scan prior to next appointment\par - Labs  CBC/ CMP/ VIt D done in Sept 2020, ordered today \par - Advised to take calcium/ vit d\par RTC in 3-4 month

## 2021-03-04 ENCOUNTER — APPOINTMENT (OUTPATIENT)
Dept: SURGERY | Facility: CLINIC | Age: 51
End: 2021-03-04
Payer: COMMERCIAL

## 2021-03-04 VITALS
TEMPERATURE: 97.9 F | BODY MASS INDEX: 14.35 KG/M2 | HEIGHT: 61 IN | OXYGEN SATURATION: 97 % | DIASTOLIC BLOOD PRESSURE: 84 MMHG | HEART RATE: 80 BPM | SYSTOLIC BLOOD PRESSURE: 147 MMHG | WEIGHT: 76 LBS

## 2021-03-04 PROCEDURE — 99072 ADDL SUPL MATRL&STAF TM PHE: CPT

## 2021-03-04 PROCEDURE — 99213 OFFICE O/P EST LOW 20 MIN: CPT

## 2021-03-05 LAB
25(OH)D3 SERPL-MCNC: 34.9 NG/ML
ALBUMIN SERPL ELPH-MCNC: 4.5 G/DL
ALP BLD-CCNC: 93 U/L
ALT SERPL-CCNC: 23 U/L
ANION GAP SERPL CALC-SCNC: 13 MMOL/L
AST SERPL-CCNC: 22 U/L
BILIRUB SERPL-MCNC: 0.2 MG/DL
BUN SERPL-MCNC: 12 MG/DL
CALCIUM SERPL-MCNC: 9.6 MG/DL
CHLORIDE SERPL-SCNC: 103 MMOL/L
CO2 SERPL-SCNC: 26 MMOL/L
CREAT SERPL-MCNC: 0.85 MG/DL
GLUCOSE SERPL-MCNC: 105 MG/DL
POTASSIUM SERPL-SCNC: 3.8 MMOL/L
PROT SERPL-MCNC: 7.6 G/DL
SODIUM SERPL-SCNC: 141 MMOL/L

## 2021-03-06 ENCOUNTER — OUTPATIENT (OUTPATIENT)
Dept: OUTPATIENT SERVICES | Facility: HOSPITAL | Age: 51
LOS: 1 days | End: 2021-03-06
Payer: COMMERCIAL

## 2021-03-06 ENCOUNTER — APPOINTMENT (OUTPATIENT)
Dept: RADIOLOGY | Facility: CLINIC | Age: 51
End: 2021-03-06
Payer: COMMERCIAL

## 2021-03-06 DIAGNOSIS — Z93.3 COLOSTOMY STATUS: Chronic | ICD-10-CM

## 2021-03-06 DIAGNOSIS — Z90.13 ACQUIRED ABSENCE OF BILATERAL BREASTS AND NIPPLES: Chronic | ICD-10-CM

## 2021-03-06 DIAGNOSIS — Z98.890 OTHER SPECIFIED POSTPROCEDURAL STATES: Chronic | ICD-10-CM

## 2021-03-06 DIAGNOSIS — Z90.710 ACQUIRED ABSENCE OF BOTH CERVIX AND UTERUS: Chronic | ICD-10-CM

## 2021-03-06 DIAGNOSIS — C50.919 MALIGNANT NEOPLASM OF UNSPECIFIED SITE OF UNSPECIFIED FEMALE BREAST: ICD-10-CM

## 2021-03-06 PROCEDURE — 77080 DXA BONE DENSITY AXIAL: CPT

## 2021-03-06 PROCEDURE — 77080 DXA BONE DENSITY AXIAL: CPT | Mod: 26

## 2021-03-08 NOTE — PHYSICAL EXAM
[Normocephalic] : normocephalic [Atraumatic] : atraumatic [EOMI] : extra ocular movement intact [PERRL] : pupils equal, round and reactive to light [Sclera nonicteric] : sclera nonicteric [Supple] : supple [No Supraclavicular Adenopathy] : no supraclavicular adenopathy [Examined in the supine and seated position] : examined in the supine and seated position [No dominant masses] : no dominant masses in right breast  [No dominant masses] : no dominant masses left breast [No Nipple Retraction] : no left nipple retraction [No Nipple Discharge] : no left nipple discharge [No Axillary Lymphadenopathy] : no left axillary lymphadenopathy [No Edema] : no edema [No Rashes] : no rashes [No Ulceration] : no ulceration [Breast Nipple Inversion] : nipples not inverted [Breast Nipple Retraction] : nipples not retracted [Breast Nipple Flattening] : nipples not flattened [Breast Nipple Fissures] : nipples not fissured [Breast Abnormal Lactation (Galactorrhea)] : no galactorrhea [Breast Abnormal Secretion Bloody Fluid] : no bloody discharge [Breast Abnormal Secretion Serous Fluid] : no serous discharge [Breast Abnormal Secretion Opalescent Fluid] : no milky discharge [de-identified] : Mastectomy flap well healed. [de-identified] : Mastectomy flap well healed.

## 2021-03-08 NOTE — ASSESSMENT
[FreeTextEntry1] : 51 yo female s/p bilateral mastectomy with right SLNB for 1.3cm IDC with 2/3 positive nodes. She underwent right axiliary lymph node dissection.  0/9 lymph nodes were negative for carcinoma.  Oncotype score of 17.  Tolerating hormonal therapy well.  Recommendation for follow up exam in 6 months.\par 1.  Follow up in 6 months\par 2.  Followup with medical oncology- hormonal therapy\par

## 2021-03-08 NOTE — HISTORY OF PRESENT ILLNESS
[FreeTextEntry1] : I had the pleasure of seeing Sera Francisco in the office for a followup exam for her diagnosed and treated right breast cancer.\par \par Sera is a micheal 49 yo premenopausal female. She was diagnosed with right breast cancer in 10/2019. \par She underwent MRI of the breast 10/15/2019 which demonstrated the right breast cancer to measure 1.4 cm without adenopathy. No MR evidence of multicentric or contralateral disease. BIRADS 6\par \par Oncotype score: 17\par Hormonal therapy:  Anastrozole 3/2020\par PMRT completed \par \par She underwent bilateral mastectomy and right sentinel lymph node biopsy for right breast cancer on 11/25/2019.  Final pathology demonstrated 1.3cm IDC grade 1 with infiltrating carcinoma extending to the cauterized anterior margin. 2/3 sentinel lymph nodes with metastatic carcinoma with the largest measuring 3mm and extracapsular extension.  She then underwent right axilla dissection on 1/3/2020 and 0/9 lymph nodes were negative for carcinoma.\par \par Genetic testing: The [employer] panel test was negative, however there was noted a VUS in the MSH3 gene. Further clarification on this variance will be obtained from the updated test as soon as it is available.\par \par 9/24/2019 Breast Biopsy ( Right, 11:00 , 5 cm from the 0., Ultrasound guided needle cores)\par - Infiltrating ductal carcinoma, well differentiated\par - no definitive lymphovascular invasion\par -No IN SITU component identified\par -Largest contiguous focus 1.1 cm\par - adjacent region containing aggregates of ducts with columnar cell change an columnar cell hyperplasia\par  HER2/ELADIO by IHC 1+ / Negative\par ER : Positive 90-95%- strong\par ND -Positive ,15-20% strong \par \par 1/3/2020 final pathology\par Right axillary dissection:  Nine lymph nodes, negative for metastatic carcinoma.\par \par 9/15/2020  CT Chest IC/ CT abdomen and pelvis OC IC\par IMPRESSION: 1.6 x 0.7 cm perisplenic soft tissue and 5 x 2 mm region of increased enhancement either in the periphery of segment IVb of the liver or perihepatic which are unchanged when compared with 5/4/2020. The perisplenic region is also stable when compared with 3/4/2020 and no increased FDG uptake was noted at that time. Small stable residual implants cannot be excluded. No new disease or interval growth is seen. Continued follow-up is recommended. \par Status post bilateral mastectomies with reconstructive surgery. Region of mild nodularity in the right reconstructed breast which is more prominent when compared with 3/4/2020. This may represent granulomatous or fibrotic tissue although clinical correlation is suggested.\par \par 1/28/2021 IMPRESSION: FDG-PET/CT scan demonstrates:\par 1. Bilateral mastectomy with reconstruction. Unchanged nonspecific asymmetric, minimal diffuse FDG avidity with skin thickening in the reconstructed right breast as compared to PET/CT from 3/4/2020. Please correlate clinically.\par 2. Interval resolution of FDG avid nodular focus in the left anterior abdominal wall, likely secondary to postsurgical changes.\par 3. Unchanged nonspecific approximately symmetric bilateral tonsillar hypermetabolism.\par \par Her clinical breast exam is benign today.  She is tolerating hormonal therapy well with no negative reports.  She will discuss with medical oncology on when her next restaging imaging will be done.  Recommendation for followup in 6 months.  \par \par \par All questions were answered.

## 2021-03-10 ENCOUNTER — TRANSCRIPTION ENCOUNTER (OUTPATIENT)
Age: 51
End: 2021-03-10

## 2021-03-11 NOTE — RESULTS/DATA
Spiritual Plan of Care    Pt Name: Molly Ca  Pt : 1952  Date: 2021    Spiritual/Emotional Assessment  Understanding:  received referral from  (Tamiko Mancilla) at pt's residential facility that pt was in our ICU.  Pt is intubated and unresponsive - cannot assess pt's understanding of her situation.  Needs/Barriers: intubated, unresponsive.  Hopes/Goals: n/a  Support System: Referring  indicates that pt has strong supportive connections at facility.  Referring  has not witnessed regular visits by family; no family present today.  Beliefs/Values: Referring  reports that pt has described her spirituality in terms of [employer], [employer] and [employer].  Resources: Writer shared bedside prayer and words of affirmation.    Outcome: Spiritual support offered per request.    Plan:  support available as needed.    Recommendations: Please page Spiritual Care via switchboard if follow up is needed/requested.    Visit type: In person    Referral source: , Other (Comment)(from pt's ANTONI)    Reason for visit: Other (Comment)(referral from )    Visited with: Patient    Taxonomy:    · Intended Effects: Samantha Affirmation  · Methods: Offer spiritual/Rastafarian support  · Interventions: Ch[place]olm    Patient Assessment: Unable to assess    Patient  Intervention: Prayer    Spiritual Plan of Care: Follow up if requested    Total Time: 15 min.     [FreeTextEntry1] : 2/4/2020 CT A/P: Sigmoid resection with anastomosis without evidence of obstruction. Small pericapsular splenic fluid collection which is decreased from prior study. \par No evidence of metastatic disease.\par \par 10/15/19 MRI Breast:\par 1.4 cm abnormal enhancement/biopsy clip in the upper outer posterior right breast, corresponding to newly diagnosed malignancy. No MRI evidence of multicentric or contralateral disease. \par RECOMMENDATION: Surgical or oncologic management. \par BI-RADS 6- Known Biopsy-Proven Malignancy \par \par 9/23/19 Pathology: \par Right breast, "11:00, 5 cm from the nipple", ultrasound guided core needle biopsy:\par -Invasive ductal carcinoma, grade 1.\par -See note.\par Note: Immunohistochemical stains for p63 and calponin are performed on block 1-B at [facility] and interpreted at [place] as follows:\par Calponin, p63: negative\par Microscopic evaluation reveals an invasive ductal carcinoma ([place] score 1+2+1), grade 1. The carcinoma measures at least 1.1 cm in its greatest linear expansion in this biopsy. \par There are no calcifications nor necrosis present.\par *Addendum*\par IMMUNOSTAINS PERFORMED AND INTERPRETED ON BLOCK " B " BY [facility],\par HER2/ELADIO BY IHC: 1 + / Negative\par ER: Positive, 90 - 95 %, strong\par ME: Positive, 15 - 20 %, strong\par \par 9/23/19 Mammo: Status post core needle biopsy of the right breast with clip placement. Recommendations for further followup will be based on pathology results.\par \par 9/19/19 US Breast/Mammo: Ill-defined hypoechoic area in the right breast at the 11:00 axis, corresponding to mammographically seen distortion. Ultrasound-guided core biopsy is advised.\par \par 5/15/19 Pathology:\par Intra-abdominal foreign body, removal:  Blood clots\par Sigmoid, resection:  Mucosal necrosis with transmural acute and chronic inflammation.  The process extends to one margin of resection (slide 1A).  Three (3) lymph nodes, one with benign glandular inclusion.\par \par 5/15/19 CT A/P:\par Moderate amount retained fecal material in the colon, greatest in the right hemicolon. Mild wall thickening involving the distal transverse colon, descending colon, and proximal sigmoid colon, possibly reactive to the large collection.  Large fluid collection which appears to be extraperitoneal layering anterior to the omentum measuring 24.7 x 9.4 x \par 20.9 cm (craniocaudad x anteroposterior x transverse). There is a component of the collection which extends posteriorly on the left into the retroperitoneum along the anterior aspect of the psoas extending to the posterior aspect of the splenic flexure (approximately 20 cm in craniocaudad dimension). Skin staples are seen within the anterior abdominal wall. Air is seen within the anterior abdominal wall which may be postoperative in etiology.\par \par 5/8/19 Cytopathology:\par Abdominal fluid:  NEGATIVE FOR MALIGNANT CELLS.  Mostly fibrin and reactive mesothelial cells.\par \par 5/7/19 Pathology:\par Omental lymph node: Markedly reactive, hemorrhagic lymph node with hemosiderin laden macrophages.\par Cyst wall(1): Hemorrhagic inflamed cyst wall without epithelial lining.\par Right hepatic flexure lymph node: Markedly reactive, hemorrhagic lymph node with hemosiderin laden macrophages.\par Omentum: Omental fat with marked reactive fibrosis, hemorrhage and chronic inflammation; marked serosal adhesions.\par Right pelvic lymph node: Markedly reactive, hemorrhagic lymph node with hemosiderin laden macrophages.\par Left pelvic lymph node: Markedly reactive, hemorrhagic lymph node with hemosiderin laden macrophages.\par Cyst wall(2):  Cyst wall, markedly inflamed and hemorrhagic with cholesterol crystals and without epithelial lining.\par ***Uterus, cervix and adnexa:  Histologically unremarkable cervix. Weakly proliferative endometrium and endometrial polyp. Cysts located outside the uterus, involving serosa, with glandular stromal structures, consistent with endometriosis.\par ****Addendum****\par Case sent for outside consultation at [facility] with Dr. Tina Mckay.\par The atypical clusters of glands, in part 8. "Uterus, cervix and adnexa", have been diagnosed as Low grade endometrioid adenocarcinoma, FIGO grade 1,  with focal sex cord like differentiation, arising in a background of endometriosis, and involving the uterine serosa.\par \par 4/23/19 CT C/A/P:\par There is a large complex cystic mass occupying the lower abdomen, measuring 21.0 (AP) x 24.2 (CC) x 33.1 (TR) cm.  The exact origin of this mass is uncertain. The left posterior wall of this mass is discontinuous, suggestive of rupture. There is a large amount of abdominal ascites.  Additional complex left adnexal cystic mass with septations and apparent mural nodules measuring 9.4 (AP) x 15.5 (CC) x 16.6 (TR) cm.

## 2021-03-15 ENCOUNTER — NON-APPOINTMENT (OUTPATIENT)
Age: 51
End: 2021-03-15

## 2021-03-15 ENCOUNTER — APPOINTMENT (OUTPATIENT)
Dept: PLASTIC SURGERY | Facility: AMBULATORY SURGERY CENTER | Age: 51
End: 2021-03-15
Payer: COMMERCIAL

## 2021-03-15 PROCEDURE — 14301 TIS TRNFR ANY 30.1-60 SQ CM: CPT

## 2021-03-15 PROCEDURE — 19380 REVJ RECONSTRUCTED BREAST: CPT | Mod: 50

## 2021-03-23 ENCOUNTER — APPOINTMENT (OUTPATIENT)
Dept: PLASTIC SURGERY | Facility: CLINIC | Age: 51
End: 2021-03-23
Payer: COMMERCIAL

## 2021-03-23 ENCOUNTER — NON-APPOINTMENT (OUTPATIENT)
Age: 51
End: 2021-03-23

## 2021-03-23 ENCOUNTER — APPOINTMENT (OUTPATIENT)
Dept: RADIATION ONCOLOGY | Facility: CLINIC | Age: 51
End: 2021-03-23
Payer: COMMERCIAL

## 2021-03-23 VITALS
RESPIRATION RATE: 16 BRPM | OXYGEN SATURATION: 97 % | SYSTOLIC BLOOD PRESSURE: 145 MMHG | HEIGHT: 61 IN | DIASTOLIC BLOOD PRESSURE: 89 MMHG | BODY MASS INDEX: 33.61 KG/M2 | TEMPERATURE: 98 F | HEART RATE: 83 BPM | WEIGHT: 178 LBS

## 2021-03-23 VITALS
SYSTOLIC BLOOD PRESSURE: 122 MMHG | HEART RATE: 78 BPM | DIASTOLIC BLOOD PRESSURE: 82 MMHG | WEIGHT: 178 LBS | OXYGEN SATURATION: 99 % | BODY MASS INDEX: 33.61 KG/M2 | HEIGHT: 61 IN

## 2021-03-23 PROCEDURE — 99024 POSTOP FOLLOW-UP VISIT: CPT

## 2021-03-23 PROCEDURE — 99072 ADDL SUPL MATRL&STAF TM PHE: CPT

## 2021-03-23 PROCEDURE — 99214 OFFICE O/P EST MOD 30 MIN: CPT

## 2021-03-23 NOTE — ASSESSMENT
[FreeTextEntry1] : 50 yo female s/p b/l KWAME flap breast reconstruction 11/25/19\par doing well\par monitor for redness, swelling, fever, chills\par no heavy lifting or strenuous activity\par continue to wear soft, non wire bra\par all pt questions answered\par

## 2021-03-23 NOTE — HISTORY OF PRESENT ILLNESS
[FreeTextEntry1] : 50 year old with invasive right breast cancer ([id number]) s/p bilateral mastectomy, post-mastectomy radiation completed 5/18/2020. She also has a history of endometrioid adenocarcinoma of the ovary arising from background of endometriosis, status post BASILIA, BSO, tumor debulking, peritonectomy due to extensive endometriosis of entire abdominal cavity on 5/12/19, complicated post-operatively by delayed leak from the sigmoid colon, requiring bowel resection and end colostomy. No adjuvant therapy was recommended. She is s/p colostomy reversal.\par \par She continues on anastrozole.\par OR 3/15/21:revision surgery to include skin paddle removal as well as abdominal dog ear revision and umbilicus revision\par Overall feels well.  Post-surgical pain manageable, not requiring medication.  Denies chest wall edema or bother.  Denies arm edema.  No fatigue or weight loss.\par \par Dr. Lagos\par Dr. Riley\par Dr. Gonzalez\par Dr. Licea\par \par \par pet scan 1/28/21:\par IMPRESSION: FDG-PET/CT scan demonstrates:\par 1. Bilateral mastectomy with reconstruction. Unchanged nonspecific asymmetric, minimal diffuse FDG avidity with skin thickening in the reconstructed right breast as compared to PET/CT from 3/4/2020. Please correlate clinically.\par 2. Interval resolution of FDG avid nodular focus in the left anterior abdominal wall, likely secondary to postsurgical changes.\par 3. Unchanged nonspecific approximately symmetric bilateral tonsillar hypermetabolism.

## 2021-03-23 NOTE — HISTORY OF PRESENT ILLNESS
[FreeTextEntry1] : 48 yo female with history of invasive ductal cancer of the right breast, s/p b/l breast reconstruction with KWAME flaps 11/25/19. pt doing well \par pt now s/p b/l breast reconstruction revision with abdominal scar and umbilicus revision 3/15/21\par denies pain, fever\par \par

## 2021-03-23 NOTE — PHYSICAL EXAM
[NI] : Normal [de-identified] : b/l breast are soft and symmetrical \par b/l breasts flaps are soft and viable\par incisions c/d/i\par nipples viable [de-identified] : abdomen soft and non tender \par incisions c/d/i\par no palpable fluid collections or signs of infection \par umbilicus viable

## 2021-03-25 ENCOUNTER — TRANSCRIPTION ENCOUNTER (OUTPATIENT)
Age: 51
End: 2021-03-25

## 2021-04-13 ENCOUNTER — APPOINTMENT (OUTPATIENT)
Dept: PLASTIC SURGERY | Facility: CLINIC | Age: 51
End: 2021-04-13

## 2021-04-13 VITALS
WEIGHT: 178.03 LBS | HEART RATE: 81 BPM | BODY MASS INDEX: 33.61 KG/M2 | SYSTOLIC BLOOD PRESSURE: 127 MMHG | HEIGHT: 61 IN | OXYGEN SATURATION: 97 % | DIASTOLIC BLOOD PRESSURE: 82 MMHG

## 2021-04-13 NOTE — PHYSICAL EXAM
[NI] : Normal [de-identified] : b/l breast are soft and symmetrical \par b/l breasts flaps are soft and viable\par incisions c/d/i\par nipples viable [de-identified] : abdomen soft and non tender \par incisions c/d/i\par no palpable fluid collections or signs of infection \par umbilicus viable

## 2021-04-13 NOTE — HISTORY OF PRESENT ILLNESS
[FreeTextEntry1] : 48 yo female with history of invasive ductal cancer of the right breast, s/p b/l breast reconstruction with KWAME flaps 11/25/19. pt doing well \par pt now s/p b/l breast reconstruction revision with abdominal scar and umbilicus revision 3/15/21\par denies pain, fever\par \par \par

## 2021-05-03 ENCOUNTER — TRANSCRIPTION ENCOUNTER (OUTPATIENT)
Age: 51
End: 2021-05-03

## 2021-06-08 ENCOUNTER — APPOINTMENT (OUTPATIENT)
Dept: GYNECOLOGIC ONCOLOGY | Facility: CLINIC | Age: 51
End: 2021-06-08
Payer: COMMERCIAL

## 2021-06-08 VITALS
HEIGHT: 61 IN | WEIGHT: 178 LBS | SYSTOLIC BLOOD PRESSURE: 128 MMHG | DIASTOLIC BLOOD PRESSURE: 83 MMHG | OXYGEN SATURATION: 99 % | HEART RATE: 102 BPM | BODY MASS INDEX: 33.61 KG/M2

## 2021-06-08 PROCEDURE — 99072 ADDL SUPL MATRL&STAF TM PHE: CPT

## 2021-06-08 PROCEDURE — 99214 OFFICE O/P EST MOD 30 MIN: CPT

## 2021-06-08 NOTE — ASSESSMENT
[FreeTextEntry1] : Pt is a 50 yo with Grade 1 endometrioid adenocarcinoma arising in the background of endometriosis involving the uterine serosa, likely primary peritoneal. She has no evidence of abdominal disease on exam or on PET CT abdomen/pelvis 9/23/2020. She reports no new concerns. No evidence of disease. I discussed that given that she is asymptomatic I do not think we need imaging at this time, but I will leave it up to Dr. Caldwell.

## 2021-06-08 NOTE — HISTORY OF PRESENT ILLNESS
[FreeTextEntry1] : Pt is a 50 yo s/p BASILIA, BSO, tumor debulking, peritonectomy on 5/7/19 due to extensive endometriosis of entire abdominal cavity. S/p end colostomy and colon resection and ultimately takedown on 8/8/19. Pathology with focus of grade 1 endometrioid adenocarcinoma arising from background of endometriosis on the uterine serosa. She was diagnosed with right breast cancer s/p bilateral mastectomy and KWAME flap by Dr. Licea on 11/25/19.  She has follow up with Dr. Yarbrough. She also had colostomy reversal and abdominoplasty in the interim. She had a completion right axillary dissection on 1/3/20. She had post-mastectomy radiation by Dr. Caldwell complete 5/18/20. \par \par Interval history: She reports being active but due to foot surgery stopped being as active for some time. Weight is stable. She is not sexually active. \par \par Colonoscopy: done 2019\par Mammogram: s/p bilateral mastectomy\par

## 2021-06-08 NOTE — PHYSICAL EXAM
[Absent] : Adnexa(ae): Absent [Normal] : Anus and perineum: Normal sphincter tone, no masses, no prolapse. [FreeTextEntry1] : smlling and happy  [de-identified] : well healed abdominal incisions [de-identified] : no masses, or lesions [Restricted in physically strenuous activity but ambulatory and able to carry out work of a light or sedentary nature] : Status 1- Restricted in physically strenuous activity but ambulatory and able to carry out work of a light or sedentary nature, e.g., light house work, office work

## 2021-06-08 NOTE — DISCUSSION/SUMMARY
[FreeTextEntry1] : No further discharge and no vaginal bleeding. No evidence of disease. No indication for imaging at this time.

## 2021-07-02 ENCOUNTER — OUTPATIENT (OUTPATIENT)
Dept: OUTPATIENT SERVICES | Facility: HOSPITAL | Age: 51
LOS: 1 days | Discharge: HOME | End: 2021-07-02

## 2021-07-02 DIAGNOSIS — Z98.890 OTHER SPECIFIED POSTPROCEDURAL STATES: Chronic | ICD-10-CM

## 2021-07-02 DIAGNOSIS — Z90.13 ACQUIRED ABSENCE OF BILATERAL BREASTS AND NIPPLES: Chronic | ICD-10-CM

## 2021-07-02 DIAGNOSIS — Z90.710 ACQUIRED ABSENCE OF BOTH CERVIX AND UTERUS: Chronic | ICD-10-CM

## 2021-07-02 DIAGNOSIS — Z93.3 COLOSTOMY STATUS: Chronic | ICD-10-CM

## 2021-07-02 DIAGNOSIS — C50.919 MALIGNANT NEOPLASM OF UNSPECIFIED SITE OF UNSPECIFIED FEMALE BREAST: ICD-10-CM

## 2021-07-06 ENCOUNTER — TRANSCRIPTION ENCOUNTER (OUTPATIENT)
Age: 51
End: 2021-07-06

## 2021-07-07 ENCOUNTER — TRANSCRIPTION ENCOUNTER (OUTPATIENT)
Age: 51
End: 2021-07-07

## 2021-07-07 ENCOUNTER — RESULT REVIEW (OUTPATIENT)
Age: 51
End: 2021-07-07

## 2021-07-07 ENCOUNTER — APPOINTMENT (OUTPATIENT)
Dept: HEMATOLOGY ONCOLOGY | Facility: CLINIC | Age: 51
End: 2021-07-07
Payer: COMMERCIAL

## 2021-07-07 VITALS
BODY MASS INDEX: 34.17 KG/M2 | OXYGEN SATURATION: 98 % | WEIGHT: 181 LBS | DIASTOLIC BLOOD PRESSURE: 88 MMHG | HEART RATE: 65 BPM | SYSTOLIC BLOOD PRESSURE: 145 MMHG | HEIGHT: 61 IN

## 2021-07-07 LAB
BASOPHILS # BLD AUTO: 0.1 K/UL — SIGNIFICANT CHANGE UP (ref 0–0.2)
BASOPHILS NFR BLD AUTO: 0.7 % — SIGNIFICANT CHANGE UP (ref 0–2)
EOSINOPHIL # BLD AUTO: 0.2 K/UL — SIGNIFICANT CHANGE UP (ref 0–0.5)
EOSINOPHIL NFR BLD AUTO: 2.2 % — SIGNIFICANT CHANGE UP (ref 0–6)
HCT VFR BLD CALC: 42.6 % — SIGNIFICANT CHANGE UP (ref 34.5–45)
HGB BLD-MCNC: 13.6 G/DL — SIGNIFICANT CHANGE UP (ref 11.5–15.5)
LYMPHOCYTES # BLD AUTO: 2.3 K/UL — SIGNIFICANT CHANGE UP (ref 1–3.3)
LYMPHOCYTES # BLD AUTO: 31.9 % — SIGNIFICANT CHANGE UP (ref 13–44)
MCHC RBC-ENTMCNC: 30.4 PG — SIGNIFICANT CHANGE UP (ref 27–34)
MCHC RBC-ENTMCNC: 31.9 G/DL — LOW (ref 32–36)
MCV RBC AUTO: 95.3 FL — SIGNIFICANT CHANGE UP (ref 80–100)
MONOCYTES # BLD AUTO: 0.5 K/UL — SIGNIFICANT CHANGE UP (ref 0–0.9)
MONOCYTES NFR BLD AUTO: 7.3 % — SIGNIFICANT CHANGE UP (ref 2–14)
NEUTROPHILS # BLD AUTO: 4.2 K/UL — SIGNIFICANT CHANGE UP (ref 1.8–7.4)
NEUTROPHILS NFR BLD AUTO: 57.8 % — SIGNIFICANT CHANGE UP (ref 43–77)
PLATELET # BLD AUTO: 217 K/UL — SIGNIFICANT CHANGE UP (ref 150–400)
RBC # BLD: 4.47 M/UL — SIGNIFICANT CHANGE UP (ref 3.8–5.2)
RBC # FLD: 12.6 % — SIGNIFICANT CHANGE UP (ref 10.3–14.5)
WBC # BLD: 7.3 K/UL — SIGNIFICANT CHANGE UP (ref 3.8–10.5)
WBC # FLD AUTO: 7.3 K/UL — SIGNIFICANT CHANGE UP (ref 3.8–10.5)

## 2021-07-07 PROCEDURE — 99072 ADDL SUPL MATRL&STAF TM PHE: CPT

## 2021-07-07 PROCEDURE — 99215 OFFICE O/P EST HI 40 MIN: CPT

## 2021-07-07 NOTE — ASSESSMENT
[FreeTextEntry1] : 48 yo female with a stage II A right Invasive ductal Carcinoma T1c N1a  ER 90-95% ND 15-20%, Her 2 negative s/p Bilateral Mastectomy and Right axillary dissection with 0/9LN involved. Initial Dora LN biopsy with 2/3 LN involved.  \par Oncotype testing done with a score of 17 with a 9 yr risk of recurrence of 15% with /  vines alone, no apparent benefit with chemotherapy.\par Mammaprint with low Risk \par \par Patient understands natural history of the disease, as well as management options. \par Patient understand that the treatment of breast cancer is 2 fold to prevent recurrence in the breast as well as distant spread. \par - With the b/l  mastectomy, there is < 3% chance of recurrence in the breast ( theoretical risk of minimal  breast tissue left behind) \par - Further treatment is to prevent distant recurrence. \par - Oncotype at 17, mamprint done which was low risk of recurrence\par - Patient on  Anastrozole since March 2020 \par - Completed RT to chest on 5/18/20\par -  Lifestyle modification weight loss, alcohol,  soy supplementation \par - s/p B/l Mastectomy, no role for mammograms\par \par Continue Anastrozole , no severe AE, Will order labs to monitor LFTs \par -DEXA SCAN 3/6/21: NORMAL \par - Labs  CBC/ CMP/ VIt D\par - continue calcium/ vit d\par RTC in 3-4 months

## 2021-07-07 NOTE — RESULTS/DATA
[FreeTextEntry1] : \par 11/18/ 18 Breast Biopsy ( Right, 11:00 , 5 cm from the 0., Ultrasound guided needle cores)\par - Infiltrating ductal carcinoma, well differentiated\par - no definitive lymphovascular invasion\par -No IN SITU component identified\par -Largest contiguous focus 1.1 cm\par - adjacent region containing aggregates of ducts with columnar cell change an columnar cell hyperplasia\par  HER2/ELADIO by IHC 1+ / Negative\par ER : Positive 90-95%- strong\par CO -Positive ,15-20% strong \par \par Final pathology demonstrated 1.3cm IDC grade 1 with infiltrating carcinoma extending to the cauterized anterior margin. 2/3 sentinel lymph nodes with metastatic carcinoma with the largest measuring 3mm and extracapsular extension. The rest of the pathology was reviewed in detail demonstrating benign findings and a copy was provided to her. \par \par 1/3/2020 final pathology\par Right axillary dissection: Nine lymph nodes, negative for metastatic carcinoma.\par

## 2021-07-07 NOTE — HISTORY OF PRESENT ILLNESS
[de-identified] : Right Breast:St IIA  IDC  pT1c pN1a ( 2/3 LN involved in Sentinal LN Biopsy) \par Subsequent Right axillary Dissection 0/9 LN Involved\par S/p RT with Dr Caldwell completed on 5/18/20 \par Er 90-95% Pr 15-20 % Her 2 negative\par GENETIC TESTING : Genetic testing: MYRISK panel test was negative, VUS in the MSH3 gene. \par ONcotype SCORE:  17 No benefit of chemotherapy\par Mammaprint: Low risk \par March 2020- current: Anastrozole\par --Bilateral Mastectomy with RIght SLNB (12/5/19)  followed by right axilla dissection (1/3/2020). \par Surgeon: Elizabeth Riley\par \par THis is a patient with a h/o Endometroid Carcinoma (Low grade serous) s/p BASILIA in May 2019 diagnosed at 49 yo \par Patient was following up with Dr Yarbrough for Endometroid Carcinoma \par Patient had MRI of the breast 10/15/2019 which demonstrated the right breast cancer to measure 1.4cm without adenopathy. No MR evidence of multicentric or contralateral disease. BIRADS 6\par \par She underwent bilateral mastectomy and right sentinel lymph node biopsy for right breast cancer on 12/5/2019. \par Final pathology demonstrated 1.3cm IDC grade 1 with infiltrating carcinoma extending to the cauterized anterior margin. 2/3 sentinel lymph nodes with metastatic carcinoma with the largest measuring 3mm and extracapsular extension. She then underwent right axilla dissection on 1/3/2019 and 0/9 lymph nodes were negative for carcinoma.\par \par Oncotype testing was done with a score of 17 with a 9 yr risk of recurrence of 15% with /  vines alone, no apparent benefit with chemo\par \par S/p RT with Dr Caldwell completed on 5/18/20 \par Had a CT scan in Sept 2020 Had rt recall post IV dye with CT chest\par   [de-identified] : \par Patient is here for a follow up- on Anastrozole daily since March 2020 \par Taking calcium/ VitD\par Tolerating well NO hot flashes/ No vaginal dryness/ no arthritis\par Feels well. \par \par  Dexa scan 3/6/21: NORMAL\par \par \par

## 2021-07-08 LAB
25(OH)D3 SERPL-MCNC: 54.7 NG/ML
ALBUMIN SERPL ELPH-MCNC: 4.5 G/DL
ALP BLD-CCNC: 87 U/L
ALT SERPL-CCNC: 23 U/L
ANION GAP SERPL CALC-SCNC: 16 MMOL/L
AST SERPL-CCNC: 25 U/L
BILIRUB SERPL-MCNC: 0.3 MG/DL
BUN SERPL-MCNC: 16 MG/DL
CALCIUM SERPL-MCNC: 10.4 MG/DL
CHLORIDE SERPL-SCNC: 102 MMOL/L
CO2 SERPL-SCNC: 25 MMOL/L
CREAT SERPL-MCNC: 0.93 MG/DL
GLUCOSE SERPL-MCNC: 100 MG/DL
POTASSIUM SERPL-SCNC: 4.2 MMOL/L
PROT SERPL-MCNC: 7.3 G/DL
SODIUM SERPL-SCNC: 142 MMOL/L

## 2021-07-27 ENCOUNTER — OUTPATIENT (OUTPATIENT)
Dept: OUTPATIENT SERVICES | Facility: HOSPITAL | Age: 51
LOS: 1 days | End: 2021-07-27
Payer: COMMERCIAL

## 2021-07-27 ENCOUNTER — APPOINTMENT (OUTPATIENT)
Dept: CT IMAGING | Facility: CLINIC | Age: 51
End: 2021-07-27
Payer: COMMERCIAL

## 2021-07-27 DIAGNOSIS — Z98.890 OTHER SPECIFIED POSTPROCEDURAL STATES: Chronic | ICD-10-CM

## 2021-07-27 DIAGNOSIS — Z90.13 ACQUIRED ABSENCE OF BILATERAL BREASTS AND NIPPLES: Chronic | ICD-10-CM

## 2021-07-27 DIAGNOSIS — Z90.710 ACQUIRED ABSENCE OF BOTH CERVIX AND UTERUS: Chronic | ICD-10-CM

## 2021-07-27 DIAGNOSIS — Z93.3 COLOSTOMY STATUS: Chronic | ICD-10-CM

## 2021-07-27 DIAGNOSIS — C56.9 MALIGNANT NEOPLASM OF UNSPECIFIED OVARY: ICD-10-CM

## 2021-07-27 PROCEDURE — 74177 CT ABD & PELVIS W/CONTRAST: CPT | Mod: 26

## 2021-07-27 PROCEDURE — 74177 CT ABD & PELVIS W/CONTRAST: CPT

## 2021-07-29 NOTE — ED ADULT TRIAGE NOTE - WEIGHT METHOD
Cornel    It was a pleasure seeing you in clinic today.  Here's the plan:    1. Abdominal pain - left lower quadrant of the abdomen.  Likely muscle strain given that pain is worse in certain positions.  Home exercises.  If no improvement within the next week, set up CT scan of abdomen (ordered already).  In addition to this, you should set up your colonoscopy for colon cancer screening.    Let me know if you have questions.    Dejon Conrad MD    
stated

## 2021-08-02 ENCOUNTER — APPOINTMENT (OUTPATIENT)
Dept: HEMATOLOGY ONCOLOGY | Facility: CLINIC | Age: 51
End: 2021-08-02

## 2021-08-04 ENCOUNTER — OUTPATIENT (OUTPATIENT)
Dept: OUTPATIENT SERVICES | Facility: HOSPITAL | Age: 51
LOS: 1 days | Discharge: HOME | End: 2021-08-04

## 2021-08-04 DIAGNOSIS — Z93.3 COLOSTOMY STATUS: Chronic | ICD-10-CM

## 2021-08-04 DIAGNOSIS — Z98.890 OTHER SPECIFIED POSTPROCEDURAL STATES: Chronic | ICD-10-CM

## 2021-08-04 DIAGNOSIS — C50.919 MALIGNANT NEOPLASM OF UNSPECIFIED SITE OF UNSPECIFIED FEMALE BREAST: ICD-10-CM

## 2021-08-04 DIAGNOSIS — Z90.710 ACQUIRED ABSENCE OF BOTH CERVIX AND UTERUS: Chronic | ICD-10-CM

## 2021-08-04 DIAGNOSIS — Z90.13 ACQUIRED ABSENCE OF BILATERAL BREASTS AND NIPPLES: Chronic | ICD-10-CM

## 2021-08-05 ENCOUNTER — RESULT REVIEW (OUTPATIENT)
Age: 51
End: 2021-08-05

## 2021-08-05 ENCOUNTER — APPOINTMENT (OUTPATIENT)
Dept: HEMATOLOGY ONCOLOGY | Facility: CLINIC | Age: 51
End: 2021-08-05
Payer: COMMERCIAL

## 2021-08-05 VITALS
HEART RATE: 75 BPM | SYSTOLIC BLOOD PRESSURE: 131 MMHG | TEMPERATURE: 98.2 F | WEIGHT: 181.03 LBS | DIASTOLIC BLOOD PRESSURE: 84 MMHG | BODY MASS INDEX: 34.21 KG/M2

## 2021-08-05 LAB
BASOPHILS # BLD AUTO: 0.02 K/UL — SIGNIFICANT CHANGE UP (ref 0–0.2)
BASOPHILS NFR BLD AUTO: 0.4 % — SIGNIFICANT CHANGE UP (ref 0–2)
EOSINOPHIL # BLD AUTO: 0.09 K/UL — SIGNIFICANT CHANGE UP (ref 0–0.5)
EOSINOPHIL NFR BLD AUTO: 1.6 % — SIGNIFICANT CHANGE UP (ref 0–6)
HCT VFR BLD CALC: 39 % — SIGNIFICANT CHANGE UP (ref 34.5–45)
HGB BLD-MCNC: 12.9 G/DL — SIGNIFICANT CHANGE UP (ref 11.5–15.5)
IMM GRANULOCYTES NFR BLD AUTO: 0.4 % — SIGNIFICANT CHANGE UP (ref 0–1.5)
LYMPHOCYTES # BLD AUTO: 1.83 K/UL — SIGNIFICANT CHANGE UP (ref 1–3.3)
LYMPHOCYTES # BLD AUTO: 32 % — SIGNIFICANT CHANGE UP (ref 13–44)
MCHC RBC-ENTMCNC: 30.9 PG — SIGNIFICANT CHANGE UP (ref 27–34)
MCHC RBC-ENTMCNC: 33.1 GM/DL — SIGNIFICANT CHANGE UP (ref 32–36)
MCV RBC AUTO: 93.5 FL — SIGNIFICANT CHANGE UP (ref 80–100)
MONOCYTES # BLD AUTO: 0.39 K/UL — SIGNIFICANT CHANGE UP (ref 0–0.9)
MONOCYTES NFR BLD AUTO: 6.8 % — SIGNIFICANT CHANGE UP (ref 2–14)
NEUTROPHILS # BLD AUTO: 3.36 K/UL — SIGNIFICANT CHANGE UP (ref 1.8–7.4)
NEUTROPHILS NFR BLD AUTO: 58.8 % — SIGNIFICANT CHANGE UP (ref 43–77)
NRBC # BLD: 0 /100 WBCS — SIGNIFICANT CHANGE UP (ref 0–0)
PLATELET # BLD AUTO: 223 K/UL — SIGNIFICANT CHANGE UP (ref 150–400)
RBC # BLD: 4.17 M/UL — SIGNIFICANT CHANGE UP (ref 3.8–5.2)
RBC # FLD: 13.2 % — SIGNIFICANT CHANGE UP (ref 10.3–14.5)
WBC # BLD: 5.71 K/UL — SIGNIFICANT CHANGE UP (ref 3.8–10.5)
WBC # FLD AUTO: 5.71 K/UL — SIGNIFICANT CHANGE UP (ref 3.8–10.5)

## 2021-08-05 PROCEDURE — 99215 OFFICE O/P EST HI 40 MIN: CPT

## 2021-08-05 NOTE — HISTORY OF PRESENT ILLNESS
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  She presented to [facility] ED on 4/23/19 s/p mechanical fall outside her place of work.  A CT A/P incidentally showed a complex cystic left adnexal mass measuring 17 cm. Massive cystic lesion occupying the lower abdomen measuring 33 cm. It was uncertain whether this represents a large peritoneal metastasis or a component of the above described cystic left adnexal mass, or possibly a right adnexal mass. There was suspicion for rupture of this mass with a large amount of abdominal ascites.  On 5/7/19 Dr. Manpreet Gonzalez performed a BASILIA, BSO, debulking and peritonectomy.  Pathology initially was benign.  An addendum was later issued which showed low grade endometrioid adenocarcinoma, FIGO grade 1, with focal sex cord like differentiation, arising in a background of endometriosis, and involving the uterine serosa.  She was discharged on 5/12/19 but returned to the ED on 5/15/19 c/o abdominal distention, N/V, dizziness, weakness, multiple falls and a fever of 104.  She was found to be hypotensive and tachycardic.  A CT A/P showed a large fluid collection which appears to be extraperitoneal layering anterior to the omentum measuring 24.7 x 9.4 x 20.9 cm. There is a component of the collection which extends posteriorly on the left into the retroperitoneum along the anterior aspect of the psoas extending to the posterior aspect of the splenic flexure (approximately 20 cm in craniocaudad dimension). On 5/17/19 Dr. Mague Durand performed an emergent exploratory laparotomy, sigmoidectomy and end colostomy.  Pathology was benign.   [de-identified] : FHx of DCIS (sister) [de-identified] : Patient called for follow-up visit via telephone due to snowstorm. She is s/p BASILIA BSO, debulking and peritonectomy on 5/7/19 with Dr. Manpreet Gonzalez due to extensive endometrioid adenocarcinoma, with a delayed leak from the sigmoid requiring bowel resection and end-colostomy by Dr. Durand.  No adjuvant treatment was recommended and she is s/p colostomy reversal. Patient currently without complaints. Denies any pain, abdominal or vaginal.  No vaginal bleeding.  No N/VD/C.  Appetite is normal. No fever, no cough, no SOB.\par \par Diagnosed with right breast cancer in September 2019, ER+ DE+ HER2-.  Followed by Dr. Sellers.

## 2021-08-05 NOTE — RESULTS/DATA
[FreeTextEntry1] : 7/27/21 CT A/P: Unchanged high density nodules along the anterior capsule of the liver and lateral capsular surface of the spleen. These are indeterminate for peritoneal implants. Continued attention on follow-up imaging is recommended.\par *  No new sites of disease in the abdomen and pelvis.\par \par 1/28/21 PET:  Bilateral mastectomy with reconstruction. Unchanged nonspecific asymmetric, minimal diffuse FDG avidity with skin thickening in the reconstructed right breast as compared to PET/CT from 3/4/2020. Please correlate clinically. Interval resolution of FDG avid nodular focus in the left anterior abdominal wall, likely secondary to postsurgical changes. Unchanged nonspecific approximately symmetric bilateral tonsillar hypermetabolism.\par \par 9/16/20 CT:  1.6 x 0.7 cm perisplenic soft tissue and 5 x 2 mm region of increased enhancement either in the periphery of segment IVb of the liver or perihepatic which are unchanged when compared with 5/4/2020. The perisplenic region is also stable when compared with 3/4/2020 and no increased FDG uptake was noted at that time. Small stable residual implants cannot be excluded. No new disease or interval growth is seen. Status post bilateral mastectomies with reconstructive surgery. Region of mild nodularity in the right reconstructed breast which is more prominent when compared with 3/4/2020. This may represent granulomatous or fibrotic tissue.\par \par 5/5/20 CT: MALDONADO\par \par 2/4/2020 CT A/P: Sigmoid resection with anastomosis without evidence of obstruction. Small pericapsular splenic fluid collection which is decreased from prior study. \par No evidence of metastatic disease.\par \par 10/15/19 MRI Breast:\par 1.4 cm abnormal enhancement/biopsy clip in the upper outer posterior right breast, corresponding to newly diagnosed malignancy. No MRI evidence of multicentric or contralateral disease. \par RECOMMENDATION: Surgical or oncologic management. \par BI-RADS 6- Known Biopsy-Proven Malignancy \par \par 9/23/19 Pathology: \par Right breast, "11:00, 5 cm from the nipple", ultrasound guided core needle biopsy:\par -Invasive ductal carcinoma, grade 1.\par -See note.\par Note: Immunohistochemical stains for p63 and calponin are performed on block 1-B at [facility] and interpreted at [facility] as follows:\par Calponin, p63: negative\par Microscopic evaluation reveals an invasive ductal carcinoma (Lizzette score 1+2+1), grade 1. The carcinoma measures at least 1.1 cm in its greatest linear expansion in this biopsy. \par There are no calcifications nor necrosis present.\par *Addendum*\par IMMUNOSTAINS PERFORMED AND INTERPRETED ON BLOCK " B " BY [facility],\par HER2/ELADIO BY IHC: 1 + / Negative\par ER: Positive, 90 - 95 %, strong\par IA: Positive, 15 - 20 %, strong\par \par 9/23/19 Mammo: Status post core needle biopsy of the right breast with clip placement. Recommendations for further followup will be based on pathology results.\par \par 9/19/19 US Breast/Mammo: Ill-defined hypoechoic area in the right breast at the 11:00 axis, corresponding to mammographically seen distortion. Ultrasound-guided core biopsy is advised.\par \par 5/15/19 Pathology:\par Intra-abdominal foreign body, removal:  Blood clots\par Sigmoid, resection:  Mucosal necrosis with transmural acute and chronic inflammation.  The process extends to one margin of resection (slide 1A).  Three (3) lymph nodes, one with benign glandular inclusion.\par \par 5/15/19 CT A/P:\par Moderate amount retained fecal material in the colon, greatest in the right hemicolon. Mild wall thickening involving the distal transverse colon, descending colon, and proximal sigmoid colon, possibly reactive to the large collection.  Large fluid collection which appears to be extraperitoneal layering anterior to the omentum measuring 24.7 x 9.4 x \par 20.9 cm (craniocaudad x anteroposterior x transverse). There is a component of the collection which extends posteriorly on the left into the retroperitoneum along the anterior aspect of the psoas extending to the posterior aspect of the splenic flexure (approximately 20 cm in craniocaudad dimension). Skin staples are seen within the anterior abdominal wall. Air is seen within the anterior abdominal wall which may be postoperative in etiology.\par \par 5/8/19 Cytopathology:\par Abdominal fluid:  NEGATIVE FOR MALIGNANT CELLS.  Mostly fibrin and reactive mesothelial cells.\par \par 5/7/19 Pathology:\par Omental lymph node: Markedly reactive, hemorrhagic lymph node with hemosiderin laden macrophages.\par Cyst wall(1): Hemorrhagic inflamed cyst wall without epithelial lining.\par Right hepatic flexure lymph node: Markedly reactive, hemorrhagic lymph node with hemosiderin laden macrophages.\par Omentum: Omental fat with marked reactive fibrosis, hemorrhage and chronic inflammation; marked serosal adhesions.\par Right pelvic lymph node: Markedly reactive, hemorrhagic lymph node with hemosiderin laden macrophages.\par Left pelvic lymph node: Markedly reactive, hemorrhagic lymph node with hemosiderin laden macrophages.\par Cyst wall(2):  Cyst wall, markedly inflamed and hemorrhagic with cholesterol crystals and without epithelial lining.\par ***Uterus, cervix and adnexa:  Histologically unremarkable cervix. Weakly proliferative endometrium and endometrial polyp. Cysts located outside the uterus, involving serosa, with glandular stromal structures, consistent with endometriosis.\par ****Addendum****\par Case sent for outside consultation at [facility] [place] with Dr. Tina Mckay.\par The atypical clusters of glands, in part 8. "Uterus, cervix and adnexa", have been diagnosed as Low grade endometrioid adenocarcinoma, FIGO grade 1,  with focal sex cord like differentiation, arising in a background of endometriosis, and involving the uterine serosa.\par \par 4/23/19 CT C/A/P:\par There is a large complex cystic mass occupying the lower abdomen, measuring 21.0 (AP) x 24.2 (CC) x 33.1 (TR) cm.  The exact origin of this mass is uncertain. The left posterior wall of this mass is discontinuous, suggestive of rupture. There is a large amount of abdominal ascites.  Additional complex left adnexal cystic mass with septations and apparent mural nodules measuring 9.4 (AP) x 15.5 (CC) x 16.6 (TR) cm.

## 2021-08-05 NOTE — PHYSICAL EXAM
[Restricted in physically strenuous activity but ambulatory and able to carry out work of a light or sedentary nature] : Status 1- Restricted in physically strenuous activity but ambulatory and able to carry out work of a light or sedentary nature, e.g., light house work, office work [Normal] : affect appropriate [de-identified] : s/p bilateral mastectomy with well-healed incisions [de-identified] : well-healed incision both  and vertical midline [de-identified] : 1+ RUE edema

## 2021-08-06 DIAGNOSIS — E04.1 NONTOXIC SINGLE THYROID NODULE: ICD-10-CM

## 2021-08-06 DIAGNOSIS — C50.911 MALIGNANT NEOPLASM OF UNSPECIFIED SITE OF RIGHT FEMALE BREAST: ICD-10-CM

## 2021-08-06 DIAGNOSIS — E55.9 VITAMIN D DEFICIENCY, UNSPECIFIED: ICD-10-CM

## 2021-08-06 DIAGNOSIS — C56.9 MALIGNANT NEOPLASM OF UNSPECIFIED OVARY: ICD-10-CM

## 2021-08-06 LAB
ALBUMIN SERPL ELPH-MCNC: 4.4 G/DL — SIGNIFICANT CHANGE UP (ref 3.3–5)
ALP SERPL-CCNC: 84 U/L — SIGNIFICANT CHANGE UP (ref 40–120)
ALT FLD-CCNC: 20 U/L — SIGNIFICANT CHANGE UP (ref 10–45)
ANION GAP SERPL CALC-SCNC: 12 MMOL/L — SIGNIFICANT CHANGE UP (ref 5–17)
AST SERPL-CCNC: 22 U/L — SIGNIFICANT CHANGE UP (ref 10–40)
BILIRUB SERPL-MCNC: 0.2 MG/DL — SIGNIFICANT CHANGE UP (ref 0.2–1.2)
BUN SERPL-MCNC: 11 MG/DL — SIGNIFICANT CHANGE UP (ref 7–23)
CALCIUM SERPL-MCNC: 10 MG/DL — SIGNIFICANT CHANGE UP (ref 8.4–10.5)
CHLORIDE SERPL-SCNC: 104 MMOL/L — SIGNIFICANT CHANGE UP (ref 96–108)
CO2 SERPL-SCNC: 26 MMOL/L — SIGNIFICANT CHANGE UP (ref 22–31)
CREAT SERPL-MCNC: 0.77 MG/DL — SIGNIFICANT CHANGE UP (ref 0.5–1.3)
GLUCOSE SERPL-MCNC: 107 MG/DL — HIGH (ref 70–99)
MAGNESIUM SERPL-MCNC: 2.2 MG/DL — SIGNIFICANT CHANGE UP (ref 1.6–2.6)
POTASSIUM SERPL-MCNC: 4 MMOL/L — SIGNIFICANT CHANGE UP (ref 3.5–5.3)
POTASSIUM SERPL-SCNC: 4 MMOL/L — SIGNIFICANT CHANGE UP (ref 3.5–5.3)
PROT SERPL-MCNC: 7.3 G/DL — SIGNIFICANT CHANGE UP (ref 6–8.3)
SODIUM SERPL-SCNC: 142 MMOL/L — SIGNIFICANT CHANGE UP (ref 135–145)

## 2021-08-17 ENCOUNTER — NON-APPOINTMENT (OUTPATIENT)
Age: 51
End: 2021-08-17

## 2021-08-17 ENCOUNTER — APPOINTMENT (OUTPATIENT)
Dept: RADIATION ONCOLOGY | Facility: CLINIC | Age: 51
End: 2021-08-17
Payer: COMMERCIAL

## 2021-08-17 VITALS
HEIGHT: 61 IN | WEIGHT: 180 LBS | OXYGEN SATURATION: 97 % | RESPIRATION RATE: 16 BRPM | SYSTOLIC BLOOD PRESSURE: 128 MMHG | HEART RATE: 72 BPM | TEMPERATURE: 98.1 F | BODY MASS INDEX: 33.99 KG/M2 | DIASTOLIC BLOOD PRESSURE: 86 MMHG

## 2021-08-17 PROCEDURE — 99214 OFFICE O/P EST MOD 30 MIN: CPT

## 2021-08-17 RX ORDER — OXYCODONE 5 MG/1
5 TABLET ORAL
Qty: 10 | Refills: 0 | Status: COMPLETED | COMMUNITY
Start: 2021-03-11 | End: 2021-08-17

## 2021-08-17 NOTE — HISTORY OF PRESENT ILLNESS
[FreeTextEntry1] : 51 year old woman with IDC right breast s/p bilateral mastectomy, PMRT completed 5/18/20.  She is also on anastrozole.\par Also history of endometrioid adenocarcinoma of the ovary arising from background of endometriosis, s/p BASILIA/BSO, tumor debulking and peritonectomy.\par \par Reports overall stable health.\par Denies bothersome hot flashes on AI.  Stable joint aches.\par \par CT abdomen & pelvis 7/27/21:\par - Unchanged high density nodules along the anterior capsule of the liver and lateral capsular surface of the spleen. These are indeterminate for peritoneal implants. Continued attention on follow-up imaging is recommended.\par - No new sites of disease in the abdomen and pelvis.\par \par Dr. Lagos\par Dr. Riley\par Dr. Gonzalez \par Dr. Yarbrough \par

## 2021-08-17 NOTE — PHYSICAL EXAM
[Extraocular Movements] : extraocular movements were intact [Outer Ear] : the ears and nose were normal in appearance [Breast Palpation Mass] : no palpable masses [No UE Edema] : there is no upper extremity edema [Abdomen Soft] : soft [Bowel Sounds] : normal bowel sounds [Normal] : no palpable adenopathy [Cervical Lymph Nodes Enlarged Anterior Bilaterally] : anterior cervical [Supraclavicular Lymph Nodes Enlarged Bilaterally] : supraclavicular [Axillary Lymph Nodes Enlarged Bilaterally] : axillary [Range of Motion to Joints] : range of motion to joints [Motor Tone] : muscle strength and tone were normal [Motor Exam] : the motor exam was normal [de-identified] : mild induration of the right reconstruction

## 2021-09-21 ENCOUNTER — APPOINTMENT (OUTPATIENT)
Dept: BREAST CENTER | Facility: CLINIC | Age: 51
End: 2021-09-21
Payer: COMMERCIAL

## 2021-09-21 ENCOUNTER — APPOINTMENT (OUTPATIENT)
Dept: SURGERY | Facility: CLINIC | Age: 51
End: 2021-09-21

## 2021-09-21 VITALS
HEIGHT: 61 IN | TEMPERATURE: 98.3 F | WEIGHT: 181 LBS | HEART RATE: 67 BPM | DIASTOLIC BLOOD PRESSURE: 94 MMHG | BODY MASS INDEX: 34.17 KG/M2 | SYSTOLIC BLOOD PRESSURE: 155 MMHG | OXYGEN SATURATION: 97 %

## 2021-09-21 PROCEDURE — 99213 OFFICE O/P EST LOW 20 MIN: CPT

## 2021-09-21 NOTE — HISTORY OF PRESENT ILLNESS
[FreeTextEntry1] : I had the pleasure of seeing Sera Francisco in the office for a followup exam for her diagnosed and treated right breast cancer.\par \par Sera is a micheal 50 yo premenopausal female. She was diagnosed with right breast cancer in 10/2019. \par She underwent MRI of the breast 10/15/2019 which demonstrated the right breast cancer to measure 1.4 cm without adenopathy. No MR evidence of multicentric or contralateral disease. BIRADS 6\par \par Oncotype score: 17\par Hormonal therapy:  Anastrozole 3/2020\par PMRT completed \par \par She underwent bilateral mastectomy and right sentinel lymph node biopsy for right breast cancer on 11/25/2019.  Final pathology demonstrated 1.3cm IDC grade 1 with infiltrating carcinoma extending to the cauterized anterior margin. 2/3 sentinel lymph nodes with metastatic carcinoma with the largest measuring 3mm and extracapsular extension.  She then underwent right axilla dissection on 1/3/2020 and 0/9 lymph nodes were negative for carcinoma.\par \par Genetic testing: allyDVMSK panel test was negative, however there was noted a VUS in the MSH3 gene. Further clarification on this variance will be obtained from the updated test as soon as it is available.\par \par 9/24/2019 Breast Biopsy ( Right, 11:00 , 5 cm from the 0., Ultrasound guided needle cores)\par - Infiltrating ductal carcinoma, well differentiated\par - no definitive lymphovascular invasion\par -No IN SITU component identified\par -Largest contiguous focus 1.1 cm\par - adjacent region containing aggregates of ducts with columnar cell change an columnar cell hyperplasia\par  HER2/ELADIO by IHC 1+ / Negative\par ER : Positive 90-95%- strong\par LA -Positive ,15-20% strong \par \par 1/3/2020 final pathology\par Right axillary dissection:  Nine lymph nodes, negative for metastatic carcinoma.\par \par 9/15/2020  CT Chest IC/ CT abdomen and pelvis OC IC\par IMPRESSION: 1.6 x 0.7 cm perisplenic soft tissue and 5 x 2 mm region of increased enhancement either in the periphery of segment IVb of the liver or perihepatic which are unchanged when compared with 5/4/2020. The perisplenic region is also stable when compared with 3/4/2020 and no increased FDG uptake was noted at that time. Small stable residual implants cannot be excluded. No new disease or interval growth is seen. Continued follow-up is recommended. \par Status post bilateral mastectomies with reconstructive surgery. Region of mild nodularity in the right reconstructed breast which is more prominent when compared with 3/4/2020. This may represent granulomatous or fibrotic tissue although clinical correlation is suggested.\par \par 1/28/2021 IMPRESSION: FDG-PET/CT scan demonstrates:\par 1. Bilateral mastectomy with reconstruction. Unchanged nonspecific asymmetric, minimal diffuse FDG avidity with skin thickening in the reconstructed right breast as compared to PET/CT from 3/4/2020. Please correlate clinically.\par 2. Interval resolution of FDG avid nodular focus in the left anterior abdominal wall, likely secondary to postsurgical changes.\par 3. Unchanged nonspecific approximately symmetric bilateral tonsillar hypermetabolism.\par \par Her clinical breast exam is benign today.  She is tolerating hormonal therapy well with no negative reports.  She will discuss with medical oncology on when her next restaging imaging will be done.  Recommendation for followup in 6 months.  \par \par \par All questions were answered.

## 2021-09-21 NOTE — ASSESSMENT
[FreeTextEntry1] : 50 yo female s/p bilateral mastectomy with right SLNB for 1.3cm IDC with 2/3 positive nodes. She underwent right axiliary lymph node dissection.  0/9 lymph nodes were negative for carcinoma.  Oncotype score of 17.  Tolerating hormonal therapy well.  Recommendation for follow up exam in 6 months.\par 1.  Follow up in 6 months\par 2.  Followup with medical oncology- hormonal therapy\par

## 2021-09-21 NOTE — PHYSICAL EXAM
[Normocephalic] : normocephalic [Atraumatic] : atraumatic [EOMI] : extra ocular movement intact [PERRL] : pupils equal, round and reactive to light [Sclera nonicteric] : sclera nonicteric [Supple] : supple [No Supraclavicular Adenopathy] : no supraclavicular adenopathy [Examined in the supine and seated position] : examined in the supine and seated position [No dominant masses] : no dominant masses in right breast  [No dominant masses] : no dominant masses left breast [No Nipple Retraction] : no left nipple retraction [No Nipple Discharge] : no left nipple discharge [Breast Nipple Inversion] : nipples not inverted [Breast Nipple Retraction] : nipples not retracted [Breast Nipple Flattening] : nipples not flattened [Breast Nipple Fissures] : nipples not fissured [Breast Abnormal Lactation (Galactorrhea)] : no galactorrhea [Breast Abnormal Secretion Bloody Fluid] : no bloody discharge [Breast Abnormal Secretion Opalescent Fluid] : no milky discharge [Breast Abnormal Secretion Serous Fluid] : no serous discharge [No Axillary Lymphadenopathy] : no left axillary lymphadenopathy [No Edema] : no edema [No Rashes] : no rashes [No Ulceration] : no ulceration [de-identified] : Mastectomy flap well healed. [de-identified] : Mastectomy flap well healed.

## 2021-09-28 ENCOUNTER — OUTPATIENT (OUTPATIENT)
Dept: OUTPATIENT SERVICES | Facility: HOSPITAL | Age: 51
LOS: 1 days | Discharge: HOME | End: 2021-09-28

## 2021-09-28 DIAGNOSIS — Z98.890 OTHER SPECIFIED POSTPROCEDURAL STATES: Chronic | ICD-10-CM

## 2021-09-28 DIAGNOSIS — Z90.13 ACQUIRED ABSENCE OF BILATERAL BREASTS AND NIPPLES: Chronic | ICD-10-CM

## 2021-09-28 DIAGNOSIS — Z93.3 COLOSTOMY STATUS: Chronic | ICD-10-CM

## 2021-09-28 DIAGNOSIS — C50.919 MALIGNANT NEOPLASM OF UNSPECIFIED SITE OF UNSPECIFIED FEMALE BREAST: ICD-10-CM

## 2021-09-28 DIAGNOSIS — Z90.710 ACQUIRED ABSENCE OF BOTH CERVIX AND UTERUS: Chronic | ICD-10-CM

## 2021-10-01 ENCOUNTER — RESULT REVIEW (OUTPATIENT)
Age: 51
End: 2021-10-01

## 2021-10-01 ENCOUNTER — APPOINTMENT (OUTPATIENT)
Dept: HEMATOLOGY ONCOLOGY | Facility: CLINIC | Age: 51
End: 2021-10-01
Payer: COMMERCIAL

## 2021-10-01 VITALS
HEIGHT: 61 IN | HEART RATE: 74 BPM | BODY MASS INDEX: 34.36 KG/M2 | DIASTOLIC BLOOD PRESSURE: 84 MMHG | WEIGHT: 182 LBS | SYSTOLIC BLOOD PRESSURE: 129 MMHG | OXYGEN SATURATION: 97 %

## 2021-10-01 LAB
BASOPHILS # BLD AUTO: 0.1 K/UL — SIGNIFICANT CHANGE UP (ref 0–0.2)
BASOPHILS NFR BLD AUTO: 0.8 % — SIGNIFICANT CHANGE UP (ref 0–2)
EOSINOPHIL # BLD AUTO: 0.1 K/UL — SIGNIFICANT CHANGE UP (ref 0–0.5)
EOSINOPHIL NFR BLD AUTO: 1.8 % — SIGNIFICANT CHANGE UP (ref 0–6)
HCT VFR BLD CALC: 42.6 % — SIGNIFICANT CHANGE UP (ref 34.5–45)
HGB BLD-MCNC: 13.3 G/DL — SIGNIFICANT CHANGE UP (ref 11.5–15.5)
LYMPHOCYTES # BLD AUTO: 2.1 K/UL — SIGNIFICANT CHANGE UP (ref 1–3.3)
LYMPHOCYTES # BLD AUTO: 32.7 % — SIGNIFICANT CHANGE UP (ref 13–44)
MCHC RBC-ENTMCNC: 30.2 PG — SIGNIFICANT CHANGE UP (ref 27–34)
MCHC RBC-ENTMCNC: 31.2 G/DL — LOW (ref 32–36)
MCV RBC AUTO: 96.8 FL — SIGNIFICANT CHANGE UP (ref 80–100)
MONOCYTES # BLD AUTO: 0.5 K/UL — SIGNIFICANT CHANGE UP (ref 0–0.9)
MONOCYTES NFR BLD AUTO: 7.6 % — SIGNIFICANT CHANGE UP (ref 2–14)
NEUTROPHILS # BLD AUTO: 3.7 K/UL — SIGNIFICANT CHANGE UP (ref 1.8–7.4)
NEUTROPHILS NFR BLD AUTO: 57.1 % — SIGNIFICANT CHANGE UP (ref 43–77)
PLATELET # BLD AUTO: 231 K/UL — SIGNIFICANT CHANGE UP (ref 150–400)
RBC # BLD: 4.4 M/UL — SIGNIFICANT CHANGE UP (ref 3.8–5.2)
RBC # FLD: 12.3 % — SIGNIFICANT CHANGE UP (ref 10.3–14.5)
WBC # BLD: 6.4 K/UL — SIGNIFICANT CHANGE UP (ref 3.8–10.5)
WBC # FLD AUTO: 6.4 K/UL — SIGNIFICANT CHANGE UP (ref 3.8–10.5)

## 2021-10-01 PROCEDURE — 99215 OFFICE O/P EST HI 40 MIN: CPT

## 2021-10-01 NOTE — ASSESSMENT
[Curative] : Goals of care discussed with patient: Curative [FreeTextEntry1] : -F/u with Kurt in February

## 2021-10-01 NOTE — RESULTS/DATA
[FreeTextEntry1] : 7/27/21 CT A/P: Unchanged high density nodules along the anterior capsule of the liver and lateral capsular surface of the spleen. These are indeterminate for peritoneal implants. Continued attention on follow-up imaging is recommended.\par *  No new sites of disease in the abdomen and pelvis.\par \par 1/28/21 PET:  Bilateral mastectomy with reconstruction. Unchanged nonspecific asymmetric, minimal diffuse FDG avidity with skin thickening in the reconstructed right breast as compared to PET/CT from 3/4/2020. Please correlate clinically. Interval resolution of FDG avid nodular focus in the left anterior abdominal wall, likely secondary to postsurgical changes. Unchanged nonspecific approximately symmetric bilateral tonsillar hypermetabolism.\par \par 9/16/20 CT:  1.6 x 0.7 cm perisplenic soft tissue and 5 x 2 mm region of increased enhancement either in the periphery of segment IVb of the liver or perihepatic which are unchanged when compared with 5/4/2020. The perisplenic region is also stable when compared with 3/4/2020 and no increased FDG uptake was noted at that time. Small stable residual implants cannot be excluded. No new disease or interval growth is seen. Status post bilateral mastectomies with reconstructive surgery. Region of mild nodularity in the right reconstructed breast which is more prominent when compared with 3/4/2020. This may represent granulomatous or fibrotic tissue.\par \par 5/5/20 CT: MALDONADO\par \par 2/4/2020 CT A/P: Sigmoid resection with anastomosis without evidence of obstruction. Small pericapsular splenic fluid collection which is decreased from prior study. \par No evidence of metastatic disease.\par \par 10/15/19 MRI Breast:\par 1.4 cm abnormal enhancement/biopsy clip in the upper outer posterior right breast, corresponding to newly diagnosed malignancy. No MRI evidence of multicentric or contralateral disease. \par RECOMMENDATION: Surgical or oncologic management. \par BI-RADS 6- Known Biopsy-Proven Malignancy \par \par 9/23/19 Pathology: \par Right breast, "11:00, 5 cm from the nipple", ultrasound guided core needle biopsy:\par -Invasive ductal carcinoma, grade 1.\par -See note.\par Note: Immunohistochemical stains for p63 and calponin are performed on block 1-B at [facility] and interpreted at [place] as follows:\par Calponin, p63: negative\par Microscopic evaluation reveals an invasive ductal carcinoma (Lizzette score 1+2+1), grade 1. The carcinoma measures at least 1.1 cm in its greatest linear expansion in this biopsy. \par There are no calcifications nor necrosis present.\par *Addendum*\par IMMUNOSTAINS PERFORMED AND INTERPRETED ON BLOCK " B " BY [facility],\par HER2/ELADIO BY IHC: 1 + / Negative\par ER: Positive, 90 - 95 %, strong\par DE: Positive, 15 - 20 %, strong\par \par 9/23/19 Mammo: Status post core needle biopsy of the right breast with clip placement. Recommendations for further followup will be based on pathology results.\par \par 9/19/19 US Breast/Mammo: Ill-defined hypoechoic area in the right breast at the 11:00 axis, corresponding to mammographically seen distortion. Ultrasound-guided core biopsy is advised.\par \par 5/15/19 Pathology:\par Intra-abdominal foreign body, removal:  Blood clots\par Sigmoid, resection:  Mucosal necrosis with transmural acute and chronic inflammation.  The process extends to one margin of resection (slide 1A).  Three (3) lymph nodes, one with benign glandular inclusion.\par \par 5/15/19 CT A/P:\par Moderate amount retained fecal material in the colon, greatest in the right hemicolon. Mild wall thickening involving the distal transverse colon, descending colon, and proximal sigmoid colon, possibly reactive to the large collection.  Large fluid collection which appears to be extraperitoneal layering anterior to the omentum measuring 24.7 x 9.4 x \par 20.9 cm (craniocaudad x anteroposterior x transverse). There is a component of the collection which extends posteriorly on the left into the retroperitoneum along the anterior aspect of the psoas extending to the posterior aspect of the splenic flexure (approximately 20 cm in craniocaudad dimension). Skin staples are seen within the anterior abdominal wall. Air is seen within the anterior abdominal wall which may be postoperative in etiology.\par \par 5/8/19 Cytopathology:\par Abdominal fluid:  NEGATIVE FOR MALIGNANT CELLS.  Mostly fibrin and reactive mesothelial cells.\par \par 5/7/19 Pathology:\par Omental lymph node: Markedly reactive, hemorrhagic lymph node with hemosiderin laden macrophages.\par Cyst wall(1): Hemorrhagic inflamed cyst wall without epithelial lining.\par Right hepatic flexure lymph node: Markedly reactive, hemorrhagic lymph node with hemosiderin laden macrophages.\par Omentum: Omental fat with marked reactive fibrosis, hemorrhage and chronic inflammation; marked serosal adhesions.\par Right pelvic lymph node: Markedly reactive, hemorrhagic lymph node with hemosiderin laden macrophages.\par Left pelvic lymph node: Markedly reactive, hemorrhagic lymph node with hemosiderin laden macrophages.\par Cyst wall(2):  Cyst wall, markedly inflamed and hemorrhagic with cholesterol crystals and without epithelial lining.\par ***Uterus, cervix and adnexa:  Histologically unremarkable cervix. Weakly proliferative endometrium and endometrial polyp. Cysts located outside the uterus, involving serosa, with glandular stromal structures, consistent with endometriosis.\par ****Addendum****\par Case sent for outside consultation at [facility] [place] with Dr. Tina Mckay.\par The atypical clusters of glands, in part 8. "Uterus, cervix and adnexa", have been diagnosed as Low grade endometrioid adenocarcinoma, FIGO grade 1,  with focal sex cord like differentiation, arising in a background of endometriosis, and involving the uterine serosa.\par \par 4/23/19 CT C/A/P:\par There is a large complex cystic mass occupying the lower abdomen, measuring 21.0 (AP) x 24.2 (CC) x 33.1 (TR) cm.  The exact origin of this mass is uncertain. The left posterior wall of this mass is discontinuous, suggestive of rupture. There is a large amount of abdominal ascites.  Additional complex left adnexal cystic mass with septations and apparent mural nodules measuring 9.4 (AP) x 15.5 (CC) x 16.6 (TR) cm.

## 2021-10-01 NOTE — PHYSICAL EXAM
[Restricted in physically strenuous activity but ambulatory and able to carry out work of a light or sedentary nature] : Status 1- Restricted in physically strenuous activity but ambulatory and able to carry out work of a light or sedentary nature, e.g., light house work, office work [Normal] : affect appropriate [de-identified] : s/p bilateral mastectomy with well-healed incisions [de-identified] : well-healed incision both  and vertical midline [de-identified] : 1+ RUE edema

## 2021-10-01 NOTE — REASON FOR VISIT
[Follow-Up Visit] : a follow-up [Parent] : parent [Family Member] : family member [FreeTextEntry2] :  endometrioid adenocarcinoma

## 2021-10-01 NOTE — HISTORY OF PRESENT ILLNESS
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  She presented to [facility] ED on 4/23/19 s/p mechanical fall outside her place of work.  A CT A/P incidentally showed a complex cystic left adnexal mass measuring 17 cm. Massive cystic lesion occupying the lower abdomen measuring 33 cm. It was uncertain whether this represents a large peritoneal metastasis or a component of the above described cystic left adnexal mass, or possibly a right adnexal mass. There was suspicion for rupture of this mass with a large amount of abdominal ascites.  On 5/7/19 Dr. Manpreet Gonzalez performed a BASILIA, BSO, debulking and peritonectomy.  Pathology initially was benign.  An addendum was later issued which showed low grade endometrioid adenocarcinoma, FIGO grade 1, with focal sex cord like differentiation, arising in a background of endometriosis, and involving the uterine serosa.  She was discharged on 5/12/19 but returned to the ED on 5/15/19 c/o abdominal distention, N/V, dizziness, weakness, multiple falls and a fever of 104.  She was found to be hypotensive and tachycardic.  A CT A/P showed a large fluid collection which appears to be extraperitoneal layering anterior to the omentum measuring 24.7 x 9.4 x 20.9 cm. There is a component of the collection which extends posteriorly on the left into the retroperitoneum along the anterior aspect of the psoas extending to the posterior aspect of the splenic flexure (approximately 20 cm in craniocaudad dimension). On 5/17/19 Dr. Mague Durand performed an emergent exploratory laparotomy, sigmoidectomy and end colostomy.  Pathology was benign.   [de-identified] : FHx of DCIS (sister) [de-identified] : She is s/p BASILIA BSO, debulking and peritonectomy on 5/7/19 with Dr. Manpreet Gonzalez due to extensive endometrioid adenocarcinoma, with a delayed leak from the sigmoid requiring bowel resection and end-colostomy by Dr. Durand.  No adjuvant treatment was recommended and she is s/p colostomy reversal.  Diagnosed with right breast cancer in September 2019, ER+ [place]+ HER2-.  \par \par Patient called for follow-up visit\par Denies hot flashes, \par Minor joint pain in pinky \par 03/21 Normal DEXA scan\par Is currently taking Vitamin D will check VIt D levels today if high can dc

## 2021-10-06 LAB
ALBUMIN SERPL ELPH-MCNC: 4.5 G/DL
ALP BLD-CCNC: 80 U/L
ALT SERPL-CCNC: 21 U/L
ANION GAP SERPL CALC-SCNC: 12 MMOL/L
AST SERPL-CCNC: 26 U/L
BILIRUB SERPL-MCNC: 0.2 MG/DL
BUN SERPL-MCNC: 14 MG/DL
CALCIUM SERPL-MCNC: 9.8 MG/DL
CHLORIDE SERPL-SCNC: 101 MMOL/L
CO2 SERPL-SCNC: 25 MMOL/L
CREAT SERPL-MCNC: 0.8 MG/DL
GLUCOSE SERPL-MCNC: 102 MG/DL
POTASSIUM SERPL-SCNC: 3.8 MMOL/L
PROT SERPL-MCNC: 7.5 G/DL
SODIUM SERPL-SCNC: 138 MMOL/L

## 2021-10-07 LAB — 25(OH)D3 SERPL-MCNC: 53.2 NG/ML

## 2021-12-01 ENCOUNTER — APPOINTMENT (OUTPATIENT)
Dept: NUCLEAR MEDICINE | Facility: CLINIC | Age: 51
End: 2021-12-01
Payer: COMMERCIAL

## 2021-12-01 ENCOUNTER — OUTPATIENT (OUTPATIENT)
Dept: OUTPATIENT SERVICES | Facility: HOSPITAL | Age: 51
LOS: 1 days | End: 2021-12-01

## 2021-12-01 DIAGNOSIS — Z90.710 ACQUIRED ABSENCE OF BOTH CERVIX AND UTERUS: Chronic | ICD-10-CM

## 2021-12-01 DIAGNOSIS — Z98.890 OTHER SPECIFIED POSTPROCEDURAL STATES: Chronic | ICD-10-CM

## 2021-12-01 DIAGNOSIS — Z90.13 ACQUIRED ABSENCE OF BILATERAL BREASTS AND NIPPLES: Chronic | ICD-10-CM

## 2021-12-01 DIAGNOSIS — Z93.3 COLOSTOMY STATUS: Chronic | ICD-10-CM

## 2021-12-01 DIAGNOSIS — Z00.8 ENCOUNTER FOR OTHER GENERAL EXAMINATION: ICD-10-CM

## 2021-12-01 PROCEDURE — 78815 PET IMAGE W/CT SKULL-THIGH: CPT | Mod: 26,PS

## 2021-12-09 NOTE — STUDENT SIGN OFF DOCUMENT - CO-SIGNATURE DATE
17-May-2019 08:35 Bexarotene Pregnancy And Lactation Text: This medication is Pregnancy Category X and should not be given to women who are pregnant or may become pregnant. This medication should not be used if you are breast feeding.

## 2021-12-14 ENCOUNTER — APPOINTMENT (OUTPATIENT)
Dept: GYNECOLOGIC ONCOLOGY | Facility: CLINIC | Age: 51
End: 2021-12-14
Payer: COMMERCIAL

## 2021-12-14 PROCEDURE — 99213 OFFICE O/P EST LOW 20 MIN: CPT

## 2021-12-14 NOTE — PHYSICAL EXAM
[Chaperone Present] : A chaperone was present in the examining room during all aspects of the physical examination [Absent] : Adnexa(ae): Absent [Normal] : Anus and perineum: Normal sphincter tone, no masses, no prolapse. [FreeTextEntry1] : smlling and happy  [de-identified] : well healed abdominal incisions [de-identified] : no masses, or lesions [Restricted in physically strenuous activity but ambulatory and able to carry out work of a light or sedentary nature] : Status 1- Restricted in physically strenuous activity but ambulatory and able to carry out work of a light or sedentary nature, e.g., light house work, office work

## 2021-12-14 NOTE — ASSESSMENT
[FreeTextEntry1] : Pt is a 52 yo with Grade 1 endometrioid adenocarcinoma arising in the background of endometriosis involving the uterine serosa, likely primary peritoneal. She has no evidence of abdominal disease on exam or on PET CT abdomen/pelvis 9/23/2020. She reports no new concerns. No evidence of disease. CT scan 8/4/21 with no evidence of disease.

## 2021-12-14 NOTE — HISTORY OF PRESENT ILLNESS
[FreeTextEntry1] : Pt is a 50 yo s/p BASILIA, BSO, tumor debulking, peritonectomy on 5/7/19 due to extensive endometriosis of entire abdominal cavity. S/p end colostomy and colon resection and ultimately takedown on 8/8/19. Pathology with focus of grade 1 endometrioid adenocarcinoma arising from background of endometriosis on the uterine serosa. She was diagnosed with right breast cancer s/p bilateral mastectomy and KWAME flap by Dr. Licea on 11/25/19.  She has follow up with Dr. Yarbrough. She also had colostomy reversal and abdominoplasty in the interim. She had a completion right axillary dissection on 1/3/20. She had post-mastectomy radiation by Dr. Caldwell complete 5/18/20. \par \par Interval history: She reports having excision of the mole on the left foot, but it was not cancerous. No new medical/surgical issues. No vaginal discharge or bleeding. Not sexually active.\par \par Colonoscopy: done 2019, due in 2024\par Mammogram: s/p bilateral mastectomy\par

## 2021-12-16 ENCOUNTER — OUTPATIENT (OUTPATIENT)
Dept: OUTPATIENT SERVICES | Facility: HOSPITAL | Age: 51
LOS: 1 days | Discharge: HOME | End: 2021-12-16

## 2021-12-16 DIAGNOSIS — Z98.890 OTHER SPECIFIED POSTPROCEDURAL STATES: Chronic | ICD-10-CM

## 2021-12-16 DIAGNOSIS — Z90.710 ACQUIRED ABSENCE OF BOTH CERVIX AND UTERUS: Chronic | ICD-10-CM

## 2021-12-16 DIAGNOSIS — Z90.13 ACQUIRED ABSENCE OF BILATERAL BREASTS AND NIPPLES: Chronic | ICD-10-CM

## 2021-12-16 DIAGNOSIS — C50.919 MALIGNANT NEOPLASM OF UNSPECIFIED SITE OF UNSPECIFIED FEMALE BREAST: ICD-10-CM

## 2021-12-16 DIAGNOSIS — Z93.3 COLOSTOMY STATUS: Chronic | ICD-10-CM

## 2021-12-17 ENCOUNTER — APPOINTMENT (OUTPATIENT)
Dept: HEMATOLOGY ONCOLOGY | Facility: CLINIC | Age: 51
End: 2021-12-17
Payer: COMMERCIAL

## 2021-12-17 ENCOUNTER — APPOINTMENT (OUTPATIENT)
Dept: HEMATOLOGY ONCOLOGY | Facility: CLINIC | Age: 51
End: 2021-12-17

## 2021-12-17 PROCEDURE — 99443: CPT

## 2021-12-17 RX ORDER — CIPROFLOXACIN HYDROCHLORIDE 250 MG/1
250 TABLET, FILM COATED ORAL
Qty: 10 | Refills: 0 | Status: DISCONTINUED | COMMUNITY
Start: 2020-09-04 | End: 2021-12-17

## 2021-12-17 NOTE — REASON FOR VISIT
[Home] : at home, [unfilled] , at the time of the visit. [Medical Office: ([facility])___] : at the medical office located in  [Verbal consent obtained from patient] : the patient, [unfilled]

## 2021-12-20 NOTE — RESULTS/DATA
[FreeTextEntry1] : 12/1/21 PET: 1. Small FDG-avid left axillary lymph nodes, new since prior PET/CT, probably are reactive, likely related to recent COVID vaccination.\par 2. Unchanged nonspecific asymmetric, minimal diffuse FDG avidity with skin thickening in the reconstructed right breast.\par 3. Small right hepatic lobe capsular density focus on recent CT is not identified, however, no significant FDG activity seen on the corresponding region.\par 4. Non FDG-avid capsular high density focus in the posterior spleen is not significantly changed.\par \par 7/27/21 CT A/P: Unchanged high density nodules along the anterior capsule of the liver and lateral capsular surface of the spleen. These are indeterminate for peritoneal implants. Continued attention on follow-up imaging is recommended.\par *  No new sites of disease in the abdomen and pelvis.\par \par 1/28/21 PET:  Bilateral mastectomy with reconstruction. Unchanged nonspecific asymmetric, minimal diffuse FDG avidity with skin thickening in the reconstructed right breast as compared to PET/CT from 3/4/2020. Please correlate clinically. Interval resolution of FDG avid nodular focus in the left anterior abdominal wall, likely secondary to postsurgical changes. Unchanged nonspecific approximately symmetric bilateral tonsillar hypermetabolism.\par \par 9/16/20 CT:  1.6 x 0.7 cm perisplenic soft tissue and 5 x 2 mm region of increased enhancement either in the periphery of segment IVb of the liver or perihepatic which are unchanged when compared with 5/4/2020. The perisplenic region is also stable when compared with 3/4/2020 and no increased FDG uptake was noted at that time. Small stable residual implants cannot be excluded. No new disease or interval growth is seen. Status post bilateral mastectomies with reconstructive surgery. Region of mild nodularity in the right reconstructed breast which is more prominent when compared with 3/4/2020. This may represent granulomatous or fibrotic tissue.\par \par 5/5/20 CT: MALDONADO\par \par 2/4/2020 CT A/P: Sigmoid resection with anastomosis without evidence of obstruction. Small pericapsular splenic fluid collection which is decreased from prior study. \par No evidence of metastatic disease.\par \par 10/15/19 MRI Breast:\par 1.4 cm abnormal enhancement/biopsy clip in the upper outer posterior right breast, corresponding to newly diagnosed malignancy. No MRI evidence of multicentric or contralateral disease. \par RECOMMENDATION: Surgical or oncologic management. \par BI-RADS 6- Known Biopsy-Proven Malignancy \par \par 9/23/19 Pathology: \par Right breast, "11:00, 5 cm from the nipple", ultrasound guided core needle biopsy:\par -Invasive ductal carcinoma, grade 1.\par -See note.\par Note: Immunohistochemical stains for p63 and calponin are performed on block 1-B at [facility] and interpreted at [place] as follows:\par Calponin, p63: negative\par Microscopic evaluation reveals an invasive ductal carcinoma (Lizzette score 1+2+1), grade 1. The carcinoma measures at least 1.1 cm in its greatest linear expansion in this biopsy. \par There are no calcifications nor necrosis present.\par *Addendum*\par IMMUNOSTAINS PERFORMED AND INTERPRETED ON BLOCK " B " BY [facility],\par HER2/ELADIO BY IHC: 1 + / Negative\par ER: Positive, 90 - 95 %, strong\par NH: Positive, 15 - 20 %, strong\par \par 9/23/19 Mammo: Status post core needle biopsy of the right breast with clip placement. Recommendations for further followup will be based on pathology results.\par \par 9/19/19 US Breast/Mammo: Ill-defined hypoechoic area in the right breast at the 11:00 axis, corresponding to mammographically seen distortion. Ultrasound-guided core biopsy is advised.\par \par 5/15/19 Pathology:\par Intra-abdominal foreign body, removal:  Blood clots\par Sigmoid, resection:  Mucosal necrosis with transmural acute and chronic inflammation.  The process extends to one margin of resection (slide 1A).  Three (3) lymph nodes, one with benign glandular inclusion.\par \par 5/15/19 CT A/P:\par Moderate amount retained fecal material in the colon, greatest in the right hemicolon. Mild wall thickening involving the distal transverse colon, descending colon, and proximal sigmoid colon, possibly reactive to the large collection.  Large fluid collection which appears to be extraperitoneal layering anterior to the omentum measuring 24.7 x 9.4 x \par 20.9 cm (craniocaudad x anteroposterior x transverse). There is a component of the collection which extends posteriorly on the left into the retroperitoneum along the anterior aspect of the psoas extending to the posterior aspect of the splenic flexure (approximately 20 cm in craniocaudad dimension). Skin staples are seen within the anterior abdominal wall. Air is seen within the anterior abdominal wall which may be postoperative in etiology.\par \par 5/8/19 Cytopathology:\par Abdominal fluid:  NEGATIVE FOR MALIGNANT CELLS.  Mostly fibrin and reactive mesothelial cells.\par \par 5/7/19 Pathology:\par Omental lymph node: Markedly reactive, hemorrhagic lymph node with hemosiderin laden macrophages.\par Cyst wall(1): Hemorrhagic inflamed cyst wall without epithelial lining.\par Right hepatic flexure lymph node: Markedly reactive, hemorrhagic lymph node with hemosiderin laden macrophages.\par Omentum: Omental fat with marked reactive fibrosis, hemorrhage and chronic inflammation; marked serosal adhesions.\par Right pelvic lymph node: Markedly reactive, hemorrhagic lymph node with hemosiderin laden macrophages.\par Left pelvic lymph node: Markedly reactive, hemorrhagic lymph node with hemosiderin laden macrophages.\par Cyst wall(2):  Cyst wall, markedly inflamed and hemorrhagic with cholesterol crystals and without epithelial lining.\par ***Uterus, cervix and adnexa:  Histologically unremarkable cervix. Weakly proliferative endometrium and endometrial polyp. Cysts located outside the uterus, involving serosa, with glandular stromal structures, consistent with endometriosis.\par ****Addendum****\par Case sent for outside consultation at [facility] cancer [place] with Dr. Tina Mckay.\par The atypical clusters of glands, in part 8. "Uterus, cervix and adnexa", have been diagnosed as Low grade endometrioid adenocarcinoma, FIGO grade 1,  with focal sex cord like differentiation, arising in a background of endometriosis, and involving the uterine serosa.\par \par 4/23/19 CT C/A/P:\par There is a large complex cystic mass occupying the lower abdomen, measuring 21.0 (AP) x 24.2 (CC) x 33.1 (TR) cm.  The exact origin of this mass is uncertain. The left posterior wall of this mass is discontinuous, suggestive of rupture. There is a large amount of abdominal ascites.  Additional complex left adnexal cystic mass with septations and apparent mural nodules measuring 9.4 (AP) x 15.5 (CC) x 16.6 (TR) cm.

## 2021-12-20 NOTE — HISTORY OF PRESENT ILLNESS
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  She presented to [facility] ED on 4/23/19 s/p mechanical fall outside her place of work.  A CT A/P incidentally showed a complex cystic left adnexal mass measuring 17 cm. Massive cystic lesion occupying the lower abdomen measuring 33 cm. It was uncertain whether this represents a large peritoneal metastasis or a component of the above described cystic left adnexal mass, or possibly a right adnexal mass. There was suspicion for rupture of this mass with a large amount of abdominal ascites.  On 5/7/19 Dr. Manpreet Gonzalez performed a BASILIA, BSO, debulking and peritonectomy.  Pathology initially was benign.  An addendum was later issued which showed low grade endometrioid adenocarcinoma, FIGO grade 1, with focal sex cord like differentiation, arising in a background of endometriosis, and involving the uterine serosa.  She was discharged on 5/12/19 but returned to the ED on 5/15/19 c/o abdominal distention, N/V, dizziness, weakness, multiple falls and a fever of 104.  She was found to be hypotensive and tachycardic.  A CT A/P showed a large fluid collection which appears to be extraperitoneal layering anterior to the omentum measuring 24.7 x 9.4 x 20.9 cm. There is a component of the collection which extends posteriorly on the left into the retroperitoneum along the anterior aspect of the psoas extending to the posterior aspect of the splenic flexure (approximately 20 cm in craniocaudad dimension). On 5/17/19 Dr. Mague Durand performed an emergent exploratory laparotomy, sigmoidectomy and end colostomy.  Pathology was benign.   [de-identified] : FHx of DCIS (sister) [de-identified] : Patient called for follow-up visit via telephone. She is s/p BASILIA BSO, debulking and peritonectomy on 5/7/19 with Dr. Manpreet Gonzalez due to extensive endometrioid adenocarcinoma, with a delayed leak from the sigmoid requiring bowel resection and end-colostomy by Dr. Durand.  No adjuvant treatment was recommended and she is s/p colostomy reversal. Patient currently without complaints. Denies any pain, abdominal or vaginal.  No vaginal bleeding.  No N/VD/C.  Appetite is normal. No fever, no cough, no SOB.\par \par Diagnosed with right breast cancer in September 2019, ER+ [place]+ HER2-.  Followed by Dr. Sellers.

## 2022-01-31 ENCOUNTER — OUTPATIENT (OUTPATIENT)
Dept: OUTPATIENT SERVICES | Facility: HOSPITAL | Age: 52
LOS: 1 days | Discharge: HOME | End: 2022-01-31

## 2022-01-31 DIAGNOSIS — Z98.890 OTHER SPECIFIED POSTPROCEDURAL STATES: Chronic | ICD-10-CM

## 2022-01-31 DIAGNOSIS — Z93.3 COLOSTOMY STATUS: Chronic | ICD-10-CM

## 2022-01-31 DIAGNOSIS — Z90.13 ACQUIRED ABSENCE OF BILATERAL BREASTS AND NIPPLES: Chronic | ICD-10-CM

## 2022-01-31 DIAGNOSIS — C50.919 MALIGNANT NEOPLASM OF UNSPECIFIED SITE OF UNSPECIFIED FEMALE BREAST: ICD-10-CM

## 2022-01-31 DIAGNOSIS — Z90.710 ACQUIRED ABSENCE OF BOTH CERVIX AND UTERUS: Chronic | ICD-10-CM

## 2022-02-01 ENCOUNTER — APPOINTMENT (OUTPATIENT)
Dept: HEMATOLOGY ONCOLOGY | Facility: CLINIC | Age: 52
End: 2022-02-01
Payer: COMMERCIAL

## 2022-02-01 ENCOUNTER — RESULT REVIEW (OUTPATIENT)
Age: 52
End: 2022-02-01

## 2022-02-01 VITALS
HEIGHT: 61 IN | DIASTOLIC BLOOD PRESSURE: 86 MMHG | WEIGHT: 186.44 LBS | BODY MASS INDEX: 35.2 KG/M2 | OXYGEN SATURATION: 97 % | HEART RATE: 77 BPM | SYSTOLIC BLOOD PRESSURE: 131 MMHG

## 2022-02-01 LAB
BASOPHILS # BLD AUTO: 0 K/UL — SIGNIFICANT CHANGE UP (ref 0–0.2)
BASOPHILS NFR BLD AUTO: 0.6 % — SIGNIFICANT CHANGE UP (ref 0–2)
EOSINOPHIL # BLD AUTO: 0.1 K/UL — SIGNIFICANT CHANGE UP (ref 0–0.5)
EOSINOPHIL NFR BLD AUTO: 1.2 % — SIGNIFICANT CHANGE UP (ref 0–6)
HCT VFR BLD CALC: 45.4 % — HIGH (ref 34.5–45)
HGB BLD-MCNC: 14 G/DL — SIGNIFICANT CHANGE UP (ref 11.5–15.5)
LYMPHOCYTES # BLD AUTO: 1.9 K/UL — SIGNIFICANT CHANGE UP (ref 1–3.3)
LYMPHOCYTES # BLD AUTO: 30.6 % — SIGNIFICANT CHANGE UP (ref 13–44)
MCHC RBC-ENTMCNC: 29.6 PG — SIGNIFICANT CHANGE UP (ref 27–34)
MCHC RBC-ENTMCNC: 30.8 G/DL — LOW (ref 32–36)
MCV RBC AUTO: 96 FL — SIGNIFICANT CHANGE UP (ref 80–100)
MONOCYTES # BLD AUTO: 0.4 K/UL — SIGNIFICANT CHANGE UP (ref 0–0.9)
MONOCYTES NFR BLD AUTO: 6.9 % — SIGNIFICANT CHANGE UP (ref 2–14)
NEUTROPHILS # BLD AUTO: 3.8 K/UL — SIGNIFICANT CHANGE UP (ref 1.8–7.4)
NEUTROPHILS NFR BLD AUTO: 60.6 % — SIGNIFICANT CHANGE UP (ref 43–77)
PLATELET # BLD AUTO: 241 K/UL — SIGNIFICANT CHANGE UP (ref 150–400)
RBC # BLD: 4.73 M/UL — SIGNIFICANT CHANGE UP (ref 3.8–5.2)
RBC # FLD: 12.5 % — SIGNIFICANT CHANGE UP (ref 10.3–14.5)
WBC # BLD: 6.2 K/UL — SIGNIFICANT CHANGE UP (ref 3.8–10.5)
WBC # FLD AUTO: 6.2 K/UL — SIGNIFICANT CHANGE UP (ref 3.8–10.5)

## 2022-02-01 PROCEDURE — 99214 OFFICE O/P EST MOD 30 MIN: CPT

## 2022-02-01 NOTE — HISTORY OF PRESENT ILLNESS
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  She presented to [facility] ED on 4/23/19 s/p mechanical fall outside her place of work.  A CT A/P incidentally showed a complex cystic left adnexal mass measuring 17 cm. Massive cystic lesion occupying the lower abdomen measuring 33 cm. It was uncertain whether this represents a large peritoneal metastasis or a component of the above described cystic left adnexal mass, or possibly a right adnexal mass. There was suspicion for rupture of this mass with a large amount of abdominal ascites.  On 5/7/19 Dr. Manpreet Gonzalez performed a BASILIA, BSO, debulking and peritonectomy.  Pathology initially was benign.  An addendum was later issued which showed low grade endometrioid adenocarcinoma, FIGO grade 1, with focal sex cord like differentiation, arising in a background of endometriosis, and involving the uterine serosa.  She was discharged on 5/12/19 but returned to the ED on 5/15/19 c/o abdominal distention, N/V, dizziness, weakness, multiple falls and a fever of 104.  She was found to be hypotensive and tachycardic.  A CT A/P showed a large fluid collection which appears to be extraperitoneal layering anterior to the omentum measuring 24.7 x 9.4 x 20.9 cm. There is a component of the collection which extends posteriorly on the left into the retroperitoneum along the anterior aspect of the psoas extending to the posterior aspect of the splenic flexure (approximately 20 cm in craniocaudad dimension). On 5/17/19 Dr. Mague uDrand performed an emergent exploratory laparotomy, sigmoidectomy and end colostomy.  Pathology was benign.   [de-identified] : FHx of DCIS (sister) [de-identified] : She is s/p BASILIA BSO, debulking and peritonectomy on 5/7/19 with Dr. Manpreet Gonzalez due to extensive endometrioid adenocarcinoma, with a delayed leak from the sigmoid requiring bowel resection and end-colostomy by Dr. Durand.  No adjuvant treatment was recommended and she is s/p colostomy reversal.  Diagnosed with right breast cancer in September 2019, ER+ [place]+ HER2-.  \par \par Patient presents for  follow-up visit\par Patient doing well, offers no acute complaints\par Patient currently taking Vitamin D 2000 units daily\par Denies fever/chills, fatigue,hot flashes, nausea, vomiting, diarrhea,constipation,  abdominal pain, bleeding, easy bruising or visual changes, chest pain, cough, SOB or SINGH,  neuropathy, LE edema.\par \par PET/CT 12/1/21\par IMPRESSION:\par 1. Small FDG-avid left axillary lymph nodes, new since prior PET/CT, probably are reactive, likely\par related to recent COVID vaccination.\par 2. Unchanged nonspecific asymmetric, minimal diffuse FDG avidity with skin thickening in the\par reconstructed right breast.\par 3. Small right hepatic lobe capsular density focus on recent CT is not identified, however, no\par significant FDG activity seen on the corresponding region.\par 4. Non FDG-avid capsular high density focus in the posterior spleen is not significantly changed.\par \par \par \par

## 2022-02-01 NOTE — RESULTS/DATA
[FreeTextEntry1] : 03/21 Normal DEXA scan\par \par 7/27/21 CT A/P: Unchanged high density nodules along the anterior capsule of the liver and lateral capsular surface of the spleen. These are indeterminate for peritoneal implants. Continued attention on follow-up imaging is recommended.\par *  No new sites of disease in the abdomen and pelvis.\par \par 1/28/21 PET:  Bilateral mastectomy with reconstruction. Unchanged nonspecific asymmetric, minimal diffuse FDG avidity with skin thickening in the reconstructed right breast as compared to PET/CT from 3/4/2020. Please correlate clinically. Interval resolution of FDG avid nodular focus in the left anterior abdominal wall, likely secondary to postsurgical changes. Unchanged nonspecific approximately symmetric bilateral tonsillar hypermetabolism.\par \par 9/16/20 CT:  1.6 x 0.7 cm perisplenic soft tissue and 5 x 2 mm region of increased enhancement either in the periphery of segment IVb of the liver or perihepatic which are unchanged when compared with 5/4/2020. The perisplenic region is also stable when compared with 3/4/2020 and no increased FDG uptake was noted at that time. Small stable residual implants cannot be excluded. No new disease or interval growth is seen. Status post bilateral mastectomies with reconstructive surgery. Region of mild nodularity in the right reconstructed breast which is more prominent when compared with 3/4/2020. This may represent granulomatous or fibrotic tissue.\par \par 5/5/20 CT: MALDONADO\par \par 2/4/2020 CT A/P: Sigmoid resection with anastomosis without evidence of obstruction. Small pericapsular splenic fluid collection which is decreased from prior study. \par No evidence of metastatic disease.\par \par 10/15/19 MRI Breast:\par 1.4 cm abnormal enhancement/biopsy clip in the upper outer posterior right breast, corresponding to newly diagnosed malignancy. No MRI evidence of multicentric or contralateral disease. \par RECOMMENDATION: Surgical or oncologic management. \par BI-RADS 6- Known Biopsy-Proven Malignancy \par \par 9/23/19 Pathology: \par Right breast, "11:00, 5 cm from the nipple", ultrasound guided core needle biopsy:\par -Invasive ductal carcinoma, grade 1.\par -See note.\par Note: Immunohistochemical stains for p63 and calponin are performed on block 1-B at [facility] and interpreted at [place] as follows:\par Calponin, p63: negative\par Microscopic evaluation reveals an invasive ductal carcinoma ([place] score 1+2+1), grade 1. The carcinoma measures at least 1.1 cm in its greatest linear expansion in this biopsy. \par There are no calcifications nor necrosis present.\par *Addendum*\par IMMUNOSTAINS PERFORMED AND INTERPRETED ON BLOCK " B " BY [facility],\par HER2/ELADIO BY IHC: 1 + / Negative\par ER: Positive, 90 - 95 %, strong\par CO: Positive, 15 - 20 %, strong\par \par 9/23/19 Mammo: Status post core needle biopsy of the right breast with clip placement. Recommendations for further followup will be based on pathology results.\par \par 9/19/19 US Breast/Mammo: Ill-defined hypoechoic area in the right breast at the 11:00 axis, corresponding to mammographically seen distortion. Ultrasound-guided core biopsy is advised.\par \par 5/15/19 Pathology:\par Intra-abdominal foreign body, removal:  Blood clots\par Sigmoid, resection:  Mucosal necrosis with transmural acute and chronic inflammation.  The process extends to one margin of resection (slide 1A).  Three (3) lymph nodes, one with benign glandular inclusion.\par \par 5/15/19 CT A/P:\par Moderate amount retained fecal material in the colon, greatest in the right hemicolon. Mild wall thickening involving the distal transverse colon, descending colon, and proximal sigmoid colon, possibly reactive to the large collection.  Large fluid collection which appears to be extraperitoneal layering anterior to the omentum measuring 24.7 x 9.4 x \par 20.9 cm (craniocaudad x anteroposterior x transverse). There is a component of the collection which extends posteriorly on the left into the retroperitoneum along the anterior aspect of the psoas extending to the posterior aspect of the splenic flexure (approximately 20 cm in craniocaudad dimension). Skin staples are seen within the anterior abdominal wall. Air is seen within the anterior abdominal wall which may be postoperative in etiology.\par \par 5/8/19 Cytopathology:\par Abdominal fluid:  NEGATIVE FOR MALIGNANT CELLS.  Mostly fibrin and reactive mesothelial cells.\par \par 5/7/19 Pathology:\par Omental lymph node: Markedly reactive, hemorrhagic lymph node with hemosiderin laden macrophages.\par Cyst wall(1): Hemorrhagic inflamed cyst wall without epithelial lining.\par Right hepatic flexure lymph node: Markedly reactive, hemorrhagic lymph node with hemosiderin laden macrophages.\par Omentum: Omental fat with marked reactive fibrosis, hemorrhage and chronic inflammation; marked serosal adhesions.\par Right pelvic lymph node: Markedly reactive, hemorrhagic lymph node with hemosiderin laden macrophages.\par Left pelvic lymph node: Markedly reactive, hemorrhagic lymph node with hemosiderin laden macrophages.\par Cyst wall(2):  Cyst wall, markedly inflamed and hemorrhagic with cholesterol crystals and without epithelial lining.\par ***Uterus, cervix and adnexa:  Histologically unremarkable cervix. Weakly proliferative endometrium and endometrial polyp. Cysts located outside the uterus, involving serosa, with glandular stromal structures, consistent with endometriosis.\par ****Addendum****\par Case sent for outside consultation at [facility] [place] with Dr. Tina Mckay.\par The atypical clusters of glands, in part 8. "Uterus, cervix and adnexa", have been diagnosed as Low grade endometrioid adenocarcinoma, FIGO grade 1,  with focal sex cord like differentiation, arising in a background of endometriosis, and involving the uterine serosa.\par \par 4/23/19 CT C/A/P:\par There is a large complex cystic mass occupying the lower abdomen, measuring 21.0 (AP) x 24.2 (CC) x 33.1 (TR) cm.  The exact origin of this mass is uncertain. The left posterior wall of this mass is discontinuous, suggestive of rupture. There is a large amount of abdominal ascites.  Additional complex left adnexal cystic mass with septations and apparent mural nodules measuring 9.4 (AP) x 15.5 (CC) x 16.6 (TR) cm.

## 2022-02-01 NOTE — END OF VISIT
[Time Spent: ___ minutes] : I have spent [unfilled] minutes of time on the encounter. Excision Depth: adipose tissue

## 2022-02-01 NOTE — PHYSICAL EXAM
[Restricted in physically strenuous activity but ambulatory and able to carry out work of a light or sedentary nature] : Status 1- Restricted in physically strenuous activity but ambulatory and able to carry out work of a light or sedentary nature, e.g., light house work, office work [Normal] : affect appropriate [de-identified] : s/p bilateral mastectomy with well-healed incisions [de-identified] : well-healed incision both  and vertical midline [de-identified] : 1+ RUE edema

## 2022-02-01 NOTE — REASON FOR VISIT
[Follow-Up Visit] : a follow-up [Family Member] : family member [FreeTextEntry2] :  endometrioid adenocarcinoma

## 2022-02-02 LAB
25(OH)D3 SERPL-MCNC: 54.7 NG/ML
ALBUMIN SERPL ELPH-MCNC: 4.6 G/DL
ALP BLD-CCNC: 80 U/L
ALT SERPL-CCNC: 18 U/L
ANION GAP SERPL CALC-SCNC: 11 MMOL/L
AST SERPL-CCNC: 21 U/L
BILIRUB SERPL-MCNC: 0.3 MG/DL
BUN SERPL-MCNC: 14 MG/DL
CALCIUM SERPL-MCNC: 9.9 MG/DL
CHLORIDE SERPL-SCNC: 102 MMOL/L
CO2 SERPL-SCNC: 29 MMOL/L
CREAT SERPL-MCNC: 0.84 MG/DL
GLUCOSE SERPL-MCNC: 103 MG/DL
POTASSIUM SERPL-SCNC: 4.2 MMOL/L
PROT SERPL-MCNC: 7.3 G/DL
SODIUM SERPL-SCNC: 142 MMOL/L

## 2022-02-15 ENCOUNTER — APPOINTMENT (OUTPATIENT)
Dept: RADIATION ONCOLOGY | Facility: CLINIC | Age: 52
End: 2022-02-15
Payer: COMMERCIAL

## 2022-02-15 ENCOUNTER — NON-APPOINTMENT (OUTPATIENT)
Age: 52
End: 2022-02-15

## 2022-02-15 VITALS
SYSTOLIC BLOOD PRESSURE: 165 MMHG | RESPIRATION RATE: 16 BRPM | BODY MASS INDEX: 35.52 KG/M2 | DIASTOLIC BLOOD PRESSURE: 80 MMHG | OXYGEN SATURATION: 97 % | HEART RATE: 79 BPM | WEIGHT: 188 LBS

## 2022-02-15 DIAGNOSIS — Z43.3 ENCOUNTER FOR ATTENTION TO COLOSTOMY: ICD-10-CM

## 2022-02-15 DIAGNOSIS — Z98.890 OTHER SPECIFIED POSTPROCEDURAL STATES: ICD-10-CM

## 2022-02-15 DIAGNOSIS — Z93.3 COLOSTOMY STATUS: ICD-10-CM

## 2022-02-15 PROCEDURE — 99214 OFFICE O/P EST MOD 30 MIN: CPT

## 2022-02-15 NOTE — PHYSICAL EXAM
[Normal] : oriented to person, place and time, the affect was normal, the mood was normal and not anxious [Extraocular Movements] : extraocular movements were intact [Breast Appearance] : normal in appearance [Symmetric] : breasts are symmetric [Breast Palpation Mass] : no palpable masses [No UE Edema] : there is no upper extremity edema [Bowel Sounds] : normal bowel sounds [Abdomen Soft] : soft [de-identified] : minimal hyperpigmentation

## 2022-02-15 NOTE — REVIEW OF SYSTEMS
[Negative] : Allergic/Immunologic [Edema Limbs: Grade 0] : Edema Limbs: Grade 0  [Fatigue: Grade 0] : Fatigue: Grade 0 [Localized Edema: Grade 0] : Localized Edema: Grade 0  [Neck Edema: Grade 0] : Neck Edema: Grade 0 [Breast Pain: Grade 0] : Breast Pain: Grade 0 [Pruritus: Grade 0] : Pruritus: Grade 0 [Skin Atrophy: Grade 0] : Skin Atrophy: Grade 0 [Skin Hyperpigmentation: Grade 1 - Hyperpigmentation covering <10% BSA; no psychosocial impact] : Skin Hyperpigmentation: Grade 1 - Hyperpigmentation covering <10% BSA; no psychosocial impact [Skin Induration: Grade 0] : Skin Induration: Grade 0 [Dermatitis Radiation: Grade 0] : Dermatitis Radiation: Grade 0

## 2022-02-15 NOTE — HISTORY OF PRESENT ILLNESS
[FreeTextEntry1] : 51 year old woman with IDC right breast s/p bilateral mastectomy, PMRT completed 5/18/20. 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  Complicated post-operatively by delayed leak from the sigmoid colon, requiring bowel resection and end colostomy. No adjuvant therapy was recommended. She is s/p colostomy reversal\par \par Reports overall stable health.  Denies breast pain, edema, itch, arm edema, fatigue or weight loss.\par Recently started following with Dermatology; s/p freezing of pre-cancerous lesions on forehead and nose.\par \par 12/21/21 PET/CT\par 1. Small FDG-avid left axillary lymph nodes, new since prior PET/CT, probably are reactive, likely related to recent COVID vaccination.\par 2. Unchanged nonspecific asymmetric, minimal diffuse FDG avidity with skin thickening in the reconstructed right breast.\par 3. Small right hepatic lobe capsular density focus on recent CT is not identified, however, no significant FDG activity seen on the corresponding region.\par 4. Non FDG-avid capsular high density focus in the posterior spleen is not significantly changed.\par \par Dr Lagos\par Dr Riley \par Dr Gonzalez \par Dr Licea \par Dr Yarbrough \par Dr Parra

## 2022-03-23 ENCOUNTER — NON-APPOINTMENT (OUTPATIENT)
Age: 52
End: 2022-03-23

## 2022-03-29 ENCOUNTER — APPOINTMENT (OUTPATIENT)
Dept: BREAST CENTER | Facility: CLINIC | Age: 52
End: 2022-03-29
Payer: COMMERCIAL

## 2022-03-29 ENCOUNTER — APPOINTMENT (OUTPATIENT)
Dept: SURGERY | Facility: CLINIC | Age: 52
End: 2022-03-29

## 2022-03-29 VITALS
HEIGHT: 61 IN | OXYGEN SATURATION: 98 % | WEIGHT: 188 LBS | SYSTOLIC BLOOD PRESSURE: 142 MMHG | BODY MASS INDEX: 35.5 KG/M2 | DIASTOLIC BLOOD PRESSURE: 92 MMHG | HEART RATE: 81 BPM | TEMPERATURE: 97.8 F

## 2022-03-29 DIAGNOSIS — Z90.13 ACQUIRED ABSENCE OF BILATERAL BREASTS AND NIPPLES: ICD-10-CM

## 2022-03-29 PROCEDURE — 99214 OFFICE O/P EST MOD 30 MIN: CPT

## 2022-03-29 NOTE — HISTORY OF PRESENT ILLNESS
[FreeTextEntry1] : I had the pleasure of seeing Sera Francisco in the office for a followup exam for her diagnosed and treated right breast cancer.\par \par Sera is a micheal 52 yo premenopausal female. She was diagnosed with right breast cancer in 10/2019. \par She underwent MRI of the breast 10/15/2019 which demonstrated the right breast cancer to measure 1.4 cm without adenopathy. No MR evidence of multicentric or contralateral disease. BIRADS 6\par \par She underwent bilateral mastectomy and right sentinel lymph node biopsy for right breast cancer on 11/25/2019.  Final pathology demonstrated 1.3cm IDC grade 1 with infiltrating carcinoma extending to the cauterized anterior margin. 2/3 sentinel lymph nodes with metastatic carcinoma with the largest measuring 3mm and extracapsular extension.  She then underwent right axilla dissection on 1/3/2020 and 0/9 lymph nodes were negative for carcinoma.\par \par Genetic testing: MYRISK panel test was negative, however there was noted a VUS in the MSH3 gene. Further clarification on this variance will be obtained from the updated test as soon as it is available.\par \par Oncotype score: 17\par Hormonal therapy:  Anastrozole 3/2020\par PMRT completed \par \par 9/24/2019 Breast Biopsy ( Right, 11:00 , 5 cm from the 0., Ultrasound guided needle cores)\par - Infiltrating ductal carcinoma, well differentiated\par - no definitive lymphovascular invasion\par -No IN SITU component identified\par -Largest contiguous focus 1.1 cm\par - adjacent region containing aggregates of ducts with columnar cell change an columnar cell hyperplasia\par  HER2/ELADIO by IHC 1+ / Negative\par ER : Positive 90-95%- strong\par MD -Positive ,15-20% strong \par \par 1/3/2020 final pathology\par Right axillary dissection:  Nine lymph nodes, negative for metastatic carcinoma.\par \par 9/15/2020  CT Chest IC/ CT abdomen and pelvis OC IC\par IMPRESSION: 1.6 x 0.7 cm perisplenic soft tissue and 5 x 2 mm region of increased enhancement either in the periphery of segment IVb of the liver or perihepatic which are unchanged when compared with 5/4/2020. The perisplenic region is also stable when compared with 3/4/2020 and no increased FDG uptake was noted at that time. Small stable residual implants cannot be excluded. No new disease or interval growth is seen. Continued follow-up is recommended. \par Status post bilateral mastectomies with reconstructive surgery. Region of mild nodularity in the right reconstructed breast which is more prominent when compared with 3/4/2020. This may represent granulomatous or fibrotic tissue although clinical correlation is suggested.\par \par 1/28/2021 IMPRESSION: FDG-PET/CT scan demonstrates:\par 1. Bilateral mastectomy with reconstruction. Unchanged nonspecific asymmetric, minimal diffuse FDG avidity with skin thickening in the reconstructed right breast as compared to PET/CT from 3/4/2020. Please correlate clinically.\par 2. Interval resolution of FDG avid nodular focus in the left anterior abdominal wall, likely secondary to postsurgical changes.\par 3. Unchanged nonspecific approximately symmetric bilateral tonsillar hypermetabolism.\par \par 12/1/2021  PET CT THI ONC FDG SUBS\par 1. Small FDG-avid left axillary lymph nodes, new since prior PET/CT, probably are reactive, likely related to recent COVID vaccination.\par 2. Unchanged nonspecific asymmetric, minimal diffuse FDG avidity with skin thickening in the reconstructed right breast.\par 3. Small right hepatic lobe capsular density focus on recent CT is not identified, however, no significant FDG activity seen on the corresponding region.\par 4. Non FDG-avid capsular high density focus in the posterior spleen is not significantly changed.\par \par Her clinical breast exam is benign today.  She is tolerating hormonal therapy well with no negative reports.  She underwent PET CT in December and reviewed with medical oncology.  No significant change seen.  Recommendation for followup in 6 months.\par \par All questions were answered.

## 2022-03-29 NOTE — ASSESSMENT
[FreeTextEntry1] : 50 yo female s/p bilateral mastectomy with right SLNB for 1.3cm IDC with 2/3 positive nodes. She underwent right axiliary lymph node dissection.  0/9 lymph nodes were negative for carcinoma.  Oncotype score of 17.  Tolerating hormonal therapy well.  Recommendation for follow up exam in 6 months.\par 1.  Follow up in 6 months\par 2.  Followup with medical oncology- hormonal therapy\par 3.  Radiation oncology\par

## 2022-03-29 NOTE — ED ADULT NURSE NOTE - NS ED NURSE RECORD ANOTHER HT AND WT
Procedures   NEXPLANON REMOVAL:    Pt is a 28 y.o. with LMP No LMP recorded. Patient has had an implant. here for implanon removal because desires fertility.      PRE-NEXPLANON REMOVAL COUNSELING:  The patient was advised of minimal risks of bleeding and pain and she agrees to proceed.    EXAM:  Implanon palpable by palpation or imaging.  The end closest to the elbow was marked.    PROCEDURE:  TIME OUT PERFORMED.  The patient was placed in same position as for insertion.  The area was prepped with antiseptic.  3 cc of 1% lidocaine with epinepherine was injected just underneath end of implant closest to the elbow.  Downward pressure was placed on the end of the implant closest to the axilla.  Using sterile technique, a 2 mm incision was made in longitudinal direction of arm at tip of the implant closest to the elbow.    The entire 4 cm implant was removed.  The incision site was covered by pressure bandage.  The procedure was well tolerated     ASSESSMENT:  Contraceptive Management / Removal Implanon    POST NEXPLANON REMOVAL COUNSELING:  Expect period-like flow to occur after Implanon removal and periods to return to pre-Implanon  pattern.  Manage post Implanon arm pain with Tylenol or Rx per [employer].  Keep arm elevated and apply intermittent ice packs to decrease pain and bruising for 24 hours.  May remove bandage in 2 hours  Implanon removal danger signs (worsening pain at incision site, bleeding through  bandage, redness and/or pus drainage at incision site).    POST NEXPLANON REMOVAL CONTRACEPTION: none    Counseling lasted approximately 15 minutes and all her questions were answered.    FOLLOW-UP: with me for annual gyn exam or prn.    
Yes

## 2022-06-02 ENCOUNTER — OUTPATIENT (OUTPATIENT)
Dept: OUTPATIENT SERVICES | Facility: HOSPITAL | Age: 52
LOS: 1 days | Discharge: HOME | End: 2022-06-02

## 2022-06-02 DIAGNOSIS — Z98.890 OTHER SPECIFIED POSTPROCEDURAL STATES: Chronic | ICD-10-CM

## 2022-06-02 DIAGNOSIS — Z93.3 COLOSTOMY STATUS: Chronic | ICD-10-CM

## 2022-06-02 DIAGNOSIS — Z90.710 ACQUIRED ABSENCE OF BOTH CERVIX AND UTERUS: Chronic | ICD-10-CM

## 2022-06-02 DIAGNOSIS — C50.919 MALIGNANT NEOPLASM OF UNSPECIFIED SITE OF UNSPECIFIED FEMALE BREAST: ICD-10-CM

## 2022-06-02 DIAGNOSIS — Z90.13 ACQUIRED ABSENCE OF BILATERAL BREASTS AND NIPPLES: Chronic | ICD-10-CM

## 2022-06-04 NOTE — BRIEF OPERATIVE NOTE - SPECIMENS
right axillary lymph nodes and contents Ivermectin Counseling:  Patient instructed to take medication on an empty stomach with a full glass of water.  Patient informed of potential adverse effects including but not limited to nausea, diarrhea, dizziness, itching, and swelling of the extremities or lymph nodes.  The patient verbalized understanding of the proper use and possible adverse effects of ivermectin.  All of the patient's questions and concerns were addressed.

## 2022-06-07 ENCOUNTER — RESULT REVIEW (OUTPATIENT)
Age: 52
End: 2022-06-07

## 2022-06-07 ENCOUNTER — APPOINTMENT (OUTPATIENT)
Dept: HEMATOLOGY ONCOLOGY | Facility: CLINIC | Age: 52
End: 2022-06-07
Payer: COMMERCIAL

## 2022-06-07 VITALS
SYSTOLIC BLOOD PRESSURE: 133 MMHG | WEIGHT: 186.06 LBS | HEART RATE: 89 BPM | BODY MASS INDEX: 35.13 KG/M2 | DIASTOLIC BLOOD PRESSURE: 85 MMHG | OXYGEN SATURATION: 97 % | HEIGHT: 61 IN

## 2022-06-07 LAB
BASOPHILS # BLD AUTO: 0 K/UL — SIGNIFICANT CHANGE UP (ref 0–0.2)
BASOPHILS NFR BLD AUTO: 0.8 % — SIGNIFICANT CHANGE UP (ref 0–2)
EOSINOPHIL # BLD AUTO: 0.1 K/UL — SIGNIFICANT CHANGE UP (ref 0–0.5)
EOSINOPHIL NFR BLD AUTO: 1.8 % — SIGNIFICANT CHANGE UP (ref 0–6)
HCT VFR BLD CALC: 42.1 % — SIGNIFICANT CHANGE UP (ref 34.5–45)
HGB BLD-MCNC: 13.8 G/DL — SIGNIFICANT CHANGE UP (ref 11.5–15.5)
LYMPHOCYTES # BLD AUTO: 1.6 K/UL — SIGNIFICANT CHANGE UP (ref 1–3.3)
LYMPHOCYTES # BLD AUTO: 30.9 % — SIGNIFICANT CHANGE UP (ref 13–44)
MCHC RBC-ENTMCNC: 31.1 PG — SIGNIFICANT CHANGE UP (ref 27–34)
MCHC RBC-ENTMCNC: 32.9 G/DL — SIGNIFICANT CHANGE UP (ref 32–36)
MCV RBC AUTO: 94.6 FL — SIGNIFICANT CHANGE UP (ref 80–100)
MONOCYTES # BLD AUTO: 0.6 K/UL — SIGNIFICANT CHANGE UP (ref 0–0.9)
MONOCYTES NFR BLD AUTO: 10.8 % — SIGNIFICANT CHANGE UP (ref 2–14)
NEUTROPHILS # BLD AUTO: 2.9 K/UL — SIGNIFICANT CHANGE UP (ref 1.8–7.4)
NEUTROPHILS NFR BLD AUTO: 55.6 % — SIGNIFICANT CHANGE UP (ref 43–77)
PLATELET # BLD AUTO: 213 K/UL — SIGNIFICANT CHANGE UP (ref 150–400)
RBC # BLD: 4.45 M/UL — SIGNIFICANT CHANGE UP (ref 3.8–5.2)
RBC # FLD: 12.8 % — SIGNIFICANT CHANGE UP (ref 10.3–14.5)
WBC # BLD: 5.2 K/UL — SIGNIFICANT CHANGE UP (ref 3.8–10.5)
WBC # FLD AUTO: 5.2 K/UL — SIGNIFICANT CHANGE UP (ref 3.8–10.5)

## 2022-06-07 PROCEDURE — 99215 OFFICE O/P EST HI 40 MIN: CPT

## 2022-06-07 NOTE — RESULTS/DATA
[FreeTextEntry1] : PET/CT 12/1/21\par IMPRESSION:\par 1. Small FDG-avid left axillary lymph nodes, new since prior PET/CT, probably are reactive, likely\par related to recent COVID vaccination.\par 2. Unchanged nonspecific asymmetric, minimal diffuse FDG avidity with skin thickening in the\par reconstructed right breast.\par 3. Small right hepatic lobe capsular density focus on recent CT is not identified, however, no\par significant FDG activity seen on the corresponding region.\par 4. Non FDG-avid capsular high density focus in the posterior spleen is not significantly changed.\par \par 03/21 Normal DEXA scan\par \par 7/27/21 CT A/P: Unchanged high density nodules along the anterior capsule of the liver and lateral capsular surface of the spleen. These are indeterminate for peritoneal implants. Continued attention on follow-up imaging is recommended.\par *  No new sites of disease in the abdomen and pelvis.\par \par 1/28/21 PET:  Bilateral mastectomy with reconstruction. Unchanged nonspecific asymmetric, minimal diffuse FDG avidity with skin thickening in the reconstructed right breast as compared to PET/CT from 3/4/2020. Please correlate clinically. Interval resolution of FDG avid nodular focus in the left anterior abdominal wall, likely secondary to postsurgical changes. Unchanged nonspecific approximately symmetric bilateral tonsillar hypermetabolism.\par \par 9/16/20 CT:  1.6 x 0.7 cm perisplenic soft tissue and 5 x 2 mm region of increased enhancement either in the periphery of segment IVb of the liver or perihepatic which are unchanged when compared with 5/4/2020. The perisplenic region is also stable when compared with 3/4/2020 and no increased FDG uptake was noted at that time. Small stable residual implants cannot be excluded. No new disease or interval growth is seen. Status post bilateral mastectomies with reconstructive surgery. Region of mild nodularity in the right reconstructed breast which is more prominent when compared with 3/4/2020. This may represent granulomatous or fibrotic tissue.\par \par 5/5/20 CT: MALDONADO\par \par 2/4/2020 CT A/P: Sigmoid resection with anastomosis without evidence of obstruction. Small pericapsular splenic fluid collection which is decreased from prior study. \par No evidence of metastatic disease.\par \par 10/15/19 MRI Breast:\par 1.4 cm abnormal enhancement/biopsy clip in the upper outer posterior right breast, corresponding to newly diagnosed malignancy. No MRI evidence of multicentric or contralateral disease. \par RECOMMENDATION: Surgical or oncologic management. \par BI-RADS 6- Known Biopsy-Proven Malignancy \par \par 9/23/19 Pathology: \par Right breast, "11:00, 5 cm from the nipple", ultrasound guided core needle biopsy:\par -Invasive ductal carcinoma, grade 1.\par -See note.\par Note: Immunohistochemical stains for p63 and calponin are performed on block 1-B at [facility] and interpreted at [place] as follows:\par Calponin, p63: negative\par Microscopic evaluation reveals an invasive ductal carcinoma ([place] score 1+2+1), grade 1. The carcinoma measures at least 1.1 cm in its greatest linear expansion in this biopsy. \par There are no calcifications nor necrosis present.\par *Addendum*\par IMMUNOSTAINS PERFORMED AND INTERPRETED ON BLOCK " B " BY [facility],\par HER2/ELADIO BY IHC: 1 + / Negative\par ER: Positive, 90 - 95 %, strong\par MN: Positive, 15 - 20 %, strong\par \par 9/23/19 Mammo: Status post core needle biopsy of the right breast with clip placement. Recommendations for further followup will be based on pathology results.\par \par 9/19/19 US Breast/Mammo: Ill-defined hypoechoic area in the right breast at the 11:00 axis, corresponding to mammographically seen distortion. Ultrasound-guided core biopsy is advised.\par \par 5/15/19 Pathology:\par Intra-abdominal foreign body, removal:  Blood clots\par Sigmoid, resection:  Mucosal necrosis with transmural acute and chronic inflammation.  The process extends to one margin of resection (slide 1A).  Three (3) lymph nodes, one with benign glandular inclusion.\par \par 5/15/19 CT A/P:\par Moderate amount retained fecal material in the colon, greatest in the right hemicolon. Mild wall thickening involving the distal transverse colon, descending colon, and proximal sigmoid colon, possibly reactive to the large collection.  Large fluid collection which appears to be extraperitoneal layering anterior to the omentum measuring 24.7 x 9.4 x \par 20.9 cm (craniocaudad x anteroposterior x transverse). There is a component of the collection which extends posteriorly on the left into the retroperitoneum along the anterior aspect of the psoas extending to the posterior aspect of the splenic flexure (approximately 20 cm in craniocaudad dimension). Skin staples are seen within the anterior abdominal wall. Air is seen within the anterior abdominal wall which may be postoperative in etiology.\par \par 5/8/19 Cytopathology:\par Abdominal fluid:  NEGATIVE FOR MALIGNANT CELLS.  Mostly fibrin and reactive mesothelial cells.\par \par 5/7/19 Pathology:\par Omental lymph node: Markedly reactive, hemorrhagic lymph node with hemosiderin laden macrophages.\par Cyst wall(1): Hemorrhagic inflamed cyst wall without epithelial lining.\par Right hepatic flexure lymph node: Markedly reactive, hemorrhagic lymph node with hemosiderin laden macrophages.\par Omentum: Omental fat with marked reactive fibrosis, hemorrhage and chronic inflammation; marked serosal adhesions.\par Right pelvic lymph node: Markedly reactive, hemorrhagic lymph node with hemosiderin laden macrophages.\par Left pelvic lymph node: Markedly reactive, hemorrhagic lymph node with hemosiderin laden macrophages.\par Cyst wall(2):  Cyst wall, markedly inflamed and hemorrhagic with cholesterol crystals and without epithelial lining.\par ***Uterus, cervix and adnexa:  Histologically unremarkable cervix. Weakly proliferative endometrium and endometrial polyp. Cysts located outside the uterus, involving serosa, with glandular stromal structures, consistent with endometriosis.\par ****Addendum****\par Case sent for outside consultation at [facility] with Dr. Tina Mckay.\par The atypical clusters of glands, in part 8. "Uterus, cervix and adnexa", have been diagnosed as Low grade endometrioid adenocarcinoma, FIGO grade 1,  with focal sex cord like differentiation, arising in a background of endometriosis, and involving the uterine serosa.\par \par 4/23/19 CT C/A/P:\par There is a large complex cystic mass occupying the lower abdomen, measuring 21.0 (AP) x 24.2 (CC) x 33.1 (TR) cm.  The exact origin of this mass is uncertain. The left posterior wall of this mass is discontinuous, suggestive of rupture. There is a large amount of abdominal ascites.  Additional complex left adnexal cystic mass with septations and apparent mural nodules measuring 9.4 (AP) x 15.5 (CC) x 16.6 (TR) cm.

## 2022-06-07 NOTE — ADDENDUM
[FreeTextEntry1] : Documented by Russ Blank acting as scribe for Dr. Lagos on 06/07/2022. \par \par All Medical record entries made by the Scribe were at my, Dr. Lagos's, direction and personally dictated by me on 06/07/2022. I have reviewed the chart and agree that the record accurately reflects my personal performance of the history, physical exam, assessment and plan. I have also personally directed, reviewed, and agreed with the discharge instructions.

## 2022-06-07 NOTE — HISTORY OF PRESENT ILLNESS
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  She presented to [facility] ED on 4/23/19 s/p mechanical fall outside her place of work.  A CT A/P incidentally showed a complex cystic left adnexal mass measuring 17 cm. Massive cystic lesion occupying the lower abdomen measuring 33 cm. It was uncertain whether this represents a large peritoneal metastasis or a component of the above described cystic left adnexal mass, or possibly a right adnexal mass. There was suspicion for rupture of this mass with a large amount of abdominal ascites.  On 5/7/19 Dr. Manpreet Gonzalez performed a BASILIA, BSO, debulking and peritonectomy.  Pathology initially was benign.  An addendum was later issued which showed low grade endometrioid adenocarcinoma, FIGO grade 1, with focal sex cord like differentiation, arising in a background of endometriosis, and involving the uterine serosa.  She was discharged on 5/12/19 but returned to the ED on 5/15/19 c/o abdominal distention, N/V, dizziness, weakness, multiple falls and a fever of 104.  She was found to be hypotensive and tachycardic.  A CT A/P showed a large fluid collection which appears to be extraperitoneal layering anterior to the omentum measuring 24.7 x 9.4 x 20.9 cm. There is a component of the collection which extends posteriorly on the left into the retroperitoneum along the anterior aspect of the psoas extending to the posterior aspect of the splenic flexure (approximately 20 cm in craniocaudad dimension). On 5/17/19 Dr. Mague Durand performed an emergent exploratory laparotomy, sigmoidectomy and end colostomy.  Pathology was benign.   [de-identified] : FHx of DCIS (sister) [de-identified] : She is s/p BASILIA BSO, debulking and peritonectomy on 5/7/19 with Dr. Manpreet Gonzalez due to extensive endometrioid adenocarcinoma, with a delayed leak from the sigmoid requiring bowel resection and end-colostomy by Dr. Durand.  No adjuvant treatment was recommended and she is s/p colostomy reversal.  Diagnosed with right breast cancer in September 2019, ER+ [place]+ HER2-.  \par \par Patient presents for follow-up visit\par Patient doing well, offers no acute complaints\par Patient currently taking Vitamin D 2000 units daily. Vit D in 2/01/22: 54 \par Pending CT as per Dr. Witt. Following up next week\par \par On anastrazole. Tolerating well\par No joint pain\par No hot flashes\par No vaginal dryness \par LFT 02/2022: WNL \par \par \par \par

## 2022-06-07 NOTE — PHYSICAL EXAM
[Restricted in physically strenuous activity but ambulatory and able to carry out work of a light or sedentary nature] : Status 1- Restricted in physically strenuous activity but ambulatory and able to carry out work of a light or sedentary nature, e.g., light house work, office work [Normal] : affect appropriate [de-identified] : s/p bilateral mastectomy with well-healed incisions [de-identified] : well-healed incision both  and vertical midline [de-identified] : 1+ RUE edema

## 2022-06-09 ENCOUNTER — TRANSCRIPTION ENCOUNTER (OUTPATIENT)
Age: 52
End: 2022-06-09

## 2022-06-09 LAB — 25(OH)D3 SERPL-MCNC: 56.4 NG/ML

## 2022-06-10 LAB
ALBUMIN SERPL ELPH-MCNC: 4.6 G/DL
ALP BLD-CCNC: 81 U/L
ALT SERPL-CCNC: 15 U/L
ANION GAP SERPL CALC-SCNC: 12 MMOL/L
AST SERPL-CCNC: 19 U/L
BILIRUB SERPL-MCNC: <0.2 MG/DL
BUN SERPL-MCNC: 14 MG/DL
CALCIUM SERPL-MCNC: 9.7 MG/DL
CHLORIDE SERPL-SCNC: 102 MMOL/L
CO2 SERPL-SCNC: 26 MMOL/L
CREAT SERPL-MCNC: 0.87 MG/DL
EGFR: 79 ML/MIN/1.73M2
GLUCOSE SERPL-MCNC: 146 MG/DL
POTASSIUM SERPL-SCNC: 4.3 MMOL/L
PROT SERPL-MCNC: 7.4 G/DL
SODIUM SERPL-SCNC: 141 MMOL/L

## 2022-06-14 ENCOUNTER — APPOINTMENT (OUTPATIENT)
Dept: GYNECOLOGIC ONCOLOGY | Facility: CLINIC | Age: 52
End: 2022-06-14
Payer: COMMERCIAL

## 2022-06-14 VITALS
SYSTOLIC BLOOD PRESSURE: 144 MMHG | HEART RATE: 92 BPM | BODY MASS INDEX: 35.12 KG/M2 | DIASTOLIC BLOOD PRESSURE: 90 MMHG | RESPIRATION RATE: 16 BRPM | WEIGHT: 186 LBS | HEIGHT: 61 IN | OXYGEN SATURATION: 99 %

## 2022-06-14 PROCEDURE — 99212 OFFICE O/P EST SF 10 MIN: CPT

## 2022-06-14 NOTE — DISCUSSION/SUMMARY
[FreeTextEntry1] : We discussed that she has been without evidence of disease since 2019 and she agrees to be seen by one of our PAs for surveillance. We will continue every 6 months surveillance until she is 5 years after diagnosis and we will alternate visits with Dr. Yarbrough.

## 2022-06-14 NOTE — HISTORY OF PRESENT ILLNESS
[FreeTextEntry1] : Pt is a 53 yo s/p BASILIA, BSO, tumor debulking, peritonectomy on 5/7/19 due to extensive endometriosis of entire abdominal cavity. S/p end colostomy and colon resection and ultimately takedown on 8/8/19. Pathology with focus of grade 1 endometrioid adenocarcinoma arising from background of endometriosis on the uterine serosa. She was diagnosed with right breast cancer s/p bilateral mastectomy and KWAME flap by Dr. Licea on 11/25/19.  She has follow up with Dr. Yarbrough. She also had colostomy reversal and abdominoplasty in the interim. She had a completion right axillary dissection on 1/3/20. She had post-mastectomy radiation by Dr. Caldwell complete 5/18/20. \par \par Interval history: She reports having excision of the mole on the left foot, but it was not cancerous. No new medical/surgical issues. No vaginal discharge or bleeding. Not sexually active.\par \par Colonoscopy: done 2019, due in 2024\par Mammogram: s/p bilateral mastectomy\par DEXA: due 2023\par Immunizations: Shigles, Tdap (2019), COVID, Influenza\par

## 2022-06-14 NOTE — END OF VISIT
[FreeTextEntry3] : RTC in 6 months for surveillance with PA [FreeTextEntry2] : Livia Cardenas MA was present the entire duration of the patient interaction and gynecological exam.\par

## 2022-06-14 NOTE — PHYSICAL EXAM
[Chaperone Present] : A chaperone was present in the examining room during all aspects of the physical examination [Absent] : Adnexa(ae): Absent [Normal] : Anus and perineum: Normal sphincter tone, no masses, no prolapse. [FreeTextEntry1] : smlling and happy  [de-identified] : well healed abdominal incisions [de-identified] : no masses, or lesions [Restricted in physically strenuous activity but ambulatory and able to carry out work of a light or sedentary nature] : Status 1- Restricted in physically strenuous activity but ambulatory and able to carry out work of a light or sedentary nature, e.g., light house work, office work

## 2022-06-14 NOTE — ASSESSMENT
[FreeTextEntry1] : Pt is a 51 yo with Grade 1 endometrioid adenocarcinoma arising in the background of endometriosis involving the uterine serosa, likely primary peritoneal. She has no evidence of abdominal disease on exam or on PET CT abdomen/pelvis 9/23/2020. CT scan 7/27/21 with no evidence of disease. No evidence of disease on exam.

## 2022-06-15 ENCOUNTER — APPOINTMENT (OUTPATIENT)
Dept: HEMATOLOGY ONCOLOGY | Facility: CLINIC | Age: 52
End: 2022-06-15

## 2022-06-16 ENCOUNTER — APPOINTMENT (OUTPATIENT)
Dept: CT IMAGING | Facility: CLINIC | Age: 52
End: 2022-06-16
Payer: COMMERCIAL

## 2022-06-16 ENCOUNTER — OUTPATIENT (OUTPATIENT)
Dept: OUTPATIENT SERVICES | Facility: HOSPITAL | Age: 52
LOS: 1 days | End: 2022-06-16
Payer: COMMERCIAL

## 2022-06-16 DIAGNOSIS — Z98.890 OTHER SPECIFIED POSTPROCEDURAL STATES: Chronic | ICD-10-CM

## 2022-06-16 DIAGNOSIS — Z00.8 ENCOUNTER FOR OTHER GENERAL EXAMINATION: ICD-10-CM

## 2022-06-16 DIAGNOSIS — Z93.3 COLOSTOMY STATUS: Chronic | ICD-10-CM

## 2022-06-16 DIAGNOSIS — Z90.13 ACQUIRED ABSENCE OF BILATERAL BREASTS AND NIPPLES: Chronic | ICD-10-CM

## 2022-06-16 DIAGNOSIS — C56.9 MALIGNANT NEOPLASM OF UNSPECIFIED OVARY: ICD-10-CM

## 2022-06-16 DIAGNOSIS — Z90.710 ACQUIRED ABSENCE OF BOTH CERVIX AND UTERUS: Chronic | ICD-10-CM

## 2022-06-16 PROCEDURE — 74177 CT ABD & PELVIS W/CONTRAST: CPT | Mod: 26

## 2022-06-16 PROCEDURE — 74177 CT ABD & PELVIS W/CONTRAST: CPT

## 2022-06-17 NOTE — ED ADULT NURSE NOTE - NSSUSCREENINGQ1_ED_ALL_ED
What Type Of Note Output Would You Prefer (Optional)?: Bullet Format Hpi Title: Evaluation of Skin Lesions No

## 2022-06-29 ENCOUNTER — OUTPATIENT (OUTPATIENT)
Dept: OUTPATIENT SERVICES | Facility: HOSPITAL | Age: 52
LOS: 1 days | Discharge: HOME | End: 2022-06-29

## 2022-06-29 DIAGNOSIS — Z98.890 OTHER SPECIFIED POSTPROCEDURAL STATES: Chronic | ICD-10-CM

## 2022-06-29 DIAGNOSIS — Z90.710 ACQUIRED ABSENCE OF BOTH CERVIX AND UTERUS: Chronic | ICD-10-CM

## 2022-06-29 DIAGNOSIS — Z90.13 ACQUIRED ABSENCE OF BILATERAL BREASTS AND NIPPLES: Chronic | ICD-10-CM

## 2022-06-29 DIAGNOSIS — Z93.3 COLOSTOMY STATUS: Chronic | ICD-10-CM

## 2022-06-29 DIAGNOSIS — C50.919 MALIGNANT NEOPLASM OF UNSPECIFIED SITE OF UNSPECIFIED FEMALE BREAST: ICD-10-CM

## 2022-07-01 ENCOUNTER — RESULT REVIEW (OUTPATIENT)
Age: 52
End: 2022-07-01

## 2022-07-01 ENCOUNTER — APPOINTMENT (OUTPATIENT)
Dept: HEMATOLOGY ONCOLOGY | Facility: CLINIC | Age: 52
End: 2022-07-01

## 2022-07-01 VITALS
TEMPERATURE: 98.3 F | RESPIRATION RATE: 17 BRPM | SYSTOLIC BLOOD PRESSURE: 145 MMHG | OXYGEN SATURATION: 98 % | WEIGHT: 186 LBS | HEART RATE: 81 BPM | DIASTOLIC BLOOD PRESSURE: 88 MMHG | BODY MASS INDEX: 35.14 KG/M2

## 2022-07-01 LAB
ALBUMIN SERPL ELPH-MCNC: 4.6 G/DL — SIGNIFICANT CHANGE UP (ref 3.3–5)
ALP SERPL-CCNC: 89 U/L — SIGNIFICANT CHANGE UP (ref 40–120)
ALT FLD-CCNC: 47 U/L — HIGH (ref 10–45)
ANION GAP SERPL CALC-SCNC: 10 MMOL/L — SIGNIFICANT CHANGE UP (ref 5–17)
AST SERPL-CCNC: 38 U/L — SIGNIFICANT CHANGE UP (ref 10–40)
BASOPHILS # BLD AUTO: 0.03 K/UL — SIGNIFICANT CHANGE UP (ref 0–0.2)
BASOPHILS NFR BLD AUTO: 0.5 % — SIGNIFICANT CHANGE UP (ref 0–2)
BILIRUB SERPL-MCNC: 0.4 MG/DL — SIGNIFICANT CHANGE UP (ref 0.2–1.2)
BUN SERPL-MCNC: 13 MG/DL — SIGNIFICANT CHANGE UP (ref 7–23)
CALCIUM SERPL-MCNC: 10.3 MG/DL — SIGNIFICANT CHANGE UP (ref 8.4–10.5)
CHLORIDE SERPL-SCNC: 104 MMOL/L — SIGNIFICANT CHANGE UP (ref 96–108)
CO2 SERPL-SCNC: 28 MMOL/L — SIGNIFICANT CHANGE UP (ref 22–31)
CREAT SERPL-MCNC: 0.85 MG/DL — SIGNIFICANT CHANGE UP (ref 0.5–1.3)
EGFR: 81 ML/MIN/1.73M2 — SIGNIFICANT CHANGE UP
EOSINOPHIL # BLD AUTO: 0.13 K/UL — SIGNIFICANT CHANGE UP (ref 0–0.5)
EOSINOPHIL NFR BLD AUTO: 2.1 % — SIGNIFICANT CHANGE UP (ref 0–6)
GLUCOSE SERPL-MCNC: 98 MG/DL — SIGNIFICANT CHANGE UP (ref 70–99)
HCT VFR BLD CALC: 40 % — SIGNIFICANT CHANGE UP (ref 34.5–45)
HGB BLD-MCNC: 13.2 G/DL — SIGNIFICANT CHANGE UP (ref 11.5–15.5)
IMM GRANULOCYTES NFR BLD AUTO: 0.3 % — SIGNIFICANT CHANGE UP (ref 0–1.5)
LYMPHOCYTES # BLD AUTO: 2.11 K/UL — SIGNIFICANT CHANGE UP (ref 1–3.3)
LYMPHOCYTES # BLD AUTO: 34 % — SIGNIFICANT CHANGE UP (ref 13–44)
MAGNESIUM SERPL-MCNC: 2.1 MG/DL — SIGNIFICANT CHANGE UP (ref 1.6–2.6)
MCHC RBC-ENTMCNC: 30.2 PG — SIGNIFICANT CHANGE UP (ref 27–34)
MCHC RBC-ENTMCNC: 33 GM/DL — SIGNIFICANT CHANGE UP (ref 32–36)
MCV RBC AUTO: 91.5 FL — SIGNIFICANT CHANGE UP (ref 80–100)
MONOCYTES # BLD AUTO: 0.46 K/UL — SIGNIFICANT CHANGE UP (ref 0–0.9)
MONOCYTES NFR BLD AUTO: 7.4 % — SIGNIFICANT CHANGE UP (ref 2–14)
NEUTROPHILS # BLD AUTO: 3.45 K/UL — SIGNIFICANT CHANGE UP (ref 1.8–7.4)
NEUTROPHILS NFR BLD AUTO: 55.7 % — SIGNIFICANT CHANGE UP (ref 43–77)
NRBC # BLD: 0 /100 WBCS — SIGNIFICANT CHANGE UP (ref 0–0)
PLATELET # BLD AUTO: 243 K/UL — SIGNIFICANT CHANGE UP (ref 150–400)
POTASSIUM SERPL-MCNC: 4.1 MMOL/L — SIGNIFICANT CHANGE UP (ref 3.5–5.3)
POTASSIUM SERPL-SCNC: 4.1 MMOL/L — SIGNIFICANT CHANGE UP (ref 3.5–5.3)
PROT SERPL-MCNC: 7.4 G/DL — SIGNIFICANT CHANGE UP (ref 6–8.3)
RBC # BLD: 4.37 M/UL — SIGNIFICANT CHANGE UP (ref 3.8–5.2)
RBC # FLD: 13.2 % — SIGNIFICANT CHANGE UP (ref 10.3–14.5)
SODIUM SERPL-SCNC: 142 MMOL/L — SIGNIFICANT CHANGE UP (ref 135–145)
WBC # BLD: 6.2 K/UL — SIGNIFICANT CHANGE UP (ref 3.8–10.5)
WBC # FLD AUTO: 6.2 K/UL — SIGNIFICANT CHANGE UP (ref 3.8–10.5)

## 2022-07-01 PROCEDURE — 99215 OFFICE O/P EST HI 40 MIN: CPT

## 2022-07-01 NOTE — REVIEW OF SYSTEMS
[Negative] : Allergic/Immunologic [Chest Pain] : no chest pain [Shortness Of Breath] : no shortness of breath

## 2022-07-01 NOTE — HISTORY OF PRESENT ILLNESS
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  She presented to [facility] ED on 4/23/19 s/p mechanical fall outside her place of work.  A CT A/P incidentally showed a complex cystic left adnexal mass measuring 17 cm. Massive cystic lesion occupying the lower abdomen measuring 33 cm. It was uncertain whether this represents a large peritoneal metastasis or a component of the above described cystic left adnexal mass, or possibly a right adnexal mass. There was suspicion for rupture of this mass with a large amount of abdominal ascites.  On 5/7/19 Dr. Manpreet Gonzalez performed a BASILIA, BSO, debulking and peritonectomy.  Pathology initially was benign.  An addendum was later issued which showed low grade endometrioid adenocarcinoma, FIGO grade 1, with focal sex cord like differentiation, arising in a background of endometriosis, and involving the uterine serosa.  She was discharged on 5/12/19 but returned to the ED on 5/15/19 c/o abdominal distention, N/V, dizziness, weakness, multiple falls and a fever of 104.  She was found to be hypotensive and tachycardic.  A CT A/P showed a large fluid collection which appears to be extraperitoneal layering anterior to the omentum measuring 24.7 x 9.4 x 20.9 cm. There is a component of the collection which extends posteriorly on the left into the retroperitoneum along the anterior aspect of the psoas extending to the posterior aspect of the splenic flexure (approximately 20 cm in craniocaudad dimension). On 5/17/19 Dr. Mague Durand performed an emergent exploratory laparotomy, sigmoidectomy and end colostomy.  Pathology was benign.   [de-identified] : FHx of DCIS (sister) [de-identified] : Patient called for follow-up visit via telephone. She is s/p BASILIA BSO, debulking and peritonectomy on 5/7/19 with Dr. Manpreet Gonzalez due to extensive endometrioid adenocarcinoma, with a delayed leak from the sigmoid requiring bowel resection and end-colostomy by Dr. Durand.  No adjuvant treatment was recommended and she is s/p colostomy reversal. Patient currently without complaints. Denies any pain, abdominal or vaginal.  No vaginal bleeding.  No N/VD/C.  Appetite is normal. No fever, no cough, no SOB.\par \par Diagnosed with right breast cancer in September 2019, ER+ [place]+ HER2-.  Followed by Dr. Sellers.

## 2022-07-01 NOTE — RESULTS/DATA
[FreeTextEntry1] : 6/16/22 CT A/P: No interval change since 6/16/2022. Calcified perisplenic nodule/implant is unchanged.\par \par 12/1/21 PET: 1. Small FDG-avid left axillary lymph nodes, new since prior PET/CT, probably are reactive, likely related to recent COVID vaccination.\par 2. Unchanged nonspecific asymmetric, minimal diffuse FDG avidity with skin thickening in the reconstructed right breast.\par 3. Small right hepatic lobe capsular density focus on recent CT is not identified, however, no significant FDG activity seen on the corresponding region.\par 4. Non FDG-avid capsular high density focus in the posterior spleen is not significantly changed.\par \par 7/27/21 CT A/P: Unchanged high density nodules along the anterior capsule of the liver and lateral capsular surface of the spleen. These are indeterminate for peritoneal implants. Continued attention on follow-up imaging is recommended.\par *  No new sites of disease in the abdomen and pelvis.\par \par 1/28/21 PET:  Bilateral mastectomy with reconstruction. Unchanged nonspecific asymmetric, minimal diffuse FDG avidity with skin thickening in the reconstructed right breast as compared to PET/CT from 3/4/2020. Please correlate clinically. Interval resolution of FDG avid nodular focus in the left anterior abdominal wall, likely secondary to postsurgical changes. Unchanged nonspecific approximately symmetric bilateral tonsillar hypermetabolism.\par \par 9/16/20 CT:  1.6 x 0.7 cm perisplenic soft tissue and 5 x 2 mm region of increased enhancement either in the periphery of segment IVb of the liver or perihepatic which are unchanged when compared with 5/4/2020. The perisplenic region is also stable when compared with 3/4/2020 and no increased FDG uptake was noted at that time. Small stable residual implants cannot be excluded. No new disease or interval growth is seen. Status post bilateral mastectomies with reconstructive surgery. Region of mild nodularity in the right reconstructed breast which is more prominent when compared with 3/4/2020. This may represent granulomatous or fibrotic tissue.\par \par 5/5/20 CT: MALDONADO\par \par 2/4/2020 CT A/P: Sigmoid resection with anastomosis without evidence of obstruction. Small pericapsular splenic fluid collection which is decreased from prior study. \par No evidence of metastatic disease.\par \par 10/15/19 MRI Breast:\par 1.4 cm abnormal enhancement/biopsy clip in the upper outer posterior right breast, corresponding to newly diagnosed malignancy. No MRI evidence of multicentric or contralateral disease. \par RECOMMENDATION: Surgical or oncologic management. \par BI-RADS 6- Known Biopsy-Proven Malignancy \par \par 9/23/19 Pathology: \par Right breast, "11:00, 5 cm from the nipple", ultrasound guided core needle biopsy:\par -Invasive ductal carcinoma, grade 1.\par -See note.\par Note: Immunohistochemical stains for p63 and calponin are performed on block 1-B at [facility] and interpreted at [place] as follows:\par Calponin, p63: negative\par Microscopic evaluation reveals an invasive ductal carcinoma ([place] score 1+2+1), grade 1. The carcinoma measures at least 1.1 cm in its greatest linear expansion in this biopsy. \par There are no calcifications nor necrosis present.\par *Addendum*\par IMMUNOSTAINS PERFORMED AND INTERPRETED ON BLOCK " B " BY [facility],\par HER2/ELADIO BY IHC: 1 + / Negative\par ER: Positive, 90 - 95 %, strong\par [place]: Positive, 15 - 20 %, strong\par \par 9/23/19 Mammo: Status post core needle biopsy of the right breast with clip placement. Recommendations for further followup will be based on pathology results.\par \par 9/19/19 US Breast/Mammo: Ill-defined hypoechoic area in the right breast at the 11:00 axis, corresponding to mammographically seen distortion. Ultrasound-guided core biopsy is advised.\par \par 5/15/19 Pathology:\par Intra-abdominal foreign body, removal:  Blood clots\par Sigmoid, resection:  Mucosal necrosis with transmural acute and chronic inflammation.  The process extends to one margin of resection (slide 1A).  Three (3) lymph nodes, one with benign glandular inclusion.\par \par 5/15/19 CT A/P:\par Moderate amount retained fecal material in the colon, greatest in the right hemicolon. Mild wall thickening involving the distal transverse colon, descending colon, and proximal sigmoid colon, possibly reactive to the large collection.  Large fluid collection which appears to be extraperitoneal layering anterior to the omentum measuring 24.7 x 9.4 x \par 20.9 cm (craniocaudad x anteroposterior x transverse). There is a component of the collection which extends posteriorly on the left into the retroperitoneum along the anterior aspect of the psoas extending to the posterior aspect of the splenic flexure (approximately 20 cm in craniocaudad dimension). Skin staples are seen within the anterior abdominal wall. Air is seen within the anterior abdominal wall which may be postoperative in etiology.\par \par 5/8/19 Cytopathology:\par Abdominal fluid:  NEGATIVE FOR MALIGNANT CELLS.  Mostly fibrin and reactive mesothelial cells.\par \par 5/7/19 Pathology:\par Omental lymph node: Markedly reactive, hemorrhagic lymph node with hemosiderin laden macrophages.\par Cyst wall(1): Hemorrhagic inflamed cyst wall without epithelial lining.\par Right hepatic flexure lymph node: Markedly reactive, hemorrhagic lymph node with hemosiderin laden macrophages.\par Omentum: Omental fat with marked reactive fibrosis, hemorrhage and chronic inflammation; marked serosal adhesions.\par Right pelvic lymph node: Markedly reactive, hemorrhagic lymph node with hemosiderin laden macrophages.\par Left pelvic lymph node: Markedly reactive, hemorrhagic lymph node with hemosiderin laden macrophages.\par Cyst wall(2):  Cyst wall, markedly inflamed and hemorrhagic with cholesterol crystals and without epithelial lining.\par ***Uterus, cervix and adnexa:  Histologically unremarkable cervix. Weakly proliferative endometrium and endometrial polyp. Cysts located outside the uterus, involving serosa, with glandular stromal structures, consistent with endometriosis.\par ****Addendum****\par Case sent for outside consultation at [facility] with Dr. Tina Mckay.\par The atypical clusters of glands, in part 8. "Uterus, cervix and adnexa", have been diagnosed as Low grade endometrioid adenocarcinoma, FIGO grade 1,  with focal sex cord like differentiation, arising in a background of endometriosis, and involving the uterine serosa.\par \par 4/23/19 CT C/A/P:\par There is a large complex cystic mass occupying the lower abdomen, measuring 21.0 (AP) x 24.2 (CC) x 33.1 (TR) cm.  The exact origin of this mass is uncertain. The left posterior wall of this mass is discontinuous, suggestive of rupture. There is a large amount of abdominal ascites.  Additional complex left adnexal cystic mass with septations and apparent mural nodules measuring 9.4 (AP) x 15.5 (CC) x 16.6 (TR) cm.

## 2022-07-02 LAB
CANCER AG125 SERPL-ACNC: 9 U/ML — SIGNIFICANT CHANGE UP
CANCER AG27-29 SERPL-ACNC: 23.9 U/ML — SIGNIFICANT CHANGE UP (ref 0–38.6)

## 2022-07-05 DIAGNOSIS — E55.9 VITAMIN D DEFICIENCY, UNSPECIFIED: ICD-10-CM

## 2022-07-05 DIAGNOSIS — C50.911 MALIGNANT NEOPLASM OF UNSPECIFIED SITE OF RIGHT FEMALE BREAST: ICD-10-CM

## 2022-07-05 DIAGNOSIS — E04.1 NONTOXIC SINGLE THYROID NODULE: ICD-10-CM

## 2022-07-05 DIAGNOSIS — C56.9 MALIGNANT NEOPLASM OF UNSPECIFIED OVARY: ICD-10-CM

## 2022-07-08 ENCOUNTER — EMERGENCY (EMERGENCY)
Facility: HOSPITAL | Age: 52
LOS: 1 days | Discharge: HOME | End: 2022-07-08
Attending: EMERGENCY MEDICINE
Payer: COMMERCIAL

## 2022-07-08 VITALS
SYSTOLIC BLOOD PRESSURE: 161 MMHG | RESPIRATION RATE: 16 BRPM | HEART RATE: 82 BPM | OXYGEN SATURATION: 99 % | DIASTOLIC BLOOD PRESSURE: 83 MMHG | TEMPERATURE: 98 F | WEIGHT: 186.07 LBS | HEIGHT: 61 IN

## 2022-07-08 DIAGNOSIS — Z98.890 OTHER SPECIFIED POSTPROCEDURAL STATES: Chronic | ICD-10-CM

## 2022-07-08 DIAGNOSIS — Z93.3 COLOSTOMY STATUS: Chronic | ICD-10-CM

## 2022-07-08 DIAGNOSIS — Z90.710 ACQUIRED ABSENCE OF BOTH CERVIX AND UTERUS: Chronic | ICD-10-CM

## 2022-07-08 DIAGNOSIS — Z90.13 ACQUIRED ABSENCE OF BILATERAL BREASTS AND NIPPLES: Chronic | ICD-10-CM

## 2022-07-08 PROCEDURE — 99284 EMERGENCY DEPT VISIT MOD MDM: CPT

## 2022-07-08 PROCEDURE — 73060 X-RAY EXAM OF HUMERUS: CPT | Mod: 26,RT

## 2022-07-08 PROCEDURE — 99284 EMERGENCY DEPT VISIT MOD MDM: CPT | Mod: 25

## 2022-07-08 PROCEDURE — 73060 X-RAY EXAM OF HUMERUS: CPT

## 2022-07-08 PROCEDURE — 73080 X-RAY EXAM OF ELBOW: CPT

## 2022-07-08 PROCEDURE — 73080 X-RAY EXAM OF ELBOW: CPT | Mod: 26,RT

## 2022-07-08 PROCEDURE — 73030 X-RAY EXAM OF SHOULDER: CPT | Mod: 26,RT

## 2022-07-08 PROCEDURE — 73030 X-RAY EXAM OF SHOULDER: CPT

## 2022-07-08 RX ORDER — ACETAMINOPHEN 500 MG
650 TABLET ORAL ONCE
Refills: 0 | Status: COMPLETED | OUTPATIENT
Start: 2022-07-08 | End: 2022-07-08

## 2022-07-08 RX ADMIN — Medication 650 MILLIGRAM(S): at 03:14

## 2022-07-08 NOTE — ED PROVIDER NOTE - ATTENDING APP SHARED VISIT CONTRIBUTION OF CARE
Patient with RUE pain s/p trip and fall. Xray with prox humerus fracture. Placed in a sling, ortho f/u given.

## 2022-07-08 NOTE — ED PROVIDER NOTE - PATIENT PORTAL LINK FT
You can access the FollowMyHealth Patient Portal offered by [facility] by registering at the following website: [email or web address]. By joining [employer]’s FollowMyHealth portal, you will also be able to view your health information using other applications (apps) compatible with our system.

## 2022-07-08 NOTE — ED PROVIDER NOTE - CLINICAL SUMMARY MEDICAL DECISION MAKING FREE TEXT BOX
pt presents s/p fall  no head trama no blood thinners  xray +non displaced proximal humerus fx  sling, ortho f/u

## 2022-07-08 NOTE — ED PROVIDER NOTE - CARE PROVIDER_API CALL
Tha Duong)  Orthopedics  [address] [place] [id number]  Phone: [phone]  Fax: [phone]  Follow Up Time:

## 2022-07-08 NOTE — ED ADULT TRIAGE NOTE - CHIEF COMPLAINT QUOTE
Ambulatory s/p missing a step at a wedding and falling onto R elbow. Patient has decreased ROM and is unable to extend the elbow. Denies hitting her head, anticoagulation, other injury. + sensation to digits.

## 2022-07-08 NOTE — ED PROVIDER NOTE - NSFOLLOWUPINSTRUCTIONS_ED_ALL_ED_FT
Humerus Fracture Treated With Immobilization    Right arm and hand showing skeleton with a humerus fracture.   A humerus fracture is a break in the large bone in the upper arm (humerus). If the joint is stable and the bones are still in their normal position (nondisplaced), the injury may be treated with immobilization. This involves the use of a cast, splint, or sling to hold your arm in place. Immobilization ensures that your bones continue to stay in the correct position while your arm is healing.      What are the causes?    This condition may be caused by:  •A fall.      •A hard, direct hit to the arm.      •A motor vehicle accident.        What increases the risk?    The following factors may make you more likely to develop this condition:  •Having a disease that makes the bones thin and weak.      •Being elderly.        What are the signs or symptoms?    Symptoms of this condition include:  •Pain.      •Swelling.      •Bruising.      •Not being able to move your arm normally.        How is this diagnosed?    This condition may be diagnosed based on:  •A physical exam.      •X-rays of your upper arm, elbow, and shoulder.      •CT scan.        How is this treated?    Treatment for this condition involves wearing a cast, splint, or sling until the injured area is stable enough for you to begin range-of-motion exercises. You may also be prescribed pain medicine.      Follow these instructions at home:    If you have a nonremovable cast or splint:     • Do not put pressure on any part of the cast or splint until it is fully hardened. This may take several hours.      • Do not stick anything inside the cast or splint to scratch your skin. Doing that increases your risk of infection.      •Check the skin around the cast or splint every day. Tell your health care provider about any concerns.      •You may put lotion on dry skin around the edges of the cast or splint. Do not put lotion on the skin underneath the cast or splint.      •Keep the cast or splint clean and dry.      If you have a removable splint or sling:     •Wear the splint or sling as told by your health care provider. Remove it only as told by your health care provider.      •Check the skin around the splint or sling every day. Tell your health care provider about any concerns.      •Loosen the splint or sling if your fingers tingle, become numb, or turn cold and blue.      •Keep the splint or sling clean and dry.      Bathing     • Do not take baths, swim, or use a hot tub until your health care provider approves. Ask your health care provider if you may take showers. You may only be allowed to take sponge baths.    •If the cast, splint, or sling is not waterproof:  •Do not let it get wet.      •Cover it with a watertight covering when you take a bath or shower.          Managing pain, stiffness, and swelling   Bag of ice on a towel on the skin.  •If directed, put ice on the injured area. To do this:  •If you have a removable splint or sling, remove it as told by your health care provider.      •Put ice in a plastic bag.      •Place a towel between your skin and the bag or between your nonremovable cast or sling and the bag.      •Leave the ice on for 20 minutes, 2–3 times a day.      •Remove the ice if your skin turns bright red. This is very important. If you cannot feel pain, heat, or cold, you have a greater risk of damage to the area.        •Move your fingers often to reduce stiffness and swelling.      •Raise the injured area above the level of your heart while you are sitting or lying down.      Driving     • Do not drive or operate machinery while taking prescription pain medicine.      •Ask your health care provider when it is safe to drive if you have a cast, splint, or sling on your arm.      Activity     • Do not lift anything until your health care provider says that it is safe.      •Return to your normal activities as told by your health care provider. Ask your health care provider what activities are safe for you.      •Do range-of-motion exercises only as told by your health care provider or physical therapist.      General instructions     • Do not use any products that contain nicotine or tobacco. These products include cigarettes, chewing tobacco, and vaping devices, such as e-cigarettes. These can delay bone healing. If you need help quitting, ask your health care provider.      •Take over-the-counter and prescription medicines only as told by your health care provider.    •Ask your health care provider if the medicine prescribed to you:•Can cause constipation. You may need to take these actions to prevent or treat constipation:  •Drink enough fluid to keep your urine pale yellow.      •Take over-the-counter or prescription medicines.      •Eat foods that are high in fiber, such as beans, whole grains, and fresh fruits and vegetables.      •Limit foods that are high in fat and processed sugars, such as fried or sweet foods.          •Keep all follow-up visits. This is important.        Contact a health care provider if:    •You have any new pain, swelling, or bruising.      •Your pain, swelling, and bruising do not improve.      •Your cast, splint, or sling becomes loose or damaged.        Get help right away if:    •Your skin or fingers on your injured arm turn blue or gray.      •Your arm feels cold or numb.      •You have severe pain in your injured arm.        Summary    •A humerus fracture is a break in the large bone in the upper arm.      •Immobilization involves the use of a cast, splint, or sling to hold your arm in place while the injury heals.      •Wear a splint or sling as told by your health care provider. Remove it only as told by your health care provider.      •Move your fingers often to reduce stiffness and swelling.      This information is not intended to replace advice given to you by your health care provider. Make sure you discuss any questions you have with your health care provider.

## 2022-07-08 NOTE — ED PROVIDER NOTE - PHYSICAL EXAMINATION
Gen: No acute distress, non toxic  HEENT: Mucous membranes moist, pink conjunctivae, EOMI  CV: RRR, nl s1/s2.  Resp: CTAB, normal rate and effort  GI: Abdomen soft, NT, ND. No rebound, no guarding  : No CVAT  Neuro: A&O x 3, moving all 4 extremities  MSK: No spine tenderness to palpation. +R upper arm ttp. full rom all joints, 2+ distal pulses sensation intact   Skin: No rashes. intact and perfused.

## 2022-07-08 NOTE — ED PROVIDER NOTE - NS ED ATTENDING STATEMENT MOD
This was a shared visit with the [facility]. I reviewed and verified the documentation and independently performed the documented:

## 2022-07-08 NOTE — ED PROVIDER NOTE - OBJECTIVE STATEMENT
51 y/o F presents c/o right shoulder/upper arm pain s/p fall. pt was walking out of a wedding missed a step falling forward onto her arm tucked into her chest. denies hitting head or LOC. pt c/opain to R arm. denies numbness tingling weakness HA neck or back pain n/v or dizziness.

## 2022-07-14 ENCOUNTER — APPOINTMENT (OUTPATIENT)
Dept: ORTHOPEDIC SURGERY | Facility: CLINIC | Age: 52
End: 2022-07-14

## 2022-07-14 VITALS
WEIGHT: 186 LBS | HEIGHT: 61 IN | BODY MASS INDEX: 35.12 KG/M2 | SYSTOLIC BLOOD PRESSURE: 142 MMHG | TEMPERATURE: 98.1 F | HEART RATE: 83 BPM | DIASTOLIC BLOOD PRESSURE: 81 MMHG

## 2022-07-14 DIAGNOSIS — M25.519 PAIN IN UNSPECIFIED SHOULDER: ICD-10-CM

## 2022-07-14 PROCEDURE — 73030 X-RAY EXAM OF SHOULDER: CPT | Mod: RT

## 2022-07-14 PROCEDURE — 23600 CLTX PROX HUMRL FX W/O MNPJ: CPT | Mod: RT

## 2022-07-14 PROCEDURE — 99203 OFFICE O/P NEW LOW 30 MIN: CPT | Mod: 57

## 2022-07-14 NOTE — HISTORY OF PRESENT ILLNESS
[de-identified] : Patient is a 52-year-old female presenting with chief complaint of right shoulder pain that occurred after a slip and fall at a wedding.  Patient states that she fell 6 to 7 days ago.  Landed on her right side.  Was diagnosed with a proximal humerus fracture.  Has been in a sling since.  Denies any neurovascular compromise in upper extremity.  Does complain of ecchymosis and swelling.  Denies any elbow or wrist pain.  Has pain with range of motion of the shoulder.  Denies any neck pain or neurovascular compromise.

## 2022-07-14 NOTE — PHYSICAL EXAM
[de-identified] : GENERAL APPEARANCE: Well nourished and hydrated, pleasant, alert, and oriented x 3. Appears their stated age. \par HEENT: Normocephalic, extraocular eye motion intact. Nasal septum midline. Oral cavity clear. External auditory canal clear. \par RESPIRATORY: Breath sounds clear and audible in all lobes. No wheezing, No accessory muscle use.\par CARDIOVASCULAR: No apparent abnormalities. No lower leg edema. No varicosities. Pedal pulses are palpable.\par NEUROLOGIC: Sensation is normal, no muscle weakness in the upper or lower extremities.\par DERMATOLOGIC: No apparent skin lesions, moist, warm, no rash.\par SPINE: Cervical spine appears normal and moves freely; thoracic spine appears normal and moves freely; lumbosacral spine appears normal and moves freely, normal, nontender.\par MUSCULOSKELETAL: Hands, wrists, and elbows are normal and move freely, \par Psychiatric: Oriented to person, place, and time, insight and judgement were intact and the affect was normal. \par Right upper extremity: Sling in place, there is pain with range of motion of the shoulder, there is ecchymosis present over the anterior aspect of the arm, there is tenderness palpation over the proximal humerus, elbow full range of motion without pain, full supination and pronation, wrist full range of motion without pain, AIN PIN median radial ulnar is intact, fingers warm well perfused brisk cap refill [de-identified] : 3 views of the right shoulder obtained the office today show a right proximal humerus fracture with minimal displacement of the greater tuberosity.  Fracture is anatomic alignment.  There is a fracture at the surgical neck.

## 2022-07-14 NOTE — DISCUSSION/SUMMARY
[Medication Risks Reviewed] : Medication risks reviewed [Surgical risks reviewed] : Surgical risks reviewed [de-identified] : Patient is a 52-year-old female with a three-part proximal humerus fracture presenting today for initial evaluation.  I did discuss with her that her greater tuberosity fragment is minimally displaced.  I did however discussed with her that is at risk of displacement and that would possibly require surgical intervention in the future.  I do think that currently her radiographs fit for nonoperative treatment.  I therefore recommended conservative treatment this time.  I given her prescription for physical therapy to work on passive range of motion.  I have also recommended she continues the sling for comfort.  She should take Tylenol and Motrin as needed for the pain.  I would like to see her back in 2 weeks for repeat evaluation and x-ray.  All questions were asked and answered.  She was advised that the greater tuberosity fragment were removed she may require surgical intervention in the future.  Patient is in agreement this.

## 2022-08-04 ENCOUNTER — APPOINTMENT (OUTPATIENT)
Dept: ORTHOPEDIC SURGERY | Facility: CLINIC | Age: 52
End: 2022-08-04

## 2022-08-04 VITALS
HEART RATE: 79 BPM | WEIGHT: 186 LBS | BODY MASS INDEX: 35.12 KG/M2 | DIASTOLIC BLOOD PRESSURE: 85 MMHG | SYSTOLIC BLOOD PRESSURE: 130 MMHG | HEIGHT: 61 IN

## 2022-08-04 PROCEDURE — 73030 X-RAY EXAM OF SHOULDER: CPT | Mod: RT

## 2022-08-04 PROCEDURE — 99024 POSTOP FOLLOW-UP VISIT: CPT

## 2022-08-04 NOTE — PHYSICAL EXAM
[de-identified] : Right upper extremity: Sling in place, there is pain with range of motion of the shoulder, there is no ecchymosis present over the anterior aspect of the arm, there is tenderness palpation over the proximal humerus, elbow full range of motion without pain, full supination and pronation, wrist full range of motion without pain, AIN PIN median radial ulnar is intact, fingers warm well perfused brisk cap refill [de-identified] : 4 views of the right shoulder obtained the office today show a right proximal humerus fracture that is unchanged alignment from previous x-ray.  No dislocation.  Greater tuberosity fragment shows no increased displacement from prior x-ray.

## 2022-08-04 NOTE — DISCUSSION/SUMMARY
[Medication Risks Reviewed] : Medication risks reviewed [Surgical risks reviewed] : Surgical risks reviewed [de-identified] : Patient is a 52 old female with three-part proximal humerus fracture presenting today for follow-up.  She is now 1 month status post injury.  There has been no significant displacement of her greater tuberosity fragment.  I did discuss with her that due to the fact that she is having reducing pain and increasing function in her shoulder I do think that she will benefit from conservative treatment.  I have recommended she continue with physical therapy and given her prescription for that.  I recommended she take NSAIDs and Tylenol as needed for the pain.  She should remain nonweightbearing on her right upper extremity.  I like to see her back in 1 month for repeat evaluation and x-ray.  All questions were asked and answered.  She was advised if the greater tuberosity fragment were to move she may require surgical fixation in the future

## 2022-08-04 NOTE — HISTORY OF PRESENT ILLNESS
[de-identified] : 52-year-old female here today 1 month status post a right proximal humerus fracture.  Overall she is doing very well.  She has been going to physical therapy.  She states the pain in her shoulder is very much improved.  She is no longer wearing her sling.  She is overall very happy with her progress.  Denies any neurovascular compromise.  Denies any new trauma.

## 2022-08-30 ENCOUNTER — APPOINTMENT (OUTPATIENT)
Dept: RADIATION ONCOLOGY | Facility: CLINIC | Age: 52
End: 2022-08-30

## 2022-08-30 ENCOUNTER — NON-APPOINTMENT (OUTPATIENT)
Age: 52
End: 2022-08-30

## 2022-08-30 VITALS
HEART RATE: 86 BPM | DIASTOLIC BLOOD PRESSURE: 77 MMHG | SYSTOLIC BLOOD PRESSURE: 153 MMHG | BODY MASS INDEX: 35.14 KG/M2 | RESPIRATION RATE: 16 BRPM | WEIGHT: 186 LBS | OXYGEN SATURATION: 99 %

## 2022-08-30 PROCEDURE — 99214 OFFICE O/P EST MOD 30 MIN: CPT

## 2022-08-30 NOTE — REVIEW OF SYSTEMS
[Edema Limbs: Grade 0] : Edema Limbs: Grade 0  [Fatigue: Grade 0] : Fatigue: Grade 0 [Localized Edema: Grade 0] : Localized Edema: Grade 0  [Pruritus: Grade 0] : Pruritus: Grade 0 [Skin Atrophy: Grade 0] : Skin Atrophy: Grade 0 [Skin Hyperpigmentation: Grade 0] : Skin Hyperpigmentation: Grade 0 [Dermatitis Radiation: Grade 0] : Dermatitis Radiation: Grade 0

## 2022-08-30 NOTE — HISTORY OF PRESENT ILLNESS
[FreeTextEntry1] : 51 year old woman with IDC right breast s/p bilateral mastectomy, PMRT completed 5/18/20. 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  Complicated post-operatively by delayed leak from the sigmoid colon, requiring bowel resection and end colostomy. No adjuvant therapy was recommended. She is s/p colostomy reversal.\par  \par Reports a fall in the beginning of July, with fracture of the right proximal humerus.  Undergoing physical therapy.\par \par No breast/chest wall pain, edema, or lymphedema.\par Dermatology; s/p freezing of pre-cancerous lesions on forehead and nose.\par \par 6/16/22 CT abd/pel:\par No interval change.  Calcified perisplenic nodule/implant is unchanged.\par \par Breast surgery: Osvaldo \par Plastics: Vinayak\par Gyn Onc: Urh\par MedOnc (ovary): Siebel  \par MedOnc (breast): [facility]\par Derm: [facility] \par Ortho: Enricohe

## 2022-08-30 NOTE — PHYSICAL EXAM
[Extraocular Movements] : extraocular movements were intact [Outer Ear] : the ears and nose were normal in appearance [Heart Rate And Rhythm] : heart rate and rhythm were normal [Breast Palpation Mass] : no palpable masses [No UE Edema] : there is no upper extremity edema [Abdomen Soft] : soft [Nondistended] : nondistended [Abdomen Tenderness] : non-tender [Cervical Lymph Nodes Enlarged Anterior Bilaterally] : anterior cervical [Supraclavicular Lymph Nodes Enlarged Bilaterally] : supraclavicular [Axillary Lymph Nodes Enlarged Bilaterally] : axillary [Motor Tone] : muscle strength and tone were normal [Normal] : oriented to person, place and time, the affect was normal, the mood was normal and not anxious [de-identified] : minimal hyperpigmentation of the right chest wall [de-identified] : right shoulder '

## 2022-09-01 NOTE — ED ADULT NURSE NOTE - NSSUSCREENINGQ4_ED_ALL_ED
Assessment/Plan:       No problem-specific Assessment & Plan notes found for this encounter  Diagnoses and all orders for this visit:    Well adult exam    Monoclonal gammopathy  Comments:  Patient is following with Hematology    Pure hypercholesterolemia  Comments:  Patient to call me with the name of the medication is taking  To follow with low-fat diet    Low thyroid stimulating hormone (TSH) level  Comments:  Corrected    Leukopenia, unspecified type  Comments: Following with Hematology    Bradycardia  Comments:  stable ,had holter monitor in the past and was evaluated by  cardiology    Immunization counseling  Comments:  recommend T dap  pt declined    Encounter for screening mammogram for malignant neoplasm of breast  Comments:   patient has an order for mammogram from her gyn    Abnormal result on screening urine test  Comments:  Borderline a urine test for pregnancy   Orders:  -     Pregnancy Test (HCG Qualitative); Future    Colon cancer screening  -     Cancel: Ambulatory Referral to Gastroenterology; Future    Hypokalemia  -     Potassium; Future    Chronic post-traumatic stress disorder (PTSD)  Comments:  declined med  Following with conseler    Anxiety  Comments:  Doing better  Follow a counselor    Other orders  -     LORazepam (ATIVAN) 1 mg tablet; Take 1 mg by mouth 2 (two) times a day as needed        Patient Instructions    Follow up with  test results      Orders Placed This Encounter   Procedures    Pregnancy Test (HCG Qualitative)     Standing Status:   Future     Number of Occurrences:   1     Standing Expiration Date:   9/1/2023    Potassium     Standing Status:   Future     Number of Occurrences:   1     Standing Expiration Date:   9/1/2023         Subjective:     Patient ID: Kendra Russell is a 44 y o  female      HPI  Well adult exam   patient does not smoke  Drinks only socially she takes medical marijuana  For  PTSD and anxiety  She is following with a counselor    Eye exam   No She follow with her eye doctor  Dental care ; she follows with her dentist periodically  Gyn and breast exam this year  mammogram not up to date  Diet , heathy diet  Exercise  , she exercise regularly  Colon cancer screening   didn't do cologuard  Immunization, she thinks her tetanus shot more than 10 years   Review of Systems   Constitutional: Negative for activity change, appetite change, chills, fatigue, fever and unexpected weight change  HENT: Negative for congestion, ear discharge, ear pain, hearing loss, nosebleeds, rhinorrhea, sinus pressure, sore throat, tinnitus, trouble swallowing and voice change  Eyes: Negative for photophobia, pain and visual disturbance  Respiratory: Negative for cough, chest tightness, shortness of breath and wheezing  Cardiovascular: Negative for chest pain, palpitations and leg swelling  Gastrointestinal: Negative for abdominal pain, anal bleeding, blood in stool, constipation, diarrhea, nausea and vomiting  Endocrine: Negative for cold intolerance, heat intolerance, polydipsia and polyuria  Genitourinary: Negative for decreased urine volume, dyspareunia, dysuria, frequency, hematuria, urgency, vaginal bleeding, vaginal discharge and vaginal pain  Musculoskeletal: Negative for arthralgias, back pain, gait problem, joint swelling, myalgias and neck pain  Skin: Negative for rash  Neurological: Negative for dizziness, tremors, seizures, syncope, speech difficulty, weakness, light-headedness, numbness and headaches  Hematological: Negative for adenopathy  Does not bruise/bleed easily  Psychiatric/Behavioral: Negative for agitation, behavioral problems, confusion, decreased concentration, dysphoric mood, hallucinations, self-injury and sleep disturbance  The patient is not nervous/anxious and is not hyperactive  Objective:     Physical Exam  Constitutional:       General: She is not in acute distress  Appearance: Normal appearance   She is well-developed  She is not ill-appearing, toxic-appearing or diaphoretic  HENT:      Head: Normocephalic and atraumatic  Right Ear: Tympanic membrane, ear canal and external ear normal       Left Ear: Tympanic membrane, ear canal and external ear normal       Ears:      Comments: Whisper test normal B/L     Nose: No congestion  Mouth/Throat:      Mouth: Mucous membranes are moist       Pharynx: No oropharyngeal exudate or posterior oropharyngeal erythema  Eyes:      General: No scleral icterus  Right eye: No discharge  Left eye: No discharge  Extraocular Movements: Extraocular movements intact  Conjunctiva/sclera: Conjunctivae normal       Pupils: Pupils are equal, round, and reactive to light  Neck:      Thyroid: No thyromegaly  Vascular: No carotid bruit or JVD  Cardiovascular:      Rate and Rhythm: Normal rate and regular rhythm  Pulses:           Carotid pulses are 3+ on the right side and 3+ on the left side  Dorsalis pedis pulses are 3+ on the right side and 3+ on the left side  Posterior tibial pulses are 3+ on the right side and 3+ on the left side  Heart sounds: Normal heart sounds  No murmur heard  Pulmonary:      Effort: Pulmonary effort is normal  No respiratory distress  Breath sounds: Normal breath sounds  No stridor  No wheezing or rales  Abdominal:      General: Bowel sounds are normal  There is no distension  Palpations: Abdomen is soft  There is no mass  Tenderness: There is no abdominal tenderness  There is no guarding or rebound  Hernia: No hernia is present  Musculoskeletal:         General: No tenderness or deformity  Normal range of motion  Cervical back: Neck supple  No rigidity  Right lower leg: No edema  Left lower leg: No edema  Lymphadenopathy:      Cervical: No cervical adenopathy  Skin:     Coloration: Skin is not jaundiced or pale  Findings: No bruising or rash  Neurological:      General: No focal deficit present  Mental Status: She is alert and oriented to person, place, and time  Cranial Nerves: No cranial nerve deficit  Sensory: No sensory deficit  Motor: No weakness or abnormal muscle tone  Coordination: Coordination normal       Gait: Gait normal       Deep Tendon Reflexes: Reflexes normal       Comments: Negative romberg   Negative Babinski B/L   Psychiatric:         Mood and Affect: Mood normal          Behavior: Behavior normal          Thought Content:  Thought content normal          Judgment: Judgment normal

## 2022-09-22 ENCOUNTER — APPOINTMENT (OUTPATIENT)
Dept: ORTHOPEDIC SURGERY | Facility: CLINIC | Age: 52
End: 2022-09-22

## 2022-09-22 VITALS
HEART RATE: 108 BPM | DIASTOLIC BLOOD PRESSURE: 97 MMHG | WEIGHT: 186 LBS | SYSTOLIC BLOOD PRESSURE: 143 MMHG | BODY MASS INDEX: 35.12 KG/M2 | HEIGHT: 61 IN

## 2022-09-22 PROCEDURE — 73030 X-RAY EXAM OF SHOULDER: CPT | Mod: RT

## 2022-09-22 PROCEDURE — 99024 POSTOP FOLLOW-UP VISIT: CPT

## 2022-09-22 NOTE — DISCUSSION/SUMMARY
[Medication Risks Reviewed] : Medication risks reviewed [de-identified] : Patient is a 52 a female now 2 and half months status post right proximal humerus fracture.  Overall she is been doing extremely well.  Her greater tuberosity fragment has healed.  She is now having full range of motion with very minimal pain.  I recommend she continue with physical therapy.  She should continue to take NSAIDs as needed for the pain.  I will see her back in 3 months for repeat evaluation and x-ray.  All questions were asked and answered.

## 2022-09-22 NOTE — PHYSICAL EXAM
[de-identified] : Right upper extremity: Full range of motion of the right shoulder with some pains at extreme there is no ecchymosis present over the anterior aspect of the arm, there is no tenderness palpation over the proximal humerus, elbow full range of motion without pain, full supination and pronation, wrist full range of motion without pain, AIN PIN median radial ulnar is intact, fingers warm well perfused brisk cap refill [de-identified] : 2 views of the right shoulder obtained the office today show a healed right proximal humerus fracture with proper anatomic alignment.  No evidence of acute fracture or dislocation.

## 2022-09-22 NOTE — HISTORY OF PRESENT ILLNESS
[de-identified] : Patient is a 52-year-old female here today for follow-up of her right shoulder pain.  Overall she is doing extremely well.  She has been in physical therapy however she is waiting for authorization for further.  She states that she has some discomfort in the shoulder but it is overall very much improved.  Denies any numbness or tingling.  Denies any new trauma.  Is happy with her progress.

## 2022-10-26 ENCOUNTER — OUTPATIENT (OUTPATIENT)
Dept: OUTPATIENT SERVICES | Facility: HOSPITAL | Age: 52
LOS: 1 days | Discharge: HOME | End: 2022-10-26

## 2022-10-26 DIAGNOSIS — Z90.13 ACQUIRED ABSENCE OF BILATERAL BREASTS AND NIPPLES: Chronic | ICD-10-CM

## 2022-10-26 DIAGNOSIS — Z93.3 COLOSTOMY STATUS: Chronic | ICD-10-CM

## 2022-10-26 DIAGNOSIS — C50.919 MALIGNANT NEOPLASM OF UNSPECIFIED SITE OF UNSPECIFIED FEMALE BREAST: ICD-10-CM

## 2022-10-26 DIAGNOSIS — Z90.710 ACQUIRED ABSENCE OF BOTH CERVIX AND UTERUS: Chronic | ICD-10-CM

## 2022-10-26 DIAGNOSIS — Z98.890 OTHER SPECIFIED POSTPROCEDURAL STATES: Chronic | ICD-10-CM

## 2022-11-01 ENCOUNTER — APPOINTMENT (OUTPATIENT)
Dept: HEMATOLOGY ONCOLOGY | Facility: CLINIC | Age: 52
End: 2022-11-01

## 2022-11-01 ENCOUNTER — APPOINTMENT (OUTPATIENT)
Dept: SURGERY | Facility: CLINIC | Age: 52
End: 2022-11-01

## 2022-11-01 ENCOUNTER — RESULT REVIEW (OUTPATIENT)
Age: 52
End: 2022-11-01

## 2022-11-01 VITALS
HEIGHT: 61 IN | BODY MASS INDEX: 35.88 KG/M2 | DIASTOLIC BLOOD PRESSURE: 87 MMHG | TEMPERATURE: 97.7 F | WEIGHT: 190.01 LBS | OXYGEN SATURATION: 97 % | SYSTOLIC BLOOD PRESSURE: 121 MMHG | HEART RATE: 74 BPM

## 2022-11-01 LAB
BASOPHILS # BLD AUTO: 0 K/UL — SIGNIFICANT CHANGE UP (ref 0–0.2)
BASOPHILS NFR BLD AUTO: 0.5 % — SIGNIFICANT CHANGE UP (ref 0–2)
EOSINOPHIL # BLD AUTO: 0.1 K/UL — SIGNIFICANT CHANGE UP (ref 0–0.5)
EOSINOPHIL NFR BLD AUTO: 1.8 % — SIGNIFICANT CHANGE UP (ref 0–6)
HCT VFR BLD CALC: 43.4 % — SIGNIFICANT CHANGE UP (ref 34.5–45)
HGB BLD-MCNC: 14.1 G/DL — SIGNIFICANT CHANGE UP (ref 11.5–15.5)
LYMPHOCYTES # BLD AUTO: 2.1 K/UL — SIGNIFICANT CHANGE UP (ref 1–3.3)
LYMPHOCYTES # BLD AUTO: 32 % — SIGNIFICANT CHANGE UP (ref 13–44)
MCHC RBC-ENTMCNC: 31.1 PG — SIGNIFICANT CHANGE UP (ref 27–34)
MCHC RBC-ENTMCNC: 32.4 G/DL — SIGNIFICANT CHANGE UP (ref 32–36)
MCV RBC AUTO: 95.9 FL — SIGNIFICANT CHANGE UP (ref 80–100)
MONOCYTES # BLD AUTO: 0.4 K/UL — SIGNIFICANT CHANGE UP (ref 0–0.9)
MONOCYTES NFR BLD AUTO: 6.2 % — SIGNIFICANT CHANGE UP (ref 2–14)
NEUTROPHILS # BLD AUTO: 4 K/UL — SIGNIFICANT CHANGE UP (ref 1.8–7.4)
NEUTROPHILS NFR BLD AUTO: 59.5 % — SIGNIFICANT CHANGE UP (ref 43–77)
PLATELET # BLD AUTO: 259 K/UL — SIGNIFICANT CHANGE UP (ref 150–400)
RBC # BLD: 4.53 M/UL — SIGNIFICANT CHANGE UP (ref 3.8–5.2)
RBC # FLD: 12.1 % — SIGNIFICANT CHANGE UP (ref 10.3–14.5)
WBC # BLD: 6.7 K/UL — SIGNIFICANT CHANGE UP (ref 3.8–10.5)
WBC # FLD AUTO: 6.7 K/UL — SIGNIFICANT CHANGE UP (ref 3.8–10.5)

## 2022-11-01 PROCEDURE — 99214 OFFICE O/P EST MOD 30 MIN: CPT

## 2022-11-02 LAB
25(OH)D3 SERPL-MCNC: 58 NG/ML
ALBUMIN SERPL ELPH-MCNC: 4.4 G/DL
ALP BLD-CCNC: 92 U/L
ALT SERPL-CCNC: 19 U/L
ANION GAP SERPL CALC-SCNC: 11 MMOL/L
AST SERPL-CCNC: 20 U/L
BILIRUB SERPL-MCNC: 0.3 MG/DL
BUN SERPL-MCNC: 12 MG/DL
CALCIUM SERPL-MCNC: 10.3 MG/DL
CHLORIDE SERPL-SCNC: 103 MMOL/L
CO2 SERPL-SCNC: 27 MMOL/L
CREAT SERPL-MCNC: 0.82 MG/DL
EGFR: 85 ML/MIN/1.73M2
GLUCOSE SERPL-MCNC: 108 MG/DL
POTASSIUM SERPL-SCNC: 4 MMOL/L
PROT SERPL-MCNC: 7.6 G/DL
SODIUM SERPL-SCNC: 140 MMOL/L

## 2022-11-03 NOTE — HISTORY OF PRESENT ILLNESS
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  She presented to [facility] ED on 4/23/19 s/p mechanical fall outside her place of work.  A CT A/P incidentally showed a complex cystic left adnexal mass measuring 17 cm. Massive cystic lesion occupying the lower abdomen measuring 33 cm. It was uncertain whether this represents a large peritoneal metastasis or a component of the above described cystic left adnexal mass, or possibly a right adnexal mass. There was suspicion for rupture of this mass with a large amount of abdominal ascites.  On 5/7/19 Dr. Manpreet Gonzalez performed a BASILIA, BSO, debulking and peritonectomy.  Pathology initially was benign.  An addendum was later issued which showed low grade endometrioid adenocarcinoma, FIGO grade 1, with focal sex cord like differentiation, arising in a background of endometriosis, and involving the uterine serosa.  She was discharged on 5/12/19 but returned to the ED on 5/15/19 c/o abdominal distention, N/V, dizziness, weakness, multiple falls and a fever of 104.  She was found to be hypotensive and tachycardic.  A CT A/P showed a large fluid collection which appears to be extraperitoneal layering anterior to the omentum measuring 24.7 x 9.4 x 20.9 cm. There is a component of the collection which extends posteriorly on the left into the retroperitoneum along the anterior aspect of the psoas extending to the posterior aspect of the splenic flexure (approximately 20 cm in craniocaudad dimension). On 5/17/19 Dr. Mague Durand performed an emergent exploratory laparotomy, sigmoidectomy and end colostomy.  Pathology was benign.   [de-identified] : FHx of DCIS (sister) [de-identified] : She is s/p BASILIA BSO, debulking and peritonectomy on 5/7/19 with Dr. Manpreet Gonzalez due to extensive endometrioid adenocarcinoma, with a delayed leak from the sigmoid requiring bowel resection and end-colostomy by Dr. Durand.  No adjuvant treatment was recommended and she is s/p colostomy reversal.  Diagnosed with right breast cancer in September 2019, ER+ [place]+ HER2-.  \par \par Patient presents for follow-up visit\par Patient doing well, offers no acute complaints\par Patient fracture right shoulder over the summer, continues PT. Patient healing well. \par Patient currently taking Vitamin D 2000 units daily. Vit D on 6/7/22: 56.4\par \par On anastrazole. Tolerating well\par No joint pain\par No hot flashes\par No vaginal dryness \par LFT 06/2022: WNL \par \par \par 6/16/22 CT A/P: \par -No interval change since 6/16/2022. Calcified perisplenic nodule/implant is unchanged\par

## 2022-11-03 NOTE — RESULTS/DATA
[FreeTextEntry1] : PET/CT 12/1/21\par IMPRESSION:\par 1. Small FDG-avid left axillary lymph nodes, new since prior PET/CT, probably are reactive, likely\par related to recent COVID vaccination.\par 2. Unchanged nonspecific asymmetric, minimal diffuse FDG avidity with skin thickening in the\par reconstructed right breast.\par 3. Small right hepatic lobe capsular density focus on recent CT is not identified, however, no\par significant FDG activity seen on the corresponding region.\par 4. Non FDG-avid capsular high density focus in the posterior spleen is not significantly changed.\par \par 03/21 Normal DEXA scan\par \par 7/27/21 CT A/P: Unchanged high density nodules along the anterior capsule of the liver and lateral capsular surface of the spleen. These are indeterminate for peritoneal implants. Continued attention on follow-up imaging is recommended.\par *  No new sites of disease in the abdomen and pelvis.\par \par 1/28/21 PET:  Bilateral mastectomy with reconstruction. Unchanged nonspecific asymmetric, minimal diffuse FDG avidity with skin thickening in the reconstructed right breast as compared to PET/CT from 3/4/2020. Please correlate clinically. Interval resolution of FDG avid nodular focus in the left anterior abdominal wall, likely secondary to postsurgical changes. Unchanged nonspecific approximately symmetric bilateral tonsillar hypermetabolism.\par \par 9/16/20 CT:  1.6 x 0.7 cm perisplenic soft tissue and 5 x 2 mm region of increased enhancement either in the periphery of segment IVb of the liver or perihepatic which are unchanged when compared with 5/4/2020. The perisplenic region is also stable when compared with 3/4/2020 and no increased FDG uptake was noted at that time. Small stable residual implants cannot be excluded. No new disease or interval growth is seen. Status post bilateral mastectomies with reconstructive surgery. Region of mild nodularity in the right reconstructed breast which is more prominent when compared with 3/4/2020. This may represent granulomatous or fibrotic tissue.\par \par 5/5/20 CT: MALDONADO\par \par 2/4/2020 CT A/P: Sigmoid resection with anastomosis without evidence of obstruction. Small pericapsular splenic fluid collection which is decreased from prior study. \par No evidence of metastatic disease.\par \par 10/15/19 MRI Breast:\par 1.4 cm abnormal enhancement/biopsy clip in the upper outer posterior right breast, corresponding to newly diagnosed malignancy. No MRI evidence of multicentric or contralateral disease. \par RECOMMENDATION: Surgical or oncologic management. \par BI-RADS 6- Known Biopsy-Proven Malignancy \par \par 9/23/19 Pathology: \par Right breast, "11:00, 5 cm from the nipple", ultrasound guided core needle biopsy:\par -Invasive ductal carcinoma, grade 1.\par -See note.\par Note: Immunohistochemical stains for p63 and calponin are performed on block 1-B at [facility] and interpreted at [place] as follows:\par Calponin, p63: negative\par Microscopic evaluation reveals an invasive ductal carcinoma (Lizzette score 1+2+1), grade 1. The carcinoma measures at least 1.1 cm in its greatest linear expansion in this biopsy. \par There are no calcifications nor necrosis present.\par *Addendum*\par IMMUNOSTAINS PERFORMED AND INTERPRETED ON BLOCK " B " BY [facility],\par HER2/ELADIO BY IHC: 1 + / Negative\par ER: Positive, 90 - 95 %, strong\par LA: Positive, 15 - 20 %, strong\par \par 9/23/19 Mammo: Status post core needle biopsy of the right breast with clip placement. Recommendations for further followup will be based on pathology results.\par \par 9/19/19 US Breast/Mammo: Ill-defined hypoechoic area in the right breast at the 11:00 axis, corresponding to mammographically seen distortion. Ultrasound-guided core biopsy is advised.\par \par 5/15/19 Pathology:\par Intra-abdominal foreign body, removal:  Blood clots\par Sigmoid, resection:  Mucosal necrosis with transmural acute and chronic inflammation.  The process extends to one margin of resection (slide 1A).  Three (3) lymph nodes, one with benign glandular inclusion.\par \par 5/15/19 CT A/P:\par Moderate amount retained fecal material in the colon, greatest in the right hemicolon. Mild wall thickening involving the distal transverse colon, descending colon, and proximal sigmoid colon, possibly reactive to the large collection.  Large fluid collection which appears to be extraperitoneal layering anterior to the omentum measuring 24.7 x 9.4 x \par 20.9 cm (craniocaudad x anteroposterior x transverse). There is a component of the collection which extends posteriorly on the left into the retroperitoneum along the anterior aspect of the psoas extending to the posterior aspect of the splenic flexure (approximately 20 cm in craniocaudad dimension). Skin staples are seen within the anterior abdominal wall. Air is seen within the anterior abdominal wall which may be postoperative in etiology.\par \par 5/8/19 Cytopathology:\par Abdominal fluid:  NEGATIVE FOR MALIGNANT CELLS.  Mostly fibrin and reactive mesothelial cells.\par \par 5/7/19 Pathology:\par Omental lymph node: Markedly reactive, hemorrhagic lymph node with hemosiderin laden macrophages.\par Cyst wall(1): Hemorrhagic inflamed cyst wall without epithelial lining.\par Right hepatic flexure lymph node: Markedly reactive, hemorrhagic lymph node with hemosiderin laden macrophages.\par Omentum: Omental fat with marked reactive fibrosis, hemorrhage and chronic inflammation; marked serosal adhesions.\par Right pelvic lymph node: Markedly reactive, hemorrhagic lymph node with hemosiderin laden macrophages.\par Left pelvic lymph node: Markedly reactive, hemorrhagic lymph node with hemosiderin laden macrophages.\par Cyst wall(2):  Cyst wall, markedly inflamed and hemorrhagic with cholesterol crystals and without epithelial lining.\par ***Uterus, cervix and adnexa:  Histologically unremarkable cervix. Weakly proliferative endometrium and endometrial polyp. Cysts located outside the uterus, involving serosa, with glandular stromal structures, consistent with endometriosis.\par ****Addendum****\par Case sent for outside consultation at [facility] with Dr. Tina Mckay.\par The atypical clusters of glands, in part 8. "Uterus, cervix and adnexa", have been diagnosed as Low grade endometrioid adenocarcinoma, FIGO grade 1,  with focal sex cord like differentiation, arising in a background of endometriosis, and involving the uterine serosa.\par \par 4/23/19 CT C/A/P:\par There is a large complex cystic mass occupying the lower abdomen, measuring 21.0 (AP) x 24.2 (CC) x 33.1 (TR) cm.  The exact origin of this mass is uncertain. The left posterior wall of this mass is discontinuous, suggestive of rupture. There is a large amount of abdominal ascites.  Additional complex left adnexal cystic mass with septations and apparent mural nodules measuring 9.4 (AP) x 15.5 (CC) x 16.6 (TR) cm.

## 2022-11-03 NOTE — PHYSICAL EXAM
[Restricted in physically strenuous activity but ambulatory and able to carry out work of a light or sedentary nature] : Status 1- Restricted in physically strenuous activity but ambulatory and able to carry out work of a light or sedentary nature, e.g., light house work, office work [Normal] : affect appropriate [de-identified] : s/p bilateral mastectomy with well-healed incisions [de-identified] : well-healed incision both  and vertical midline

## 2022-11-10 ENCOUNTER — TRANSCRIPTION ENCOUNTER (OUTPATIENT)
Age: 52
End: 2022-11-10

## 2022-11-11 ENCOUNTER — TRANSCRIPTION ENCOUNTER (OUTPATIENT)
Age: 52
End: 2022-11-11

## 2022-11-14 ENCOUNTER — TRANSCRIPTION ENCOUNTER (OUTPATIENT)
Age: 52
End: 2022-11-14

## 2022-12-05 ENCOUNTER — APPOINTMENT (OUTPATIENT)
Dept: GYNECOLOGIC ONCOLOGY | Facility: CLINIC | Age: 52
End: 2022-12-05

## 2022-12-05 VITALS
HEART RATE: 85 BPM | SYSTOLIC BLOOD PRESSURE: 143 MMHG | BODY MASS INDEX: 36.25 KG/M2 | WEIGHT: 192 LBS | HEIGHT: 61 IN | DIASTOLIC BLOOD PRESSURE: 88 MMHG | OXYGEN SATURATION: 99 %

## 2022-12-05 PROCEDURE — 99213 OFFICE O/P EST LOW 20 MIN: CPT

## 2022-12-05 RX ORDER — DIPHENHYDRAMINE HCL 25 MG/1
25 TABLET ORAL
Qty: 2 | Refills: 0 | Status: DISCONTINUED | COMMUNITY
Start: 2021-07-06 | End: 2022-12-05

## 2022-12-05 RX ORDER — IMIQUIMOD 50 MG/G
5 CREAM TOPICAL
Qty: 12 | Refills: 0 | Status: DISCONTINUED | COMMUNITY
Start: 2020-12-08 | End: 2022-12-05

## 2022-12-05 RX ORDER — PREDNISONE 50 MG/1
50 TABLET ORAL
Qty: 3 | Refills: 0 | Status: DISCONTINUED | COMMUNITY
Start: 2021-07-06 | End: 2022-12-05

## 2022-12-05 RX ORDER — DIPHENHYDRAMINE HYDROCHLORIDE 25 MG/1
25 CAPSULE ORAL
Qty: 2 | Refills: 0 | Status: DISCONTINUED | COMMUNITY
Start: 2022-06-09 | End: 2022-12-05

## 2022-12-05 NOTE — PHYSICAL EXAM
[Chaperone Present] : A chaperone was present in the examining room during all aspects of the physical examination [Absent] : Adnexa(ae): Absent [Normal] : Anus and perineum: Normal sphincter tone, no masses, no prolapse. [Restricted in physically strenuous activity but ambulatory and able to carry out work of a light or sedentary nature] : Status 1- Restricted in physically strenuous activity but ambulatory and able to carry out work of a light or sedentary nature, e.g., light house work, office work [FreeTextEntry1] : MA: Yue Hardin [de-identified] : no masses, or lesions

## 2022-12-05 NOTE — ASSESSMENT
[FreeTextEntry1] : 53yo with Grade 1 endometrioid adenocarcinoma arising in the background of endometriosis involving the uterine serosa, likely primary peritoneal. PET CT abdomen/pelvis 9/23/2020 with MALDONADO. CT scan 7/27/21 also with no evidence of disease. No evidence of disease on exam today.

## 2022-12-05 NOTE — HISTORY OF PRESENT ILLNESS
[FreeTextEntry1] : 53 yo s/p BASILIA, BSO, tumor debulking, peritonectomy on 5/7/19 due to extensive endometriosis of entire abdominal cavity. S/p end colostomy and colon resection and ultimately takedown on 8/8/19. Pathology with focus of grade 1 endometrioid adenocarcinoma arising from background of endometriosis on the uterine serosa. She was diagnosed with right breast cancer s/p bilateral mastectomy and KWAME flap by Dr. Licea on 11/25/19.  She follows with Heme/Onc. She also had colostomy reversal and abdominoplasty in the interim. She had a completion right axillary dissection on 1/3/20. She had post-mastectomy radiation by Dr. Caldwell complete 5/18/20. \par \par Interval history: Pt reports R. shoulder fracture over the summer, she is now recovered and doing PT. Otherwise no new medical/surgical issues. No vaginal discharge or bleeding. Not sexually active. She does not currently use a vaginal dilator, counseled patient that she should discuss script with Rad-Onc if she is interested in this to prevent any further narrowing of vagina or possible pain with future vaginal exams. \par \par Colonoscopy: 2019, due again in 2024\par Mammogram: s/p bilateral mastectomy in 2019\par DEXA: March 2021: normal\par \par Immunizations: Shingles, Tdap (2019), COVID, Influenza\par

## 2022-12-21 ENCOUNTER — APPOINTMENT (OUTPATIENT)
Dept: ORTHOPEDIC SURGERY | Facility: CLINIC | Age: 52
End: 2022-12-21

## 2022-12-21 VITALS
SYSTOLIC BLOOD PRESSURE: 152 MMHG | HEIGHT: 61 IN | BODY MASS INDEX: 36.25 KG/M2 | DIASTOLIC BLOOD PRESSURE: 90 MMHG | WEIGHT: 192 LBS | HEART RATE: 82 BPM

## 2022-12-21 DIAGNOSIS — S42.209A UNSPECIFIED FRACTURE OF UPPER END OF UNSPECIFIED HUMERUS, INITIAL ENCOUNTER FOR CLOSED FRACTURE: ICD-10-CM

## 2022-12-21 PROCEDURE — 99213 OFFICE O/P EST LOW 20 MIN: CPT

## 2022-12-21 PROCEDURE — 73030 X-RAY EXAM OF SHOULDER: CPT | Mod: RT

## 2022-12-21 NOTE — PHYSICAL EXAM
[de-identified] : Right upper extremity: Full range of motion of the right shoulder with no pains at extreme there is no ecchymosis present over the anterior aspect of the arm, there is no tenderness palpation over the proximal humerus, elbow full range of motion without pain, full supination and pronation, wrist full range of motion without pain, AIN PIN median radial ulnar is intact, fingers warm well perfused brisk cap refill [de-identified] : 2 views of the right shoulder obtained the office today show a healed right greater tuberosity fracture without significant displacement.  No acute fracture dislocation

## 2022-12-21 NOTE — DISCUSSION/SUMMARY
[Medication Risks Reviewed] : Medication risks reviewed [de-identified] : Patient is a 52-year-old female here today over 6-month status post right greater tuberosity fracture.  Overall she is doing very well.  She is now having no pain.  Her fracture is healed radiographically as well as clinically.  I recommend she continue with conservative treatment at this time.  She is to take Tylenol and NSAIDs as needed for any pain.  She will continue with her at home exercises.  I will see her back on an as-needed basis for her right shoulder.  All questions were asked and answered

## 2022-12-21 NOTE — HISTORY OF PRESENT ILLNESS
[de-identified] : Patient is a 52-year-old female here today for follow-up of her right greater tuberosity fracture.  She is now over 6 months from injury.  She not having any pain in the shoulder.  She is completed her physical therapy.  She is extremely happy with her progress.  Has no limitations in range of motion.  Has no complaints today with her shoulder.

## 2023-02-06 NOTE — PATIENT PROFILE ADULT - BRADEN MOBILITY
well developed, well nourished , in no acute distress , ambulating without difficulty , normal communication ability (4) no limitation

## 2023-02-28 ENCOUNTER — NON-APPOINTMENT (OUTPATIENT)
Age: 53
End: 2023-02-28

## 2023-02-28 ENCOUNTER — APPOINTMENT (OUTPATIENT)
Dept: RADIATION ONCOLOGY | Facility: CLINIC | Age: 53
End: 2023-02-28
Payer: COMMERCIAL

## 2023-03-07 ENCOUNTER — NON-APPOINTMENT (OUTPATIENT)
Age: 53
End: 2023-03-07

## 2023-03-07 ENCOUNTER — APPOINTMENT (OUTPATIENT)
Dept: RADIATION ONCOLOGY | Facility: CLINIC | Age: 53
End: 2023-03-07
Payer: COMMERCIAL

## 2023-03-07 VITALS
WEIGHT: 195 LBS | DIASTOLIC BLOOD PRESSURE: 83 MMHG | HEART RATE: 84 BPM | SYSTOLIC BLOOD PRESSURE: 153 MMHG | RESPIRATION RATE: 16 BRPM | BODY MASS INDEX: 36.85 KG/M2 | OXYGEN SATURATION: 98 %

## 2023-03-07 PROCEDURE — 99214 OFFICE O/P EST MOD 30 MIN: CPT

## 2023-03-07 NOTE — HISTORY OF PRESENT ILLNESS
[FreeTextEntry1] : 52 year old woman with IDC right breast s/p bilateral mastectomy/reconstruction, PMRT completed 5/18/20. She continues on anastrozole.\par \par Interval history:\par Reports tolerating AI well thus far; no significant joint aches or other bother.\par No chest wall bother.\par No lymphedema.\par \par Follows with:\par Breast Surgery: Elizabeth Riley\par MedOnc (breast): [facility]\par GynOnc: Manpreet Gonzalez\par MedOnc (ovary): Macarena Yarbrough\par Ortho: Tha Duong\par PCP: Max Da Silva

## 2023-03-07 NOTE — PHYSICAL EXAM
[Extraocular Movements] : extraocular movements were intact [Outer Ear] : the ears and nose were normal in appearance [Breast Appearance] : normal in appearance [Symmetric] : breasts are symmetric [Breast Palpation Mass] : no palpable masses [No UE Edema] : there is no upper extremity edema [Normal] : no palpable adenopathy [Cervical Lymph Nodes Enlarged Anterior Bilaterally] : anterior cervical [Supraclavicular Lymph Nodes Enlarged Bilaterally] : supraclavicular [Axillary Lymph Nodes Enlarged Bilaterally] : axillary [Motor Tone] : muscle strength and tone were normal [de-identified] : s/p bilateral mastectomy/KWAME reconstruction

## 2023-03-07 NOTE — REVIEW OF SYSTEMS
[Negative] : Allergic/Immunologic [Edema Limbs: Grade 0] : Edema Limbs: Grade 0  [Fatigue: Grade 0] : Fatigue: Grade 0 [Localized Edema: Grade 0] : Localized Edema: Grade 0  [Neck Edema: Grade 0] : Neck Edema: Grade 0 [Breast Pain: Grade 0] : Breast Pain: Grade 0 [Pruritus: Grade 0] : Pruritus: Grade 0 [Skin Hyperpigmentation: Grade 0] : Skin Hyperpigmentation: Grade 0 [Dermatitis Radiation: Grade 0] : Dermatitis Radiation: Grade 0

## 2023-03-10 ENCOUNTER — TRANSCRIPTION ENCOUNTER (OUTPATIENT)
Age: 53
End: 2023-03-10

## 2023-03-25 ENCOUNTER — APPOINTMENT (OUTPATIENT)
Dept: RADIOLOGY | Facility: CLINIC | Age: 53
End: 2023-03-25
Payer: COMMERCIAL

## 2023-03-25 ENCOUNTER — OUTPATIENT (OUTPATIENT)
Dept: OUTPATIENT SERVICES | Facility: HOSPITAL | Age: 53
LOS: 1 days | End: 2023-03-25
Payer: COMMERCIAL

## 2023-03-25 DIAGNOSIS — Z98.890 OTHER SPECIFIED POSTPROCEDURAL STATES: Chronic | ICD-10-CM

## 2023-03-25 DIAGNOSIS — Z90.710 ACQUIRED ABSENCE OF BOTH CERVIX AND UTERUS: Chronic | ICD-10-CM

## 2023-03-25 DIAGNOSIS — Z00.8 ENCOUNTER FOR OTHER GENERAL EXAMINATION: ICD-10-CM

## 2023-03-25 DIAGNOSIS — Z90.13 ACQUIRED ABSENCE OF BILATERAL BREASTS AND NIPPLES: Chronic | ICD-10-CM

## 2023-03-25 DIAGNOSIS — Z13.820 ENCOUNTER FOR SCREENING FOR OSTEOPOROSIS: ICD-10-CM

## 2023-03-25 DIAGNOSIS — Z93.3 COLOSTOMY STATUS: Chronic | ICD-10-CM

## 2023-03-25 PROCEDURE — 77085 DXA BONE DENSITY AXL VRT FX: CPT | Mod: 26

## 2023-03-25 PROCEDURE — 77085 DXA BONE DENSITY AXL VRT FX: CPT

## 2023-04-17 ENCOUNTER — APPOINTMENT (OUTPATIENT)
Dept: SURGERY | Facility: CLINIC | Age: 53
End: 2023-04-17

## 2023-04-26 ENCOUNTER — OUTPATIENT (OUTPATIENT)
Dept: OUTPATIENT SERVICES | Facility: HOSPITAL | Age: 53
LOS: 1 days | Discharge: HOME | End: 2023-04-26

## 2023-04-26 DIAGNOSIS — Z93.3 COLOSTOMY STATUS: Chronic | ICD-10-CM

## 2023-04-26 DIAGNOSIS — Z98.890 OTHER SPECIFIED POSTPROCEDURAL STATES: Chronic | ICD-10-CM

## 2023-04-26 DIAGNOSIS — Z90.13 ACQUIRED ABSENCE OF BILATERAL BREASTS AND NIPPLES: Chronic | ICD-10-CM

## 2023-04-26 DIAGNOSIS — Z90.710 ACQUIRED ABSENCE OF BOTH CERVIX AND UTERUS: Chronic | ICD-10-CM

## 2023-04-26 DIAGNOSIS — C50.919 MALIGNANT NEOPLASM OF UNSPECIFIED SITE OF UNSPECIFIED FEMALE BREAST: ICD-10-CM

## 2023-04-27 ENCOUNTER — NON-APPOINTMENT (OUTPATIENT)
Age: 53
End: 2023-04-27

## 2023-05-02 ENCOUNTER — RESULT REVIEW (OUTPATIENT)
Age: 53
End: 2023-05-02

## 2023-05-02 ENCOUNTER — APPOINTMENT (OUTPATIENT)
Dept: HEMATOLOGY ONCOLOGY | Facility: CLINIC | Age: 53
End: 2023-05-02
Payer: COMMERCIAL

## 2023-05-02 VITALS
HEIGHT: 61 IN | HEART RATE: 77 BPM | WEIGHT: 191 LBS | DIASTOLIC BLOOD PRESSURE: 86 MMHG | TEMPERATURE: 98 F | BODY MASS INDEX: 36.06 KG/M2 | SYSTOLIC BLOOD PRESSURE: 137 MMHG | OXYGEN SATURATION: 98 %

## 2023-05-02 LAB
BASOPHILS # BLD AUTO: 0 K/UL — SIGNIFICANT CHANGE UP (ref 0–0.2)
BASOPHILS NFR BLD AUTO: 0.7 % — SIGNIFICANT CHANGE UP (ref 0–2)
EOSINOPHIL # BLD AUTO: 0.1 K/UL — SIGNIFICANT CHANGE UP (ref 0–0.5)
EOSINOPHIL NFR BLD AUTO: 2 % — SIGNIFICANT CHANGE UP (ref 0–6)
HCT VFR BLD CALC: 41.2 % — SIGNIFICANT CHANGE UP (ref 34.5–45)
HGB BLD-MCNC: 13.9 G/DL — SIGNIFICANT CHANGE UP (ref 11.5–15.5)
LYMPHOCYTES # BLD AUTO: 2.2 K/UL — SIGNIFICANT CHANGE UP (ref 1–3.3)
LYMPHOCYTES # BLD AUTO: 36.8 % — SIGNIFICANT CHANGE UP (ref 13–44)
MCHC RBC-ENTMCNC: 31.3 PG — SIGNIFICANT CHANGE UP (ref 27–34)
MCHC RBC-ENTMCNC: 33.6 G/DL — SIGNIFICANT CHANGE UP (ref 32–36)
MCV RBC AUTO: 93 FL — SIGNIFICANT CHANGE UP (ref 80–100)
MONOCYTES # BLD AUTO: 0.4 K/UL — SIGNIFICANT CHANGE UP (ref 0–0.9)
MONOCYTES NFR BLD AUTO: 6.8 % — SIGNIFICANT CHANGE UP (ref 2–14)
NEUTROPHILS # BLD AUTO: 3.2 K/UL — SIGNIFICANT CHANGE UP (ref 1.8–7.4)
NEUTROPHILS NFR BLD AUTO: 53.7 % — SIGNIFICANT CHANGE UP (ref 43–77)
PLATELET # BLD AUTO: 229 K/UL — SIGNIFICANT CHANGE UP (ref 150–400)
RBC # BLD: 4.43 M/UL — SIGNIFICANT CHANGE UP (ref 3.8–5.2)
RBC # FLD: 12.5 % — SIGNIFICANT CHANGE UP (ref 10.3–14.5)
WBC # BLD: 6 K/UL — SIGNIFICANT CHANGE UP (ref 3.8–10.5)
WBC # FLD AUTO: 6 K/UL — SIGNIFICANT CHANGE UP (ref 3.8–10.5)

## 2023-05-02 PROCEDURE — 99215 OFFICE O/P EST HI 40 MIN: CPT

## 2023-05-02 NOTE — ADDENDUM
[FreeTextEntry1] : Documented by Jamila Woodson acting as scribe for Dr. Lagos on 05/02/2023.\par \par All Medical record entries made by the Scribe were at my, Dr. Lagos, direction and personally dictated by me on 05/02/2023. I have reviewed the chart and agree that the record accurately reflects my personal performance of the history, physical exam, assessment and plan. I have also personally directed, reviewed, and agreed with the discharge instructions.

## 2023-05-02 NOTE — HISTORY OF PRESENT ILLNESS
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  She presented to [facility] ED on 4/23/19 s/p mechanical fall outside her place of work.  A CT A/P incidentally showed a complex cystic left adnexal mass measuring 17 cm. Massive cystic lesion occupying the lower abdomen measuring 33 cm. It was uncertain whether this represents a large peritoneal metastasis or a component of the above described cystic left adnexal mass, or possibly a right adnexal mass. There was suspicion for rupture of this mass with a large amount of abdominal ascites.  On 5/7/19 Dr. Manpreet Gonzalez performed a BASILIA, BSO, debulking and peritonectomy.  Pathology initially was benign.  An addendum was later issued which showed low grade endometrioid adenocarcinoma, FIGO grade 1, with focal sex cord like differentiation, arising in a background of endometriosis, and involving the uterine serosa.  She was discharged on 5/12/19 but returned to the ED on 5/15/19 c/o abdominal distention, N/V, dizziness, weakness, multiple falls and a fever of 104.  She was found to be hypotensive and tachycardic.  A CT A/P showed a large fluid collection which appears to be extraperitoneal layering anterior to the omentum measuring 24.7 x 9.4 x 20.9 cm. There is a component of the collection which extends posteriorly on the left into the retroperitoneum along the anterior aspect of the psoas extending to the posterior aspect of the splenic flexure (approximately 20 cm in craniocaudad dimension). On 5/17/19 Dr. Mague Durand performed an emergent exploratory laparotomy, sigmoidectomy and end colostomy.  Pathology was benign.   [de-identified] : FHx of DCIS (sister) [de-identified] : 11/1/22 She is s/p BASILIA BSO, debulking and peritonectomy on 5/7/19 with Dr. Manpreet Gonzalez due to extensive endometrioid adenocarcinoma, with a delayed leak from the sigmoid requiring bowel resection and end-colostomy by Dr. Durand.  No adjuvant treatment was recommended and she is s/p colostomy reversal.  Diagnosed with right breast cancer in September 2019, ER+ GA+ HER2-.  \par \par Patient presents for follow-up visit\par Patient doing well, offers no acute complaints\par Patient fracture right shoulder over the summer, continues PT. Patient healing well. \par Patient currently taking Vitamin D 2000 units daily. Vit D on 6/7/22: 56.4\par \par On anastrazole. Tolerating well\par No joint pain\par No hot flashes\par No vaginal dryness \par LFT 06/2022: WNL \par \par \par 05/02/2023 Returns for follow up visit. Continues on anastrazole, tolerating well. \par Denies  joint pain, hot flashes, vaginal dryness. Takes vit D. States Dr. Witt has stopped ordering surveillance scans. \par \par 05/02/2023: WBC 6.0 K, HGB 13.9 g, HCT 41.2 %,  K, ANC 3200\par 3/25/23 DEXA - Osteopenia \par \par

## 2023-05-02 NOTE — PHYSICAL EXAM
[Restricted in physically strenuous activity but ambulatory and able to carry out work of a light or sedentary nature] : Status 1- Restricted in physically strenuous activity but ambulatory and able to carry out work of a light or sedentary nature, e.g., light house work, office work [Normal] : affect appropriate [de-identified] : s/p bilateral mastectomy with well-healed incisions [de-identified] : well-healed incision both  and vertical midline

## 2023-05-02 NOTE — ASSESSMENT
[FreeTextEntry1] : 05/02/2023: WBC 6.0 K, HGB 13.9 g, HCT 41.2 %,  K, ANC 3200\par \par #Breast cancer, right \par • -s/p bilateral mastectomy with right SLNB for 1.3cm IDC with 2/3 positive nodes. \par     -s/p right axiliary lymph node dissection. 0/9 lymph nodes were negative for carcinoma.\par     Oncotype score 17\par     -Post-mastectomy radiation to the right chest wall/lymph nodes completed 5/18/20 with\par     Dr. Caldwell\par     - Patient currently taking Vitamin D 2000 units daily.\par     -Continue anastrozole\par     -following with Dr. Caldwell, Dr. Witt and Dr. Morton\par \par #Endometrioid adenocarcinoma of ovary, unspecified laterality \par • Grade 1 endometroid adenocarcinoma arising in the background of endometriosis\par     involving the uterine serosa, likely primary peritoneal.\par     -s/p BASILIA, BSO, tumor debulking, peritonectomy on 5/7/19 with Dr. Manpreet Gonzalez due\par     to extensive endometriosis of entire abdominal cavity. \par     -s/p end colostomy and colon resection \par     -s/p reversal of colostomy\par     -Pathology with focus of grade 1 endometrioid adenocarcinoma of the ovary arising from\par     background of endometriosis. \par     -Per [facility], Uterus, cervix and adnexa diagnosed as "low grade endometrioid\par     adenocarcinoma, FIGO grade 1, with focal sex cord like differentiation, arising in a\par     background of endometriosis, and involving the uterine serosa". \par     -Given focus/ tiny cluster of cells with low grade endometriod, adjuvant chemotherapy\par     and radiation not recommended. Additionally, it is unclear where this cluster originated\par     from. Removal of ovaries will decrease her chance of malignant transformation of any\par     residual endometriosis. \par     -discussed anti-hormonal therapy with AI and pt did not elect to be treated with AI\par     -F/u with close observation with Dr. Gonzalez, every 3-6 mos\par     -s/p genetics evaluation with Dr. Wright: No clinically significant mutation was identified. A\par     variant of uncertain significance was identified, MSH3.\par     -s/p colonoscopy Aug 2019 with GI, Dr. Durand.. Next colonoscopy is Aug 2024 per\par     Dr. Durand\par     -6/16/22 CT A/P: No interval change since 6/16/2022. Calcified perisplenic\par     nodule/implant is unchanged.\par     -reviewed scan in detail \par     - Follow up in 12 months alternating every 6 months with Dr. Gonzalez.\par \par #Thyroid nodule \par • Following with Dr. Da Silva, PCP, [facility]. \par -s/p biopsy 10/4/20, benign as per patient.\par \par #Osteopenia\par  -3/2021 Normal DEXA scan\par - 3/25/23 DEXA - Osteopenia \par -  Continue Vit D\par - referral to endocrine\par \par  \par RTO in 6 months \par \par \par \par

## 2023-05-02 NOTE — RESULTS/DATA
[FreeTextEntry1] : \par 6/16/22 CT A/P: \par -No interval change since 6/16/2022. Calcified perisplenic nodule/implant is unchanged\par \par \par PET/CT 12/1/21\par IMPRESSION:\par 1. Small FDG-avid left axillary lymph nodes, new since prior PET/CT, probably are reactive, likely\par related to recent COVID vaccination.\par 2. Unchanged nonspecific asymmetric, minimal diffuse FDG avidity with skin thickening in the\par reconstructed right breast.\par 3. Small right hepatic lobe capsular density focus on recent CT is not identified, however, no\par significant FDG activity seen on the corresponding region.\par 4. Non FDG-avid capsular high density focus in the posterior spleen is not significantly changed.\par \par 03/21 Normal DEXA scan\par \par 7/27/21 CT A/P: Unchanged high density nodules along the anterior capsule of the liver and lateral capsular surface of the spleen. These are indeterminate for peritoneal implants. Continued attention on follow-up imaging is recommended.\par *  No new sites of disease in the abdomen and pelvis.\par \par 1/28/21 PET:  Bilateral mastectomy with reconstruction. Unchanged nonspecific asymmetric, minimal diffuse FDG avidity with skin thickening in the reconstructed right breast as compared to PET/CT from 3/4/2020. Please correlate clinically. Interval resolution of FDG avid nodular focus in the left anterior abdominal wall, likely secondary to postsurgical changes. Unchanged nonspecific approximately symmetric bilateral tonsillar hypermetabolism.\par \par 9/16/20 CT:  1.6 x 0.7 cm perisplenic soft tissue and 5 x 2 mm region of increased enhancement either in the periphery of segment IVb of the liver or perihepatic which are unchanged when compared with 5/4/2020. The perisplenic region is also stable when compared with 3/4/2020 and no increased FDG uptake was noted at that time. Small stable residual implants cannot be excluded. No new disease or interval growth is seen. Status post bilateral mastectomies with reconstructive surgery. Region of mild nodularity in the right reconstructed breast which is more prominent when compared with 3/4/2020. This may represent granulomatous or fibrotic tissue.\par \par 5/5/20 CT: MALDONADO\par \par 2/4/2020 CT A/P: Sigmoid resection with anastomosis without evidence of obstruction. Small pericapsular splenic fluid collection which is decreased from prior study. \par No evidence of metastatic disease.\par \par 10/15/19 MRI Breast:\par 1.4 cm abnormal enhancement/biopsy clip in the upper outer posterior right breast, corresponding to newly diagnosed malignancy. No MRI evidence of multicentric or contralateral disease. \par RECOMMENDATION: Surgical or oncologic management. \par BI-RADS 6- Known Biopsy-Proven Malignancy \par \par 9/23/19 Pathology: \par Right breast, "11:00, 5 cm from the nipple", ultrasound guided core needle biopsy:\par -Invasive ductal carcinoma, grade 1.\par -See note.\par Note: Immunohistochemical stains for p63 and calponin are performed on block 1-B at [facility] and interpreted at [place] as follows:\par Calponin, p63: negative\par Microscopic evaluation reveals an invasive ductal carcinoma (Lizzette score 1+2+1), grade 1. The carcinoma measures at least 1.1 cm in its greatest linear expansion in this biopsy. \par There are no calcifications nor necrosis present.\par *Addendum*\par IMMUNOSTAINS PERFORMED AND INTERPRETED ON BLOCK " B " BY [facility],\par HER2/ELADIO BY IHC: 1 + / Negative\par ER: Positive, 90 - 95 %, strong\par SC: Positive, 15 - 20 %, strong\par \par 9/23/19 Mammo: Status post core needle biopsy of the right breast with clip placement. Recommendations for further followup will be based on pathology results.\par \par 9/19/19 US Breast/Mammo: Ill-defined hypoechoic area in the right breast at the 11:00 axis, corresponding to mammographically seen distortion. Ultrasound-guided core biopsy is advised.\par \par 5/15/19 Pathology:\par Intra-abdominal foreign body, removal:  Blood clots\par Sigmoid, resection:  Mucosal necrosis with transmural acute and chronic inflammation.  The process extends to one margin of resection (slide 1A).  Three (3) lymph nodes, one with benign glandular inclusion.\par \par 5/15/19 CT A/P:\par Moderate amount retained fecal material in the colon, greatest in the right hemicolon. Mild wall thickening involving the distal transverse colon, descending colon, and proximal sigmoid colon, possibly reactive to the large collection.  Large fluid collection which appears to be extraperitoneal layering anterior to the omentum measuring 24.7 x 9.4 x \par 20.9 cm (craniocaudad x anteroposterior x transverse). There is a component of the collection which extends posteriorly on the left into the retroperitoneum along the anterior aspect of the psoas extending to the posterior aspect of the splenic flexure (approximately 20 cm in craniocaudad dimension). Skin staples are seen within the anterior abdominal wall. Air is seen within the anterior abdominal wall which may be postoperative in etiology.\par \par 5/8/19 Cytopathology:\par Abdominal fluid:  NEGATIVE FOR MALIGNANT CELLS.  Mostly fibrin and reactive mesothelial cells.\par \par 5/7/19 Pathology:\par Omental lymph node: Markedly reactive, hemorrhagic lymph node with hemosiderin laden macrophages.\par Cyst wall(1): Hemorrhagic inflamed cyst wall without epithelial lining.\par Right hepatic flexure lymph node: Markedly reactive, hemorrhagic lymph node with hemosiderin laden macrophages.\par Omentum: Omental fat with marked reactive fibrosis, hemorrhage and chronic inflammation; marked serosal adhesions.\par Right pelvic lymph node: Markedly reactive, hemorrhagic lymph node with hemosiderin laden macrophages.\par Left pelvic lymph node: Markedly reactive, hemorrhagic lymph node with hemosiderin laden macrophages.\par Cyst wall(2):  Cyst wall, markedly inflamed and hemorrhagic with cholesterol crystals and without epithelial lining.\par ***Uterus, cervix and adnexa:  Histologically unremarkable cervix. Weakly proliferative endometrium and endometrial polyp. Cysts located outside the uterus, involving serosa, with glandular stromal structures, consistent with endometriosis.\par ****Addendum****\par Case sent for outside consultation at [facility] with Dr. Tina Mckay.\par The atypical clusters of glands, in part 8. "Uterus, cervix and adnexa", have been diagnosed as Low grade endometrioid adenocarcinoma, FIGO grade 1,  with focal sex cord like differentiation, arising in a background of endometriosis, and involving the uterine serosa.\par \par 4/23/19 CT C/A/P:\par There is a large complex cystic mass occupying the lower abdomen, measuring 21.0 (AP) x 24.2 (CC) x 33.1 (TR) cm.  The exact origin of this mass is uncertain. The left posterior wall of this mass is discontinuous, suggestive of rupture. There is a large amount of abdominal ascites.  Additional complex left adnexal cystic mass with septations and apparent mural nodules measuring 9.4 (AP) x 15.5 (CC) x 16.6 (TR) cm.

## 2023-05-02 NOTE — REASON FOR VISIT
"RE: Alisa Weinstein  MR#: [id number]  : 1988  DOS: Oct 12, 2022      NEUROPSYCHOLOGICAL CONSULTATION      REASON FOR REFERRAL:  Alisa Weinstein is a 34 year old female with 14 years of education. The patient was referred for a neuropsychological evaluation by Dr. Farah secondary to memory and cognitive concerns. The evaluation was requested in order to document her cognitive and emotional functioning, assist with the differential diagnosis, and provide appropriate recommendations. The patient was informed that the evaluation included multiple measures of performance and symptom validity, and she was encouraged to provide her best effort at all times.  The nature of the neuropsychological evaluation was also discussed, including limits of confidentiality (for suspected child or elder abuse, potential homicide or suicide, and court orders). The patient was also informed that the report would be placed on the electronic medical records system.  The patient was also given an opportunity to ask questions. The patient indicated that she understood the information and consented to participate in the evaluation.    PROCEDURES:   Review of records and clinical interview,  Mental Status-orientation, Wide Range Achievement Test-4, Wechsler Adult Intelligence Scale-IV, Mora Verbal Learning Test-Revised, Clock Drawing Test, Verbal Fluency Test, Personality Assessment Inventory, Barry Complex Figure Test, Trailmaking Test, NAB Naming Test, TOMM, Judgment of Line Orientation Test, Stroop Test, Wechsler Memory Scale-IV (selected subtests) and Grooved Pegboard Test     REVIEW OF RECORDS: Records from 10/3/22 noted that she has a history of \"a venous sinus thrombosis.  Her hospital course was complicated by a single seizure, left frontoparietal hemorrhage, right frontoparietal ischemic stroke,  and cerebral edema s/p EVD drain. She was initially at TCU and then transferred to ARU as she clinically improved.\" The " "records also indicated that \"She continues to follow with Physical Therapy and Occupational Therapy and made gains towards her goals.  However she is currently not driving and recent OT evaluation noted inability to perform multitasking and oncern for high-level cognitive impairment.\" Further,  records noted that her \"generalized anxiety that existed prior to her admission but has worsened regarding her recent diagnosis. We discussed possibly decreasing her spasticity medications due to concern of sedation and the patient reports a great concern for worsening her anxiety.\" Records noted other significant medical history included body mass index (BMI) of 40-44.9, gastroesophageal reflux disease (GERD), and hyperlipidemia.  Records noted the patient is being treated with baclofen, Keppra, Topamax, Xarelto, Mirena, sodium chloride, fluoxetine, Colace, and Selsun.  A CT scan of the head dated 12/28/2021 noted \"Continued evolution of the left frontoparietal hemorrhage with surrounding vasogenic edema. Redemonstration of vasogenic edema of the right perirolandic sulcus are with decrease cortical hypodensities. No hypodensities to suggest interval bleeding. Ventricles are slitlike, largely stable from prior exam. No new loss of gray-white matter differentiation. Stable effacement of the basal cisterns. Stable diffuse effacement of the sulci. No midline shift. Right frontal juan daniel hole. The visualized portions of the paranasal sinuses and mastoid air cells are clear.\"     Neurology records from 8/11/22 noted that she was seen for \"evaluation of provoked seizure 2/2 CVST and brain injury (L-frontal IPH, R- frontal infarction). Please see the comprehensive neurologic consultation notes from those dates in the Epic records for details. At our last visit, the patient was having muscle twitching of her legs, left hand, and right leg.  At times, she may have a pattern of left hand shaking then right leg shaking. Events were " "occurring every 2-3 hours.  The events started in some degree after weaning off of Vimpat.  She was to have repeat CTV and a video EEG. Interval history: - Video EEG 6/30/2022 captured multiple right sided switching, both side twitching, left arm twitching events and they did not have correlation with EEG changes. 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  I think a daily agent like metoprolol or nortriptyline would be helpful, but given her use of prozac and history of asthma, these agents are limited.  Monotherapy with Topamax is reasonable, and I recommend increasing the daily dose to 200 (as below).\"     CLINICAL INTERVIEW: The patient was interviewed via video platform to the [facility] ([facility] [place]) and she was interviewed alone.  On interview, she confirmed that she sustained a venous sinus thrombosis on December 18, 2021 which resulted in both hemorrhagic and ischemic strokes.  She reported after her initial hospitalization she was in the transitional care unit (TCU) for one month followed by about one month in the acute rehabilitation unit (ARU), which is consistent records.  She reported that her functioning has significantly improved since this time.  Specifically, she reported that when she initially awoke from the coma, she was paralyzed on both the left and right side did not have a good prognosis.  However, she said she is now walking, although balance and coordination continue to be an issue.  Additionally, she noted that she continues to experience several cognitive issues, although these have improved as well.  For example, she reported that her short-term memory is still difficult.  She also reported " "difficulty with attention and concentration, such as getting distracted easily in noisy environments or if multiple conversations are occurring.  She said she gets overwhelmed easily by stimuli, and even going to the grocery store can be overwhelming for her.  She said she has difficulty filtering out background noise.  She noted occasional word finding difficulties, and said she is noticed these more since her stroke.  She reported that it is difficult for her to make spur the moment decisions, but if she has extended time she is able to make decisions better.  Functionally, she reported she is not driving, and did not pass a driving evaluation at [facility] a couple of months ago.  She denied difficulty with financial management.  She said basic household chores take 3 times longer, and she is unable to do cooking if it requires doing more than one thing at a time.  She said she uses a microwave to cook.  In terms of medication management, she said it is hard to remember she takes medication, so she established a checkoff system and phone reminders to help her track her medication use.    In terms of her mood, she said she tries to be positive, but she is experiencing significant anxiety.  She reported that she is constantly worried about having another stroke and is also \"hyperaware\" of her physical symptoms.  She reported that her sleep is poor and she will wake up frequently during the night.  She said that she is in bed about 8 hours, but her sleep is significantly interrupted nso she may sleep about 6 hours.  She said that she has a history of sleep apnea and uses a CPAP machine which helps.  Nonetheless, she also reported fatigue during the day and said she will often fall asleep involuntarily, such as watching television or in Gnosticist.  The patient reported that she has been working with a psychiatrist for an extended period time, and following the stroke began working with a " psychotherapist, both of which are ongoing.  She denied any suicidal or homicidal ideation, and denied any history of suicide attempts.  She also denied any hallucinations or delusions.  She reported that she has not drank any alcohol since the stroke, and prior to that she would have a glass of wine or beer 1-2 times per month.  She denied any use of tobacco or illicit substances.    Medically, she reported that she continues to experience headaches, which are often brought on by noise.  She noted that she had Covid-19 in early 2020, but has no lingering symptoms from this.  She also denied any history of liver or kidney disease.  She said she wears eyeglasses all the time but denied any hearing problems.  She indicated the medications listed in the records were up-to-date.  On a medical history questionnaire, she noted the history of stroke, seizures, and brain surgery, consistent with the records.  She denied any history of hypertension, diabetes, thyroid problems, hypercholesterolemia, concussions, heart disease, or pulmonary disease on the questionnaire.    Academically, the patient reported that she has an associates degree in marketing management and she was on the honor roll in school.  She denied ever being in special education classes or having to repeat a grade.  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  She reported that she was  previously one time and is .  She said that she is engaged and lives with her fiancé, her 9-year-old daughter, and giovanna.  She noted that her fiancé also has 2 stepdaughters who live with them on a part-time basis.    BEHAVIORAL OBSERVATIONS:  On examination, the patient was casually but appropriately dressed and groomed. The patient was cooperative with the evaluation and was talkative.  Speech was normal in volume, rate and tone.  The patient also appeared to put forth  their best effort on all tasks. Thus, the results of this evaluation are a reasonably valid reflection of the patient's current level of functioning. There were no indications of hallucinations, delusions or unusual thought processes and the patient was generally well oriented to personal information, place, and time.  The patient tended to respond to questions and items slowly.  The patient was a good historian, with a self-report consistent with medical records. Affect was also generally appropriate.      RESULTS: Formal performance validity measures were within expected limits and consistent with the above noted observations.  Psychometric estimates of the patient's premorbid global cognitive functioning based upon single word reading ability were in the average range, which is consistent with her educational and occupational history.    Measures of attention/concentration were poorer than expected.  For example, a digit span task was in the low average range.  Additionally, a visual scanning task that integrates attention was in the exceptionally low range.  Speed of information processing was also significantly slowed.  For example, psychomotor speed was in the exceptionally low range.  Motor-free processing speed ranged from below average to low average.    Measures of the patient's memory functioning were generally poorer than expected, but structure improved her encoding of new information.  Specifically, immediate recall on a less structured word list learning task was in the below average range, however on a more structured story memory task, immediate recall was in the average range and significantly better.  Likewise, in terms of visual learning, immediate recall on a complex figure drawing task was in the low average range and poorer than expected.  Delayed recall on the word list learning task was in the exceptionally low range, while delayed recall on the story memory task was in the average range.   Delayed recall on the complex figure drawing task was in the low average range.  Recognition memory was generally consistent with delayed recall.  Thus, there was no evidence for rapid forgetting of previously learned verbal or visual information.    Expressive language functioning was mildly variable.  For example, confrontation naming was in the average range.  Likewise, phonemic fluency was in the average range, but semantic fluency was in the below average range.  Verbal abstract reasoning was in the low average range.    Visual-spatial and visual constructional abilities were poorer than expected.  For example, there was evidence for mild visual-spatial distortions on clock drawing a complex figure drawing tasks.  Motor-free line orientation judgment was also in the exceptionally low range.  Motor-free visual reasoning was better and in the average range.    Measures of the patient's executive functioning were also poorer than expected.  For example, a complex visual scanning task that integrates cognitive flexibility was in the exceptionally low range.  A measure of response inhibition that integrates processing speed was in the low average range and poorer than expected.  Further, there was evidence for organizational difficulties on clock drawing and complex figure drawing tasks.  Verbal abstract reasoning was in the low average range.    Formal personality evaluation was completed utilizing the clinical interview and an objective measure of emotional functioning.  On the objective measure, the patient responded in a valid and interpretable fashion.  She responded similarly to individuals were experiencing marked emotional distress, including very high levels of anxiety both generally and about her physical functioning and health.  Further, similar individuals often have difficulty relaxing and likely experience fatigue as result of high levels of stress and anxiety.     SUMMARY:  In summary, the  neuropsychological assessment results indicated no evidence for significant change or decline in global cognitive functioning.  However, attention/concentration was in the impaired range, and there was evidence for significantly slowed speed of information processing.  In terms of memory functioning, encoding of new information on less structured tasks was significantly poorer than expected.  However, on a more structured story memory task, her encoding of new verbal information was better and within expected limits.  The results indicated that her learning of new information was adversely impacted by organizational ability, slowed processing speed, and difficulty with attention/concentration.  Conversely, her performance was better on more structured memory tasks.  Further, there was no evidence for rapid forgetting of previously learned information either.  Executive functioning was also in the mildly impaired range, with particular difficulty with cognitive flexibility, organizational ability, and response inhibition.  Visual-spatial abilities were poorer than expected, and expressive language functioning was variable.  In terms of motor functioning, fine motor dexterity was in the impaired range bilaterally, and there was no evidence for lateralized motor deficits.  On formal personality evaluation, there was evidence for significant anxiety symptoms, consistent with her history of generalized anxiety disorder (TIARRA).  However, the pattern and extent of cognitive difficulties is greater than could be accounted for by anxiety alone.  Thus, the results were consistent with her history of venous sinus thrombosis and resultant ischemic and hemorrhagic strokes.  Given the relatively brief time since the strokes, further neurologically based cognitive improvement is likely.  Other factors, such as sleep apnea, pain, sleep difficulties, and anxiety, could contribute to her cognitive difficulties as well if not well  managed.    RECOMMENDATIONS:   1.  Given these results, continued cognitive rehabilitation therapy, such as with a speech therapist, is recommended.  2.  At this time, safety is a concern, particularly with issues like driving.  Therefore, it is recommended that the patient continue to avoid driving at this time.  Further, a follow-up formal driving evaluation is recommended prior to resuming driving in the future.  3.  Continued psychotherapeutic intervention may be beneficial. A highly structured cognitive-behavioral intervention focused on coping and stress management would likely be the most beneficial.  Addressing sleep related difficulties would also likely be a productive focus of psychotherapy.  4. Continued psychiatric consultation to address medication management for her anxiety symptoms is also recommended.   5. Given the reported sleep difficulties, it is recommended that sleep hygiene be addressed to improve the quality and duration of sleep. The following are recommendations from the National Sleep Foundation that may be helpful:     Avoid napping during the day; it can disturb the normal pattern of sleep and wakefulness.    Avoid stimulants such as caffeine, nicotine, and alcohol too close to bedtime. While alcohol is well known to speed the onset of sleep, it disrupts sleep in the second half as the body begins to metabolize the alcohol, causing arousal.    Exercise can promote good sleep. Vigorous exercise should be taken in the morning or late afternoon. A relaxing exercise, like yoga, can be done before bed to help initiate a restful night's sleep.    Food can be disruptive right before sleep; stay away from large meals close to bedtime. Also dietary changes can cause sleep problems, if someone is struggling with a sleep problem, it's not a good time to start experimenting with spicy dishes. And, remember, chocolate has caffeine.    Ensure adequate exposure to natural light. This is particularly  important for older people who may not venture outside as frequently as children and adults. Light exposure helps maintain a healthy sleep-wake cycle.    Establish a regular relaxing bedtime routine. Try to avoid emotionally upsetting conversations and activities before trying to go to sleep. Don't dwell on, or bring your problems to bed.    Associate your bed with sleep. It's not a good idea to use your bed to watch TV, listen to the radio, or read.    Make sure that the sleep environment is pleasant and relaxing. The bed should be comfortable, the room should not be too hot or cold, or too bright.  6.  The patient benefited from increased structure when learning new information.  Therefore, in order to promote learning and memorization of new verbal material, it is recommended that the patient add structure to the material she is learning to promote subsequent recall (e.g., use mnemonic devices, acronyms, make up a story or song). Additionally, she is encouraged to use visual aids, such as flash cards, diagrams, charts, etc.   7. The patient may find the following attention and organizational strategies helpful:     Use cell phone reminders to help monitor upcoming appointments and due dates as well as other important tasks (e.g., when to take medications). Set the reminder to go off several times prior to the event with advanced notice (e.g., one week before, two days before, the day before, and the morning of the event).     She should attempt to reduce distractions at home. Having a clutter-free work environment and removing or at least making it harder to access potential distractions would help optimize her attention. She might also consider facing away from high traffic areas and using earplugs or noise cancellation headphones when desiring a quiet work environment.    Taking short breaks during her day may help optimize and maintain her concentration.     Make to-do lists and prioritize tasks. Break down  "large tasks into smaller steps and check off each small step when completed.   8. Chronic pain, such as from daily headaches, can also interfere with cognitive functioning in daily life. The following strategies can be helpful in minimizing the impact of pain on concentration and memory:    Get an adequate amount of restorative sleep each night     Practice mindfulness exercises often      Maintain a schedule that allows for sufficient time to engage in pleasurable activities.     A good self-help resource for managing pain and instruction in relaxation techniques is  Managing Pain Before it Manages You  by Meg Castro M.D., Ph.D., [employer].     \"Chronic Pain Control Workbook: A Step-by-Step Guide for Coping with and Overcoming Pain\" By Britney Wright and Mustapha Lanza. [employer].     \"Chronic Pain Control Workbook: A step by step guide for coping with and overcoming pain\" by Britney Wright and Mustapha Lanza. [employer]     ZHAO Arciniega (1987). Mastering Pain: A Twelve-Step Program for Coping with Chronic Pain. [employer].     The following online resources can provide additional helpful information about pain management generally.     www.ninds.nih.gov/health_and_medical/disorders/chronic_pain.htm      American Pain Society at www.ampainsoc.org      International Association for the Study of Pain at www.iasp-pain.org     American Academy of Pain Medicine at www.painmed.org    9.  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    10. The results of the evaluation now constitute a baseline of the patient s cognitive functioning.  Given the evidence for significant deficits in cognitive functioning, a referral for a neuropsychological reevaluation in approximately one year is recommended in order to document any changes in cognitive functioning and provide further " [Follow-Up Visit] : a follow-up [Family Member] : family member recommendations and prognosis to the patient, family and treatment team.    Results and recommendations were discussed with the patient via telephone on 10/12/2022 and her questions were answered.  I encouraged her to follow-up with her treating healthcare providers to facilitate recommendations noted in this report.  Thank you for referring this interesting individual. If you have any questions, please feel free to contact me.      Emery Gutierrez, PhD, ABPP, LP  Professor and Licensed Psychologist [id number]  Board Certified Clinical Neuropsychologist    Time Spent:      Minutes Date Code Units   Total Time-Neuropsychologist Professional 236 10/12/22 62187 1     10/12/22 80802 3   Neuropsychologist Admin/scoring 0 10/12/22 69181 0     10/12/22 23657 0   Diagnostic Interview 21 10/12/22 91113 1   Psychometrician Time-Test Administration/Score 180 10/12/22 26937 1     10/12/22 03321 5   Diagnosis R41.3 I63.9 G08 F41.1        [FreeTextEntry2] :  endometrioid adenocarcinoma

## 2023-05-03 LAB
25(OH)D3 SERPL-MCNC: 44.5 NG/ML
ALBUMIN SERPL ELPH-MCNC: 4.5 G/DL
ALP BLD-CCNC: 93 U/L
ALT SERPL-CCNC: 52 U/L
ANION GAP SERPL CALC-SCNC: 14 MMOL/L
AST SERPL-CCNC: 34 U/L
BILIRUB SERPL-MCNC: 0.4 MG/DL
BUN SERPL-MCNC: 12 MG/DL
CALCIUM SERPL-MCNC: 9.9 MG/DL
CHLORIDE SERPL-SCNC: 102 MMOL/L
CO2 SERPL-SCNC: 25 MMOL/L
CREAT SERPL-MCNC: 0.86 MG/DL
EGFR: 80 ML/MIN/1.73M2
GLUCOSE SERPL-MCNC: 104 MG/DL
POTASSIUM SERPL-SCNC: 4.1 MMOL/L
PROT SERPL-MCNC: 7.6 G/DL
SODIUM SERPL-SCNC: 141 MMOL/L

## 2023-05-31 NOTE — PATIENT PROFILE ADULT - INFORMATION PROVIDED TO:
Post-Care Instructions: I reviewed with the patient in detail post-care instructions. Patient should stay away from the sun and wear sun protection until treated areas are fully healed. patient

## 2023-06-05 ENCOUNTER — APPOINTMENT (OUTPATIENT)
Dept: GYNECOLOGIC ONCOLOGY | Facility: CLINIC | Age: 53
End: 2023-06-05
Payer: COMMERCIAL

## 2023-06-05 VITALS
BODY MASS INDEX: 36.06 KG/M2 | SYSTOLIC BLOOD PRESSURE: 163 MMHG | OXYGEN SATURATION: 94 % | HEIGHT: 61 IN | WEIGHT: 191 LBS | HEART RATE: 83 BPM | DIASTOLIC BLOOD PRESSURE: 90 MMHG

## 2023-06-05 PROCEDURE — 99213 OFFICE O/P EST LOW 20 MIN: CPT

## 2023-06-05 NOTE — ASSESSMENT
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 CT abd/pelvis 6/2022 with no interval changes. Vaginal atrophy noted on exam and narrow vaginal canal. Recommended vaginal dilators especially in a female who is not sexually active, patient declines at this time. No evidence of disease on exam today.

## 2023-06-05 NOTE — PHYSICAL EXAM
[Chaperone Present] : A chaperone was present in the examining room during all aspects of the physical examination [Absent] : Uterus: Absent [Fully active, able to carry on all pre-disease performance without restriction] : Status 0 - Fully active, able to carry on all pre-disease performance without restriction [Normal] : Bimanual Exam: Normal [FreeTextEntry1] : Patient was interviewed and examined with chaperone present. Name of chaperone: Melba Ramirez PA-C and Melissa GILLIS \par  [de-identified] : multiple small varicosities noted on b/l labia  [de-identified] : atrophic changes noted

## 2023-06-05 NOTE — REASON FOR VISIT
[FreeTextEntry1] : [facility]\par \par [facility] [phone] at [address] [place] [id number]\par

## 2023-06-05 NOTE — HISTORY OF PRESENT ILLNESS
[FreeTextEntry1] : 54yo with focus grade 1 endometrioid adenocarcinoma arising in the background of endometriosis on the uterine serosa. S/p BASILIA, BSO, tumor debulking, peritonectomy on 5/7/2019 due to extensive endometriosis of entire abdominal cavity. S/p end colostomy and colon resection and ultimately takedown on 8/8/2019. Pt has a hx of right breast cancer s/p bilateral mastectomy and KWAME flap by Dr. Licea on 11/25/19. She follows with Heme/Onc, Dr. Sellers. She also had colostomy reversal and abdominoplasty in the interim. She had a completion right axillary dissection on 1/3/20. She had post-mastectomy radiation by Dr. Caldwell complete 5/18/20-on Anastrazole. Pt denies VB, abdominal/pelvic pain, pressure, changes in bowel or bladder function. She is not sexually active. \par \par MyRisk Genetic: 10/11/2019: MSH3 Uncertain Clinical significance \par \par CT abd/pelvis 6/16/2022: No interval change since 6/16/2022, calcified perisplenic nodule/implant is unchanged.\par \par Ca-125 7/2022: 9 \par \par Health Maintenance: \par \par Colonoscopy: 2019, due again in 2024\par Mammogram: s/p bilateral mastectomy in 2019\par DEXA: March 2023, osteopenia- ref. to endocrine \par \par Immunizations: Shingles, Tdap (2019), COVID, Influenza

## 2023-06-06 NOTE — ASU PREOP CHECKLIST - VERIFY SURGICAL SITE/SIDE WITH PATIENT
done Dapsone Counseling: I discussed with the patient the risks of dapsone including but not limited to hemolytic anemia, agranulocytosis, rashes, methemoglobinemia, kidney failure, peripheral neuropathy, headaches, GI upset, and liver toxicity.  Patients who start dapsone require monitoring including baseline LFTs and weekly CBCs for the first month, then every month thereafter.  The patient verbalized understanding of the proper use and possible adverse effects of dapsone.  All of the patient's questions and concerns were addressed.

## 2023-06-14 ENCOUNTER — RESULT REVIEW (OUTPATIENT)
Age: 53
End: 2023-06-14

## 2023-06-14 ENCOUNTER — APPOINTMENT (OUTPATIENT)
Dept: HEMATOLOGY ONCOLOGY | Facility: CLINIC | Age: 53
End: 2023-06-14
Payer: COMMERCIAL

## 2023-06-14 ENCOUNTER — OUTPATIENT (OUTPATIENT)
Dept: OUTPATIENT SERVICES | Facility: HOSPITAL | Age: 53
LOS: 1 days | Discharge: HOME | End: 2023-06-14

## 2023-06-14 VITALS
WEIGHT: 190.44 LBS | HEIGHT: 61 IN | TEMPERATURE: 98.3 F | HEART RATE: 69 BPM | RESPIRATION RATE: 18 BRPM | SYSTOLIC BLOOD PRESSURE: 164 MMHG | OXYGEN SATURATION: 98 % | BODY MASS INDEX: 35.95 KG/M2 | DIASTOLIC BLOOD PRESSURE: 97 MMHG

## 2023-06-14 DIAGNOSIS — Z93.3 COLOSTOMY STATUS: Chronic | ICD-10-CM

## 2023-06-14 DIAGNOSIS — C50.919 MALIGNANT NEOPLASM OF UNSPECIFIED SITE OF UNSPECIFIED FEMALE BREAST: ICD-10-CM

## 2023-06-14 DIAGNOSIS — Z98.890 OTHER SPECIFIED POSTPROCEDURAL STATES: Chronic | ICD-10-CM

## 2023-06-14 DIAGNOSIS — Z90.710 ACQUIRED ABSENCE OF BOTH CERVIX AND UTERUS: Chronic | ICD-10-CM

## 2023-06-14 DIAGNOSIS — E55.9 VITAMIN D DEFICIENCY, UNSPECIFIED: ICD-10-CM

## 2023-06-14 DIAGNOSIS — Z90.13 ACQUIRED ABSENCE OF BILATERAL BREASTS AND NIPPLES: Chronic | ICD-10-CM

## 2023-06-14 LAB
ALBUMIN SERPL ELPH-MCNC: 4.4 G/DL — SIGNIFICANT CHANGE UP (ref 3.3–5)
ALP SERPL-CCNC: 84 U/L — SIGNIFICANT CHANGE UP (ref 40–120)
ALT FLD-CCNC: 21 U/L — SIGNIFICANT CHANGE UP (ref 10–45)
ANION GAP SERPL CALC-SCNC: 11 MMOL/L — SIGNIFICANT CHANGE UP (ref 5–17)
AST SERPL-CCNC: 24 U/L — SIGNIFICANT CHANGE UP (ref 10–40)
BASOPHILS # BLD AUTO: 0.02 K/UL — SIGNIFICANT CHANGE UP (ref 0–0.2)
BASOPHILS NFR BLD AUTO: 0.4 % — SIGNIFICANT CHANGE UP (ref 0–2)
BILIRUB SERPL-MCNC: 0.3 MG/DL — SIGNIFICANT CHANGE UP (ref 0.2–1.2)
BUN SERPL-MCNC: 13 MG/DL — SIGNIFICANT CHANGE UP (ref 7–23)
CALCIUM SERPL-MCNC: 10.5 MG/DL — SIGNIFICANT CHANGE UP (ref 8.4–10.5)
CANCER AG125 SERPL-ACNC: 8 U/ML — SIGNIFICANT CHANGE UP
CHLORIDE SERPL-SCNC: 106 MMOL/L — SIGNIFICANT CHANGE UP (ref 96–108)
CO2 SERPL-SCNC: 28 MMOL/L — SIGNIFICANT CHANGE UP (ref 22–31)
CREAT SERPL-MCNC: 0.91 MG/DL — SIGNIFICANT CHANGE UP (ref 0.5–1.3)
EGFR: 74 ML/MIN/1.73M2 — SIGNIFICANT CHANGE UP
EOSINOPHIL # BLD AUTO: 0.06 K/UL — SIGNIFICANT CHANGE UP (ref 0–0.5)
EOSINOPHIL NFR BLD AUTO: 1.1 % — SIGNIFICANT CHANGE UP (ref 0–6)
GLUCOSE SERPL-MCNC: 106 MG/DL — HIGH (ref 70–99)
HCT VFR BLD CALC: 41.1 % — SIGNIFICANT CHANGE UP (ref 34.5–45)
HGB BLD-MCNC: 13.5 G/DL — SIGNIFICANT CHANGE UP (ref 11.5–15.5)
IMM GRANULOCYTES NFR BLD AUTO: 0.4 % — SIGNIFICANT CHANGE UP (ref 0–0.9)
LYMPHOCYTES # BLD AUTO: 1.95 K/UL — SIGNIFICANT CHANGE UP (ref 1–3.3)
LYMPHOCYTES # BLD AUTO: 34.8 % — SIGNIFICANT CHANGE UP (ref 13–44)
MAGNESIUM SERPL-MCNC: 2.2 MG/DL — SIGNIFICANT CHANGE UP (ref 1.6–2.6)
MCHC RBC-ENTMCNC: 30.6 PG — SIGNIFICANT CHANGE UP (ref 27–34)
MCHC RBC-ENTMCNC: 32.8 GM/DL — SIGNIFICANT CHANGE UP (ref 32–36)
MCV RBC AUTO: 93.2 FL — SIGNIFICANT CHANGE UP (ref 80–100)
MONOCYTES # BLD AUTO: 0.42 K/UL — SIGNIFICANT CHANGE UP (ref 0–0.9)
MONOCYTES NFR BLD AUTO: 7.5 % — SIGNIFICANT CHANGE UP (ref 2–14)
NEUTROPHILS # BLD AUTO: 3.14 K/UL — SIGNIFICANT CHANGE UP (ref 1.8–7.4)
NEUTROPHILS NFR BLD AUTO: 55.8 % — SIGNIFICANT CHANGE UP (ref 43–77)
NRBC # BLD: 0 /100 WBCS — SIGNIFICANT CHANGE UP (ref 0–0)
PLATELET # BLD AUTO: 234 K/UL — SIGNIFICANT CHANGE UP (ref 150–400)
POTASSIUM SERPL-MCNC: 4.3 MMOL/L — SIGNIFICANT CHANGE UP (ref 3.5–5.3)
POTASSIUM SERPL-SCNC: 4.3 MMOL/L — SIGNIFICANT CHANGE UP (ref 3.5–5.3)
PROT SERPL-MCNC: 7.5 G/DL — SIGNIFICANT CHANGE UP (ref 6–8.3)
RBC # BLD: 4.41 M/UL — SIGNIFICANT CHANGE UP (ref 3.8–5.2)
RBC # FLD: 13.2 % — SIGNIFICANT CHANGE UP (ref 10.3–14.5)
SODIUM SERPL-SCNC: 145 MMOL/L — SIGNIFICANT CHANGE UP (ref 135–145)
WBC # BLD: 5.61 K/UL — SIGNIFICANT CHANGE UP (ref 3.8–10.5)
WBC # FLD AUTO: 5.61 K/UL — SIGNIFICANT CHANGE UP (ref 3.8–10.5)

## 2023-06-14 PROCEDURE — 99215 OFFICE O/P EST HI 40 MIN: CPT

## 2023-06-14 NOTE — RESULTS/DATA
[FreeTextEntry1] : 6/16/22 CT A/P: No interval change since 6/16/2022. Calcified perisplenic nodule/implant is unchanged.\par \par 12/1/21 PET: 1. Small FDG-avid left axillary lymph nodes, new since prior PET/CT, probably are reactive, likely related to recent COVID vaccination.\par 2. Unchanged nonspecific asymmetric, minimal diffuse FDG avidity with skin thickening in the reconstructed right breast.\par 3. Small right hepatic lobe capsular density focus on recent CT is not identified, however, no significant FDG activity seen on the corresponding region.\par 4. Non FDG-avid capsular high density focus in the posterior spleen is not significantly changed.\par \par 7/27/21 CT A/P: Unchanged high density nodules along the anterior capsule of the liver and lateral capsular surface of the spleen. These are indeterminate for peritoneal implants. Continued attention on follow-up imaging is recommended.\par *  No new sites of disease in the abdomen and pelvis.\par \par 1/28/21 PET:  Bilateral mastectomy with reconstruction. Unchanged nonspecific asymmetric, minimal diffuse FDG avidity with skin thickening in the reconstructed right breast as compared to PET/CT from 3/4/2020. Please correlate clinically. Interval resolution of FDG avid nodular focus in the left anterior abdominal wall, likely secondary to postsurgical changes. Unchanged nonspecific approximately symmetric bilateral tonsillar hypermetabolism.\par \par 9/16/20 CT:  1.6 x 0.7 cm perisplenic soft tissue and 5 x 2 mm region of increased enhancement either in the periphery of segment IVb of the liver or perihepatic which are unchanged when compared with 5/4/2020. The perisplenic region is also stable when compared with 3/4/2020 and no increased FDG uptake was noted at that time. Small stable residual implants cannot be excluded. No new disease or interval growth is seen. Status post bilateral mastectomies with reconstructive surgery. Region of mild nodularity in the right reconstructed breast which is more prominent when compared with 3/4/2020. This may represent granulomatous or fibrotic tissue.\par \par 5/5/20 CT: MALDONADO\par \par 2/4/2020 CT A/P: Sigmoid resection with anastomosis without evidence of obstruction. Small pericapsular splenic fluid collection which is decreased from prior study. \par No evidence of metastatic disease.\par \par 10/15/19 MRI Breast:\par 1.4 cm abnormal enhancement/biopsy clip in the upper outer posterior right breast, corresponding to newly diagnosed malignancy. No MRI evidence of multicentric or contralateral disease. \par RECOMMENDATION: Surgical or oncologic management. \par BI-RADS 6- Known Biopsy-Proven Malignancy \par \par 9/23/19 Pathology: \par Right breast, "11:00, 5 cm from the nipple", ultrasound guided core needle biopsy:\par -Invasive ductal carcinoma, grade 1.\par -See note.\par Note: Immunohistochemical stains for p63 and calponin are performed on block 1-B at [facility] and interpreted at [place] as follows:\par Calponin, p63: negative\par Microscopic evaluation reveals an invasive ductal carcinoma ([place] score 1+2+1), grade 1. The carcinoma measures at least 1.1 cm in its greatest linear expansion in this biopsy. \par There are no calcifications nor necrosis present.\par *Addendum*\par IMMUNOSTAINS PERFORMED AND INTERPRETED ON BLOCK " B " BY [facility],\par HER2/ELADIO BY IHC: 1 + / Negative\par ER: Positive, 90 - 95 %, strong\par NM: Positive, 15 - 20 %, strong\par \par 9/23/19 Mammo: Status post core needle biopsy of the right breast with clip placement. Recommendations for further followup will be based on pathology results.\par \par 9/19/19 US Breast/Mammo: Ill-defined hypoechoic area in the right breast at the 11:00 axis, corresponding to mammographically seen distortion. Ultrasound-guided core biopsy is advised.\par \par 5/15/19 Pathology:\par Intra-abdominal foreign body, removal:  Blood clots\par Sigmoid, resection:  Mucosal necrosis with transmural acute and chronic inflammation.  The process extends to one margin of resection (slide 1A).  Three (3) lymph nodes, one with benign glandular inclusion.\par \par 5/15/19 CT A/P:\par Moderate amount retained fecal material in the colon, greatest in the right hemicolon. Mild wall thickening involving the distal transverse colon, descending colon, and proximal sigmoid colon, possibly reactive to the large collection.  Large fluid collection which appears to be extraperitoneal layering anterior to the omentum measuring 24.7 x 9.4 x \par 20.9 cm (craniocaudad x anteroposterior x transverse). There is a component of the collection which extends posteriorly on the left into the retroperitoneum along the anterior aspect of the psoas extending to the posterior aspect of the splenic flexure (approximately 20 cm in craniocaudad dimension). Skin staples are seen within the anterior abdominal wall. Air is seen within the anterior abdominal wall which may be postoperative in etiology.\par \par 5/8/19 Cytopathology:\par Abdominal fluid:  NEGATIVE FOR MALIGNANT CELLS.  Mostly fibrin and reactive mesothelial cells.\par \par 5/7/19 Pathology:\par Omental lymph node: Markedly reactive, hemorrhagic lymph node with hemosiderin laden macrophages.\par Cyst wall(1): Hemorrhagic inflamed cyst wall without epithelial lining.\par Right hepatic flexure lymph node: Markedly reactive, hemorrhagic lymph node with hemosiderin laden macrophages.\par Omentum: Omental fat with marked reactive fibrosis, hemorrhage and chronic inflammation; marked serosal adhesions.\par Right pelvic lymph node: Markedly reactive, hemorrhagic lymph node with hemosiderin laden macrophages.\par Left pelvic lymph node: Markedly reactive, hemorrhagic lymph node with hemosiderin laden macrophages.\par Cyst wall(2):  Cyst wall, markedly inflamed and hemorrhagic with cholesterol crystals and without epithelial lining.\par ***Uterus, cervix and adnexa:  Histologically unremarkable cervix. Weakly proliferative endometrium and endometrial polyp. Cysts located outside the uterus, involving serosa, with glandular stromal structures, consistent with endometriosis.\par ****Addendum****\par Case sent for outside consultation at [facility] with Dr. Tina Mckay.\par The atypical clusters of glands, in part 8. "Uterus, cervix and adnexa", have been diagnosed as Low grade endometrioid adenocarcinoma, FIGO grade 1,  with focal sex cord like differentiation, arising in a background of endometriosis, and involving the uterine serosa.\par \par 4/23/19 CT C/A/P:\par There is a large complex cystic mass occupying the lower abdomen, measuring 21.0 (AP) x 24.2 (CC) x 33.1 (TR) cm.  The exact origin of this mass is uncertain. The left posterior wall of this mass is discontinuous, suggestive of rupture. There is a large amount of abdominal ascites.  Additional complex left adnexal cystic mass with septations and apparent mural nodules measuring 9.4 (AP) x 15.5 (CC) x 16.6 (TR) cm.

## 2023-06-14 NOTE — HISTORY OF PRESENT ILLNESS
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  She presented to [facility] ED on 4/23/19 s/p mechanical fall outside her place of work.  A CT A/P incidentally showed a complex cystic left adnexal mass measuring 17 cm. Massive cystic lesion occupying the lower abdomen measuring 33 cm. It was uncertain whether this represents a large peritoneal metastasis or a component of the above described cystic left adnexal mass, or possibly a right adnexal mass. There was suspicion for rupture of this mass with a large amount of abdominal ascites.  On 5/7/19 Dr. Manpreet Gonzalez performed a BASILIA, BSO, debulking and peritonectomy.  Pathology initially was benign.  An addendum was later issued which showed low grade endometrioid adenocarcinoma, FIGO grade 1, with focal sex cord like differentiation, arising in a background of endometriosis, and involving the uterine serosa.  She was discharged on 5/12/19 but returned to the ED on 5/15/19 c/o abdominal distention, N/V, dizziness, weakness, multiple falls and a fever of 104.  She was found to be hypotensive and tachycardic.  A CT A/P showed a large fluid collection which appears to be extraperitoneal layering anterior to the omentum measuring 24.7 x 9.4 x 20.9 cm. There is a component of the collection which extends posteriorly on the left into the retroperitoneum along the anterior aspect of the psoas extending to the posterior aspect of the splenic flexure (approximately 20 cm in craniocaudad dimension). On 5/17/19 Dr. Mague Durand performed an emergent exploratory laparotomy, sigmoidectomy and end colostomy.  Pathology was benign.   [de-identified] : FHx of DCIS (sister) [de-identified] : Patient called for follow-up visit. She is s/p BASILIA BSO, debulking and peritonectomy on 5/7/19 with Dr. Manpreet Gonzalez due to extensive endometrioid adenocarcinoma, with a delayed leak from the sigmoid requiring bowel resection and end-colostomy by Dr. Durand.  No adjuvant treatment was recommended and she is s/p colostomy reversal. Patient currently without complaints. Denies any pain, abdominal or vaginal.  No vaginal bleeding.  No N/VD/C.  Appetite is normal. No fever, no cough, no SOB.\par \par Diagnosed with right breast cancer in September 2019, ER+ UT+ HER2-.  Followed by Dr. Sellers.

## 2023-06-15 DIAGNOSIS — C56.9 MALIGNANT NEOPLASM OF UNSPECIFIED OVARY: ICD-10-CM

## 2023-06-15 DIAGNOSIS — M85.80 OTHER SPECIFIED DISORDERS OF BONE DENSITY AND STRUCTURE, UNSPECIFIED SITE: ICD-10-CM

## 2023-06-15 DIAGNOSIS — E04.1 NONTOXIC SINGLE THYROID NODULE: ICD-10-CM

## 2023-06-15 DIAGNOSIS — C50.911 MALIGNANT NEOPLASM OF UNSPECIFIED SITE OF RIGHT FEMALE BREAST: ICD-10-CM

## 2023-06-15 DIAGNOSIS — E55.9 VITAMIN D DEFICIENCY, UNSPECIFIED: ICD-10-CM

## 2023-06-30 ENCOUNTER — NON-APPOINTMENT (OUTPATIENT)
Age: 53
End: 2023-06-30

## 2023-06-30 NOTE — H&P PST ADULT - CONSTITUTIONAL
Patient transported to [address] [place] [address] [address] [facility], [address] [place] [address]  06/30/23 [id number]
Provider at bedside       Jose Raul Seymour RN  06/30/23 [id number]
detailed exam

## 2023-07-25 NOTE — PATIENT PROFILE ADULT - FUNCTIONAL SCREEN CURRENT LEVEL: SWALLOWING (IF SCORE 2 OR MORE FOR ANY ITEM, CONSULT REHAB SERVICES), MLM)
0 = swallows foods/liquids without difficulty
Additional Notes: Handout given
Detail Level: Zone
Render Risk Assessment In Note?: no

## 2023-08-11 ENCOUNTER — NON-APPOINTMENT (OUTPATIENT)
Age: 53
End: 2023-08-11

## 2023-09-19 ENCOUNTER — NON-APPOINTMENT (OUTPATIENT)
Age: 53
End: 2023-09-19

## 2023-09-19 ENCOUNTER — APPOINTMENT (OUTPATIENT)
Dept: RADIATION ONCOLOGY | Facility: CLINIC | Age: 53
End: 2023-09-19
Payer: COMMERCIAL

## 2023-09-19 VITALS
DIASTOLIC BLOOD PRESSURE: 95 MMHG | BODY MASS INDEX: 36.06 KG/M2 | RESPIRATION RATE: 16 BRPM | OXYGEN SATURATION: 99 % | WEIGHT: 191 LBS | HEIGHT: 61 IN | HEART RATE: 90 BPM | SYSTOLIC BLOOD PRESSURE: 161 MMHG

## 2023-09-19 PROCEDURE — 99214 OFFICE O/P EST MOD 30 MIN: CPT

## 2023-09-19 RX ORDER — METRONIDAZOLE 10 MG/G
GEL TOPICAL
Refills: 0 | Status: ACTIVE | COMMUNITY

## 2023-11-04 ENCOUNTER — OUTPATIENT (OUTPATIENT)
Dept: OUTPATIENT SERVICES | Facility: HOSPITAL | Age: 53
LOS: 1 days | Discharge: HOME | End: 2023-11-04

## 2023-11-04 DIAGNOSIS — Z90.710 ACQUIRED ABSENCE OF BOTH CERVIX AND UTERUS: Chronic | ICD-10-CM

## 2023-11-04 DIAGNOSIS — Z98.890 OTHER SPECIFIED POSTPROCEDURAL STATES: Chronic | ICD-10-CM

## 2023-11-04 DIAGNOSIS — Z93.3 COLOSTOMY STATUS: Chronic | ICD-10-CM

## 2023-11-04 DIAGNOSIS — C50.919 MALIGNANT NEOPLASM OF UNSPECIFIED SITE OF UNSPECIFIED FEMALE BREAST: ICD-10-CM

## 2023-11-04 DIAGNOSIS — Z90.13 ACQUIRED ABSENCE OF BILATERAL BREASTS AND NIPPLES: Chronic | ICD-10-CM

## 2023-11-07 ENCOUNTER — RESULT REVIEW (OUTPATIENT)
Age: 53
End: 2023-11-07

## 2023-11-07 ENCOUNTER — APPOINTMENT (OUTPATIENT)
Dept: HEMATOLOGY ONCOLOGY | Facility: CLINIC | Age: 53
End: 2023-11-07
Payer: COMMERCIAL

## 2023-11-07 VITALS
SYSTOLIC BLOOD PRESSURE: 176 MMHG | DIASTOLIC BLOOD PRESSURE: 99 MMHG | WEIGHT: 190 LBS | HEART RATE: 78 BPM | TEMPERATURE: 99.3 F | OXYGEN SATURATION: 96 % | BODY MASS INDEX: 35.87 KG/M2 | HEIGHT: 61 IN

## 2023-11-07 LAB
BASOPHILS # BLD AUTO: 0 K/UL — SIGNIFICANT CHANGE UP (ref 0–0.2)
BASOPHILS # BLD AUTO: 0 K/UL — SIGNIFICANT CHANGE UP (ref 0–0.2)
BASOPHILS NFR BLD AUTO: 0.7 % — SIGNIFICANT CHANGE UP (ref 0–2)
BASOPHILS NFR BLD AUTO: 0.7 % — SIGNIFICANT CHANGE UP (ref 0–2)
EOSINOPHIL # BLD AUTO: 0.1 K/UL — SIGNIFICANT CHANGE UP (ref 0–0.5)
EOSINOPHIL # BLD AUTO: 0.1 K/UL — SIGNIFICANT CHANGE UP (ref 0–0.5)
EOSINOPHIL NFR BLD AUTO: 2 % — SIGNIFICANT CHANGE UP (ref 0–6)
EOSINOPHIL NFR BLD AUTO: 2 % — SIGNIFICANT CHANGE UP (ref 0–6)
HCT VFR BLD CALC: 43.1 % — SIGNIFICANT CHANGE UP (ref 34.5–45)
HCT VFR BLD CALC: 43.1 % — SIGNIFICANT CHANGE UP (ref 34.5–45)
HGB BLD-MCNC: 14.1 G/DL — SIGNIFICANT CHANGE UP (ref 11.5–15.5)
HGB BLD-MCNC: 14.1 G/DL — SIGNIFICANT CHANGE UP (ref 11.5–15.5)
LYMPHOCYTES # BLD AUTO: 2.3 K/UL — SIGNIFICANT CHANGE UP (ref 1–3.3)
LYMPHOCYTES # BLD AUTO: 2.3 K/UL — SIGNIFICANT CHANGE UP (ref 1–3.3)
LYMPHOCYTES # BLD AUTO: 37.1 % — SIGNIFICANT CHANGE UP (ref 13–44)
LYMPHOCYTES # BLD AUTO: 37.1 % — SIGNIFICANT CHANGE UP (ref 13–44)
MCHC RBC-ENTMCNC: 30.3 PG — SIGNIFICANT CHANGE UP (ref 27–34)
MCHC RBC-ENTMCNC: 30.3 PG — SIGNIFICANT CHANGE UP (ref 27–34)
MCHC RBC-ENTMCNC: 32.8 G/DL — SIGNIFICANT CHANGE UP (ref 32–36)
MCHC RBC-ENTMCNC: 32.8 G/DL — SIGNIFICANT CHANGE UP (ref 32–36)
MCV RBC AUTO: 92.4 FL — SIGNIFICANT CHANGE UP (ref 80–100)
MCV RBC AUTO: 92.4 FL — SIGNIFICANT CHANGE UP (ref 80–100)
MONOCYTES # BLD AUTO: 0.4 K/UL — SIGNIFICANT CHANGE UP (ref 0–0.9)
MONOCYTES # BLD AUTO: 0.4 K/UL — SIGNIFICANT CHANGE UP (ref 0–0.9)
MONOCYTES NFR BLD AUTO: 6.1 % — SIGNIFICANT CHANGE UP (ref 2–14)
MONOCYTES NFR BLD AUTO: 6.1 % — SIGNIFICANT CHANGE UP (ref 2–14)
NEUTROPHILS # BLD AUTO: 3.3 K/UL — SIGNIFICANT CHANGE UP (ref 1.8–7.4)
NEUTROPHILS # BLD AUTO: 3.3 K/UL — SIGNIFICANT CHANGE UP (ref 1.8–7.4)
NEUTROPHILS NFR BLD AUTO: 54.1 % — SIGNIFICANT CHANGE UP (ref 43–77)
NEUTROPHILS NFR BLD AUTO: 54.1 % — SIGNIFICANT CHANGE UP (ref 43–77)
PLATELET # BLD AUTO: 270 K/UL — SIGNIFICANT CHANGE UP (ref 150–400)
PLATELET # BLD AUTO: 270 K/UL — SIGNIFICANT CHANGE UP (ref 150–400)
RBC # BLD: 4.67 M/UL — SIGNIFICANT CHANGE UP (ref 3.8–5.2)
RBC # BLD: 4.67 M/UL — SIGNIFICANT CHANGE UP (ref 3.8–5.2)
RBC # FLD: 12.6 % — SIGNIFICANT CHANGE UP (ref 10.3–14.5)
RBC # FLD: 12.6 % — SIGNIFICANT CHANGE UP (ref 10.3–14.5)
WBC # BLD: 6.1 K/UL — SIGNIFICANT CHANGE UP (ref 3.8–10.5)
WBC # BLD: 6.1 K/UL — SIGNIFICANT CHANGE UP (ref 3.8–10.5)
WBC # FLD AUTO: 6.1 K/UL — SIGNIFICANT CHANGE UP (ref 3.8–10.5)
WBC # FLD AUTO: 6.1 K/UL — SIGNIFICANT CHANGE UP (ref 3.8–10.5)

## 2023-11-07 PROCEDURE — 99214 OFFICE O/P EST MOD 30 MIN: CPT

## 2023-11-08 ENCOUNTER — APPOINTMENT (OUTPATIENT)
Dept: ENDOCRINOLOGY | Facility: CLINIC | Age: 53
End: 2023-11-08
Payer: COMMERCIAL

## 2023-11-08 VITALS
DIASTOLIC BLOOD PRESSURE: 90 MMHG | SYSTOLIC BLOOD PRESSURE: 154 MMHG | WEIGHT: 188 LBS | OXYGEN SATURATION: 98 % | HEIGHT: 61 IN | BODY MASS INDEX: 35.5 KG/M2 | HEART RATE: 99 BPM

## 2023-11-08 DIAGNOSIS — Z85.43 PERSONAL HISTORY OF MALIGNANT NEOPLASM OF OVARY: ICD-10-CM

## 2023-11-08 DIAGNOSIS — Z80.8 FAMILY HISTORY OF MALIGNANT NEOPLASM OF OTHER ORGANS OR SYSTEMS: ICD-10-CM

## 2023-11-08 DIAGNOSIS — Z78.9 OTHER SPECIFIED HEALTH STATUS: ICD-10-CM

## 2023-11-08 DIAGNOSIS — E04.2 NONTOXIC MULTINODULAR GOITER: ICD-10-CM

## 2023-11-08 DIAGNOSIS — Z80.9 FAMILY HISTORY OF MALIGNANT NEOPLASM, UNSPECIFIED: ICD-10-CM

## 2023-11-08 LAB
25(OH)D3 SERPL-MCNC: 46.5 NG/ML
ALBUMIN SERPL ELPH-MCNC: 4.5 G/DL
ALP BLD-CCNC: 89 U/L
ALT SERPL-CCNC: 23 U/L
ANION GAP SERPL CALC-SCNC: 11 MMOL/L
AST SERPL-CCNC: 27 U/L
BILIRUB SERPL-MCNC: 0.3 MG/DL
BUN SERPL-MCNC: 12 MG/DL
CALCIUM SERPL-MCNC: 10.4 MG/DL
CHLORIDE SERPL-SCNC: 102 MMOL/L
CO2 SERPL-SCNC: 30 MMOL/L
CREAT SERPL-MCNC: 0.81 MG/DL
EGFR: 86 ML/MIN/1.73M2
GLUCOSE SERPL-MCNC: 107 MG/DL
POTASSIUM SERPL-SCNC: 4.2 MMOL/L
PROT SERPL-MCNC: 7.6 G/DL
SODIUM SERPL-SCNC: 143 MMOL/L

## 2023-11-08 PROCEDURE — 99214 OFFICE O/P EST MOD 30 MIN: CPT

## 2023-11-29 NOTE — H&P PST ADULT - NS PRO LACT YNNA
Problem: At Risk for Falls  Goal: # Patient does not fall  Outcome: Outcome Not Met, Continue to Monitor  Goal: # Takes action to control fall-related risks  Outcome: Outcome Not Met, Continue to Monitor  Goal: # Verbalizes understanding of fall risk/precautions  Description: Document education using the patient education activity  Outcome: Outcome Not Met, Continue to Monitor     Problem: At Risk for Injury Due to Fall  Goal: # Patient does not fall  Outcome: Outcome Not Met, Continue to Monitor  Goal: # Takes action to control condition specific risks  Outcome: Outcome Not Met, Continue to Monitor  Goal: # Verbalizes understanding of fall-related injury personal risks  Description: Document education using the patient education activity  Outcome: Outcome Not Met, Continue to Monitor     Problem: Pain  Goal: #Acceptable pain level achieved/maintained at rest using NRS/Faces  Description: This goal is used for patients who can self-report.  Acceptable means the level is at or below the identified comfort/function goal.  Outcome: Outcome Not Met, Continue to Monitor  Goal: # Acceptable pain level achieved/maintained at rest using NRS/Faces without oversedation (opioid naive or PCA/Epidural infusion)  Description: This goal is used if Opioid-naïve or on PCA/Epidural Infusion.  Outcome: Outcome Not Met, Continue to Monitor  Goal: # Acceptable pain level achieved/maintained with activity using NRS/Faces  Description: This goal is used for patients who can self-report and are not achieving acceptable pain control during activity.  Outcome: Outcome Not Met, Continue to Monitor  Goal: Acceptable pain/comfort level is achieved/maintained at rest (based on Pain Behaviors Scale)  Description: This goal is used for patients who are not able to self-report pain and are assessed for pain using the Pain Behaviors Scale  Outcome: Outcome Not Met, Continue to Monitor  Goal: Acceptable pain/comfort level is achieved/maintained at  rest based on PAINAID scale (Dementia)  Description: This goal is used for patients who are not able to self-report pain, have dementia, and assessed using the PAINAD scale.  Outcome: Outcome Not Met, Continue to Monitor  Goal: Acceptable pain/comfort level is achieved/maintained at rest (based on pediatric behavior tool: NIPS, NPASS, or FLACC)  Description: This goal is used for pediatric patients who are not able to self report pain.  Outcome: Outcome Not Met, Continue to Monitor  Goal: # Verbalizes understanding of pain management  Description: Documented in Patient Education Activity  Outcome: Outcome Not Met, Continue to Monitor  Goal: Verbalizes understanding and effective use of Patient Controlled Analgesia (PCA)  Description: Documented in Patient Education Activity  This goal is used for patients with PCA  Outcome: Outcome Not Met, Continue to Monitor  Goal: Maximum comfort achieved/maintained at end of life (Hospice)  Outcome: Outcome Not Met, Continue to Monitor     Problem: Impaired Physical Mobility  Goal: # Bed mobility, ambulation, and ADLs are maintained or returned to baseline during hospitalization  Outcome: Outcome Not Met, Continue to Monitor     Problem: Diabetes  Goal: Achieves glycemic balance  Description: Goal is to maintain blood sugar within range with no episodes of hypoglycemia  Outcome: Outcome Not Met, Continue to Monitor  Goal: Verbalizes/demonstrates understanding of NEW diagnosis of diabetes and management  Description: Document on Patient Education Activity  Outcome: Outcome Not Met, Continue to Monitor  Goal: Verbalizes understanding of diabetes management including how to use HbA1C to evaluate status of blood sugar over time (Diabetes is NOT a new diagnosis)  Description: Diabetes Education  Outcome: Outcome Not Met, Continue to Monitor  Goal: Demonstrates ability to self-administer insulin  Description: Document on Patient Education Activity  Outcome: Outcome Not Met, Continue  to Monitor      no

## 2023-12-12 ENCOUNTER — APPOINTMENT (OUTPATIENT)
Dept: GYNECOLOGIC ONCOLOGY | Facility: CLINIC | Age: 53
End: 2023-12-12
Payer: COMMERCIAL

## 2023-12-12 VITALS — WEIGHT: 193 LBS | BODY MASS INDEX: 36.44 KG/M2 | HEIGHT: 61 IN

## 2023-12-12 PROCEDURE — 99213 OFFICE O/P EST LOW 20 MIN: CPT

## 2023-12-26 ENCOUNTER — TRANSCRIPTION ENCOUNTER (OUTPATIENT)
Age: 53
End: 2023-12-26

## 2023-12-26 RX ORDER — ANASTROZOLE TABLETS 1 MG/1
1 TABLET ORAL DAILY
Qty: 90 | Refills: 3 | Status: ACTIVE | COMMUNITY
Start: 2020-02-18 | End: 1900-01-01

## 2024-03-05 ENCOUNTER — APPOINTMENT (OUTPATIENT)
Dept: RADIATION ONCOLOGY | Facility: CLINIC | Age: 54
End: 2024-03-05
Payer: COMMERCIAL

## 2024-03-05 ENCOUNTER — NON-APPOINTMENT (OUTPATIENT)
Age: 54
End: 2024-03-05

## 2024-03-05 VITALS
OXYGEN SATURATION: 98 % | HEART RATE: 92 BPM | HEIGHT: 61 IN | SYSTOLIC BLOOD PRESSURE: 163 MMHG | DIASTOLIC BLOOD PRESSURE: 77 MMHG | TEMPERATURE: 97 F | RESPIRATION RATE: 15 BRPM | WEIGHT: 196 LBS | BODY MASS INDEX: 37 KG/M2

## 2024-03-05 DIAGNOSIS — C50.911 MALIGNANT NEOPLASM OF UNSPECIFIED SITE OF RIGHT FEMALE BREAST: ICD-10-CM

## 2024-03-05 PROCEDURE — 99214 OFFICE O/P EST MOD 30 MIN: CPT

## 2024-03-05 NOTE — HISTORY OF PRESENT ILLNESS
[FreeTextEntry1] : 53 year old woman with IDC right breast s/p bilateral mastectomy, PMRT completed 5/18/20.  She is also on anastrozole. 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  Interval history: No breast pain, breast edema, arm edema, fatigue or weight loss. Reports overall stable health. Tolerating endocrine therapy well.  No significant hot flashes.  No myalgias or arthralgias. No vaginal discharge. Reports that she is due for colonoscopy later this year; prior in 2019.  MedOnc: [facility]  Breast Surgery: Elizabeth Riley GynOnc: Manpreet Gonzalez Endo: Evita Andujar

## 2024-03-05 NOTE — REVIEW OF SYSTEMS
[Edema Limbs: Grade 0] : Edema Limbs: Grade 0  [Negative] : Allergic/Immunologic [Fatigue: Grade 0] : Fatigue: Grade 0 [Localized Edema: Grade 0] : Localized Edema: Grade 0  [Neck Edema: Grade 0] : Neck Edema: Grade 0 [Breast Pain: Grade 0] : Breast Pain: Grade 0 [Skin Hyperpigmentation: Grade 0] : Skin Hyperpigmentation: Grade 0 [Dermatitis Radiation: Grade 0] : Dermatitis Radiation: Grade 0

## 2024-03-05 NOTE — PHYSICAL EXAM
[Sclera] : the sclera and conjunctiva were normal [Extraocular Movements] : extraocular movements were intact [Outer Ear] : the ears and nose were normal in appearance [No UE Edema] : there is no upper extremity edema [Normal] : no palpable adenopathy [Cervical Lymph Nodes Enlarged Anterior Bilaterally] : anterior cervical [Supraclavicular Lymph Nodes Enlarged Bilaterally] : supraclavicular [Axillary Lymph Nodes Enlarged Bilaterally] : axillary [Musculoskeletal - Swelling] : no joint swelling [de-identified] : No significant hyperpigmentation of the right chest wall.  No palpable suspicious nodularity or mass lesions identified bilaterally. [Range of Motion to Joints] : range of motion to joints

## 2024-05-08 ENCOUNTER — OUTPATIENT (OUTPATIENT)
Dept: OUTPATIENT SERVICES | Facility: HOSPITAL | Age: 54
LOS: 1 days | Discharge: HOME | End: 2024-05-08

## 2024-05-08 DIAGNOSIS — Z93.3 COLOSTOMY STATUS: Chronic | ICD-10-CM

## 2024-05-08 DIAGNOSIS — Z90.710 ACQUIRED ABSENCE OF BOTH CERVIX AND UTERUS: Chronic | ICD-10-CM

## 2024-05-08 DIAGNOSIS — Z98.890 OTHER SPECIFIED POSTPROCEDURAL STATES: Chronic | ICD-10-CM

## 2024-05-08 DIAGNOSIS — C50.919 MALIGNANT NEOPLASM OF UNSPECIFIED SITE OF UNSPECIFIED FEMALE BREAST: ICD-10-CM

## 2024-05-08 DIAGNOSIS — Z90.13 ACQUIRED ABSENCE OF BILATERAL BREASTS AND NIPPLES: Chronic | ICD-10-CM

## 2024-05-14 ENCOUNTER — RESULT REVIEW (OUTPATIENT)
Age: 54
End: 2024-05-14

## 2024-05-14 ENCOUNTER — APPOINTMENT (OUTPATIENT)
Dept: HEMATOLOGY ONCOLOGY | Facility: CLINIC | Age: 54
End: 2024-05-14
Payer: COMMERCIAL

## 2024-05-14 VITALS
HEART RATE: 80 BPM | RESPIRATION RATE: 17 BRPM | DIASTOLIC BLOOD PRESSURE: 90 MMHG | HEIGHT: 61 IN | OXYGEN SATURATION: 98 % | TEMPERATURE: 98.1 F | WEIGHT: 195.38 LBS | BODY MASS INDEX: 36.89 KG/M2 | SYSTOLIC BLOOD PRESSURE: 156 MMHG

## 2024-05-14 LAB
BASOPHILS # BLD AUTO: 0 K/UL — SIGNIFICANT CHANGE UP (ref 0–0.2)
BASOPHILS NFR BLD AUTO: 0.8 % — SIGNIFICANT CHANGE UP (ref 0–2)
EOSINOPHIL # BLD AUTO: 0.1 K/UL — SIGNIFICANT CHANGE UP (ref 0–0.5)
EOSINOPHIL NFR BLD AUTO: 1.2 % — SIGNIFICANT CHANGE UP (ref 0–6)
HCT VFR BLD CALC: 40.9 % — SIGNIFICANT CHANGE UP (ref 34.5–45)
HGB BLD-MCNC: 13.7 G/DL — SIGNIFICANT CHANGE UP (ref 11.5–15.5)
LYMPHOCYTES # BLD AUTO: 1.6 K/UL — SIGNIFICANT CHANGE UP (ref 1–3.3)
LYMPHOCYTES # BLD AUTO: 27.6 % — SIGNIFICANT CHANGE UP (ref 13–44)
MCHC RBC-ENTMCNC: 30.7 PG — SIGNIFICANT CHANGE UP (ref 27–34)
MCHC RBC-ENTMCNC: 33.4 G/DL — SIGNIFICANT CHANGE UP (ref 32–36)
MCV RBC AUTO: 91.8 FL — SIGNIFICANT CHANGE UP (ref 80–100)
MONOCYTES # BLD AUTO: 0.3 K/UL — SIGNIFICANT CHANGE UP (ref 0–0.9)
MONOCYTES NFR BLD AUTO: 5.8 % — SIGNIFICANT CHANGE UP (ref 2–14)
NEUTROPHILS # BLD AUTO: 3.7 K/UL — SIGNIFICANT CHANGE UP (ref 1.8–7.4)
NEUTROPHILS NFR BLD AUTO: 64.5 % — SIGNIFICANT CHANGE UP (ref 43–77)
PLATELET # BLD AUTO: 246 K/UL — SIGNIFICANT CHANGE UP (ref 150–400)
RBC # BLD: 4.45 M/UL — SIGNIFICANT CHANGE UP (ref 3.8–5.2)
RBC # FLD: 12.5 % — SIGNIFICANT CHANGE UP (ref 10.3–14.5)
WBC # BLD: 5.7 K/UL — SIGNIFICANT CHANGE UP (ref 3.8–10.5)
WBC # FLD AUTO: 5.7 K/UL — SIGNIFICANT CHANGE UP (ref 3.8–10.5)

## 2024-05-14 PROCEDURE — 99215 OFFICE O/P EST HI 40 MIN: CPT

## 2024-05-14 NOTE — ASSESSMENT
[FreeTextEntry1] : #Breast cancer, right -s/p bilateral mastectomy with right SLNB for 1.3cm IDC with 2/3 positive nodes. -s/p right auxiliary lymph node dissection. 0/9 lymph nodes were negative for carcinoma.   Oncotype score 17  -Post-mastectomy radiation to the right chest wall/lymph nodes completed 5/18/20 with Dr. Caldwell  - Patient currently taking Vitamin D 2000 units daily, Calcium -Continue anastrozole - Will order labs today DEX c/w osteopenia in March 2023, following up with endocrine -following with Dr. Caldwell, Dr. Witt and Dr. mcdonald   #Endometrioid adenocarcinoma of ovary, unspecified laterality - Grade 1 endometroid adenocarcinoma arising in the background of endometriosis involving the uterine serosa, likely primary peritoneal. -s/p BASILIA, BSO, tumor debulking, peritonectomy on 5/7/19 with Dr. Manpreet Gonzalez due to extensive endometriosis of entire abdominal cavity. -s/p end colostomy and colon resection -s/p reversal of colostomy -Pathology with focus of grade 1 endometrioid adenocarcinoma of the ovary arising from background of endometriosis.   -Per [facility], Uterus, cervix and adnexa diagnosed as "low grade endometrioid adenocarcinoma, FIGO grade 1, with focal sex cord like differentiation, arising in a background of endometriosis, and involving the uterine serosa".   -Given focus/ tiny cluster of cells with low grade endometriod, adjuvant chemotherapy and radiation not recommended. Additionally, it is unclear where this cluster originated from. Removal of ovaries will decrease her chance of malignant transformation of any residual endometriosis.  -discussed anti-hormonal therapy with AI and pt did not elect to be treated with AI  -F/u with close observation with Dr. Gonzalez, every 3-6 mos  -s/p genetics evaluation with Dr. Wright: No clinically significant mutation was identified. A variant of uncertain significance was identified, MSH3.  -s/p colonoscopy Aug 2019 with GI, Dr. Durand.. Next colonoscopy is Aug 2024 per   Dr. Durand   -6/16/22 CT A/P: No interval change since 6/16/2022. Calcified perisplenic nodule/implant is unchanged.  -reviewed scan in detail - Follow up in 12 months alternating every 6 months with Dr. Gonzalez.  #Thyroid nodule - Following with Dr. Da Silva, PCP, [facility]. -s/p biopsy 10/4/20, benign as per patient.  #Osteopenia -3/2021 Normal DEXA scan - 3/25/23 DEXA - Osteopenia - Continue Vit D - Apt with Endo in May 2024 RTO in 6 months

## 2024-05-14 NOTE — RESULTS/DATA
[FreeTextEntry1] : \par  6/16/22 CT A/P: \par  -No interval change since 6/16/2022. Calcified perisplenic nodule/implant is unchanged\par  \par  \par  PET/CT 12/1/21\par  IMPRESSION:\par  1. Small FDG-avid left axillary lymph nodes, new since prior PET/CT, probably are reactive, likely\par  related to recent COVID vaccination.\par  2. Unchanged nonspecific asymmetric, minimal diffuse FDG avidity with skin thickening in the\par  reconstructed right breast.\par  3. Small right hepatic lobe capsular density focus on recent CT is not identified, however, no\par  significant FDG activity seen on the corresponding region.\par  4. Non FDG-avid capsular high density focus in the posterior spleen is not significantly changed.\par  \par  03/21 Normal DEXA scan\par  \par  7/27/21 CT A/P: Unchanged high density nodules along the anterior capsule of the liver and lateral capsular surface of the spleen. These are indeterminate for peritoneal implants. Continued attention on follow-up imaging is recommended.\par  *  No new sites of disease in the abdomen and pelvis.\par  \par  1/28/21 PET:  Bilateral mastectomy with reconstruction. Unchanged nonspecific asymmetric, minimal diffuse FDG avidity with skin thickening in the reconstructed right breast as compared to PET/CT from 3/4/2020. Please correlate clinically. Interval resolution of FDG avid nodular focus in the left anterior abdominal wall, likely secondary to postsurgical changes. Unchanged nonspecific approximately symmetric bilateral tonsillar hypermetabolism.\par  \par  9/16/20 CT:  1.6 x 0.7 cm perisplenic soft tissue and 5 x 2 mm region of increased enhancement either in the periphery of segment IVb of the liver or perihepatic which are unchanged when compared with 5/4/2020. The perisplenic region is also stable when compared with 3/4/2020 and no increased FDG uptake was noted at that time. Small stable residual implants cannot be excluded. No new disease or interval growth is seen. Status post bilateral mastectomies with reconstructive surgery. Region of mild nodularity in the right reconstructed breast which is more prominent when compared with 3/4/2020. This may represent granulomatous or fibrotic tissue.\par  \par  5/5/20 CT: MALDONADO\par  \par  2/4/2020 CT A/P: Sigmoid resection with anastomosis without evidence of obstruction. Small pericapsular splenic fluid collection which is decreased from prior study. \par  No evidence of metastatic disease.\par  \par  10/15/19 MRI Breast:\par  1.4 cm abnormal enhancement/biopsy clip in the upper outer posterior right breast, corresponding to newly diagnosed malignancy. No MRI evidence of multicentric or contralateral disease. \par  RECOMMENDATION: Surgical or oncologic management. \par  BI-RADS 6- Known Biopsy-Proven Malignancy \par  \par  9/23/19 Pathology: \par  Right breast, "11:00, 5 cm from the nipple", ultrasound guided core needle biopsy:\par  -Invasive ductal carcinoma, grade 1.\par  -See note.\par  Note: Immunohistochemical stains for p63 and calponin are performed on block 1-B at [facility] and interpreted at [place] as follows:\par  Calponin, p63: negative\par  Microscopic evaluation reveals an invasive ductal carcinoma (Lizzette score 1+2+1), grade 1. The carcinoma measures at least 1.1 cm in its greatest linear expansion in this biopsy. \par  There are no calcifications nor necrosis present.\par  *Addendum*\par  IMMUNOSTAINS PERFORMED AND INTERPRETED ON BLOCK " B " BY [facility],\par  HER2/ELADIO BY IHC: 1 + / Negative\par  ER: Positive, 90 - 95 %, strong\par  KS: Positive, 15 - 20 %, strong\par  \par  9/23/19 Mammo: Status post core needle biopsy of the right breast with clip placement. Recommendations for further followup will be based on pathology results.\par  \par  9/19/19 US Breast/Mammo: Ill-defined hypoechoic area in the right breast at the 11:00 axis, corresponding to mammographically seen distortion. Ultrasound-guided core biopsy is advised.\par  \par  5/15/19 Pathology:\par  Intra-abdominal foreign body, removal:  Blood clots\par  Sigmoid, resection:  Mucosal necrosis with transmural acute and chronic inflammation.  The process extends to one margin of resection (slide 1A).  Three (3) lymph nodes, one with benign glandular inclusion.\par  \par  5/15/19 CT A/P:\par  Moderate amount retained fecal material in the colon, greatest in the right hemicolon. Mild wall thickening involving the distal transverse colon, descending colon, and proximal sigmoid colon, possibly reactive to the large collection.  Large fluid collection which appears to be extraperitoneal layering anterior to the omentum measuring 24.7 x 9.4 x \par  20.9 cm (craniocaudad x anteroposterior x transverse). There is a component of the collection which extends posteriorly on the left into the retroperitoneum along the anterior aspect of the psoas extending to the posterior aspect of the splenic flexure (approximately 20 cm in craniocaudad dimension). Skin staples are seen within the anterior abdominal wall. Air is seen within the anterior abdominal wall which may be postoperative in etiology.\par  \par  5/8/19 Cytopathology:\par  Abdominal fluid:  NEGATIVE FOR MALIGNANT CELLS.  Mostly fibrin and reactive mesothelial cells.\par  \par  5/7/19 Pathology:\par  Omental lymph node: Markedly reactive, hemorrhagic lymph node with hemosiderin laden macrophages.\par  Cyst wall(1): Hemorrhagic inflamed cyst wall without epithelial lining.\par  Right hepatic flexure lymph node: Markedly reactive, hemorrhagic lymph node with hemosiderin laden macrophages.\par  Omentum: Omental fat with marked reactive fibrosis, hemorrhage and chronic inflammation; marked serosal adhesions.\par  Right pelvic lymph node: Markedly reactive, hemorrhagic lymph node with hemosiderin laden macrophages.\par  Left pelvic lymph node: Markedly reactive, hemorrhagic lymph node with hemosiderin laden macrophages.\par  Cyst wall(2):  Cyst wall, markedly inflamed and hemorrhagic with cholesterol crystals and without epithelial lining.\par  ***Uterus, cervix and adnexa:  Histologically unremarkable cervix. Weakly proliferative endometrium and endometrial polyp. Cysts located outside the uterus, involving serosa, with glandular stromal structures, consistent with endometriosis.\par  ****Addendum****\par  Case sent for outside consultation at [facility] with Dr. Tina Mckay.\par  The atypical clusters of glands, in part 8. "Uterus, cervix and adnexa", have been diagnosed as Low grade endometrioid adenocarcinoma, FIGO grade 1,  with focal sex cord like differentiation, arising in a background of endometriosis, and involving the uterine serosa.\par  \par  4/23/19 CT C/A/P:\par  There is a large complex cystic mass occupying the lower abdomen, measuring 21.0 (AP) x 24.2 (CC) x 33.1 (TR) cm.  The exact origin of this mass is uncertain. The left posterior wall of this mass is discontinuous, suggestive of rupture. There is a large amount of abdominal ascites.  Additional complex left adnexal cystic mass with septations and apparent mural nodules measuring 9.4 (AP) x 15.5 (CC) x 16.6 (TR) cm.

## 2024-05-14 NOTE — PHYSICAL EXAM
[Restricted in physically strenuous activity but ambulatory and able to carry out work of a light or sedentary nature] : Status 1- Restricted in physically strenuous activity but ambulatory and able to carry out work of a light or sedentary nature, e.g., light house work, office work [Normal] : affect appropriate [de-identified] : s/p bilateral mastectomy with well-healed incisions [de-identified] : well-healed incision both  and vertical midline

## 2024-05-14 NOTE — HISTORY OF PRESENT ILLNESS
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  She presented to [facility] ED on 4/23/19 s/p mechanical fall outside her place of work.  A CT A/P incidentally showed a complex cystic left adnexal mass measuring 17 cm. Massive cystic lesion occupying the lower abdomen measuring 33 cm. It was uncertain whether this represents a large peritoneal metastasis or a component of the above described cystic left adnexal mass, or possibly a right adnexal mass. There was suspicion for rupture of this mass with a large amount of abdominal ascites.  On 5/7/19 Dr. Manpreet Gonzalez performed a BASILIA, BSO, debulking and peritonectomy.  Pathology initially was benign.  An addendum was later issued which showed low grade endometrioid adenocarcinoma, FIGO grade 1, with focal sex cord like differentiation, arising in a background of endometriosis, and involving the uterine serosa.  She was discharged on 5/12/19 but returned to the ED on 5/15/19 c/o abdominal distention, N/V, dizziness, weakness, multiple falls and a fever of 104.  She was found to be hypotensive and tachycardic.  A CT A/P showed a large fluid collection which appears to be extraperitoneal layering anterior to the omentum measuring 24.7 x 9.4 x 20.9 cm. There is a component of the collection which extends posteriorly on the left into the retroperitoneum along the anterior aspect of the psoas extending to the posterior aspect of the splenic flexure (approximately 20 cm in craniocaudad dimension). On 5/17/19 Dr. Mague Durand performed an emergent exploratory laparotomy, sigmoidectomy and end colostomy.  Pathology was benign.   [de-identified] : FHx of DCIS (sister) [de-identified] : 11/1/22 She is s/p BASILIA BSO, debulking and peritonectomy on 5/7/19 with Dr. Manpreet Gonzalez due to extensive endometrioid adenocarcinoma, with a delayed leak from the sigmoid requiring bowel resection and end-colostomy by Dr. Durand.  No adjuvant treatment was recommended and she is s/p colostomy reversal.  Diagnosed with right breast cancer in September 2019, ER+ [place]+ HER2.    Patient presents for follow-up visit Patient doing well, offers no acute complaints Patient currently taking Vitamin D 2000 units daily. Vit D level in Nov 2023 at 46  Still seeing DR Riley  On anastrazole. Tolerating well No joint pain No hot flashes No vaginal dryness   3/25/23 DEXA - Osteopenia, saw Endocroine, f/u in may 2024

## 2024-05-15 LAB
25(OH)D3 SERPL-MCNC: 68 NG/ML
ALBUMIN SERPL ELPH-MCNC: 4.4 G/DL
ALP BLD-CCNC: 86 U/L
ALT SERPL-CCNC: 17 U/L
ANION GAP SERPL CALC-SCNC: 11 MMOL/L
AST SERPL-CCNC: 21 U/L
BILIRUB SERPL-MCNC: 0.2 MG/DL
BUN SERPL-MCNC: 12 MG/DL
CALCIUM SERPL-MCNC: 9.8 MG/DL
CHLORIDE SERPL-SCNC: 104 MMOL/L
CO2 SERPL-SCNC: 26 MMOL/L
CREAT SERPL-MCNC: 0.89 MG/DL
EGFR: 77 ML/MIN/1.73M2
GLUCOSE SERPL-MCNC: 114 MG/DL
POTASSIUM SERPL-SCNC: 4.1 MMOL/L
PROT SERPL-MCNC: 7.4 G/DL
SODIUM SERPL-SCNC: 141 MMOL/L

## 2024-05-24 ENCOUNTER — APPOINTMENT (OUTPATIENT)
Dept: ENDOCRINOLOGY | Facility: CLINIC | Age: 54
End: 2024-05-24
Payer: COMMERCIAL

## 2024-05-24 VITALS
WEIGHT: 195 LBS | SYSTOLIC BLOOD PRESSURE: 144 MMHG | HEIGHT: 61 IN | DIASTOLIC BLOOD PRESSURE: 90 MMHG | HEART RATE: 88 BPM | BODY MASS INDEX: 36.82 KG/M2 | OXYGEN SATURATION: 98 %

## 2024-05-24 PROCEDURE — G2211 COMPLEX E/M VISIT ADD ON: CPT

## 2024-05-24 PROCEDURE — 99214 OFFICE O/P EST MOD 30 MIN: CPT

## 2024-05-24 NOTE — ASSESSMENT
[FreeTextEntry1] : 53 y.o. Female with PMHx of Right breast Ca, s/p b/l  mastectomy(2019), s/p RTX 2020, Endometrioid adenocarcinoma, s/p BASILIA BSO, referred initially by Oncology for evaluation of Osteopenia. Patient has Hx of Right proximal femoral fracture after slip and fall in 7/2022, treated conservatively. Her menopause coincided with hysterectomy/oophorectomy in 2019. Currently on Anastrozole. FHx - mother with osteoporosis.   # Osteopenia in the setting of post-surgical menopause and estrogen deprivation Tx  At risk for developing osteoporosis.  Last DEXA scan (2/2023) in osteopenia range with increasing bone loss in her hip only T-1.6<==0.9 (2021).  FRAX score remains low 6.2% and 0.5% for major and hip fractures respectively.  Her proximal humerus Fx in 2022 is most likely due to compromised bone quality due to RTX in the same area. No absolute indication for treatment now or to repeat DEXA scan earlier but would consider if suspected accelerated bone loss.   Recent BW reviewed. Will continue to monitor.  Continue current Calcium and Vit D supplements daily Continue weight bearing exercise can be helpful as well.   # Hx of Right thyroid nodule S/p reported benign FNA 2020. PCP manages with yearly US.  Will follow in 6 months to reassess biochemically (will use BW from oncology who will see the patient before me)

## 2024-05-24 NOTE — PHYSICAL EXAM
[Alert] : alert [Obese] : obese [No Acute Distress] : no acute distress [Well Developed] : well developed [Normal Voice/Communication] : normal voice communication [Normal Sclera/Conjunctiva] : normal sclera/conjunctiva [PERRL] : pupils equal, round and reactive to light [Normal Outer Ear/Nose] : the ears and nose were normal in appearance [Normal Hearing] : hearing was normal [Thyroid Not Enlarged] : the thyroid was not enlarged [No Thyroid Nodules] : no palpable thyroid nodules [No Respiratory Distress] : no respiratory distress [Clear to Auscultation] : lungs were clear to auscultation bilaterally [Normal Rate] : heart rate was normal [Regular Rhythm] : with a regular rhythm [No Edema] : no peripheral edema [Soft] : abdomen soft [Normal Supraclavicular Nodes] : no supraclavicular lymphadenopathy [Normal Anterior Cervical Nodes] : no anterior cervical lymphadenopathy [Normal Posterior Cervical Nodes] : no posterior cervical lymphadenopathy [No Clubbing, Cyanosis] : no clubbing  or cyanosis of the fingernails [Acanthosis Nigricans] : no acanthosis nigricans [Normal Reflexes] : deep tendon reflexes were 2+ and symmetric [No Tremors] : no tremors [Oriented x3] : oriented to person, place, and time [Normal Affect] : the affect was normal [Normal Insight/Judgement] : insight and judgment were intact [Normal Mood] : the mood was normal [de-identified] : Obese

## 2024-06-11 ENCOUNTER — APPOINTMENT (OUTPATIENT)
Dept: GYNECOLOGIC ONCOLOGY | Facility: CLINIC | Age: 54
End: 2024-06-11
Payer: COMMERCIAL

## 2024-06-11 ENCOUNTER — OUTPATIENT (OUTPATIENT)
Dept: OUTPATIENT SERVICES | Facility: HOSPITAL | Age: 54
LOS: 1 days | Discharge: HOME | End: 2024-06-11

## 2024-06-11 VITALS
DIASTOLIC BLOOD PRESSURE: 96 MMHG | HEART RATE: 79 BPM | SYSTOLIC BLOOD PRESSURE: 176 MMHG | RESPIRATION RATE: 16 BRPM | HEIGHT: 61 IN | WEIGHT: 195 LBS | OXYGEN SATURATION: 98 % | BODY MASS INDEX: 36.82 KG/M2

## 2024-06-11 DIAGNOSIS — Z90.13 ACQUIRED ABSENCE OF BILATERAL BREASTS AND NIPPLES: Chronic | ICD-10-CM

## 2024-06-11 DIAGNOSIS — Z98.890 OTHER SPECIFIED POSTPROCEDURAL STATES: Chronic | ICD-10-CM

## 2024-06-11 DIAGNOSIS — Z93.3 COLOSTOMY STATUS: Chronic | ICD-10-CM

## 2024-06-11 DIAGNOSIS — C50.919 MALIGNANT NEOPLASM OF UNSPECIFIED SITE OF UNSPECIFIED FEMALE BREAST: ICD-10-CM

## 2024-06-11 DIAGNOSIS — Z90.710 ACQUIRED ABSENCE OF BOTH CERVIX AND UTERUS: Chronic | ICD-10-CM

## 2024-06-11 PROCEDURE — 99212 OFFICE O/P EST SF 10 MIN: CPT

## 2024-06-11 PROCEDURE — G2211 COMPLEX E/M VISIT ADD ON: CPT

## 2024-06-11 NOTE — REASON FOR VISIT
[FreeTextEntry1] : [facility] [phone] at [address] [place] [address]

## 2024-06-11 NOTE — ASSESSMENT
[FreeTextEntry1] : 53yo with focus grade 1 endometrioid adenocarcinoma of the ovary unspecified laterality arising in the background of endometriosis on the uterine serosa likely primary peritoneal. S/p BASILIA, BSO, tumor debulking, peritonectomy on 5/7/2019 due to extensive endometriosis of entire abdominal cavity. S/p end colostomy and colon resection and ultimately takedown on 8/8/2019. Given focus/tiny cluster of cells with low grade endometrioid, adjuvant chemotherapy and RT not recommended.  Pt is clinically MALDONADO on exam today. Await  (to be drawn tomorrow).   Pt's BP elevated today-denies HA, CP, blurry vision. Pt gets very anxious during her visits here. Rec home BP monitoring and if still elevated, needs PCP eval. Pt is not on BP meds and states her BP is always normal at home.   Pt follows closely with Dr. Caldwell, Dr. Yarbrough and Dr. Sellers.   I discussed with the patient that she is now approximately five years from her gynecological cancer diagnosis.  I explained to the patient that, while the cure rate for any gynecological cancer is not 100%, the risk of her cancer recurring after five years is very small.  I explained that it is our routine for patients to be discharged from my practice at this important milestone and the importance of routine gynecological examinations with her gynecologist was discussed.  The signs and symptoms of recurrence were reviewed and the patient knows to return to see me if she has any of these or any other concerns.   .

## 2024-06-11 NOTE — HISTORY OF PRESENT ILLNESS
[FreeTextEntry1] : 55yo with focus grade 1 endometrioid adenocarcinoma of the ovary unspecified laterality arising in the background of endometriosis on the uterine serosa likely primary peritoneal. S/p BASILIA, BSO, tumor debulking, peritonectomy on 5/7/2019 due to extensive endometriosis of entire abdominal cavity. S/p end colostomy and colon resection and ultimately takedown on 8/8/2019. Given focus/tiny cluster of cells with low grade endometrioid, adjuvant chemotherapy and RT not recommended.  Pt has a hx of right breast cancer, ER+, SC+, Her2 neg s/p bilateral mastectomy and KWAME flap by Dr. Licea on 11/25/19. She follows with Heme/Onc, Dr. Sellers. She also had colostomy reversal and abdominoplasty in the interim. She had a completion right axillary dissection on 1/3/20. She had post-mastectomy radiation by Dr. Caldwell complete 5/18/20-on Anastrazole.  Pt denies VB, abdominal/pelvic pain, pressure, changes in bowel or bladder function. She is not sexually active.  Cristiane Genetic: 10/11/2019: MSH3 Uncertain Clinical significance CT abd/pelvis 6/16/2022: No interval change since 6/16/2022, calcified perisplenic nodule/implant is unchanged. Ca-125 6/14/23: 8  Health Maintenance: Colonoscopy: 2019, due again in 2024 Mammogram: s/p bilateral mastectomy in 2019 DEXA: March 2023, osteopenia- evaluated by endocrinologist with recommendation to take CA + Vit D, weight bearing exercises

## 2024-06-11 NOTE — PLAN
[TextEntry] : Pt discharged to routine GYN care in 6 months-referral given  Routine follow up with PCP/Heme-onc/Endocrine Imaging as clinically indicated-pt is asymptomatic. Pt will review need for imaging with Dr. Yarbrough at next visit.  Await  results-if elevated--->consider Ct Pt will schedule a screening colonoscopy  BP monitoring at home

## 2024-06-11 NOTE — PHYSICAL EXAM
[Chaperone Present] : A chaperone was present in the examining room during all aspects of the physical examination [[id number]] : A chaperone was present during the pelvic exam. [FreeTextEntry2] : Krista Delgado MA  [Absent] : Adnexa(ae): Absent [Normal] : Bimanual Exam: Normal [de-identified] : well healed abdominal scars [Fully active, able to carry on all pre-disease performance without restriction] : Status 0 - Fully active, able to carry on all pre-disease performance without restriction

## 2024-06-12 ENCOUNTER — RESULT REVIEW (OUTPATIENT)
Age: 54
End: 2024-06-12

## 2024-06-12 ENCOUNTER — APPOINTMENT (OUTPATIENT)
Dept: HEMATOLOGY ONCOLOGY | Facility: CLINIC | Age: 54
End: 2024-06-12
Payer: COMMERCIAL

## 2024-06-12 VITALS
OXYGEN SATURATION: 99 % | WEIGHT: 193 LBS | HEART RATE: 78 BPM | DIASTOLIC BLOOD PRESSURE: 89 MMHG | BODY MASS INDEX: 36.44 KG/M2 | SYSTOLIC BLOOD PRESSURE: 167 MMHG | TEMPERATURE: 98.6 F | HEIGHT: 61 IN

## 2024-06-12 DIAGNOSIS — C50.919 MALIGNANT NEOPLASM OF UNSPECIFIED SITE OF UNSPECIFIED FEMALE BREAST: ICD-10-CM

## 2024-06-12 DIAGNOSIS — C56.9 MALIGNANT NEOPLASM OF UNSPECIFIED OVARY: ICD-10-CM

## 2024-06-12 DIAGNOSIS — M85.80 OTHER SPECIFIED DISORDERS OF BONE DENSITY AND STRUCTURE, UNSPECIFIED SITE: ICD-10-CM

## 2024-06-12 DIAGNOSIS — E04.1 NONTOXIC SINGLE THYROID NODULE: ICD-10-CM

## 2024-06-12 LAB
BASOPHILS # BLD AUTO: 0.02 K/UL — SIGNIFICANT CHANGE UP (ref 0–0.2)
BASOPHILS NFR BLD AUTO: 0.3 % — SIGNIFICANT CHANGE UP (ref 0–2)
EOSINOPHIL # BLD AUTO: 0.03 K/UL — SIGNIFICANT CHANGE UP (ref 0–0.5)
EOSINOPHIL NFR BLD AUTO: 0.4 % — SIGNIFICANT CHANGE UP (ref 0–6)
HCT VFR BLD CALC: 40.1 % — SIGNIFICANT CHANGE UP (ref 34.5–45)
HGB BLD-MCNC: 13.4 G/DL — SIGNIFICANT CHANGE UP (ref 11.5–15.5)
IMM GRANULOCYTES NFR BLD AUTO: 0.3 % — SIGNIFICANT CHANGE UP (ref 0–0.9)
LYMPHOCYTES # BLD AUTO: 2.58 K/UL — SIGNIFICANT CHANGE UP (ref 1–3.3)
LYMPHOCYTES # BLD AUTO: 35.6 % — SIGNIFICANT CHANGE UP (ref 13–44)
MCHC RBC-ENTMCNC: 30.3 PG — SIGNIFICANT CHANGE UP (ref 27–34)
MCHC RBC-ENTMCNC: 33.4 GM/DL — SIGNIFICANT CHANGE UP (ref 32–36)
MCV RBC AUTO: 90.7 FL — SIGNIFICANT CHANGE UP (ref 80–100)
MONOCYTES # BLD AUTO: 0.41 K/UL — SIGNIFICANT CHANGE UP (ref 0–0.9)
MONOCYTES NFR BLD AUTO: 5.7 % — SIGNIFICANT CHANGE UP (ref 2–14)
NEUTROPHILS # BLD AUTO: 4.19 K/UL — SIGNIFICANT CHANGE UP (ref 1.8–7.4)
NEUTROPHILS NFR BLD AUTO: 57.7 % — SIGNIFICANT CHANGE UP (ref 43–77)
NRBC # BLD: 0 /100 WBCS — SIGNIFICANT CHANGE UP (ref 0–0)
PLATELET # BLD AUTO: 259 K/UL — SIGNIFICANT CHANGE UP (ref 150–400)
RBC # BLD: 4.42 M/UL — SIGNIFICANT CHANGE UP (ref 3.8–5.2)
RBC # FLD: 13.1 % — SIGNIFICANT CHANGE UP (ref 10.3–14.5)
WBC # BLD: 7.25 K/UL — SIGNIFICANT CHANGE UP (ref 3.8–10.5)
WBC # FLD AUTO: 7.25 K/UL — SIGNIFICANT CHANGE UP (ref 3.8–10.5)

## 2024-06-12 PROCEDURE — 99214 OFFICE O/P EST MOD 30 MIN: CPT

## 2024-06-12 PROCEDURE — G2211 COMPLEX E/M VISIT ADD ON: CPT

## 2024-06-13 LAB
ALBUMIN SERPL ELPH-MCNC: 4.6 G/DL — SIGNIFICANT CHANGE UP (ref 3.3–5)
ALP SERPL-CCNC: 86 U/L — SIGNIFICANT CHANGE UP (ref 40–120)
ALT FLD-CCNC: 26 U/L — SIGNIFICANT CHANGE UP (ref 10–45)
ANION GAP SERPL CALC-SCNC: 11 MMOL/L — SIGNIFICANT CHANGE UP (ref 5–17)
AST SERPL-CCNC: 28 U/L — SIGNIFICANT CHANGE UP (ref 10–40)
BILIRUB SERPL-MCNC: 0.2 MG/DL — SIGNIFICANT CHANGE UP (ref 0.2–1.2)
BUN SERPL-MCNC: 11 MG/DL — SIGNIFICANT CHANGE UP (ref 7–23)
CALCIUM SERPL-MCNC: 9.7 MG/DL — SIGNIFICANT CHANGE UP (ref 8.4–10.5)
CANCER AG125 SERPL-ACNC: 8 U/ML — SIGNIFICANT CHANGE UP
CHLORIDE SERPL-SCNC: 103 MMOL/L — SIGNIFICANT CHANGE UP (ref 96–108)
CO2 SERPL-SCNC: 26 MMOL/L — SIGNIFICANT CHANGE UP (ref 22–31)
CREAT SERPL-MCNC: 0.85 MG/DL — SIGNIFICANT CHANGE UP (ref 0.5–1.3)
EGFR: 80 ML/MIN/1.73M2 — SIGNIFICANT CHANGE UP
GLUCOSE SERPL-MCNC: 119 MG/DL — HIGH (ref 70–99)
POTASSIUM SERPL-MCNC: 4.2 MMOL/L — SIGNIFICANT CHANGE UP (ref 3.5–5.3)
POTASSIUM SERPL-SCNC: 4.2 MMOL/L — SIGNIFICANT CHANGE UP (ref 3.5–5.3)
PROT SERPL-MCNC: 7.4 G/DL — SIGNIFICANT CHANGE UP (ref 6–8.3)
SODIUM SERPL-SCNC: 140 MMOL/L — SIGNIFICANT CHANGE UP (ref 135–145)

## 2024-06-14 PROBLEM — C50.919 BREAST CANCER: Status: ACTIVE | Noted: 2019-10-07

## 2024-06-14 PROBLEM — E04.1 THYROID NODULE: Status: ACTIVE | Noted: 2020-09-23

## 2024-06-14 PROBLEM — C56.9: Status: ACTIVE | Noted: 2019-05-30

## 2024-06-14 PROBLEM — M85.80 OSTEOPENIA: Status: ACTIVE | Noted: 2023-06-14

## 2024-06-14 NOTE — PHYSICAL EXAM
[Restricted in physically strenuous activity but ambulatory and able to carry out work of a light or sedentary nature] : Status 1- Restricted in physically strenuous activity but ambulatory and able to carry out work of a light or sedentary nature, e.g., light house work, office work [Normal] : affect appropriate [de-identified] : wt stable overall

## 2024-06-14 NOTE — HISTORY OF PRESENT ILLNESS
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  She presented to [facility] ED on 4/23/19 s/p mechanical fall outside her place of work.  A CT A/P incidentally showed a complex cystic left adnexal mass measuring 17 cm. Massive cystic lesion occupying the lower abdomen measuring 33 cm. It was uncertain whether this represents a large peritoneal metastasis or a component of the above described cystic left adnexal mass, or possibly a right adnexal mass. There was suspicion for rupture of this mass with a large amount of abdominal ascites.  On 5/7/19 Dr. Manpreet Gonzalez performed a BASILIA, BSO, debulking and peritonectomy.  Pathology initially was benign.  An addendum was later issued which showed low grade endometrioid adenocarcinoma, FIGO grade 1, with focal sex cord like differentiation, arising in a background of endometriosis, and involving the uterine serosa.  She was discharged on 5/12/19 but returned to the ED on 5/15/19 c/o abdominal distention, N/V, dizziness, weakness, multiple falls and a fever of 104.  She was found to be hypotensive and tachycardic.  A CT A/P showed a large fluid collection which appears to be extraperitoneal layering anterior to the omentum measuring 24.7 x 9.4 x 20.9 cm. There is a component of the collection which extends posteriorly on the left into the retroperitoneum along the anterior aspect of the psoas extending to the posterior aspect of the splenic flexure (approximately 20 cm in craniocaudad dimension). On 5/17/19 Dr. Mague Durand performed an emergent exploratory laparotomy, sigmoidectomy and end colostomy.  Pathology was benign.   [de-identified] : FHx of DCIS (sister) [de-identified] : Patient called for follow-up visit. She is s/p BASILIA BSO, debulking and peritonectomy on 5/7/19 with Dr. Manpreet Gonzalez due to extensive endometrioid adenocarcinoma, with a delayed leak from the sigmoid requiring bowel resection and end-colostomy by Dr. Durand. No adjuvant treatment was recommended, and she is s/p colostomy reversal. Patient currently without complaints. Denies any pain, abdominal or vaginal.  No vaginal bleeding. No N/VD/C. Appetite is normal. No fever, no cough, no SOB.  Diagnosed with right breast cancer in September 2019, ER+ [place]+ HER2-.  Followed by Dr. Sellers.    Was seen by GYN -Onc yesterday, no other follow ups needed, will follow up GYN annually, will be being assigned a new MD.

## 2024-06-14 NOTE — REVIEW OF SYSTEMS
[Recent Change In Weight] : ~T no recent weight change [Chest Pain] : no chest pain [Shortness Of Breath] : no shortness of breath [Abdominal Pain] : no abdominal pain [Diarrhea: Grade 0] : Diarrhea: Grade 0 [Negative] : Psychiatric [FreeTextEntry2] : wt stable overall  [de-identified] : R mastectomy

## 2024-06-14 NOTE — RESULTS/DATA
[FreeTextEntry1] : 6/16/22 CT A/P: No interval change since 6/16/2022. Calcified perisplenic nodule/implant is unchanged.  12/1/21 PET: 1. Small FDG-avid left axillary lymph nodes, new since prior PET/CT, probably are reactive, likely related to recent COVID vaccination. 2. Unchanged nonspecific asymmetric, minimal diffuse FDG avidity with skin thickening in the reconstructed right breast. 3. Small right hepatic lobe capsular density focus on recent CT is not identified, however, no significant FDG activity seen on the corresponding region. 4. Non FDG-avid capsular high density focus in the posterior spleen is not significantly changed.  7/27/21 CT A/P: Unchanged high density nodules along the anterior capsule of the liver and lateral capsular surface of the spleen. These are indeterminate for peritoneal implants. Continued attention on follow-up imaging is recommended. *  No new sites of disease in the abdomen and pelvis.  1/28/21 PET:  Bilateral mastectomy with reconstruction. Unchanged nonspecific asymmetric, minimal diffuse FDG avidity with skin thickening in the reconstructed right breast as compared to PET/CT from 3/4/2020. Please correlate clinically. Interval resolution of FDG avid nodular focus in the left anterior abdominal wall, likely secondary to postsurgical changes. Unchanged nonspecific approximately symmetric bilateral tonsillar hypermetabolism.  9/16/20 CT:  1.6 x 0.7 cm perisplenic soft tissue and 5 x 2 mm region of increased enhancement either in the periphery of segment IVb of the liver or perihepatic which are unchanged when compared with 5/4/2020. The perisplenic region is also stable when compared with 3/4/2020 and no increased FDG uptake was noted at that time. Small stable residual implants cannot be excluded. No new disease or interval growth is seen. Status post bilateral mastectomies with reconstructive surgery. Region of mild nodularity in the right reconstructed breast which is more prominent when compared with 3/4/2020. This may represent granulomatous or fibrotic tissue.  5/5/20 CT: MALDONADO  2/4/2020 CT A/P: Sigmoid resection with anastomosis without evidence of obstruction. Small pericapsular splenic fluid collection which is decreased from prior study.  No evidence of metastatic disease.  10/15/19 MRI Breast: 1.4 cm abnormal enhancement/biopsy clip in the upper outer posterior right breast, corresponding to newly diagnosed malignancy. No MRI evidence of multicentric or contralateral disease.  RECOMMENDATION: Surgical or oncologic management.  BI-RADS 6- Known Biopsy-Proven Malignancy   9/23/19 Pathology:  Right breast, "11:00, 5 cm from the nipple", ultrasound guided core needle biopsy: -Invasive ductal carcinoma, grade 1. -See note. Note: Immunohistochemical stains for p63 and calponin are performed on block 1-B at [facility] and interpreted at [place] as follows: Calponin, p63: negative Microscopic evaluation reveals an invasive ductal carcinoma (Inland score 1+2+1), grade 1. The carcinoma measures at least 1.1 cm in its greatest linear expansion in this biopsy.  There are no calcifications nor necrosis present. *Addendum* IMMUNOSTAINS PERFORMED AND INTERPRETED ON BLOCK " B " BY [facility], HER2/ELADIO BY IHC: 1 + / Negative [place]: Positive, 90 - 95 %, strong [place]: Positive, 15 - 20 %, strong  9/23/19 Mammo: Status post core needle biopsy of the right breast with clip placement. Recommendations for further followup will be based on pathology results.  9/19/19 US Breast/Mammo: Ill-defined hypoechoic area in the right breast at the 11:00 axis, corresponding to mammographically seen distortion. Ultrasound-guided core biopsy is advised.  5/15/19 Pathology: Intra-abdominal foreign body, removal:  Blood clots Sigmoid, resection:  Mucosal necrosis with transmural acute and chronic inflammation.  The process extends to one margin of resection (slide 1A).  Three (3) lymph nodes, one with benign glandular inclusion.  5/15/19 CT A/P: Moderate amount retained fecal material in the colon, greatest in the right hemicolon. Mild wall thickening involving the distal transverse colon, descending colon, and proximal sigmoid colon, possibly reactive to the large collection.  Large fluid collection which appears to be extraperitoneal layering anterior to the omentum measuring 24.7 x 9.4 x  20.9 cm (craniocaudad x anteroposterior x transverse). There is a component of the collection which extends posteriorly on the left into the retroperitoneum along the anterior aspect of the psoas extending to the posterior aspect of the splenic flexure (approximately 20 cm in craniocaudad dimension). Skin staples are seen within the anterior abdominal wall. Air is seen within the anterior abdominal wall which may be postoperative in etiology.  5/8/19 Cytopathology: Abdominal fluid:  NEGATIVE FOR MALIGNANT CELLS.  Mostly fibrin and reactive mesothelial cells.  5/7/19 Pathology: Omental lymph node: Markedly reactive, hemorrhagic lymph node with hemosiderin laden macrophages. Cyst wall(1): Hemorrhagic inflamed cyst wall without epithelial lining. Right hepatic flexure lymph node: Markedly reactive, hemorrhagic lymph node with hemosiderin laden macrophages. Omentum: Omental fat with marked reactive fibrosis, hemorrhage and chronic inflammation; marked serosal adhesions. Right pelvic lymph node: Markedly reactive, hemorrhagic lymph node with hemosiderin laden macrophages. Left pelvic lymph node: Markedly reactive, hemorrhagic lymph node with hemosiderin laden macrophages. Cyst wall(2):  Cyst wall, markedly inflamed and hemorrhagic with cholesterol crystals and without epithelial lining. ***Uterus, cervix and adnexa:  Histologically unremarkable cervix. Weakly proliferative endometrium and endometrial polyp. Cysts located outside the uterus, involving serosa, with glandular stromal structures, consistent with endometriosis. ****Addendum**** Case sent for outside consultation at [facility] [place] with Dr. Tina Mckay. The atypical clusters of glands, in part 8. "Uterus, cervix and adnexa", have been diagnosed as Low grade endometrioid adenocarcinoma, FIGO grade 1,  with focal sex cord like differentiation, arising in a background of endometriosis, and involving the uterine serosa.  4/23/19 CT C/A/P: There is a large complex cystic mass occupying the lower abdomen, measuring 21.0 (AP) x 24.2 (CC) x 33.1 (TR) cm.  The exact origin of this mass is uncertain. The left posterior wall of this mass is discontinuous, suggestive of rupture. There is a large amount of abdominal ascites.  Additional complex left adnexal cystic mass with septations and apparent mural nodules measuring 9.4 (AP) x 15.5 (CC) x 16.6 (TR) cm.

## 2024-06-26 ENCOUNTER — TRANSCRIPTION ENCOUNTER (OUTPATIENT)
Age: 54
End: 2024-06-26

## 2024-07-29 NOTE — PATIENT PROFILE ADULT - NSPROMEDSPATCH_GEN_A_NUR
Goal Outcome Evaluation:                                              
Goal Outcome Evaluation:           Progress: improving  Outcome Evaluation: Pt AAO this shift. son states pt is much more to his baseline. denies any pain or discomfort.                               
Goal Outcome Evaluation:  Plan of Care Reviewed With: patient           Outcome Evaluation: Patient presents at or near baseline functional status at time of evaluation with no identified functional deficits that impede patient independence with activities of daily living.  No indicated need for skilled occupational therapy intervention in the acute care setting.  Occupational therapy will sign off at this time.      Anticipated Discharge Disposition (OT): home                        
Goal Outcome Evaluation:  Plan of Care Reviewed With: patient        Progress: improving  Outcome Evaluation: Patient AAO overnight. Had complaints of nausea, medicated per mar with noted improvement. x1 to chair. VSS. Call light within reach                               
Goal Outcome Evaluation:  Plan of Care Reviewed With: patient        Progress: improving  Outcome Evaluation: Pt presents with decresaed functional mobility secondary to current hospitalization. Skilled PT intervention is needed to address noted deficits in order to restore to PLOF.      Anticipated Discharge Disposition (PT): home with home health, home with assist                        
none

## 2024-08-21 NOTE — HISTORY OF PRESENT ILLNESS
[FreeTextEntry1] : 54 year old woman with IDC right breast s/p bilateral mastectomy, PMRT completed 5/18/20.  She is also on anastrozole. 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  Interval history: She met with Guanako Meraz on 8/30/24 breast pain, breast edema, arm edema, fatigue or weight loss. vaginal pruritus, vaginal discharge, change in bowel, change in urination or  pelvic pain/cramping  care team MedOnc: Macarena Yarbrough & Tea Lagos  Breast Surgery: Elizabeth Riley GynOnc: Manpreet Gonzalez/Melba Carvalho Endo: Evita Andujar  PCP Max Da Silva GYN selin Mack

## 2024-08-29 ENCOUNTER — NON-APPOINTMENT (OUTPATIENT)
Age: 54
End: 2024-08-29

## 2024-08-30 ENCOUNTER — NON-APPOINTMENT (OUTPATIENT)
Age: 54
End: 2024-08-30

## 2024-08-30 ENCOUNTER — APPOINTMENT (OUTPATIENT)
Dept: COLORECTAL SURGERY | Facility: CLINIC | Age: 54
End: 2024-08-30
Payer: COMMERCIAL

## 2024-08-30 VITALS
RESPIRATION RATE: 16 BRPM | WEIGHT: 195 LBS | DIASTOLIC BLOOD PRESSURE: 109 MMHG | BODY MASS INDEX: 36.82 KG/M2 | HEIGHT: 61 IN | SYSTOLIC BLOOD PRESSURE: 169 MMHG | HEART RATE: 82 BPM | TEMPERATURE: 97.6 F

## 2024-08-30 DIAGNOSIS — Z12.11 ENCOUNTER FOR SCREENING FOR MALIGNANT NEOPLASM OF COLON: ICD-10-CM

## 2024-08-30 PROCEDURE — 99203 OFFICE O/P NEW LOW 30 MIN: CPT

## 2024-08-30 NOTE — HISTORY OF PRESENT ILLNESS
[FreeTextEntry1] : 54-year-old female who presents for consultation for colon cancer screening.  Her last colonoscopy was in 2019 and a colon polyp was removed.  She has no current gastrointestinal symptoms such as changes in bowel habits, abdominal pain or blood in her stool.  Her history significant for endometrioid adenocarcinoma in 2019 for which she underwent a hysterectomy, bilateral salpingo-oophorectomy and debulking and peritonectomy.  She had a sigmoid injury that resulted in a Jefferson's procedure and subsequent reversal in 2019.  She also has a history of breast cancer and has completed surgery and adjuvant treatment from that standpoint.

## 2024-08-30 NOTE — PHYSICAL EXAM
[Respiratory Effort] : normal respiratory effort [Normal Rate and Rhythm] : normal rate and rhythm [Calm] : calm [de-identified] : Soft, nontender, nondistended.  No mass or hernias appreciated.  Surgical incisions noted. [de-identified] : Well-appearing, in no distress [de-identified] : Normocephalic, atraumatic [de-identified] : Moves extremities without difficulty [de-identified] : Warm and dry [de-identified] : Alert and oriented x 3

## 2024-08-30 NOTE — PLAN
[TextEntry] : 54-year-old female in need of surveillance colonoscopy. I reviewed the risks, benefits and alternative of pursuing a colonoscopy. Risks of colonoscopy was reviewed including but not limited to cardiopulmonary complications, risk of bleeding, infection, missed lesions, perforation which could result in emergent surgery. Will proceed with scheduling colonoscopy

## 2024-08-30 NOTE — CONSULT LETTER
[Dear  ___] : Dear  [unfilled], [Consult Letter:] : I had the pleasure of evaluating your patient, [unfilled]. [Please see my note below.] : Please see my note below. [Consult Closing:] : Thank you very much for allowing me to participate in the care of this patient.  If you have any questions, please do not hesitate to contact me. [Sincerely,] : Sincerely, [FreeTextEntry3] : Dada Isaac MD

## 2024-09-03 ENCOUNTER — TRANSCRIPTION ENCOUNTER (OUTPATIENT)
Age: 54
End: 2024-09-03

## 2024-09-03 ENCOUNTER — APPOINTMENT (OUTPATIENT)
Dept: RADIATION ONCOLOGY | Facility: CLINIC | Age: 54
End: 2024-09-03
Payer: COMMERCIAL

## 2024-09-03 ENCOUNTER — NON-APPOINTMENT (OUTPATIENT)
Age: 54
End: 2024-09-03

## 2024-09-03 VITALS
BODY MASS INDEX: 37.34 KG/M2 | DIASTOLIC BLOOD PRESSURE: 88 MMHG | RESPIRATION RATE: 16 BRPM | OXYGEN SATURATION: 97 % | WEIGHT: 197.6 LBS | HEART RATE: 90 BPM | SYSTOLIC BLOOD PRESSURE: 148 MMHG

## 2024-09-03 DIAGNOSIS — C50.911 MALIGNANT NEOPLASM OF UNSPECIFIED SITE OF RIGHT FEMALE BREAST: ICD-10-CM

## 2024-09-03 PROCEDURE — G2211 COMPLEX E/M VISIT ADD ON: CPT

## 2024-09-03 PROCEDURE — 99214 OFFICE O/P EST MOD 30 MIN: CPT

## 2024-09-03 NOTE — REVIEW OF SYSTEMS
[Edema Limbs: Grade 0] : Edema Limbs: Grade 0  [Fatigue: Grade 0] : Fatigue: Grade 0 [Localized Edema: Grade 0] : Localized Edema: Grade 0  [Pruritus: Grade 0] : Pruritus: Grade 0 [Skin Hyperpigmentation: Grade 0] : Skin Hyperpigmentation: Grade 0 [Skin Induration: Grade 0] : Skin Induration: Grade 0 [Dermatitis Radiation: Grade 0] : Dermatitis Radiation: Grade 0

## 2024-09-03 NOTE — PHYSICAL EXAM
[Sclera] : the sclera and conjunctiva were normal [Extraocular Movements] : extraocular movements were intact [Outer Ear] : the ears and nose were normal in appearance [Heart Rate And Rhythm] : heart rate and rhythm were normal [No UE Edema] : there is no upper extremity edema [Cervical Lymph Nodes Enlarged Anterior Bilaterally] : anterior cervical [Supraclavicular Lymph Nodes Enlarged Bilaterally] : supraclavicular [Axillary Lymph Nodes Enlarged Bilaterally] : axillary [Range of Motion to Joints] : range of motion to joints [Normal] : oriented to person, place and time, the affect was normal, the mood was normal and not anxious [de-identified] : Bilateral reconstruction.  No residual hyperpigmentation.  No palpable suspicious nodularity or induration.

## 2024-09-03 NOTE — HISTORY OF PRESENT ILLNESS
[FreeTextEntry1] : 54 year old woman with IDC right breast s/p bilateral mastectomy, PMRT completed 5/18/20.  She is also on anastrozole. 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  Ms. Francisco presents for routine follow-up.  Interval history: She reports overall stable health since last visit; recent biopsy on left nose by dermatology, and awaiting results. Followed up with Dr. Riley every 6 months; anticipates MRI breast in 12/2024. Tolerating anastrozole well; no hot flashes, myalgias or arthralgias. No local dermatitis, upper extremity edema, or other chest wall bother. Has completed 5-year follow-up with GYN oncology, and will follow-up with gynecology. She met with Guanako Meraz on 8/30/24; planning for screening colonoscopy in 10/2024.  Prior colonoscopy in 2019.  Care team MedOnc: Macarena Yarbrough & Tea Lagos  Breast Surgery: Elizabeth Riley GynOnc: Manpreet Gonzalez/Melba Carvalho Endo: Evita Andujar  PCP Max Da Silva GYN Shannon Mack Derm: Tamela Parra

## 2024-09-09 ENCOUNTER — TRANSCRIPTION ENCOUNTER (OUTPATIENT)
Age: 54
End: 2024-09-09

## 2024-10-01 ENCOUNTER — TRANSCRIPTION ENCOUNTER (OUTPATIENT)
Age: 54
End: 2024-10-01

## 2024-10-02 ENCOUNTER — OUTPATIENT (OUTPATIENT)
Dept: OUTPATIENT SERVICES | Facility: HOSPITAL | Age: 54
LOS: 1 days | End: 2024-10-02
Payer: COMMERCIAL

## 2024-10-02 ENCOUNTER — APPOINTMENT (OUTPATIENT)
Dept: COLORECTAL SURGERY | Facility: GI CENTER | Age: 54
End: 2024-10-02
Payer: COMMERCIAL

## 2024-10-02 ENCOUNTER — RESULT REVIEW (OUTPATIENT)
Age: 54
End: 2024-10-02

## 2024-10-02 DIAGNOSIS — Z90.710 ACQUIRED ABSENCE OF BOTH CERVIX AND UTERUS: Chronic | ICD-10-CM

## 2024-10-02 DIAGNOSIS — Z98.890 OTHER SPECIFIED POSTPROCEDURAL STATES: Chronic | ICD-10-CM

## 2024-10-02 DIAGNOSIS — Z93.3 COLOSTOMY STATUS: Chronic | ICD-10-CM

## 2024-10-02 DIAGNOSIS — Z12.11 ENCOUNTER FOR SCREENING FOR MALIGNANT NEOPLASM OF COLON: ICD-10-CM

## 2024-10-02 DIAGNOSIS — Z90.13 ACQUIRED ABSENCE OF BILATERAL BREASTS AND NIPPLES: Chronic | ICD-10-CM

## 2024-10-02 PROCEDURE — 88305 TISSUE EXAM BY PATHOLOGIST: CPT

## 2024-10-02 PROCEDURE — 88305 TISSUE EXAM BY PATHOLOGIST: CPT | Mod: 26

## 2024-10-02 PROCEDURE — 45380 COLONOSCOPY AND BIOPSY: CPT | Mod: PT

## 2024-10-02 PROCEDURE — 45380 COLONOSCOPY AND BIOPSY: CPT

## 2024-10-02 NOTE — PHYSICAL EXAM
[Respiratory Effort] : normal respiratory effort [Normal Rate and Rhythm] : normal rate and rhythm [Calm] : calm [de-identified] : Soft, nontender, nondistended.  No mass or hernias appreciated.  Surgical incisions noted. [de-identified] : Well-appearing, in no distress [de-identified] : Normocephalic, atraumatic [de-identified] : Moves extremities without difficulty [de-identified] : Warm and dry [de-identified] : Alert and oriented x 3

## 2024-10-04 ENCOUNTER — NON-APPOINTMENT (OUTPATIENT)
Age: 54
End: 2024-10-04

## 2024-10-04 LAB — SURGICAL PATHOLOGY STUDY: SIGNIFICANT CHANGE UP

## 2024-11-15 ENCOUNTER — APPOINTMENT (OUTPATIENT)
Dept: HEMATOLOGY ONCOLOGY | Facility: CLINIC | Age: 54
End: 2024-11-15

## 2024-11-27 ENCOUNTER — OUTPATIENT (OUTPATIENT)
Dept: OUTPATIENT SERVICES | Facility: HOSPITAL | Age: 54
LOS: 1 days | Discharge: HOME | End: 2024-11-27

## 2024-11-27 DIAGNOSIS — Z90.13 ACQUIRED ABSENCE OF BILATERAL BREASTS AND NIPPLES: Chronic | ICD-10-CM

## 2024-11-27 DIAGNOSIS — Z90.710 ACQUIRED ABSENCE OF BOTH CERVIX AND UTERUS: Chronic | ICD-10-CM

## 2024-11-27 DIAGNOSIS — Z98.890 OTHER SPECIFIED POSTPROCEDURAL STATES: Chronic | ICD-10-CM

## 2024-11-27 DIAGNOSIS — Z93.3 COLOSTOMY STATUS: Chronic | ICD-10-CM

## 2024-11-27 DIAGNOSIS — C50.919 MALIGNANT NEOPLASM OF UNSPECIFIED SITE OF UNSPECIFIED FEMALE BREAST: ICD-10-CM

## 2024-12-02 ENCOUNTER — APPOINTMENT (OUTPATIENT)
Dept: HEMATOLOGY ONCOLOGY | Facility: CLINIC | Age: 54
End: 2024-12-02
Payer: COMMERCIAL

## 2024-12-02 ENCOUNTER — NON-APPOINTMENT (OUTPATIENT)
Age: 54
End: 2024-12-02

## 2024-12-02 ENCOUNTER — RESULT REVIEW (OUTPATIENT)
Age: 54
End: 2024-12-02

## 2024-12-02 VITALS
WEIGHT: 195 LBS | OXYGEN SATURATION: 97 % | HEIGHT: 61 IN | DIASTOLIC BLOOD PRESSURE: 92 MMHG | HEART RATE: 80 BPM | BODY MASS INDEX: 36.82 KG/M2 | SYSTOLIC BLOOD PRESSURE: 165 MMHG

## 2024-12-02 DIAGNOSIS — C50.919 MALIGNANT NEOPLASM OF UNSPECIFIED SITE OF UNSPECIFIED FEMALE BREAST: ICD-10-CM

## 2024-12-02 PROCEDURE — 99214 OFFICE O/P EST MOD 30 MIN: CPT

## 2024-12-03 LAB
25(OH)D3 SERPL-MCNC: 70.1 NG/ML
ALBUMIN SERPL ELPH-MCNC: 4.4 G/DL
ALP BLD-CCNC: 94 U/L
ALT SERPL-CCNC: 37 U/L
ANION GAP SERPL CALC-SCNC: 14 MMOL/L
AST SERPL-CCNC: 27 U/L
BILIRUB SERPL-MCNC: 0.3 MG/DL
BUN SERPL-MCNC: 12 MG/DL
CALCIUM SERPL-MCNC: 10.4 MG/DL
CHLORIDE SERPL-SCNC: 102 MMOL/L
CO2 SERPL-SCNC: 26 MMOL/L
CREAT SERPL-MCNC: 0.83 MG/DL
EGFR: 83 ML/MIN/1.73M2
GLUCOSE SERPL-MCNC: 102 MG/DL
POTASSIUM SERPL-SCNC: 4.2 MMOL/L
PROT SERPL-MCNC: 7.8 G/DL
SODIUM SERPL-SCNC: 141 MMOL/L

## 2024-12-13 ENCOUNTER — APPOINTMENT (OUTPATIENT)
Dept: ENDOCRINOLOGY | Facility: CLINIC | Age: 54
End: 2024-12-13
Payer: COMMERCIAL

## 2024-12-13 VITALS — WEIGHT: 192 LBS | BODY MASS INDEX: 36.25 KG/M2 | HEIGHT: 61 IN

## 2024-12-13 VITALS — SYSTOLIC BLOOD PRESSURE: 138 MMHG | DIASTOLIC BLOOD PRESSURE: 92 MMHG

## 2024-12-13 DIAGNOSIS — M85.80 OTHER SPECIFIED DISORDERS OF BONE DENSITY AND STRUCTURE, UNSPECIFIED SITE: ICD-10-CM

## 2024-12-13 DIAGNOSIS — E04.1 NONTOXIC SINGLE THYROID NODULE: ICD-10-CM

## 2024-12-13 PROCEDURE — G2211 COMPLEX E/M VISIT ADD ON: CPT | Mod: NC

## 2024-12-13 PROCEDURE — 99214 OFFICE O/P EST MOD 30 MIN: CPT

## 2024-12-16 ENCOUNTER — TRANSCRIPTION ENCOUNTER (OUTPATIENT)
Age: 54
End: 2024-12-16

## 2024-12-18 ENCOUNTER — OUTPATIENT (OUTPATIENT)
Dept: OUTPATIENT SERVICES | Facility: HOSPITAL | Age: 54
LOS: 1 days | End: 2024-12-18
Payer: COMMERCIAL

## 2024-12-18 ENCOUNTER — RESULT REVIEW (OUTPATIENT)
Age: 54
End: 2024-12-18

## 2024-12-18 ENCOUNTER — APPOINTMENT (OUTPATIENT)
Dept: MRI IMAGING | Facility: CLINIC | Age: 54
End: 2024-12-18
Payer: COMMERCIAL

## 2024-12-18 DIAGNOSIS — Z90.13 ACQUIRED ABSENCE OF BILATERAL BREASTS AND NIPPLES: Chronic | ICD-10-CM

## 2024-12-18 DIAGNOSIS — Z98.890 OTHER SPECIFIED POSTPROCEDURAL STATES: Chronic | ICD-10-CM

## 2024-12-18 DIAGNOSIS — Z93.3 COLOSTOMY STATUS: Chronic | ICD-10-CM

## 2024-12-18 DIAGNOSIS — Z90.710 ACQUIRED ABSENCE OF BOTH CERVIX AND UTERUS: Chronic | ICD-10-CM

## 2024-12-18 DIAGNOSIS — Z00.8 ENCOUNTER FOR OTHER GENERAL EXAMINATION: ICD-10-CM

## 2024-12-18 PROCEDURE — A9585: CPT

## 2024-12-18 PROCEDURE — 77049 MRI BREAST C-+ W/CAD BI: CPT | Mod: 26

## 2024-12-18 PROCEDURE — C8937: CPT

## 2024-12-18 PROCEDURE — C8908: CPT

## 2025-01-31 NOTE — CDI QUERY NOTE - NSCDI_DOCCLARIFY2_GEN_ALL_CORE_FT
----- Message from Curtis Bird MD sent at 1/30/2025 10:24 AM CST -----  Inform patient that there has been an increase in the size of her mass this will be discussed in greater detail in the upcoming visit  ----- Message -----  From: Genia Mcdaniels Incoming Radiant Results And Orders  Sent: 1/30/2025   8:47 AM CST  To: Curtis Bird MD  
I called pt 2nd attempt no answer  Left voicemail to call office back     I called pt's daughter no answer  Left voicemail to call office back  
I called pt no answer  Left voicemail to call office back   
Pt's daughter called office back  She is aware of CT chest results reviewed by Dr Marge Sen voiced understanding and agreeable   
Documentation clarification is required for accuracy in coding and billing, claim validation and reporting severity of illness, quality data and risk of mortality.

## 2025-02-20 ENCOUNTER — NON-APPOINTMENT (OUTPATIENT)
Age: 55
End: 2025-02-20

## 2025-02-25 ENCOUNTER — APPOINTMENT (OUTPATIENT)
Dept: OBGYN | Facility: CLINIC | Age: 55
End: 2025-02-25
Payer: COMMERCIAL

## 2025-02-25 VITALS — HEIGHT: 61 IN | BODY MASS INDEX: 35.87 KG/M2 | WEIGHT: 190 LBS

## 2025-02-25 DIAGNOSIS — Z01.419 ENCOUNTER FOR GYNECOLOGICAL EXAMINATION (GENERAL) (ROUTINE) W/OUT ABNORMAL FINDINGS: ICD-10-CM

## 2025-02-25 PROCEDURE — 99386 PREV VISIT NEW AGE 40-64: CPT

## 2025-02-25 PROCEDURE — 99396 PREV VISIT EST AGE 40-64: CPT

## 2025-02-27 LAB — HPV HIGH+LOW RISK DNA PNL CVX: NOT DETECTED

## 2025-03-03 LAB — CYTOLOGY CVX/VAG DOC THIN PREP: ABNORMAL

## 2025-03-07 ENCOUNTER — APPOINTMENT (OUTPATIENT)
Dept: RADIATION ONCOLOGY | Facility: CLINIC | Age: 55
End: 2025-03-07
Payer: COMMERCIAL

## 2025-03-07 ENCOUNTER — NON-APPOINTMENT (OUTPATIENT)
Age: 55
End: 2025-03-07

## 2025-03-07 VITALS
BODY MASS INDEX: 36.06 KG/M2 | DIASTOLIC BLOOD PRESSURE: 97 MMHG | WEIGHT: 191 LBS | SYSTOLIC BLOOD PRESSURE: 165 MMHG | HEART RATE: 79 BPM | RESPIRATION RATE: 16 BRPM | HEIGHT: 61 IN | OXYGEN SATURATION: 96 %

## 2025-03-07 DIAGNOSIS — C50.919 MALIGNANT NEOPLASM OF UNSPECIFIED SITE OF UNSPECIFIED FEMALE BREAST: ICD-10-CM

## 2025-03-07 PROCEDURE — 99214 OFFICE O/P EST MOD 30 MIN: CPT

## 2025-03-07 RX ORDER — ASPIRIN 81 MG
81 TABLET, DELAYED RELEASE (ENTERIC COATED) ORAL
Refills: 0 | Status: ACTIVE | COMMUNITY

## 2025-03-07 RX ORDER — LOSARTAN POTASSIUM 50 MG/1
50 TABLET, FILM COATED ORAL
Refills: 0 | Status: ACTIVE | COMMUNITY

## 2025-03-07 RX ORDER — ROSUVASTATIN CALCIUM 10 MG/1
10 TABLET, FILM COATED ORAL
Refills: 0 | Status: ACTIVE | COMMUNITY

## 2025-04-03 ENCOUNTER — TRANSCRIPTION ENCOUNTER (OUTPATIENT)
Age: 55
End: 2025-04-03

## 2025-04-05 ENCOUNTER — OUTPATIENT (OUTPATIENT)
Dept: OUTPATIENT SERVICES | Facility: HOSPITAL | Age: 55
LOS: 1 days | End: 2025-04-05
Payer: COMMERCIAL

## 2025-04-05 ENCOUNTER — APPOINTMENT (OUTPATIENT)
Dept: RADIOLOGY | Facility: CLINIC | Age: 55
End: 2025-04-05
Payer: COMMERCIAL

## 2025-04-05 DIAGNOSIS — Z98.890 OTHER SPECIFIED POSTPROCEDURAL STATES: Chronic | ICD-10-CM

## 2025-04-05 DIAGNOSIS — Z90.710 ACQUIRED ABSENCE OF BOTH CERVIX AND UTERUS: Chronic | ICD-10-CM

## 2025-04-05 DIAGNOSIS — Z00.8 ENCOUNTER FOR OTHER GENERAL EXAMINATION: ICD-10-CM

## 2025-04-05 DIAGNOSIS — Z93.3 COLOSTOMY STATUS: Chronic | ICD-10-CM

## 2025-04-05 DIAGNOSIS — Z90.13 ACQUIRED ABSENCE OF BILATERAL BREASTS AND NIPPLES: Chronic | ICD-10-CM

## 2025-04-05 PROCEDURE — 77085 DXA BONE DENSITY AXL VRT FX: CPT | Mod: 26

## 2025-04-05 PROCEDURE — 77085 DXA BONE DENSITY AXL VRT FX: CPT

## 2025-05-28 ENCOUNTER — OUTPATIENT (OUTPATIENT)
Dept: OUTPATIENT SERVICES | Facility: HOSPITAL | Age: 55
LOS: 1 days | Discharge: HOME | End: 2025-05-28

## 2025-05-28 DIAGNOSIS — Z93.3 COLOSTOMY STATUS: Chronic | ICD-10-CM

## 2025-05-28 DIAGNOSIS — Z98.890 OTHER SPECIFIED POSTPROCEDURAL STATES: Chronic | ICD-10-CM

## 2025-05-28 DIAGNOSIS — Z90.13 ACQUIRED ABSENCE OF BILATERAL BREASTS AND NIPPLES: Chronic | ICD-10-CM

## 2025-05-28 DIAGNOSIS — Z90.710 ACQUIRED ABSENCE OF BOTH CERVIX AND UTERUS: Chronic | ICD-10-CM

## 2025-05-28 DIAGNOSIS — C50.919 MALIGNANT NEOPLASM OF UNSPECIFIED SITE OF UNSPECIFIED FEMALE BREAST: ICD-10-CM

## 2025-05-31 ENCOUNTER — NON-APPOINTMENT (OUTPATIENT)
Age: 55
End: 2025-05-31

## 2025-06-02 ENCOUNTER — RESULT REVIEW (OUTPATIENT)
Age: 55
End: 2025-06-02

## 2025-06-02 ENCOUNTER — APPOINTMENT (OUTPATIENT)
Dept: HEMATOLOGY ONCOLOGY | Facility: CLINIC | Age: 55
End: 2025-06-02
Payer: COMMERCIAL

## 2025-06-02 VITALS
HEIGHT: 61 IN | HEART RATE: 78 BPM | DIASTOLIC BLOOD PRESSURE: 98 MMHG | OXYGEN SATURATION: 95 % | TEMPERATURE: 98.2 F | BODY MASS INDEX: 36.15 KG/M2 | WEIGHT: 191.47 LBS | SYSTOLIC BLOOD PRESSURE: 174 MMHG

## 2025-06-02 DIAGNOSIS — C50.919 MALIGNANT NEOPLASM OF UNSPECIFIED SITE OF UNSPECIFIED FEMALE BREAST: ICD-10-CM

## 2025-06-02 LAB
BASOPHILS # BLD AUTO: 0.03 K/UL — SIGNIFICANT CHANGE UP (ref 0–0.2)
BASOPHILS NFR BLD AUTO: 0.5 % — SIGNIFICANT CHANGE UP (ref 0–2)
EOSINOPHIL # BLD AUTO: 0.1 K/UL — SIGNIFICANT CHANGE UP (ref 0–0.5)
EOSINOPHIL NFR BLD AUTO: 1.5 % — SIGNIFICANT CHANGE UP (ref 0–6)
HCT VFR BLD CALC: 40.5 % — SIGNIFICANT CHANGE UP (ref 34.5–45)
HGB BLD-MCNC: 13 G/DL — SIGNIFICANT CHANGE UP (ref 11.5–15.5)
IMM GRANULOCYTES # BLD AUTO: 0.03 K/UL — SIGNIFICANT CHANGE UP (ref 0–0.07)
IMM GRANULOCYTES NFR BLD AUTO: 0.5 % — SIGNIFICANT CHANGE UP (ref 0–0.9)
LYMPHOCYTES # BLD AUTO: 2.09 K/UL — SIGNIFICANT CHANGE UP (ref 1–3.3)
LYMPHOCYTES NFR BLD AUTO: 32.1 % — SIGNIFICANT CHANGE UP (ref 13–44)
MCHC RBC-ENTMCNC: 29.9 PG — SIGNIFICANT CHANGE UP (ref 27–34)
MCHC RBC-ENTMCNC: 32.1 G/DL — SIGNIFICANT CHANGE UP (ref 32–36)
MCV RBC AUTO: 93.1 FL — SIGNIFICANT CHANGE UP (ref 80–100)
MONOCYTES # BLD AUTO: 0.36 K/UL — SIGNIFICANT CHANGE UP (ref 0–0.9)
MONOCYTES NFR BLD AUTO: 5.5 % — SIGNIFICANT CHANGE UP (ref 2–14)
NEUTROPHILS # BLD AUTO: 3.9 K/UL — SIGNIFICANT CHANGE UP (ref 1.8–7.4)
NEUTROPHILS NFR BLD AUTO: 59.9 % — SIGNIFICANT CHANGE UP (ref 43–77)
NRBC # BLD AUTO: 0 K/UL — SIGNIFICANT CHANGE UP (ref 0–0)
NRBC # FLD: 0 K/UL — SIGNIFICANT CHANGE UP (ref 0–0)
NRBC BLD AUTO-RTO: 0 /100 WBCS — SIGNIFICANT CHANGE UP (ref 0–0)
PLATELET # BLD AUTO: 258 K/UL — SIGNIFICANT CHANGE UP (ref 150–400)
PMV BLD: 9.6 FL — SIGNIFICANT CHANGE UP (ref 7–13)
RBC # BLD: 4.35 M/UL — SIGNIFICANT CHANGE UP (ref 3.8–5.2)
RBC # FLD: 13.3 % — SIGNIFICANT CHANGE UP (ref 10.3–14.5)
WBC # BLD: 6.51 K/UL — SIGNIFICANT CHANGE UP (ref 3.8–10.5)
WBC # FLD AUTO: 6.51 K/UL — SIGNIFICANT CHANGE UP (ref 3.8–10.5)

## 2025-06-02 PROCEDURE — 99215 OFFICE O/P EST HI 40 MIN: CPT

## 2025-06-02 RX ORDER — LOSARTAN POTASSIUM 100 MG/1
100 TABLET, FILM COATED ORAL
Refills: 0 | Status: ACTIVE | COMMUNITY

## 2025-06-03 LAB
25(OH)D3 SERPL-MCNC: 77.9 NG/ML
ALBUMIN SERPL ELPH-MCNC: 4.5 G/DL
ALP BLD-CCNC: 97 U/L
ALT SERPL-CCNC: 43 U/L
ANION GAP SERPL CALC-SCNC: 14 MMOL/L
AST SERPL-CCNC: 37 U/L
BILIRUB SERPL-MCNC: 0.5 MG/DL
BUN SERPL-MCNC: 14 MG/DL
CALCIUM SERPL-MCNC: 10.4 MG/DL
CHLORIDE SERPL-SCNC: 103 MMOL/L
CO2 SERPL-SCNC: 25 MMOL/L
CREAT SERPL-MCNC: 0.88 MG/DL
EGFRCR SERPLBLD CKD-EPI 2021: 77 ML/MIN/1.73M2
GLUCOSE SERPL-MCNC: 114 MG/DL
POTASSIUM SERPL-SCNC: 4.2 MMOL/L
PROT SERPL-MCNC: 7.6 G/DL
SODIUM SERPL-SCNC: 141 MMOL/L

## 2025-06-09 ENCOUNTER — TRANSCRIPTION ENCOUNTER (OUTPATIENT)
Age: 55
End: 2025-06-09

## 2025-06-11 ENCOUNTER — OUTPATIENT (OUTPATIENT)
Dept: OUTPATIENT SERVICES | Facility: HOSPITAL | Age: 55
LOS: 1 days | Discharge: HOME | End: 2025-06-11

## 2025-06-11 DIAGNOSIS — Z98.890 OTHER SPECIFIED POSTPROCEDURAL STATES: Chronic | ICD-10-CM

## 2025-06-11 DIAGNOSIS — Z90.13 ACQUIRED ABSENCE OF BILATERAL BREASTS AND NIPPLES: Chronic | ICD-10-CM

## 2025-06-11 DIAGNOSIS — Z90.710 ACQUIRED ABSENCE OF BOTH CERVIX AND UTERUS: Chronic | ICD-10-CM

## 2025-06-11 DIAGNOSIS — Z93.3 COLOSTOMY STATUS: Chronic | ICD-10-CM

## 2025-06-11 DIAGNOSIS — C50.919 MALIGNANT NEOPLASM OF UNSPECIFIED SITE OF UNSPECIFIED FEMALE BREAST: ICD-10-CM

## 2025-06-13 ENCOUNTER — RESULT REVIEW (OUTPATIENT)
Age: 55
End: 2025-06-13

## 2025-06-13 ENCOUNTER — APPOINTMENT (OUTPATIENT)
Dept: HEMATOLOGY ONCOLOGY | Facility: CLINIC | Age: 55
End: 2025-06-13
Payer: COMMERCIAL

## 2025-06-13 VITALS
BODY MASS INDEX: 35.87 KG/M2 | TEMPERATURE: 98.1 F | HEART RATE: 93 BPM | DIASTOLIC BLOOD PRESSURE: 93 MMHG | HEIGHT: 61 IN | WEIGHT: 190 LBS | OXYGEN SATURATION: 98 % | SYSTOLIC BLOOD PRESSURE: 163 MMHG

## 2025-06-13 DIAGNOSIS — I10 ESSENTIAL (PRIMARY) HYPERTENSION: ICD-10-CM

## 2025-06-13 DIAGNOSIS — C56.9 MALIGNANT NEOPLASM OF UNSPECIFIED OVARY: ICD-10-CM

## 2025-06-13 DIAGNOSIS — C44.310 BASAL CELL CARCINOMA OF SKIN OF UNSPECIFIED PARTS OF FACE: ICD-10-CM

## 2025-06-13 DIAGNOSIS — E04.1 NONTOXIC SINGLE THYROID NODULE: ICD-10-CM

## 2025-06-13 DIAGNOSIS — C50.911 MALIGNANT NEOPLASM OF UNSPECIFIED SITE OF RIGHT FEMALE BREAST: ICD-10-CM

## 2025-06-13 LAB
BASOPHILS # BLD AUTO: 0.02 K/UL — SIGNIFICANT CHANGE UP (ref 0–0.2)
BASOPHILS NFR BLD AUTO: 0.3 % — SIGNIFICANT CHANGE UP (ref 0–2)
EOSINOPHIL # BLD AUTO: 0.11 K/UL — SIGNIFICANT CHANGE UP (ref 0–0.5)
EOSINOPHIL NFR BLD AUTO: 1.5 % — SIGNIFICANT CHANGE UP (ref 0–6)
HCT VFR BLD CALC: 38.7 % — SIGNIFICANT CHANGE UP (ref 34.5–45)
HGB BLD-MCNC: 13.3 G/DL — SIGNIFICANT CHANGE UP (ref 11.5–15.5)
IMM GRANULOCYTES NFR BLD AUTO: 0.3 % — SIGNIFICANT CHANGE UP (ref 0–0.9)
LYMPHOCYTES # BLD AUTO: 2.83 K/UL — SIGNIFICANT CHANGE UP (ref 1–3.3)
LYMPHOCYTES # BLD AUTO: 38.9 % — SIGNIFICANT CHANGE UP (ref 13–44)
MCHC RBC-ENTMCNC: 31.1 PG — SIGNIFICANT CHANGE UP (ref 27–34)
MCHC RBC-ENTMCNC: 34.4 G/DL — SIGNIFICANT CHANGE UP (ref 32–36)
MCV RBC AUTO: 90.6 FL — SIGNIFICANT CHANGE UP (ref 80–100)
MONOCYTES # BLD AUTO: 0.43 K/UL — SIGNIFICANT CHANGE UP (ref 0–0.9)
MONOCYTES NFR BLD AUTO: 5.9 % — SIGNIFICANT CHANGE UP (ref 2–14)
NEUTROPHILS # BLD AUTO: 3.87 K/UL — SIGNIFICANT CHANGE UP (ref 1.8–7.4)
NEUTROPHILS NFR BLD AUTO: 53.1 % — SIGNIFICANT CHANGE UP (ref 43–77)
NRBC BLD AUTO-RTO: 0 /100 WBCS — SIGNIFICANT CHANGE UP (ref 0–0)
PLATELET # BLD AUTO: 226 K/UL — SIGNIFICANT CHANGE UP (ref 150–400)
RBC # BLD: 4.27 M/UL — SIGNIFICANT CHANGE UP (ref 3.8–5.2)
RBC # FLD: 13.6 % — SIGNIFICANT CHANGE UP (ref 10.3–14.5)
WBC # BLD: 7.28 K/UL — SIGNIFICANT CHANGE UP (ref 3.8–10.5)
WBC # FLD AUTO: 7.28 K/UL — SIGNIFICANT CHANGE UP (ref 3.8–10.5)

## 2025-06-13 PROCEDURE — G2211 COMPLEX E/M VISIT ADD ON: CPT | Mod: NC

## 2025-06-13 PROCEDURE — 99215 OFFICE O/P EST HI 40 MIN: CPT

## 2025-06-16 LAB
ALBUMIN SERPL ELPH-MCNC: 4.5 G/DL
ALP BLD-CCNC: 95 U/L
ALT SERPL-CCNC: 36 U/L
ANION GAP SERPL CALC-SCNC: 15 MMOL/L
AST SERPL-CCNC: 31 U/L
BILIRUB SERPL-MCNC: 0.4 MG/DL
BUN SERPL-MCNC: 14 MG/DL
CALCIUM SERPL-MCNC: 9.8 MG/DL
CANCER AG125 SERPL-ACNC: 9 U/ML
CHLORIDE SERPL-SCNC: 103 MMOL/L
CO2 SERPL-SCNC: 22 MMOL/L
CREAT SERPL-MCNC: 0.78 MG/DL
EGFRCR SERPLBLD CKD-EPI 2021: 89 ML/MIN/1.73M2
GLUCOSE SERPL-MCNC: 94 MG/DL
MAGNESIUM SERPL-MCNC: 2 MG/DL
POTASSIUM SERPL-SCNC: 4.1 MMOL/L
PROT SERPL-MCNC: 7.5 G/DL
SODIUM SERPL-SCNC: 141 MMOL/L

## 2025-07-03 NOTE — ED ADULT NURSE NOTE - NS ED NURSE LEVEL OF CONSCIOUSNESS ORIENTATION
Patient called to let Dr. Palla, know that her Glucose level is 265 and A1C 10.8. Patient stated she advised her PCP office to fax over all lab results as soon as possible. Patient is requesting some medicine to help with her Glucose & A1C. Any questions please contact patient at [phone]   Oriented - self; Oriented - place; Oriented - time

## 2025-09-08 ENCOUNTER — APPOINTMENT (OUTPATIENT)
Dept: ENDOCRINOLOGY | Facility: CLINIC | Age: 55
End: 2025-09-08
Payer: COMMERCIAL

## 2025-09-08 VITALS
WEIGHT: 189 LBS | DIASTOLIC BLOOD PRESSURE: 87 MMHG | SYSTOLIC BLOOD PRESSURE: 126 MMHG | HEART RATE: 90 BPM | HEIGHT: 61 IN | BODY MASS INDEX: 35.68 KG/M2 | OXYGEN SATURATION: 98 %

## 2025-09-08 DIAGNOSIS — M85.80 OTHER SPECIFIED DISORDERS OF BONE DENSITY AND STRUCTURE, UNSPECIFIED SITE: ICD-10-CM

## 2025-09-08 PROCEDURE — G2211 COMPLEX E/M VISIT ADD ON: CPT | Mod: NC

## 2025-09-08 PROCEDURE — 99214 OFFICE O/P EST MOD 30 MIN: CPT

## 2025-09-08 RX ORDER — CALCIUM CITRATE/VITAMIN D3 315MG-6.25
TABLET ORAL
Refills: 0 | Status: ACTIVE | COMMUNITY

## (undated) DEVICE — FORCEP RADIAL JAW 4 240CM DISP

## (undated) DEVICE — KIT DEFENDO 4 OLY 4 PC